# Patient Record
Sex: FEMALE | Race: WHITE | Employment: OTHER | URBAN - METROPOLITAN AREA
[De-identification: names, ages, dates, MRNs, and addresses within clinical notes are randomized per-mention and may not be internally consistent; named-entity substitution may affect disease eponyms.]

---

## 2017-02-03 ENCOUNTER — HOSPITAL ENCOUNTER (OUTPATIENT)
Dept: GENERAL RADIOLOGY | Age: 37
Discharge: HOME | End: 2017-02-03
Payer: MEDICAID

## 2017-02-03 ENCOUNTER — HOSPITAL ENCOUNTER (OUTPATIENT)
Dept: WOMENS IMAGING | Age: 37
Discharge: HOME | End: 2017-02-03
Payer: MEDICAID

## 2017-02-03 DIAGNOSIS — G89.29 CHRONIC RIGHT-SIDED LOW BACK PAIN, WITH SCIATICA PRESENCE UNSPECIFIED: ICD-10-CM

## 2017-02-03 DIAGNOSIS — Z13.9 SCREENING: ICD-10-CM

## 2017-02-03 DIAGNOSIS — M54.5 CHRONIC RIGHT-SIDED LOW BACK PAIN, WITH SCIATICA PRESENCE UNSPECIFIED: ICD-10-CM

## 2017-02-03 PROCEDURE — G0202 SCR MAMMO BI INCL CAD: HCPCS

## 2017-02-03 PROCEDURE — 72110 X-RAY EXAM L-2 SPINE 4/>VWS: CPT

## 2017-02-17 ENCOUNTER — OFFICE VISIT (OUTPATIENT)
Dept: SURGERY | Age: 37
End: 2017-02-17

## 2017-02-17 VITALS
TEMPERATURE: 96.1 F | HEIGHT: 65 IN | WEIGHT: 281 LBS | BODY MASS INDEX: 46.82 KG/M2 | DIASTOLIC BLOOD PRESSURE: 76 MMHG | SYSTOLIC BLOOD PRESSURE: 118 MMHG

## 2017-02-17 DIAGNOSIS — K80.10 CHRONIC CHOLECYSTITIS WITH CALCULUS: ICD-10-CM

## 2017-02-17 DIAGNOSIS — K80.10 CHRONIC CHOLECYSTITIS WITH CALCULUS: Primary | ICD-10-CM

## 2017-02-17 LAB
ALBUMIN SERPL-MCNC: 4.6 G/DL (ref 3.9–4.9)
ALP BLD-CCNC: 105 U/L (ref 40–130)
ALT SERPL-CCNC: 46 U/L (ref 0–33)
AMYLASE: 53 U/L (ref 28–100)
ANION GAP SERPL CALCULATED.3IONS-SCNC: 10 MEQ/L (ref 7–13)
AST SERPL-CCNC: 44 U/L (ref 0–35)
BILIRUB SERPL-MCNC: 0.2 MG/DL (ref 0–1.2)
BUN BLDV-MCNC: 6 MG/DL (ref 6–20)
CALCIUM SERPL-MCNC: 9.6 MG/DL (ref 8.6–10.2)
CHLORIDE BLD-SCNC: 101 MEQ/L (ref 98–107)
CO2: 27 MEQ/L (ref 22–29)
CREAT SERPL-MCNC: 0.61 MG/DL (ref 0.5–0.9)
GFR AFRICAN AMERICAN: >60
GFR NON-AFRICAN AMERICAN: >60
GLOBULIN: 2.9 G/DL (ref 2.3–3.5)
GLUCOSE BLD-MCNC: 127 MG/DL (ref 74–109)
POTASSIUM SERPL-SCNC: 4 MEQ/L (ref 3.5–5.1)
SODIUM BLD-SCNC: 138 MEQ/L (ref 132–144)
TOTAL PROTEIN: 7.5 G/DL (ref 6.4–8.1)

## 2017-02-17 PROCEDURE — 99203 OFFICE O/P NEW LOW 30 MIN: CPT | Performed by: SURGERY

## 2017-02-17 RX ORDER — LITHIUM CARBONATE 300 MG/1
300 CAPSULE ORAL
COMMUNITY
End: 2018-09-28 | Stop reason: SDUPTHER

## 2017-02-17 RX ORDER — SIMVASTATIN 40 MG
TABLET ORAL
COMMUNITY
Start: 2017-02-10 | End: 2017-07-19 | Stop reason: SDUPTHER

## 2017-02-17 RX ORDER — ORPHENADRINE CITRATE 100 MG/1
TABLET, EXTENDED RELEASE ORAL
Refills: 0 | COMMUNITY
Start: 2017-02-10 | End: 2017-07-19

## 2017-02-17 RX ORDER — CEPHALEXIN 250 MG/1
250 CAPSULE ORAL 3 TIMES DAILY
Qty: 42 CAPSULE | Refills: 1 | Status: SHIPPED | OUTPATIENT
Start: 2017-02-17 | End: 2017-03-03

## 2017-02-17 RX ORDER — SERTRALINE HYDROCHLORIDE 100 MG/1
100 TABLET, FILM COATED ORAL DAILY
COMMUNITY
End: 2018-09-28 | Stop reason: SDUPTHER

## 2017-02-17 RX ORDER — HYDROCODONE BITARTRATE AND ACETAMINOPHEN 5; 325 MG/1; MG/1
TABLET ORAL
Qty: 40 TABLET | Refills: 0 | Status: SHIPPED | OUTPATIENT
Start: 2017-02-17 | End: 2017-03-02

## 2017-02-17 ASSESSMENT — ENCOUNTER SYMPTOMS
EYES NEGATIVE: 1
RESPIRATORY NEGATIVE: 1
ALLERGIC/IMMUNOLOGIC NEGATIVE: 1
BLOOD IN STOOL: 0
RECTAL PAIN: 0
COLOR CHANGE: 0
RHINORRHEA: 0
NAUSEA: 1
ABDOMINAL DISTENTION: 0
SHORTNESS OF BREATH: 0
ABDOMINAL PAIN: 1
CHEST TIGHTNESS: 0
BACK PAIN: 1
VOMITING: 1

## 2017-02-18 ENCOUNTER — HOSPITAL ENCOUNTER (OUTPATIENT)
Dept: ULTRASOUND IMAGING | Age: 37
Discharge: HOME | End: 2017-02-18
Payer: MEDICAID

## 2017-02-18 DIAGNOSIS — K80.10 CHRONIC CHOLECYSTITIS WITH CALCULUS: ICD-10-CM

## 2017-02-18 PROCEDURE — 76705 ECHO EXAM OF ABDOMEN: CPT

## 2017-02-23 ENCOUNTER — PREP FOR PROCEDURE (OUTPATIENT)
Dept: SURGERY | Age: 37
End: 2017-02-23

## 2017-02-23 ENCOUNTER — OFFICE VISIT (OUTPATIENT)
Dept: SURGERY | Age: 37
End: 2017-02-23

## 2017-02-23 VITALS
DIASTOLIC BLOOD PRESSURE: 84 MMHG | HEIGHT: 65 IN | SYSTOLIC BLOOD PRESSURE: 122 MMHG | WEIGHT: 274 LBS | BODY MASS INDEX: 45.65 KG/M2 | TEMPERATURE: 95.6 F

## 2017-02-23 DIAGNOSIS — K80.10 CHRONIC CHOLECYSTITIS WITH CALCULUS: Primary | ICD-10-CM

## 2017-02-23 PROCEDURE — 99213 OFFICE O/P EST LOW 20 MIN: CPT | Performed by: SURGERY

## 2017-02-23 RX ORDER — OXYCODONE HYDROCHLORIDE AND ACETAMINOPHEN 5; 325 MG/1; MG/1
TABLET ORAL
Qty: 40 TABLET | Refills: 0 | Status: SHIPPED | OUTPATIENT
Start: 2017-02-23 | End: 2017-07-19

## 2017-02-23 RX ORDER — FLUCONAZOLE 150 MG/1
150 TABLET ORAL DAILY
Qty: 5 TABLET | Refills: 0 | Status: SHIPPED | OUTPATIENT
Start: 2017-02-23 | End: 2017-02-24

## 2017-02-27 ENCOUNTER — ANESTHESIA EVENT (OUTPATIENT)
Dept: OPERATING ROOM | Age: 37
End: 2017-02-27
Payer: MEDICAID

## 2017-02-28 ENCOUNTER — HOSPITAL ENCOUNTER (OUTPATIENT)
Dept: GENERAL RADIOLOGY | Age: 37
Setting detail: OUTPATIENT SURGERY
Discharge: HOME | End: 2017-02-28
Attending: SURGERY
Payer: MEDICAID

## 2017-02-28 ENCOUNTER — HOSPITAL ENCOUNTER (OUTPATIENT)
Age: 37
Setting detail: OUTPATIENT SURGERY
Discharge: HOME | End: 2017-02-28
Attending: SURGERY | Admitting: SURGERY
Payer: MEDICAID

## 2017-02-28 ENCOUNTER — ANESTHESIA (OUTPATIENT)
Dept: OPERATING ROOM | Age: 37
End: 2017-02-28
Payer: MEDICAID

## 2017-02-28 ENCOUNTER — SURGERY (OUTPATIENT)
Age: 37
End: 2017-02-28

## 2017-02-28 VITALS — TEMPERATURE: 96.8 F | SYSTOLIC BLOOD PRESSURE: 116 MMHG | OXYGEN SATURATION: 93 % | DIASTOLIC BLOOD PRESSURE: 63 MMHG

## 2017-02-28 VITALS
DIASTOLIC BLOOD PRESSURE: 83 MMHG | TEMPERATURE: 98 F | RESPIRATION RATE: 20 BRPM | SYSTOLIC BLOOD PRESSURE: 122 MMHG | HEART RATE: 83 BPM | HEIGHT: 65 IN | OXYGEN SATURATION: 98 % | WEIGHT: 278 LBS | BODY MASS INDEX: 46.32 KG/M2

## 2017-02-28 DIAGNOSIS — K80.50 STONES COMMON DUCT: ICD-10-CM

## 2017-02-28 LAB
GLUCOSE BLD-MCNC: 120 MG/DL (ref 60–115)
GLUCOSE BLD-MCNC: 137 MG/DL (ref 60–115)
HCT VFR BLD CALC: 44.7 % (ref 37–47)
HEMOGLOBIN: 14.5 G/DL (ref 12–16)
MCH RBC QN AUTO: 27.2 PG (ref 27–31.3)
MCHC RBC AUTO-ENTMCNC: 32.4 % (ref 33–37)
MCV RBC AUTO: 84 FL (ref 82–100)
PDW BLD-RTO: 15.9 % (ref 11.5–14.5)
PERFORMED ON: ABNORMAL
PERFORMED ON: ABNORMAL
PLATELET # BLD: 204 K/UL (ref 130–400)
RBC # BLD: 5.32 M/UL (ref 4.2–5.4)
WBC # BLD: 11.4 K/UL (ref 4.8–10.8)

## 2017-02-28 PROCEDURE — 88304 TISSUE EXAM BY PATHOLOGIST: CPT

## 2017-02-28 PROCEDURE — 6360000002 HC RX W HCPCS

## 2017-02-28 PROCEDURE — 6360000002 HC RX W HCPCS: Performed by: SURGERY

## 2017-02-28 PROCEDURE — 6360000002 HC RX W HCPCS: Performed by: NURSE ANESTHETIST, CERTIFIED REGISTERED

## 2017-02-28 PROCEDURE — 85027 COMPLETE CBC AUTOMATED: CPT

## 2017-02-28 PROCEDURE — 87070 CULTURE OTHR SPECIMN AEROBIC: CPT

## 2017-02-28 PROCEDURE — 2580000003 HC RX 258: Performed by: STUDENT IN AN ORGANIZED HEALTH CARE EDUCATION/TRAINING PROGRAM

## 2017-02-28 PROCEDURE — 7100000001 HC PACU RECOVERY - ADDTL 15 MIN: Performed by: SURGERY

## 2017-02-28 PROCEDURE — 3700000001 HC ADD 15 MINUTES (ANESTHESIA): Performed by: SURGERY

## 2017-02-28 PROCEDURE — 7100000011 HC PHASE II RECOVERY - ADDTL 15 MIN: Performed by: SURGERY

## 2017-02-28 PROCEDURE — 3600000004 HC SURGERY LEVEL 4 BASE: Performed by: SURGERY

## 2017-02-28 PROCEDURE — 2500000003 HC RX 250 WO HCPCS: Performed by: STUDENT IN AN ORGANIZED HEALTH CARE EDUCATION/TRAINING PROGRAM

## 2017-02-28 PROCEDURE — 87075 CULTR BACTERIA EXCEPT BLOOD: CPT

## 2017-02-28 PROCEDURE — 76000 FLUOROSCOPY <1 HR PHYS/QHP: CPT

## 2017-02-28 PROCEDURE — 2580000003 HC RX 258: Performed by: SURGERY

## 2017-02-28 PROCEDURE — 3700000000 HC ANESTHESIA ATTENDED CARE: Performed by: SURGERY

## 2017-02-28 PROCEDURE — 47563 LAPARO CHOLECYSTECTOMY/GRAPH: CPT | Performed by: SURGERY

## 2017-02-28 PROCEDURE — 6370000000 HC RX 637 (ALT 250 FOR IP): Performed by: SURGERY

## 2017-02-28 PROCEDURE — 3600000014 HC SURGERY LEVEL 4 ADDTL 15MIN: Performed by: SURGERY

## 2017-02-28 PROCEDURE — 2500000003 HC RX 250 WO HCPCS: Performed by: NURSE ANESTHETIST, CERTIFIED REGISTERED

## 2017-02-28 PROCEDURE — 6360000002 HC RX W HCPCS: Performed by: ANESTHESIOLOGY

## 2017-02-28 PROCEDURE — 87205 SMEAR GRAM STAIN: CPT

## 2017-02-28 PROCEDURE — 7100000010 HC PHASE II RECOVERY - FIRST 15 MIN: Performed by: SURGERY

## 2017-02-28 PROCEDURE — 7100000000 HC PACU RECOVERY - FIRST 15 MIN: Performed by: SURGERY

## 2017-02-28 PROCEDURE — 6360000004 HC RX CONTRAST MEDICATION: Performed by: SURGERY

## 2017-02-28 RX ORDER — OXYCODONE HYDROCHLORIDE AND ACETAMINOPHEN 5; 325 MG/1; MG/1
1 TABLET ORAL EVERY 4 HOURS PRN
Status: DISCONTINUED | OUTPATIENT
Start: 2017-02-28 | End: 2017-02-28 | Stop reason: HOSPADM

## 2017-02-28 RX ORDER — PROPOFOL 10 MG/ML
INJECTION, EMULSION INTRAVENOUS PRN
Status: DISCONTINUED | OUTPATIENT
Start: 2017-02-28 | End: 2017-02-28 | Stop reason: SDUPTHER

## 2017-02-28 RX ORDER — ROCURONIUM BROMIDE 10 MG/ML
INJECTION, SOLUTION INTRAVENOUS PRN
Status: DISCONTINUED | OUTPATIENT
Start: 2017-02-28 | End: 2017-02-28 | Stop reason: SDUPTHER

## 2017-02-28 RX ORDER — SODIUM CHLORIDE, SODIUM LACTATE, POTASSIUM CHLORIDE, CALCIUM CHLORIDE 600; 310; 30; 20 MG/100ML; MG/100ML; MG/100ML; MG/100ML
INJECTION, SOLUTION INTRAVENOUS CONTINUOUS
Status: DISCONTINUED | OUTPATIENT
Start: 2017-02-28 | End: 2017-02-28 | Stop reason: SDUPTHER

## 2017-02-28 RX ORDER — HYDROMORPHONE HCL 110MG/55ML
PATIENT CONTROLLED ANALGESIA SYRINGE INTRAVENOUS PRN
Status: DISCONTINUED | OUTPATIENT
Start: 2017-02-28 | End: 2017-02-28 | Stop reason: SDUPTHER

## 2017-02-28 RX ORDER — SODIUM CHLORIDE 0.9 % (FLUSH) 0.9 %
10 SYRINGE (ML) INJECTION PRN
Status: DISCONTINUED | OUTPATIENT
Start: 2017-02-28 | End: 2017-02-28 | Stop reason: HOSPADM

## 2017-02-28 RX ORDER — FENTANYL CITRATE 50 UG/ML
INJECTION, SOLUTION INTRAMUSCULAR; INTRAVENOUS PRN
Status: DISCONTINUED | OUTPATIENT
Start: 2017-02-28 | End: 2017-02-28 | Stop reason: SDUPTHER

## 2017-02-28 RX ORDER — PROMETHAZINE HYDROCHLORIDE 25 MG/ML
25 INJECTION, SOLUTION INTRAMUSCULAR; INTRAVENOUS
Status: DISCONTINUED | OUTPATIENT
Start: 2017-02-28 | End: 2017-02-28 | Stop reason: HOSPADM

## 2017-02-28 RX ORDER — SODIUM CHLORIDE 0.9 % (FLUSH) 0.9 %
10 SYRINGE (ML) INJECTION EVERY 12 HOURS SCHEDULED
Status: DISCONTINUED | OUTPATIENT
Start: 2017-02-28 | End: 2017-02-28 | Stop reason: SDUPTHER

## 2017-02-28 RX ORDER — ACETAMINOPHEN 325 MG/1
650 TABLET ORAL EVERY 4 HOURS PRN
Status: DISCONTINUED | OUTPATIENT
Start: 2017-02-28 | End: 2017-02-28 | Stop reason: HOSPADM

## 2017-02-28 RX ORDER — ONDANSETRON 2 MG/ML
4 INJECTION INTRAMUSCULAR; INTRAVENOUS EVERY 6 HOURS PRN
Status: DISCONTINUED | OUTPATIENT
Start: 2017-02-28 | End: 2017-02-28 | Stop reason: HOSPADM

## 2017-02-28 RX ORDER — OXYCODONE HYDROCHLORIDE AND ACETAMINOPHEN 5; 325 MG/1; MG/1
2 TABLET ORAL EVERY 4 HOURS PRN
Status: DISCONTINUED | OUTPATIENT
Start: 2017-02-28 | End: 2017-02-28 | Stop reason: HOSPADM

## 2017-02-28 RX ORDER — MORPHINE SULFATE 2 MG/ML
1 INJECTION, SOLUTION INTRAMUSCULAR; INTRAVENOUS
Status: DISCONTINUED | OUTPATIENT
Start: 2017-02-28 | End: 2017-02-28 | Stop reason: HOSPADM

## 2017-02-28 RX ORDER — SODIUM CHLORIDE 0.9 % (FLUSH) 0.9 %
10 SYRINGE (ML) INJECTION EVERY 12 HOURS SCHEDULED
Status: DISCONTINUED | OUTPATIENT
Start: 2017-02-28 | End: 2017-02-28 | Stop reason: HOSPADM

## 2017-02-28 RX ORDER — LIDOCAINE HYDROCHLORIDE 10 MG/ML
1 INJECTION, SOLUTION EPIDURAL; INFILTRATION; INTRACAUDAL; PERINEURAL
Status: COMPLETED | OUTPATIENT
Start: 2017-02-28 | End: 2017-02-28

## 2017-02-28 RX ORDER — FENTANYL CITRATE 50 UG/ML
50 INJECTION, SOLUTION INTRAMUSCULAR; INTRAVENOUS EVERY 5 MIN PRN
Status: COMPLETED | OUTPATIENT
Start: 2017-02-28 | End: 2017-02-28

## 2017-02-28 RX ORDER — SUCCINYLCHOLINE CHLORIDE 20 MG/ML
INJECTION INTRAMUSCULAR; INTRAVENOUS PRN
Status: DISCONTINUED | OUTPATIENT
Start: 2017-02-28 | End: 2017-02-28 | Stop reason: SDUPTHER

## 2017-02-28 RX ORDER — SODIUM CHLORIDE, SODIUM LACTATE, POTASSIUM CHLORIDE, CALCIUM CHLORIDE 600; 310; 30; 20 MG/100ML; MG/100ML; MG/100ML; MG/100ML
INJECTION, SOLUTION INTRAVENOUS CONTINUOUS
Status: DISCONTINUED | OUTPATIENT
Start: 2017-02-28 | End: 2017-02-28 | Stop reason: HOSPADM

## 2017-02-28 RX ORDER — LIDOCAINE HYDROCHLORIDE 20 MG/ML
INJECTION, SOLUTION INFILTRATION; PERINEURAL PRN
Status: DISCONTINUED | OUTPATIENT
Start: 2017-02-28 | End: 2017-02-28 | Stop reason: SDUPTHER

## 2017-02-28 RX ORDER — MAGNESIUM HYDROXIDE 1200 MG/15ML
LIQUID ORAL CONTINUOUS PRN
Status: DISCONTINUED | OUTPATIENT
Start: 2017-02-28 | End: 2017-02-28 | Stop reason: HOSPADM

## 2017-02-28 RX ORDER — KETOROLAC TROMETHAMINE 30 MG/ML
30 INJECTION, SOLUTION INTRAMUSCULAR; INTRAVENOUS
Status: COMPLETED | OUTPATIENT
Start: 2017-02-28 | End: 2017-02-28

## 2017-02-28 RX ORDER — ONDANSETRON 2 MG/ML
4 INJECTION INTRAMUSCULAR; INTRAVENOUS
Status: DISCONTINUED | OUTPATIENT
Start: 2017-02-28 | End: 2017-02-28 | Stop reason: HOSPADM

## 2017-02-28 RX ORDER — SODIUM CHLORIDE 0.9 % (FLUSH) 0.9 %
10 SYRINGE (ML) INJECTION PRN
Status: DISCONTINUED | OUTPATIENT
Start: 2017-02-28 | End: 2017-02-28 | Stop reason: SDUPTHER

## 2017-02-28 RX ORDER — ONDANSETRON 2 MG/ML
INJECTION INTRAMUSCULAR; INTRAVENOUS PRN
Status: DISCONTINUED | OUTPATIENT
Start: 2017-02-28 | End: 2017-02-28 | Stop reason: SDUPTHER

## 2017-02-28 RX ORDER — MIDAZOLAM HYDROCHLORIDE 1 MG/ML
INJECTION INTRAMUSCULAR; INTRAVENOUS
Status: COMPLETED
Start: 2017-02-28 | End: 2017-02-28

## 2017-02-28 RX ADMIN — HYDROMORPHONE HYDROCHLORIDE 0.5 MG: 1 INJECTION, SOLUTION INTRAMUSCULAR; INTRAVENOUS; SUBCUTANEOUS at 14:00

## 2017-02-28 RX ADMIN — SODIUM CHLORIDE 1000 ML: 900 IRRIGANT IRRIGATION at 12:40

## 2017-02-28 RX ADMIN — LIDOCAINE HYDROCHLORIDE 0.1 ML: 10 INJECTION, SOLUTION EPIDURAL; INFILTRATION; INTRACAUDAL; PERINEURAL at 10:45

## 2017-02-28 RX ADMIN — HYDROMORPHONE HYDROCHLORIDE 0.4 MG: 2 INJECTION, SOLUTION INTRAMUSCULAR; INTRAVENOUS; SUBCUTANEOUS at 13:15

## 2017-02-28 RX ADMIN — HYDROMORPHONE HYDROCHLORIDE 0.5 MG: 1 INJECTION, SOLUTION INTRAMUSCULAR; INTRAVENOUS; SUBCUTANEOUS at 14:10

## 2017-02-28 RX ADMIN — ONDANSETRON HYDROCHLORIDE 4 MG: 2 INJECTION, SOLUTION INTRAMUSCULAR; INTRAVENOUS at 13:05

## 2017-02-28 RX ADMIN — DEXTROSE MONOHYDRATE 3 G: 50 INJECTION, SOLUTION INTRAVENOUS at 12:12

## 2017-02-28 RX ADMIN — FENTANYL CITRATE 50 MCG: 50 INJECTION, SOLUTION INTRAMUSCULAR; INTRAVENOUS at 12:35

## 2017-02-28 RX ADMIN — IOTHALAMATE MEGLUMINE 9 ML: 430 INJECTION INTRAVASCULAR at 13:19

## 2017-02-28 RX ADMIN — FENTANYL CITRATE 50 MCG: 50 INJECTION, SOLUTION INTRAMUSCULAR; INTRAVENOUS at 13:51

## 2017-02-28 RX ADMIN — LIDOCAINE HYDROCHLORIDE 100 MG: 20 INJECTION, SOLUTION INFILTRATION; PERINEURAL at 12:20

## 2017-02-28 RX ADMIN — FENTANYL CITRATE 50 MCG: 50 INJECTION, SOLUTION INTRAMUSCULAR; INTRAVENOUS at 13:44

## 2017-02-28 RX ADMIN — SODIUM CHLORIDE, POTASSIUM CHLORIDE, SODIUM LACTATE AND CALCIUM CHLORIDE: 600; 310; 30; 20 INJECTION, SOLUTION INTRAVENOUS at 13:10

## 2017-02-28 RX ADMIN — HYDROMORPHONE HYDROCHLORIDE 0.4 MG: 2 INJECTION, SOLUTION INTRAMUSCULAR; INTRAVENOUS; SUBCUTANEOUS at 13:00

## 2017-02-28 RX ADMIN — Medication 1 STICK: at 13:00

## 2017-02-28 RX ADMIN — HYDROMORPHONE HYDROCHLORIDE 0.4 MG: 2 INJECTION, SOLUTION INTRAMUSCULAR; INTRAVENOUS; SUBCUTANEOUS at 13:27

## 2017-02-28 RX ADMIN — HYDROMORPHONE HYDROCHLORIDE 0.4 MG: 2 INJECTION, SOLUTION INTRAMUSCULAR; INTRAVENOUS; SUBCUTANEOUS at 13:28

## 2017-02-28 RX ADMIN — MIDAZOLAM HYDROCHLORIDE 2 MG: 1 INJECTION, SOLUTION INTRAMUSCULAR; INTRAVENOUS at 12:12

## 2017-02-28 RX ADMIN — FENTANYL CITRATE 50 MCG: 50 INJECTION, SOLUTION INTRAMUSCULAR; INTRAVENOUS at 12:45

## 2017-02-28 RX ADMIN — OXYCODONE HYDROCHLORIDE AND ACETAMINOPHEN 2 TABLET: 5; 325 TABLET ORAL at 15:05

## 2017-02-28 RX ADMIN — SODIUM CHLORIDE, POTASSIUM CHLORIDE, SODIUM LACTATE AND CALCIUM CHLORIDE: 600; 310; 30; 20 INJECTION, SOLUTION INTRAVENOUS at 10:45

## 2017-02-28 RX ADMIN — KETOROLAC TROMETHAMINE 30 MG: 30 INJECTION, SOLUTION INTRAMUSCULAR at 10:45

## 2017-02-28 RX ADMIN — ROCURONIUM BROMIDE 30 MG: 10 SOLUTION INTRAVENOUS at 12:28

## 2017-02-28 RX ADMIN — FENTANYL CITRATE 100 MCG: 50 INJECTION, SOLUTION INTRAMUSCULAR; INTRAVENOUS at 12:20

## 2017-02-28 RX ADMIN — HYDROMORPHONE HYDROCHLORIDE 0.5 MG: 1 INJECTION, SOLUTION INTRAMUSCULAR; INTRAVENOUS; SUBCUTANEOUS at 14:20

## 2017-02-28 RX ADMIN — SUGAMMADEX 252 MG: 100 INJECTION, SOLUTION INTRAVENOUS at 13:15

## 2017-02-28 RX ADMIN — ROCURONIUM BROMIDE 10 MG: 10 SOLUTION INTRAVENOUS at 12:20

## 2017-02-28 RX ADMIN — PROPOFOL 200 MG: 10 INJECTION, EMULSION INTRAVENOUS at 12:20

## 2017-02-28 RX ADMIN — SODIUM CHLORIDE 1000 ML: 900 IRRIGANT IRRIGATION at 12:41

## 2017-02-28 RX ADMIN — HYDROMORPHONE HYDROCHLORIDE 0.4 MG: 2 INJECTION, SOLUTION INTRAMUSCULAR; INTRAVENOUS; SUBCUTANEOUS at 13:20

## 2017-02-28 RX ADMIN — HYDROMORPHONE HYDROCHLORIDE 0.5 MG: 1 INJECTION, SOLUTION INTRAMUSCULAR; INTRAVENOUS; SUBCUTANEOUS at 14:29

## 2017-02-28 RX ADMIN — SUCCINYLCHOLINE CHLORIDE 140 MG: 20 INJECTION, SOLUTION INTRAMUSCULAR; INTRAVENOUS at 12:20

## 2017-02-28 ASSESSMENT — PAIN SCALES - GENERAL
PAINLEVEL_OUTOF10: 5
PAINLEVEL_OUTOF10: 9
PAINLEVEL_OUTOF10: 8
PAINLEVEL_OUTOF10: 0
PAINLEVEL_OUTOF10: 4
PAINLEVEL_OUTOF10: 7
PAINLEVEL_OUTOF10: 3
PAINLEVEL_OUTOF10: 6
PAINLEVEL_OUTOF10: 5
PAINLEVEL_OUTOF10: 9
PAINLEVEL_OUTOF10: 10
PAINLEVEL_OUTOF10: 6

## 2017-02-28 ASSESSMENT — ENCOUNTER SYMPTOMS: STRIDOR: 0

## 2017-03-01 ENCOUNTER — TELEPHONE (OUTPATIENT)
Dept: SURGERY | Age: 37
End: 2017-03-01

## 2017-03-02 ENCOUNTER — OFFICE VISIT (OUTPATIENT)
Dept: SURGERY | Age: 37
End: 2017-03-02

## 2017-03-02 VITALS
HEIGHT: 65 IN | SYSTOLIC BLOOD PRESSURE: 116 MMHG | TEMPERATURE: 96.9 F | WEIGHT: 280 LBS | BODY MASS INDEX: 46.65 KG/M2 | DIASTOLIC BLOOD PRESSURE: 78 MMHG

## 2017-03-02 DIAGNOSIS — K80.10 CHRONIC CHOLECYSTITIS WITH CALCULUS: Primary | ICD-10-CM

## 2017-03-02 LAB
ANAEROBIC CULTURE: NORMAL
CULTURE SURGICAL: NORMAL
GRAM STAIN RESULT: NORMAL

## 2017-03-02 PROCEDURE — 99024 POSTOP FOLLOW-UP VISIT: CPT | Performed by: SURGERY

## 2017-03-02 RX ORDER — OXYCODONE HYDROCHLORIDE AND ACETAMINOPHEN 5; 325 MG/1; MG/1
TABLET ORAL
Qty: 40 TABLET | Refills: 0 | Status: SHIPPED | OUTPATIENT
Start: 2017-03-02 | End: 2017-07-19

## 2017-03-03 PROBLEM — F19.11 HISTORY OF DRUG ABUSE (HCC): Status: ACTIVE | Noted: 2017-03-03

## 2017-06-15 PROBLEM — M47.816 SPONDYLOSIS OF LUMBAR REGION WITHOUT MYELOPATHY OR RADICULOPATHY: Status: ACTIVE | Noted: 2017-06-15

## 2017-07-19 ENCOUNTER — OFFICE VISIT (OUTPATIENT)
Dept: PRIMARY CARE CLINIC | Age: 37
End: 2017-07-19

## 2017-07-19 VITALS
HEIGHT: 65 IN | WEIGHT: 265 LBS | BODY MASS INDEX: 44.15 KG/M2 | RESPIRATION RATE: 16 BRPM | SYSTOLIC BLOOD PRESSURE: 120 MMHG | DIASTOLIC BLOOD PRESSURE: 76 MMHG | HEART RATE: 95 BPM | OXYGEN SATURATION: 93 % | TEMPERATURE: 98 F

## 2017-07-19 DIAGNOSIS — Z20.5 PERINATAL HEPATITIS C EXPOSURE: ICD-10-CM

## 2017-07-19 DIAGNOSIS — E78.5 DYSLIPIDEMIA: ICD-10-CM

## 2017-07-19 DIAGNOSIS — R53.81 MALAISE AND FATIGUE: ICD-10-CM

## 2017-07-19 DIAGNOSIS — R53.83 MALAISE AND FATIGUE: ICD-10-CM

## 2017-07-19 DIAGNOSIS — E11.51 DM (DIABETES MELLITUS) TYPE II CONTROLLED PERIPHERAL VASCULAR DISORDER: Primary | ICD-10-CM

## 2017-07-19 DIAGNOSIS — E11.51 DM (DIABETES MELLITUS) TYPE II CONTROLLED PERIPHERAL VASCULAR DISORDER: ICD-10-CM

## 2017-07-19 LAB
CHOLESTEROL, TOTAL: 240 MG/DL (ref 0–199)
CREATININE URINE: 109.9 MG/DL
HBA1C MFR BLD: 6.3 % (ref 4.8–5.9)
HDLC SERPL-MCNC: 30 MG/DL (ref 40–59)
LDL CHOLESTEROL CALCULATED: ABNORMAL MG/DL (ref 0–129)
MICROALBUMIN UR-MCNC: <1.2 MG/DL
MICROALBUMIN/CREAT UR-RTO: NORMAL MG/G (ref 0–30)
T4 FREE: 1.11 NG/DL (ref 0.93–1.7)
TRIGL SERPL-MCNC: 542 MG/DL (ref 0–200)
TSH SERPL DL<=0.05 MIU/L-ACNC: 3.74 UIU/ML (ref 0.27–4.2)

## 2017-07-19 PROCEDURE — 99203 OFFICE O/P NEW LOW 30 MIN: CPT | Performed by: FAMILY MEDICINE

## 2017-07-19 RX ORDER — TRAMADOL HYDROCHLORIDE 50 MG/1
50 TABLET ORAL EVERY 6 HOURS PRN
Qty: 60 TABLET | Refills: 1 | Status: SHIPPED | OUTPATIENT
Start: 2017-07-19 | End: 2017-08-16 | Stop reason: SDUPTHER

## 2017-07-19 RX ORDER — SIMVASTATIN 40 MG
40 TABLET ORAL NIGHTLY
Qty: 90 TABLET | Refills: 1 | Status: SHIPPED | OUTPATIENT
Start: 2017-07-19 | End: 2018-11-23 | Stop reason: DRUGHIGH

## 2017-07-19 ASSESSMENT — ENCOUNTER SYMPTOMS
SINUS PRESSURE: 0
DIARRHEA: 1
NAUSEA: 0
RESPIRATORY NEGATIVE: 1
COUGH: 0
VOMITING: 0
ABDOMINAL PAIN: 0
SORE THROAT: 0
BLURRED VISION: 0
CONSTIPATION: 0
BACK PAIN: 0
WHEEZING: 0
SHORTNESS OF BREATH: 0

## 2017-07-19 ASSESSMENT — PATIENT HEALTH QUESTIONNAIRE - PHQ9
SUM OF ALL RESPONSES TO PHQ QUESTIONS 1-9: 0
2. FEELING DOWN, DEPRESSED OR HOPELESS: 0
1. LITTLE INTEREST OR PLEASURE IN DOING THINGS: 0
SUM OF ALL RESPONSES TO PHQ9 QUESTIONS 1 & 2: 0

## 2017-07-22 LAB — HIV-1 WESTERN BLOT: NEGATIVE

## 2017-08-16 ENCOUNTER — TELEPHONE (OUTPATIENT)
Dept: PRIMARY CARE CLINIC | Age: 37
End: 2017-08-16

## 2017-08-16 ENCOUNTER — OFFICE VISIT (OUTPATIENT)
Dept: PRIMARY CARE CLINIC | Age: 37
End: 2017-08-16

## 2017-08-16 VITALS
HEIGHT: 65 IN | SYSTOLIC BLOOD PRESSURE: 108 MMHG | WEIGHT: 263 LBS | DIASTOLIC BLOOD PRESSURE: 70 MMHG | TEMPERATURE: 97.2 F | RESPIRATION RATE: 16 BRPM | HEART RATE: 95 BPM | BODY MASS INDEX: 43.82 KG/M2 | OXYGEN SATURATION: 97 %

## 2017-08-16 DIAGNOSIS — M47.816 SPONDYLOSIS OF LUMBAR REGION WITHOUT MYELOPATHY OR RADICULOPATHY: Primary | ICD-10-CM

## 2017-08-16 PROCEDURE — 99213 OFFICE O/P EST LOW 20 MIN: CPT | Performed by: FAMILY MEDICINE

## 2017-08-16 RX ORDER — TRAMADOL HYDROCHLORIDE 50 MG/1
50 TABLET ORAL EVERY 6 HOURS PRN
Qty: 60 TABLET | Refills: 1 | Status: SHIPPED | OUTPATIENT
Start: 2017-08-16 | End: 2018-10-12

## 2017-08-16 RX ORDER — FENOFIBRATE 160 MG/1
160 TABLET ORAL DAILY
Qty: 30 TABLET | Refills: 5 | Status: SHIPPED | OUTPATIENT
Start: 2017-08-16 | End: 2018-07-10

## 2017-08-16 ASSESSMENT — ENCOUNTER SYMPTOMS
SORE THROAT: 0
DIARRHEA: 0
WHEEZING: 0
COUGH: 0
CONSTIPATION: 0
RESPIRATORY NEGATIVE: 1
ABDOMINAL PAIN: 0
BACK PAIN: 1
SINUS PRESSURE: 0
VOMITING: 0
NAUSEA: 0
SHORTNESS OF BREATH: 0

## 2017-08-24 ENCOUNTER — HOSPITAL ENCOUNTER (OUTPATIENT)
Dept: MRI IMAGING | Age: 37
Discharge: HOME | End: 2017-08-24
Payer: MEDICAID

## 2017-08-24 VITALS — WEIGHT: 265 LBS | BODY MASS INDEX: 44.15 KG/M2 | HEIGHT: 65 IN

## 2017-08-24 DIAGNOSIS — M47.816 SPONDYLOSIS OF LUMBAR REGION WITHOUT MYELOPATHY OR RADICULOPATHY: ICD-10-CM

## 2017-08-24 PROCEDURE — 72148 MRI LUMBAR SPINE W/O DYE: CPT

## 2018-07-10 ENCOUNTER — OFFICE VISIT (OUTPATIENT)
Dept: PRIMARY CARE CLINIC | Age: 38
End: 2018-07-10
Payer: MEDICAID

## 2018-07-10 VITALS
WEIGHT: 259 LBS | BODY MASS INDEX: 43.15 KG/M2 | SYSTOLIC BLOOD PRESSURE: 126 MMHG | HEART RATE: 97 BPM | DIASTOLIC BLOOD PRESSURE: 80 MMHG | OXYGEN SATURATION: 98 % | HEIGHT: 65 IN | RESPIRATION RATE: 16 BRPM | TEMPERATURE: 99.4 F

## 2018-07-10 DIAGNOSIS — R73.9 HYPERGLYCEMIA: ICD-10-CM

## 2018-07-10 DIAGNOSIS — R53.81 MALAISE AND FATIGUE: ICD-10-CM

## 2018-07-10 DIAGNOSIS — R53.83 MALAISE AND FATIGUE: ICD-10-CM

## 2018-07-10 DIAGNOSIS — Z20.6 HIV EXPOSURE: ICD-10-CM

## 2018-07-10 DIAGNOSIS — E78.5 DYSLIPIDEMIA: ICD-10-CM

## 2018-07-10 DIAGNOSIS — N63.20 BREAST MASS, LEFT: Primary | ICD-10-CM

## 2018-07-10 LAB
ALBUMIN SERPL-MCNC: 4.4 G/DL (ref 3.9–4.9)
ALP BLD-CCNC: 91 U/L (ref 40–130)
ALT SERPL-CCNC: 32 U/L (ref 0–33)
ANION GAP SERPL CALCULATED.3IONS-SCNC: 15 MEQ/L (ref 7–13)
AST SERPL-CCNC: 22 U/L (ref 0–35)
BASOPHILS ABSOLUTE: 0.2 K/UL (ref 0–0.2)
BASOPHILS RELATIVE PERCENT: 1.4 %
BILIRUB SERPL-MCNC: <0.2 MG/DL (ref 0–1.2)
BUN BLDV-MCNC: 7 MG/DL (ref 6–20)
CALCIUM SERPL-MCNC: 9.7 MG/DL (ref 8.6–10.2)
CHLORIDE BLD-SCNC: 102 MEQ/L (ref 98–107)
CHOLESTEROL, TOTAL: 221 MG/DL (ref 0–199)
CO2: 24 MEQ/L (ref 22–29)
CREAT SERPL-MCNC: 0.53 MG/DL (ref 0.5–0.9)
EOSINOPHILS ABSOLUTE: 0.4 K/UL (ref 0–0.7)
EOSINOPHILS RELATIVE PERCENT: 2.6 %
GFR AFRICAN AMERICAN: >60
GFR NON-AFRICAN AMERICAN: >60
GLOBULIN: 3.2 G/DL (ref 2.3–3.5)
GLUCOSE BLD-MCNC: 65 MG/DL (ref 74–109)
HBA1C MFR BLD: 6.1 % (ref 4.8–5.9)
HCT VFR BLD CALC: 46.3 % (ref 37–47)
HDLC SERPL-MCNC: 29 MG/DL (ref 40–59)
HEMOGLOBIN: 15.1 G/DL (ref 12–16)
LDL CHOLESTEROL CALCULATED: 133 MG/DL (ref 0–129)
LYMPHOCYTES ABSOLUTE: 3.7 K/UL (ref 1–4.8)
LYMPHOCYTES RELATIVE PERCENT: 24.9 %
MCH RBC QN AUTO: 27.6 PG (ref 27–31.3)
MCHC RBC AUTO-ENTMCNC: 32.7 % (ref 33–37)
MCV RBC AUTO: 84.4 FL (ref 82–100)
MONOCYTES ABSOLUTE: 0.7 K/UL (ref 0.2–0.8)
MONOCYTES RELATIVE PERCENT: 4.4 %
NEUTROPHILS ABSOLUTE: 10 K/UL (ref 1.4–6.5)
NEUTROPHILS RELATIVE PERCENT: 66.7 %
PDW BLD-RTO: 15.3 % (ref 11.5–14.5)
PLATELET # BLD: 263 K/UL (ref 130–400)
POTASSIUM SERPL-SCNC: 3.8 MEQ/L (ref 3.5–5.1)
RBC # BLD: 5.48 M/UL (ref 4.2–5.4)
SODIUM BLD-SCNC: 141 MEQ/L (ref 132–144)
TOTAL PROTEIN: 7.6 G/DL (ref 6.4–8.1)
TRIGL SERPL-MCNC: 294 MG/DL (ref 0–200)
TSH SERPL DL<=0.05 MIU/L-ACNC: 2.73 UIU/ML (ref 0.27–4.2)
WBC # BLD: 15 K/UL (ref 4.8–10.8)

## 2018-07-10 PROCEDURE — 99214 OFFICE O/P EST MOD 30 MIN: CPT | Performed by: FAMILY MEDICINE

## 2018-07-10 PROCEDURE — G8427 DOCREV CUR MEDS BY ELIG CLIN: HCPCS | Performed by: FAMILY MEDICINE

## 2018-07-10 PROCEDURE — G8417 CALC BMI ABV UP PARAM F/U: HCPCS | Performed by: FAMILY MEDICINE

## 2018-07-10 PROCEDURE — 4004F PT TOBACCO SCREEN RCVD TLK: CPT | Performed by: FAMILY MEDICINE

## 2018-07-10 ASSESSMENT — ENCOUNTER SYMPTOMS
CONSTIPATION: 0
WHEEZING: 0
RESPIRATORY NEGATIVE: 1
SHORTNESS OF BREATH: 0
EYES NEGATIVE: 1
ABDOMINAL PAIN: 0
EYE ITCHING: 0
PHOTOPHOBIA: 0
EYE REDNESS: 0
DIARRHEA: 0
BACK PAIN: 0
GASTROINTESTINAL NEGATIVE: 1

## 2018-07-10 NOTE — PROGRESS NOTES
Subjective:      Patient ID: Lurlene Phalen is a 45 y.o. female who presents today for:  Chief Complaint   Patient presents with    Fatigue     ptis here today with concerns with fatigue. pt states that she has beem picking up extra slack at home and is unsure if that is the issue. pt sates that she sleeps enough at night and sometimes takes naps during the day. HPI  Fatigue  Pt presents today for fatigue. She admits she has been picking up extra slack at home and she is unsure if this is causing fatigue. Pt states she sleeps 8-10 hours a night and takes naps during the day. Patient comes in also complaining of right breast mass. She admits she has had tenderness to the medial inner aspect of the right breast with lump formation. She is interested in having a mammogram at this time. She denies any recent trauma or injury to the breast.    Patient does admit that she has difficulty and she had to quit her job so that she can take care of her  at home. She does request a B stating such so that she may receive food stamps. Past Medical History:   Diagnosis Date    Bipolar disorder ([address]     Depression     DM (diabetes mellitus) ([facility] [facility]     History of drug abuse     positive drug screens 9/22/14 and 5/18/14    Hyperlipidemia     Osteoarthritis      Past Surgical History:   Procedure Laterality Date    CHOLECYSTECTOMY, LAPAROSCOPIC N/A 2/28/2017    CHOLECYSTECTOMY LAPAROSCOPIC WITH GRAMS POSS OPEN , RECENT LABS  performed by Nimisha Graves MD at [facility]       Family History   Problem Relation Age of Onset    Other Mother         CHF    Cirrhosis Brother     Other Maternal Grandmother         CHF     Social History     Social History    Marital status: Single     Spouse name: N/A    Number of children: N/A    Years of education: N/A     Occupational History    Not on file.      Social History Main Topics    Smoking status: Current Every Day Smoker Packs/day: 0.50     Years: 24.00     Types: Cigarettes    Smokeless tobacco: Not on file    Alcohol use No    Drug use: No    Sexual activity: Not on file     Other Topics Concern    Not on file     Social History Narrative    No narrative on file     Allergies:  Nsaids and Adhesive tape    Review of Systems   Constitutional: Positive for fatigue. Negative for activity change and appetite change. HENT: Negative. Eyes: Negative. Negative for photophobia, redness and itching. Respiratory: Negative. Negative for shortness of breath and wheezing. Cardiovascular: Negative. Gastrointestinal: Negative. Negative for abdominal pain, constipation and diarrhea. Genitourinary: Negative. Negative for hematuria, pelvic pain and urgency. Musculoskeletal: Negative. Negative for back pain. Skin: Negative. Neurological: Negative. Psychiatric/Behavioral: Negative. Negative for agitation and behavioral problems. The patient is not nervous/anxious. Objective:   /80   Pulse 97   Temp 99.4 °F (37.4 °C) (Tympanic)   Resp 16   Ht 5' 5\" (1.651 m)   Wt 259 lb (117.5 kg)   SpO2 98%   BMI 43.10 kg/m²     Physical Exam   Constitutional: She is oriented to person, place, and time. She appears well-developed and well-nourished. HENT:   Head: Normocephalic and atraumatic. Eyes: Conjunctivae and EOM are normal. Pupils are equal, round, and reactive to light. Neck: Normal range of motion. Neck supple. No thyromegaly present. Cardiovascular: Normal rate, regular rhythm and normal heart sounds. No murmur heard. Pulmonary/Chest: Effort normal and breath sounds normal. She has no wheezes. She exhibits no tenderness. Abdominal: Soft. Bowel sounds are normal. There is no tenderness. Musculoskeletal: Normal range of motion. She exhibits no edema or tenderness. Lymphadenopathy:     She has no cervical adenopathy. Neurological: She is alert and oriented to person, place, and time. She has normal reflexes. Coordination normal.   Skin: Skin is warm and dry. No rash noted. Psychiatric: Thought content normal. Her mood appears anxious. Cognition and memory are not impaired. She exhibits a depressed mood. She exhibits normal recent memory and normal remote memory. Nursing note and vitals reviewed. Assessment:      Diagnosis Orders   1. Breast mass, left  HO DIGITAL DIAGNOSTIC W OR WO CAD BILATERAL   2. HIV exposure  HIV-1 western blot   3. Malaise and fatigue  TSH without Reflex    CBC Auto Differential   4. Dyslipidemia  Comprehensive Metabolic Panel    Lipid Panel   5. Hyperglycemia  POCT glycosylated hemoglobin (Hb A1C)    Hemoglobin A1C       Plan:      Orders Placed This Encounter   Procedures    HO DIGITAL DIAGNOSTIC W OR WO CAD BILATERAL     Standing Status:   Future     Standing Expiration Date:   9/10/2019    HIV-1 western blot     Standing Status:   Future     Number of Occurrences:   1     Standing Expiration Date:   7/10/2019    Comprehensive Metabolic Panel     Standing Status:   Future     Number of Occurrences:   1     Standing Expiration Date:   8/10/2018    Lipid Panel     Standing Status:   Future     Number of Occurrences:   1     Standing Expiration Date:   8/10/2018     Order Specific Question:   Is Patient Fasting?/# of Hours     Answer:   yes    TSH without Reflex     Standing Status:   Future     Number of Occurrences:   1     Standing Expiration Date:   8/10/2018    CBC Auto Differential     Standing Status:   Future     Number of Occurrences:   1     Standing Expiration Date:   7/10/2019    Hemoglobin A1C     Standing Status:   Future     Number of Occurrences:   1     Standing Expiration Date:   7/10/2019    POCT glycosylated hemoglobin (Hb A1C)         Controlled Substances Monitoring:      Return in about 4 weeks (around 8/7/2018) for Review of Labs & Diagnostic Testing, Routine follow up.     JENNIFER Alfred, JOCELYN  , am scribing for and in the presence of Devika Isaac DO. Electronically signed by :  Patricia Moss, JENNIFER, [facility], Devika Isaac DO, personally performed the services described in this documentation, as scribed by Patricia Moss in my presence, and it is both accurate and complete.  Electronically signed by: Devika Isaac DO    7/10/18 11:18 PM    Devika Isaac DO

## 2018-07-13 LAB — HIV-1 WESTERN BLOT: NEGATIVE

## 2018-09-28 ENCOUNTER — OFFICE VISIT (OUTPATIENT)
Dept: PRIMARY CARE CLINIC | Age: 38
End: 2018-09-28
Payer: MEDICAID

## 2018-09-28 VITALS
OXYGEN SATURATION: 94 % | HEIGHT: 65 IN | RESPIRATION RATE: 16 BRPM | HEART RATE: 94 BPM | WEIGHT: 257 LBS | SYSTOLIC BLOOD PRESSURE: 124 MMHG | BODY MASS INDEX: 42.82 KG/M2 | DIASTOLIC BLOOD PRESSURE: 80 MMHG | TEMPERATURE: 98.4 F

## 2018-09-28 DIAGNOSIS — G47.33 OSA (OBSTRUCTIVE SLEEP APNEA): ICD-10-CM

## 2018-09-28 DIAGNOSIS — R10.11 RIGHT UPPER QUADRANT ABDOMINAL PAIN: ICD-10-CM

## 2018-09-28 DIAGNOSIS — F32.1 CURRENT MODERATE EPISODE OF MAJOR DEPRESSIVE DISORDER WITHOUT PRIOR EPISODE (HCC): ICD-10-CM

## 2018-09-28 DIAGNOSIS — R53.83 FATIGUE, UNSPECIFIED TYPE: ICD-10-CM

## 2018-09-28 DIAGNOSIS — R05.9 COUGH: ICD-10-CM

## 2018-09-28 DIAGNOSIS — Z79.899 LONG-TERM CURRENT USE OF LITHIUM: ICD-10-CM

## 2018-09-28 DIAGNOSIS — R10.31 RIGHT LOWER QUADRANT ABDOMINAL PAIN: ICD-10-CM

## 2018-09-28 DIAGNOSIS — J40 BRONCHITIS: Primary | ICD-10-CM

## 2018-09-28 DIAGNOSIS — R19.7 DIARRHEA, UNSPECIFIED TYPE: ICD-10-CM

## 2018-09-28 LAB
ALBUMIN SERPL-MCNC: 4.7 G/DL (ref 3.9–4.9)
ALP BLD-CCNC: 96 U/L (ref 40–130)
ALT SERPL-CCNC: 25 U/L (ref 0–33)
AMYLASE: 76 U/L (ref 28–100)
ANION GAP SERPL CALCULATED.3IONS-SCNC: 15 MEQ/L (ref 7–13)
AST SERPL-CCNC: 20 U/L (ref 0–35)
BASOPHILS ABSOLUTE: 0.2 K/UL (ref 0–0.2)
BASOPHILS RELATIVE PERCENT: 0.9 %
BILIRUB SERPL-MCNC: <0.2 MG/DL (ref 0–1.2)
BUN BLDV-MCNC: 10 MG/DL (ref 6–20)
CALCIUM SERPL-MCNC: 9.7 MG/DL (ref 8.6–10.2)
CHLORIDE BLD-SCNC: 106 MEQ/L (ref 98–107)
CO2: 20 MEQ/L (ref 22–29)
CREAT SERPL-MCNC: 0.67 MG/DL (ref 0.5–0.9)
EOSINOPHILS ABSOLUTE: 0.3 K/UL (ref 0–0.7)
EOSINOPHILS RELATIVE PERCENT: 1.5 %
GFR AFRICAN AMERICAN: >60
GFR NON-AFRICAN AMERICAN: >60
GLOBULIN: 3.5 G/DL (ref 2.3–3.5)
GLUCOSE BLD-MCNC: 106 MG/DL (ref 74–109)
HCT VFR BLD CALC: 48.9 % (ref 37–47)
HEMOGLOBIN: 16.2 G/DL (ref 12–16)
LIPASE: 52 U/L (ref 13–60)
LITHIUM LEVEL: 0.2 MEQ/L (ref 0.6–1.2)
LYMPHOCYTES ABSOLUTE: 3.9 K/UL (ref 1–4.8)
LYMPHOCYTES RELATIVE PERCENT: 22.3 %
MCH RBC QN AUTO: 28 PG (ref 27–31.3)
MCHC RBC AUTO-ENTMCNC: 33 % (ref 33–37)
MCV RBC AUTO: 84.8 FL (ref 82–100)
MONOCYTES ABSOLUTE: 0.8 K/UL (ref 0.2–0.8)
MONOCYTES RELATIVE PERCENT: 4.6 %
NEUTROPHILS ABSOLUTE: 12.5 K/UL (ref 1.4–6.5)
NEUTROPHILS RELATIVE PERCENT: 70.7 %
PDW BLD-RTO: 15.2 % (ref 11.5–14.5)
PLATELET # BLD: 307 K/UL (ref 130–400)
POTASSIUM SERPL-SCNC: 4.4 MEQ/L (ref 3.5–5.1)
RBC # BLD: 5.77 M/UL (ref 4.2–5.4)
S PYO AG THROAT QL: NORMAL
SODIUM BLD-SCNC: 141 MEQ/L (ref 132–144)
TOTAL PROTEIN: 8.2 G/DL (ref 6.4–8.1)
TSH SERPL DL<=0.05 MIU/L-ACNC: 2.3 UIU/ML (ref 0.27–4.2)
WBC # BLD: 17.7 K/UL (ref 4.8–10.8)

## 2018-09-28 PROCEDURE — 87880 STREP A ASSAY W/OPTIC: CPT | Performed by: FAMILY MEDICINE

## 2018-09-28 PROCEDURE — 96372 THER/PROPH/DIAG INJ SC/IM: CPT | Performed by: FAMILY MEDICINE

## 2018-09-28 PROCEDURE — 4004F PT TOBACCO SCREEN RCVD TLK: CPT | Performed by: FAMILY MEDICINE

## 2018-09-28 PROCEDURE — 99214 OFFICE O/P EST MOD 30 MIN: CPT | Performed by: FAMILY MEDICINE

## 2018-09-28 PROCEDURE — G8417 CALC BMI ABV UP PARAM F/U: HCPCS | Performed by: FAMILY MEDICINE

## 2018-09-28 PROCEDURE — G8427 DOCREV CUR MEDS BY ELIG CLIN: HCPCS | Performed by: FAMILY MEDICINE

## 2018-09-28 RX ORDER — PROMETHAZINE HYDROCHLORIDE AND CODEINE PHOSPHATE 6.25; 1 MG/5ML; MG/5ML
5 SYRUP ORAL EVERY 6 HOURS PRN
Qty: 118 ML | Refills: 0 | Status: SHIPPED | OUTPATIENT
Start: 2018-09-28 | End: 2018-10-05

## 2018-09-28 RX ORDER — CEFTRIAXONE 1 G/1
1 INJECTION, POWDER, FOR SOLUTION INTRAMUSCULAR; INTRAVENOUS ONCE
Status: COMPLETED | OUTPATIENT
Start: 2018-09-28 | End: 2018-09-28

## 2018-09-28 RX ORDER — PROMETHAZINE HYDROCHLORIDE AND CODEINE PHOSPHATE 6.25; 1 MG/5ML; MG/5ML
5 SYRUP ORAL EVERY 6 HOURS PRN
Qty: 120 ML | Refills: 0 | Status: CANCELLED | OUTPATIENT
Start: 2018-09-28 | End: 2018-10-05

## 2018-09-28 RX ORDER — LITHIUM CARBONATE 300 MG/1
300 CAPSULE ORAL
Qty: 90 CAPSULE | Refills: 3 | Status: ON HOLD | OUTPATIENT
Start: 2018-09-28 | End: 2020-06-23 | Stop reason: HOSPADM

## 2018-09-28 RX ORDER — AZITHROMYCIN 250 MG/1
TABLET, FILM COATED ORAL
Qty: 1 PACKET | Refills: 0 | Status: SHIPPED | OUTPATIENT
Start: 2018-09-28 | End: 2018-10-02

## 2018-09-28 RX ORDER — SERTRALINE HYDROCHLORIDE 100 MG/1
100 TABLET, FILM COATED ORAL DAILY
Qty: 30 TABLET | Refills: 3 | Status: SHIPPED | OUTPATIENT
Start: 2018-09-28 | End: 2018-11-12 | Stop reason: SDUPTHER

## 2018-09-28 RX ADMIN — CEFTRIAXONE 1 G: 1 INJECTION, POWDER, FOR SOLUTION INTRAMUSCULAR; INTRAVENOUS at 16:30

## 2018-09-28 ASSESSMENT — ENCOUNTER SYMPTOMS
SHORTNESS OF BREATH: 1
EYES NEGATIVE: 1
HEMOPTYSIS: 0
ABDOMINAL PAIN: 0
EYE ITCHING: 0
EYE REDNESS: 0
RHINORRHEA: 1
SORE THROAT: 1
PHOTOPHOBIA: 0
HEARTBURN: 0
DIARRHEA: 1
BACK PAIN: 0
WHEEZING: 0
CONSTIPATION: 0
COUGH: 1

## 2018-09-28 ASSESSMENT — PATIENT HEALTH QUESTIONNAIRE - PHQ9
SUM OF ALL RESPONSES TO PHQ QUESTIONS 1-9: 0
1. LITTLE INTEREST OR PLEASURE IN DOING THINGS: 0
SUM OF ALL RESPONSES TO PHQ QUESTIONS 1-9: 0
2. FEELING DOWN, DEPRESSED OR HOPELESS: 0
SUM OF ALL RESPONSES TO PHQ9 QUESTIONS 1 & 2: 0

## 2018-09-29 ENCOUNTER — TELEPHONE (OUTPATIENT)
Dept: PRIMARY CARE CLINIC | Age: 38
End: 2018-09-29

## 2018-10-01 RX ORDER — SIMVASTATIN 20 MG
20 TABLET ORAL EVERY EVENING
Qty: 30 TABLET | Refills: 3 | Status: SHIPPED | OUTPATIENT
Start: 2018-10-01 | End: 2018-11-12 | Stop reason: SDUPTHER

## 2018-10-01 RX ORDER — BENZONATATE 100 MG/1
100-200 CAPSULE ORAL 3 TIMES DAILY PRN
Qty: 60 CAPSULE | Refills: 1 | Status: SHIPPED | OUTPATIENT
Start: 2018-10-01 | End: 2018-10-12

## 2018-10-09 ENCOUNTER — HOSPITAL ENCOUNTER (OUTPATIENT)
Dept: SLEEP CENTER | Age: 38
Discharge: HOME | End: 2018-10-11
Payer: MEDICAID

## 2018-10-09 PROCEDURE — 95810 POLYSOM 6/> YRS 4/> PARAM: CPT | Performed by: INTERNAL MEDICINE

## 2018-10-09 PROCEDURE — 95810 POLYSOM 6/> YRS 4/> PARAM: CPT

## 2018-10-10 PROBLEM — G47.30 SLEEP APNEA: Status: ACTIVE | Noted: 2018-09-28

## 2018-10-11 DIAGNOSIS — G47.33 OSA (OBSTRUCTIVE SLEEP APNEA): ICD-10-CM

## 2018-10-12 ENCOUNTER — OFFICE VISIT (OUTPATIENT)
Dept: PRIMARY CARE CLINIC | Age: 38
End: 2018-10-12
Payer: MEDICAID

## 2018-10-12 VITALS
HEART RATE: 76 BPM | TEMPERATURE: 98 F | OXYGEN SATURATION: 96 % | DIASTOLIC BLOOD PRESSURE: 88 MMHG | SYSTOLIC BLOOD PRESSURE: 130 MMHG | BODY MASS INDEX: 43.65 KG/M2 | WEIGHT: 262 LBS | RESPIRATION RATE: 16 BRPM | HEIGHT: 65 IN

## 2018-10-12 DIAGNOSIS — E66.01 MORBID OBESITY WITH BODY MASS INDEX (BMI) OF 40.0 TO 44.9 IN ADULT (HCC): ICD-10-CM

## 2018-10-12 DIAGNOSIS — G47.33 OBSTRUCTIVE SLEEP APNEA SYNDROME: Primary | ICD-10-CM

## 2018-10-12 DIAGNOSIS — K90.9 DIARRHEA DUE TO MALABSORPTION: ICD-10-CM

## 2018-10-12 DIAGNOSIS — K14.6 TONGUE SORE: ICD-10-CM

## 2018-10-12 DIAGNOSIS — R19.7 DIARRHEA DUE TO MALABSORPTION: ICD-10-CM

## 2018-10-12 PROCEDURE — G8417 CALC BMI ABV UP PARAM F/U: HCPCS | Performed by: FAMILY MEDICINE

## 2018-10-12 PROCEDURE — G8427 DOCREV CUR MEDS BY ELIG CLIN: HCPCS | Performed by: FAMILY MEDICINE

## 2018-10-12 PROCEDURE — 4004F PT TOBACCO SCREEN RCVD TLK: CPT | Performed by: FAMILY MEDICINE

## 2018-10-12 PROCEDURE — 99214 OFFICE O/P EST MOD 30 MIN: CPT | Performed by: FAMILY MEDICINE

## 2018-10-12 PROCEDURE — G8484 FLU IMMUNIZE NO ADMIN: HCPCS | Performed by: FAMILY MEDICINE

## 2018-10-12 RX ORDER — DIPHENOXYLATE HYDROCHLORIDE AND ATROPINE SULFATE 2.5; .025 MG/1; MG/1
1 TABLET ORAL 4 TIMES DAILY PRN
Qty: 40 TABLET | Refills: 0 | Status: SHIPPED | OUTPATIENT
Start: 2018-10-12 | End: 2018-11-11

## 2018-10-12 RX ORDER — ACYCLOVIR 400 MG/1
400 TABLET ORAL DAILY
Qty: 30 TABLET | Refills: 0 | Status: SHIPPED | OUTPATIENT
Start: 2018-10-12 | End: 2018-11-23 | Stop reason: ALTCHOICE

## 2018-10-12 RX ORDER — TRIAMCINOLONE ACETONIDE 0.1 %
PASTE (GRAM) DENTAL
Qty: 60 G | Refills: 1 | Status: CANCELLED | OUTPATIENT
Start: 2018-10-12 | End: 2018-10-19

## 2018-10-12 ASSESSMENT — ENCOUNTER SYMPTOMS
EYE DISCHARGE: 0
EYE PAIN: 0
CONSTIPATION: 0
DOUBLE VISION: 0
SORE THROAT: 1
WHEEZING: 0
RHINORRHEA: 0
HEMOPTYSIS: 0
EYE REDNESS: 0
ORTHOPNEA: 0
SHORTNESS OF BREATH: 0
NAUSEA: 0
PHOTOPHOBIA: 0
GASTROINTESTINAL NEGATIVE: 1
ABDOMINAL PAIN: 0
SWOLLEN GLANDS: 0
VOMITING: 0
STRIDOR: 0
COUGH: 1
DIARRHEA: 0
HEARTBURN: 0
EYE ITCHING: 0
EYES NEGATIVE: 1
BACK PAIN: 0

## 2018-10-12 NOTE — PROGRESS NOTES
neck pain. Skin: Negative. Negative for rash. Allergic/Immunologic: Negative for environmental allergies. Neurological: Negative. Negative for headaches. Psychiatric/Behavioral: Negative. Negative for agitation and behavioral problems. The patient is not nervous/anxious. Objective:   /88 (Site: Left Upper Arm, Position: Sitting, Cuff Size: Medium Adult)   Pulse 76   Temp 98 °F (36.7 °C) (Oral)   Resp 16   Ht 5' 5\" (1.651 m)   Wt 262 lb (118.8 kg)   SpO2 96%   BMI 43.60 kg/m²     Physical Exam   Constitutional: She is oriented to person, place, and time. She appears well-developed and well-nourished. HENT:   Head: Normocephalic and atraumatic. Eyes: Pupils are equal, round, and reactive to light. Conjunctivae and EOM are normal.   Neck: Normal range of motion. Neck supple. No thyromegaly present. Cardiovascular: Normal rate, regular rhythm and normal heart sounds. No murmur heard. Pulmonary/Chest: Effort normal and breath sounds normal. She has no wheezes. She exhibits no tenderness. Abdominal: Soft. Bowel sounds are normal. There is no tenderness. Musculoskeletal: Normal range of motion. She exhibits no edema or tenderness. Lymphadenopathy:     She has no cervical adenopathy. Neurological: She is alert and oriented to person, place, and time. She has normal reflexes. Coordination normal.   Skin: Skin is warm and dry. No rash noted. Psychiatric: She has a normal mood and affect. Thought content normal.   Nursing note and vitals reviewed. Assessment:      Diagnosis Orders   1. Obstructive sleep apnea syndrome  Sleep Study with PAP Titration   2. Tongue sore  acyclovir (ZOVIRAX) 400 MG tablet   3. Morbid obesity with body mass index (BMI) of 40.0 to 44.9 in adult ([facility] [address]     4.  Diarrhea due to malabsorption  diphenoxylate-atropine (DIPHENATOL) 2.5-0.025 MG per tablet       Plan:      Orders Placed This Encounter   Procedures    Sleep Study with PAP Titration Standing Status:   Future     Standing Expiration Date:   10/12/2019     Order Specific Question:   Sleep Study Titration Type     Answer:   Split Night Study (Baseline followed by PAP Titration)     Order Specific Question:   Location For Sleep Study     Answer:   [facility]     Order Specific Question:   Select Sleep Lab Location     Answer:   [facility]     Orders Placed This Encounter   Medications    acyclovir (ZOVIRAX) 400 MG tablet     Sig: Take 1 tablet by mouth daily     Dispense:  30 tablet     Refill:  0    diphenoxylate-atropine (DIPHENATOL) 2.5-0.025 MG per tablet     Sig: Take 1 tablet by mouth 4 times daily as needed for Diarrhea for up to 30 days. Do not exceed 5/day. Dispense:  40 tablet     Refill:  0       Controlled Substances Monitoring:      Return in about 4 weeks (around 11/9/2018) for Routine follow up, Review of [facility]. JENNIFER Salas, CMA  , am scribing for and in the presence of Najma Samayoa DO. Electronically signed by :  JENNIFER Miner, [address], Najma Samayoa DO, personally performed the services described in this documentation, as scribed by Vane Diaz in my presence, and it is both accurate and complete.  Electronically signed by: Najma Samayoa DO    10/18/18 10:33 PM    Najma Samayoa DO

## 2018-10-26 ENCOUNTER — HOSPITAL ENCOUNTER (OUTPATIENT)
Dept: SLEEP CENTER | Age: 38
Discharge: HOME | End: 2018-10-28
Payer: MEDICAID

## 2018-10-26 PROCEDURE — 95811 POLYSOM 6/>YRS CPAP 4/> PARM: CPT

## 2018-10-26 PROCEDURE — 95811 POLYSOM 6/>YRS CPAP 4/> PARM: CPT | Performed by: INTERNAL MEDICINE

## 2018-10-30 ENCOUNTER — TELEPHONE (OUTPATIENT)
Dept: PRIMARY CARE CLINIC | Age: 38
End: 2018-10-30

## 2018-10-30 DIAGNOSIS — G47.33 OBSTRUCTIVE SLEEP APNEA SYNDROME: ICD-10-CM

## 2018-10-30 DIAGNOSIS — G47.33 OSA (OBSTRUCTIVE SLEEP APNEA): Primary | ICD-10-CM

## 2018-11-10 ENCOUNTER — HOSPITAL ENCOUNTER (OUTPATIENT)
Dept: ULTRASOUND IMAGING | Age: 38
Discharge: HOME | End: 2018-11-12
Payer: MEDICAID

## 2018-11-10 DIAGNOSIS — R10.11 RIGHT UPPER QUADRANT ABDOMINAL PAIN: ICD-10-CM

## 2018-11-10 DIAGNOSIS — R10.31 RIGHT LOWER QUADRANT ABDOMINAL PAIN: ICD-10-CM

## 2018-11-10 DIAGNOSIS — R19.7 DIARRHEA, UNSPECIFIED TYPE: ICD-10-CM

## 2018-11-10 PROCEDURE — 76705 ECHO EXAM OF ABDOMEN: CPT

## 2018-11-12 DIAGNOSIS — F32.1 CURRENT MODERATE EPISODE OF MAJOR DEPRESSIVE DISORDER WITHOUT PRIOR EPISODE (HCC): ICD-10-CM

## 2018-11-12 RX ORDER — SIMVASTATIN 20 MG
20 TABLET ORAL EVERY EVENING
Qty: 30 TABLET | Refills: 3 | Status: ON HOLD | OUTPATIENT
Start: 2018-11-12 | End: 2022-02-16 | Stop reason: HOSPADM

## 2018-11-12 RX ORDER — SERTRALINE HYDROCHLORIDE 100 MG/1
100 TABLET, FILM COATED ORAL DAILY
Qty: 30 TABLET | Refills: 3 | Status: ON HOLD | OUTPATIENT
Start: 2018-11-12 | End: 2018-11-28 | Stop reason: HOSPADM

## 2018-11-23 ENCOUNTER — HOSPITAL ENCOUNTER (INPATIENT)
Age: 38
LOS: 5 days | Discharge: HOME | DRG: 753 | End: 2018-11-28
Attending: EMERGENCY MEDICINE | Admitting: PSYCHIATRY & NEUROLOGY
Payer: MEDICAID

## 2018-11-23 DIAGNOSIS — F31.9 BIPOLAR 1 DISORDER (HCC): Primary | ICD-10-CM

## 2018-11-23 DIAGNOSIS — F32.2 CURRENT SEVERE EPISODE OF MAJOR DEPRESSIVE DISORDER WITHOUT PSYCHOTIC FEATURES WITHOUT PRIOR EPISODE (HCC): ICD-10-CM

## 2018-11-23 PROBLEM — F31.32 BIPOLAR DISORDER, CURRENT EPISODE DEPRESSED, MODERATE (HCC): Status: ACTIVE | Noted: 2018-11-23

## 2018-11-23 LAB
ACETAMINOPHEN LEVEL: <5 UG/ML (ref 10–30)
ALBUMIN SERPL-MCNC: 4 G/DL (ref 3.9–4.9)
ALP BLD-CCNC: 90 U/L (ref 40–130)
ALT SERPL-CCNC: 20 U/L (ref 0–33)
AMPHETAMINE SCREEN, URINE: ABNORMAL
ANION GAP SERPL CALCULATED.3IONS-SCNC: 11 MEQ/L (ref 7–13)
AST SERPL-CCNC: 22 U/L (ref 0–35)
BACTERIA: NORMAL /HPF
BARBITURATE SCREEN URINE: ABNORMAL
BASOPHILS ABSOLUTE: 0.2 K/UL (ref 0–0.2)
BASOPHILS RELATIVE PERCENT: 1.3 %
BENZODIAZEPINE SCREEN, URINE: ABNORMAL
BILIRUB SERPL-MCNC: 0.4 MG/DL (ref 0–1.2)
BILIRUBIN URINE: ABNORMAL
BLOOD, URINE: NEGATIVE
BUN BLDV-MCNC: 11 MG/DL (ref 6–20)
CALCIUM SERPL-MCNC: 9.7 MG/DL (ref 8.6–10.2)
CANNABINOID SCREEN URINE: POSITIVE
CHLORIDE BLD-SCNC: 103 MEQ/L (ref 98–107)
CK MB: 2.9 NG/ML (ref 0–3.8)
CLARITY: CLEAR
CO2: 23 MEQ/L (ref 22–29)
COCAINE METABOLITE SCREEN URINE: ABNORMAL
COLOR: ABNORMAL
CREAT SERPL-MCNC: 0.67 MG/DL (ref 0.5–0.9)
CREATINE KINASE-MB INDEX: 2.1 % (ref 0–3.5)
EOSINOPHILS ABSOLUTE: 0.1 K/UL (ref 0–0.7)
EOSINOPHILS RELATIVE PERCENT: 0.7 %
EPITHELIAL CELLS, UA: NORMAL /HPF
ETHANOL PERCENT: NORMAL G/DL
ETHANOL: <10 MG/DL (ref 0–0.08)
GFR AFRICAN AMERICAN: >60
GFR NON-AFRICAN AMERICAN: >60
GLOBULIN: 3.5 G/DL (ref 2.3–3.5)
GLUCOSE BLD-MCNC: 106 MG/DL (ref 74–109)
GLUCOSE URINE: NEGATIVE MG/DL
HCG(URINE) PREGNANCY TEST: NEGATIVE
HCT VFR BLD CALC: 45.3 % (ref 37–47)
HEMOGLOBIN: 15.1 G/DL (ref 12–16)
KETONES, URINE: ABNORMAL MG/DL
LEUKOCYTE ESTERASE, URINE: ABNORMAL
LITHIUM LEVEL: 0.5 MEQ/L (ref 0.6–1.2)
LYMPHOCYTES ABSOLUTE: 2.9 K/UL (ref 1–4.8)
LYMPHOCYTES RELATIVE PERCENT: 15.4 %
Lab: ABNORMAL
MCH RBC QN AUTO: 28.2 PG (ref 27–31.3)
MCHC RBC AUTO-ENTMCNC: 33.4 % (ref 33–37)
MCV RBC AUTO: 84.3 FL (ref 82–100)
MONOCYTES ABSOLUTE: 0.9 K/UL (ref 0.2–0.8)
MONOCYTES RELATIVE PERCENT: 4.7 %
MUCUS: PRESENT
NEUTROPHILS ABSOLUTE: 14.7 K/UL (ref 1.4–6.5)
NEUTROPHILS RELATIVE PERCENT: 77.9 %
NITRITE, URINE: NEGATIVE
OPIATE SCREEN URINE: ABNORMAL
PDW BLD-RTO: 15.1 % (ref 11.5–14.5)
PH UA: 5 (ref 5–9)
PHENCYCLIDINE SCREEN URINE: ABNORMAL
PLATELET # BLD: 240 K/UL (ref 130–400)
POTASSIUM SERPL-SCNC: 3.7 MEQ/L (ref 3.5–5.1)
PROTEIN UA: ABNORMAL MG/DL
RBC # BLD: 5.38 M/UL (ref 4.2–5.4)
RBC UA: NORMAL /HPF (ref 0–2)
RENAL EPITHELIAL, UA: NORMAL /HPF
SALICYLATE, SERUM: <0.3 MG/DL (ref 15–30)
SODIUM BLD-SCNC: 137 MEQ/L (ref 132–144)
SPECIFIC GRAVITY UA: 1.03 (ref 1–1.03)
TOTAL CK: 135 U/L (ref 0–170)
TOTAL PROTEIN: 7.5 G/DL (ref 6.4–8.1)
TSH SERPL DL<=0.05 MIU/L-ACNC: 1.19 UIU/ML (ref 0.27–4.2)
URINE REFLEX TO CULTURE: YES
UROBILINOGEN, URINE: 1 E.U./DL
WBC # BLD: 18.8 K/UL (ref 4.8–10.8)
WBC UA: NORMAL /HPF (ref 0–5)

## 2018-11-23 PROCEDURE — 1240000000 HC EMOTIONAL WELLNESS R&B

## 2018-11-23 PROCEDURE — 80307 DRUG TEST PRSMV CHEM ANLYZR: CPT

## 2018-11-23 PROCEDURE — 82553 CREATINE MB FRACTION: CPT

## 2018-11-23 PROCEDURE — 6370000000 HC RX 637 (ALT 250 FOR IP): Performed by: PSYCHIATRY & NEUROLOGY

## 2018-11-23 PROCEDURE — 85025 COMPLETE CBC W/AUTO DIFF WBC: CPT

## 2018-11-23 PROCEDURE — 84443 ASSAY THYROID STIM HORMONE: CPT

## 2018-11-23 PROCEDURE — G0480 DRUG TEST DEF 1-7 CLASSES: HCPCS

## 2018-11-23 PROCEDURE — 84703 CHORIONIC GONADOTROPIN ASSAY: CPT

## 2018-11-23 PROCEDURE — 99285 EMERGENCY DEPT VISIT HI MDM: CPT

## 2018-11-23 PROCEDURE — 80053 COMPREHEN METABOLIC PANEL: CPT

## 2018-11-23 PROCEDURE — 87086 URINE CULTURE/COLONY COUNT: CPT

## 2018-11-23 PROCEDURE — 82550 ASSAY OF CK (CPK): CPT

## 2018-11-23 PROCEDURE — 80178 ASSAY OF LITHIUM: CPT

## 2018-11-23 PROCEDURE — 6370000000 HC RX 637 (ALT 250 FOR IP): Performed by: EMERGENCY MEDICINE

## 2018-11-23 PROCEDURE — 81001 URINALYSIS AUTO W/SCOPE: CPT

## 2018-11-23 PROCEDURE — 36415 COLL VENOUS BLD VENIPUNCTURE: CPT

## 2018-11-23 RX ORDER — HALOPERIDOL 5 MG/ML
5 INJECTION INTRAMUSCULAR EVERY 6 HOURS PRN
Status: DISCONTINUED | OUTPATIENT
Start: 2018-11-23 | End: 2018-11-28 | Stop reason: HOSPADM

## 2018-11-23 RX ORDER — SERTRALINE HYDROCHLORIDE 100 MG/1
100 TABLET, FILM COATED ORAL DAILY
Status: DISCONTINUED | OUTPATIENT
Start: 2018-11-24 | End: 2018-11-24

## 2018-11-23 RX ORDER — SIMVASTATIN 20 MG
20 TABLET ORAL EVERY EVENING
Status: DISCONTINUED | OUTPATIENT
Start: 2018-11-23 | End: 2018-11-28 | Stop reason: HOSPADM

## 2018-11-23 RX ORDER — BENZTROPINE MESYLATE 1 MG/ML
2 INJECTION INTRAMUSCULAR; INTRAVENOUS 2 TIMES DAILY PRN
Status: DISCONTINUED | OUTPATIENT
Start: 2018-11-23 | End: 2018-11-28 | Stop reason: HOSPADM

## 2018-11-23 RX ORDER — TRAZODONE HYDROCHLORIDE 50 MG/1
50 TABLET ORAL NIGHTLY
Status: DISCONTINUED | OUTPATIENT
Start: 2018-11-23 | End: 2018-11-28 | Stop reason: HOSPADM

## 2018-11-23 RX ORDER — HYDROXYZINE HYDROCHLORIDE 50 MG/ML
50 INJECTION, SOLUTION INTRAMUSCULAR EVERY 6 HOURS PRN
Status: DISCONTINUED | OUTPATIENT
Start: 2018-11-23 | End: 2018-11-28 | Stop reason: HOSPADM

## 2018-11-23 RX ORDER — ACETAMINOPHEN 325 MG/1
650 TABLET ORAL EVERY 4 HOURS PRN
Status: DISCONTINUED | OUTPATIENT
Start: 2018-11-23 | End: 2018-11-28 | Stop reason: HOSPADM

## 2018-11-23 RX ORDER — LITHIUM CARBONATE 300 MG/1
300 CAPSULE ORAL
Status: DISCONTINUED | OUTPATIENT
Start: 2018-11-24 | End: 2018-11-28 | Stop reason: HOSPADM

## 2018-11-23 RX ORDER — TRAZODONE HYDROCHLORIDE 50 MG/1
50 TABLET ORAL NIGHTLY PRN
Status: DISCONTINUED | OUTPATIENT
Start: 2018-11-24 | End: 2018-11-23 | Stop reason: CLARIF

## 2018-11-23 RX ORDER — HYDROXYZINE PAMOATE 50 MG/1
50 CAPSULE ORAL EVERY 6 HOURS PRN
Status: DISCONTINUED | OUTPATIENT
Start: 2018-11-23 | End: 2018-11-28 | Stop reason: HOSPADM

## 2018-11-23 RX ORDER — MAGNESIUM HYDROXIDE/ALUMINUM HYDROXICE/SIMETHICONE 120; 1200; 1200 MG/30ML; MG/30ML; MG/30ML
30 SUSPENSION ORAL EVERY 6 HOURS PRN
Status: DISCONTINUED | OUTPATIENT
Start: 2018-11-23 | End: 2018-11-28 | Stop reason: HOSPADM

## 2018-11-23 RX ORDER — HALOPERIDOL 5 MG
5 TABLET ORAL EVERY 6 HOURS PRN
Status: DISCONTINUED | OUTPATIENT
Start: 2018-11-23 | End: 2018-11-28 | Stop reason: HOSPADM

## 2018-11-23 RX ADMIN — HYDROXYZINE PAMOATE 50 MG: 50 CAPSULE ORAL at 22:44

## 2018-11-23 RX ADMIN — ACETAMINOPHEN 650 MG: 325 TABLET ORAL at 22:43

## 2018-11-23 RX ADMIN — TRAZODONE HYDROCHLORIDE 50 MG: 50 TABLET ORAL at 22:45

## 2018-11-23 RX ADMIN — SIMVASTATIN 20 MG: 20 TABLET, FILM COATED ORAL at 22:44

## 2018-11-23 RX ADMIN — NICOTINE POLACRILEX 2 MG: 2 GUM, CHEWING BUCCAL at 19:24

## 2018-11-23 ASSESSMENT — ENCOUNTER SYMPTOMS
VOMITING: 0
COUGH: 0
DIARRHEA: 0
SORE THROAT: 0
BACK PAIN: 0
NAUSEA: 0
SHORTNESS OF BREATH: 0
ABDOMINAL PAIN: 0

## 2018-11-23 ASSESSMENT — SLEEP AND FATIGUE QUESTIONNAIRES
DIFFICULTY FALLING ASLEEP: NO
AVERAGE NUMBER OF SLEEP HOURS: 6
DIFFICULTY ARISING: NO
DIFFICULTY STAYING ASLEEP: YES
DO YOU HAVE DIFFICULTY SLEEPING: YES
SLEEP PATTERN: DISTURBED/INTERRUPTED SLEEP
DO YOU USE A SLEEP AID: NO
RESTFUL SLEEP: NO

## 2018-11-23 ASSESSMENT — PATIENT HEALTH QUESTIONNAIRE - PHQ9: SUM OF ALL RESPONSES TO PHQ QUESTIONS 1-9: 15

## 2018-11-23 ASSESSMENT — PAIN SCALES - GENERAL: PAINLEVEL_OUTOF10: 6

## 2018-11-24 LAB
EKG ATRIAL RATE: 69 BPM
EKG P AXIS: 27 DEGREES
EKG P-R INTERVAL: 174 MS
EKG Q-T INTERVAL: 432 MS
EKG QRS DURATION: 90 MS
EKG QTC CALCULATION (BAZETT): 462 MS
EKG R AXIS: 18 DEGREES
EKG T AXIS: 40 DEGREES
EKG VENTRICULAR RATE: 69 BPM

## 2018-11-24 PROCEDURE — 6370000000 HC RX 637 (ALT 250 FOR IP): Performed by: PSYCHIATRY & NEUROLOGY

## 2018-11-24 PROCEDURE — 1240000000 HC EMOTIONAL WELLNESS R&B

## 2018-11-24 PROCEDURE — 93005 ELECTROCARDIOGRAM TRACING: CPT

## 2018-11-24 PROCEDURE — 99253 IP/OBS CNSLTJ NEW/EST LOW 45: CPT | Performed by: INTERNAL MEDICINE

## 2018-11-24 RX ADMIN — LITHIUM CARBONATE 300 MG: 300 CAPSULE, GELATIN COATED ORAL at 08:50

## 2018-11-24 RX ADMIN — SERTRALINE 100 MG: 100 TABLET, FILM COATED ORAL at 08:50

## 2018-11-24 RX ADMIN — TRAZODONE HYDROCHLORIDE 50 MG: 50 TABLET ORAL at 20:16

## 2018-11-24 RX ADMIN — NICOTINE POLACRILEX 2 MG: 2 GUM, CHEWING BUCCAL at 10:39

## 2018-11-24 RX ADMIN — SIMVASTATIN 20 MG: 20 TABLET, FILM COATED ORAL at 18:19

## 2018-11-24 RX ADMIN — LITHIUM CARBONATE 300 MG: 300 CAPSULE, GELATIN COATED ORAL at 12:59

## 2018-11-24 RX ADMIN — NICOTINE POLACRILEX 2 MG: 2 GUM, CHEWING BUCCAL at 14:53

## 2018-11-24 RX ADMIN — HYDROXYZINE PAMOATE 50 MG: 50 CAPSULE ORAL at 20:16

## 2018-11-24 RX ADMIN — NICOTINE POLACRILEX 2 MG: 2 GUM, CHEWING BUCCAL at 19:43

## 2018-11-24 RX ADMIN — LITHIUM CARBONATE 300 MG: 300 CAPSULE, GELATIN COATED ORAL at 18:19

## 2018-11-24 NOTE — PLAN OF CARE
Problem: Altered Mood, Depressive Behavior:  Goal: Able to verbalize acceptance of life and situations over which he or she has no control  Able to verbalize acceptance of life and situations over which he or she has no control   Outcome: Ongoing    Goal: Ability to disclose and discuss suicidal ideas will improve  Ability to disclose and discuss suicidal ideas will improve   Outcome: Met This Shift

## 2018-11-24 NOTE — CONSULTS
file     Other Topics Concern    Not on file     Social History Narrative    No narrative on file      [] Unable to obtain due to ventilated and/ or neurologic status      Home Medications:      Prescriptions Prior to Admission: sertraline (ZOLOFT) 100 MG tablet, Take 1 tablet by mouth daily  simvastatin (ZOCOR) 20 MG tablet, Take 1 tablet by mouth every evening  lithium 300 MG capsule, Take 1 capsule by mouth 3 times daily (with meals) (Patient taking differently: Take 900 mg by mouth nightly )    Current Hospital Medications:     Scheduled Meds:   [START ON 11/25/2018] sertraline  150 mg Oral Daily    lithium  300 mg Oral TID WC    simvastatin  20 mg Oral QPM    traZODone  50 mg Oral Nightly     Continuous Infusions:  PRN Meds:.acetaminophen, magnesium hydroxide, benztropine mesylate, nicotine polacrilex, aluminum & magnesium hydroxide-simethicone, hydrOXYzine **OR** hydrOXYzine, haloperidol **OR** haloperidol lactate  . Allergies: Allergies   Allergen Reactions    Nsaids Other (See Comments)     NSAIDS interact with Lithium, causing elevated levels.  Adhesive Tape         Review of Systems:       [x] CV, Resp, Neuro, , and all other systems reviewed and negative other than listed in HPI.     [] Unable to obtain due to ventilated and/ or neurologic status      Objective Findings:     Vitals:   Vitals:    11/23/18 1635 11/23/18 2130 11/24/18 0820   BP: 114/67 103/66 119/72   Pulse: 114 93 71   Resp: 18 18 16   Temp: 98.6 °F (37 °C) 98.3 °F (36.8 °C) 97.8 °F (36.6 °C)   TempSrc: Oral Oral Oral   SpO2: 97%  97%   Weight: 257 lb (116.6 kg)     Height: 5' 5\" (1.651 m)          Physical Examination:  General: comfortable  HEENT: Normocephalic, no scleral icterus. Neck: No JVD. Heart: Regular, no murmur, no rub/gallop. No RV heave. Lungs: Clear to ascultation, no rales/wheezing/rhonchi. Good chest wall excursion. Abdomen: Distension no, minimal tenderness in lower abdomen.    Extremities: No clubbing/cyanosis, no edema. Skin: Warm, dry, normal turgor, no rash, no bruise, no petichiae. Neuro: No myoclonus or tremor. Psych: Normal affect    Results/ Medications reviewed 11/24/2018, 2:28 PM     Laboratory, Microbiology, Pathology, Radiology, Cardiology, Medications and Transcriptions reviewed  Scheduled Meds:   [START ON 11/25/2018] sertraline  150 mg Oral Daily    lithium  300 mg Oral TID WC    simvastatin  20 mg Oral QPM    traZODone  50 mg Oral Nightly     Continuous Infusions:    Recent Labs      11/23/18   1802   WBC  18.8*   HGB  15.1   HCT  45.3   MCV  84.3   PLT  240     Recent Labs      11/23/18   1802   NA  137   K  3.7   CL  103   CO2  23   BUN  11   CREATININE  0.67     Recent Labs      11/23/18 1802   AST  22   ALT  20   BILITOT  0.4   ALKPHOS  90     No results for input(s): LIPASE, AMYLASE in the last 72 hours. Recent Labs      11/23/18   1802   PROT  7.5     Us Abdomen Limited    Result Date: 11/10/2018  EXAMINATION:  US ABDOMEN LIMITED CLINICAL HISTORY: R10.31 Right lower quadrant abdominal pain ICD10. . COMPARISONS:  NONE AVAILABLE Clinical note: Patient reports cholecystectomy in 2017 TECHNIQUE:  Transabdominal ultrasound of the right upper quadrant FINDINGS:  There is diffuse increased echogenicity. This a nonspecific finding that can be seen with fatty infiltration. No focal parenchymal adenitis or intrahepatic biliary dilatation. The gallbladder surgically absent. The common bile duct measures 5 mm. There is a small fluid collection in the region of the daquan hepatis, gallbladder fossa. It measures approximate 1.4 x 1.7 cm. SMALL FLUID COLLECTION THE REGION OF THE DAQUAN HEPATIS /GALLBLADDER FOSSA. MAY BE POSTSURGICAL. RECOMMEND REPEAT ULTRASOUND IN 6 MONTHS. OTHER FINDINGS DETAILED ABOVE       Impression:   72-year-old female currently in the hospital with worsening depression.   GI be consulted for evaluation of chronic non bloody diarrhea, along with chronic

## 2018-11-24 NOTE — H&P
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 She used to use drugs, mostly opiates and cocaine, in various forms. She said she had been smoking THC lately. Tox screen was positive for THC     VITALS: /72   Pulse 71   Temp 97.8 °F (36.6 °C) (Oral)   Resp 16   Ht 5' 5\" (1.651 m)   Wt 257 lb (116.6 kg)   SpO2 97%   BMI 42.77 kg/m²     MENTAL STATUS EXAM: Appearance: alert, fair grooming Behavior: cooperative Mood: depressed, anxious Affect: mood-congruent Speech: mildly pressured Thought Process: relatively organized Thought Content: with suicidal, and ?homicidal ideations, as above; some ? paranoia Perception: denies hallucinations Orientation: oriented to time, place, self Concentration: fair Memory: fair Abstraction: able to demonstrate some abstract thinking Fund of knowledge: fair Insight: fair Judgement: fair    LABS:   Admission on 11/23/2018   Component Date Value Ref Range Status    Acetaminophen Level 11/23/2018 <5* 10 - 30 ug/mL Final    WBC 11/23/2018 18.8* 4.8 - 10.8 K/uL Final    RBC 11/23/2018 5.38  4.20 - 5.40 M/uL Final    Hemoglobin 11/23/2018 15.1  12.0 - 16.0 g/dL Final    Hematocrit 11/23/2018 45.3  37.0 - 47.0 % Final    MCV 11/23/2018 84.3  82.0 - 100.0 fL Final    MCH 11/23/2018 28.2  27.0 - 31.3 pg Final    MCHC 11/23/2018 33.4  33.0 - 37.0 % Final    RDW 11/23/2018 15.1* 11.5 - 14.5 % Final    Platelets 61/75/6487 240  130 - 400 K/uL Final    Neutrophils % 11/23/2018 77.9  % Final    Lymphocytes % 11/23/2018 15.4  % Final    Monocytes % 11/23/2018 4.7  % Final    Eosinophils % 11/23/2018 0.7  % Final    Basophils % 11/23/2018 1.3  % Final    Neutrophils # 11/23/2018 14.7* 1.4 - 6.5 K/uL Final    Lymphocytes # 11/23/2018 2.9  1.0 - 4.8 K/uL Final    Monocytes # 11/23/2018 0.9* 0.2 - 0.8 K/uL Final    Eosinophils # 11/23/2018 0.1  0.0 - 0.7 K/uL Final    Basophils # 11/23/2018 0.2  0.0 - 0.2 K/uL Final    Total CK 11/23/2018 135  0 - 170 U/L Final

## 2018-11-25 ENCOUNTER — APPOINTMENT (OUTPATIENT)
Dept: GENERAL RADIOLOGY | Age: 38
DRG: 753 | End: 2018-11-25
Payer: MEDICAID

## 2018-11-25 LAB
CRYPTOSPORIDIUM ANTIGEN STOOL: NORMAL
GI BACTERIAL PATHOGENS BY PCR: NORMAL
GIARDIA ANTIGEN STOOL: NORMAL
URINE CULTURE, ROUTINE: NORMAL

## 2018-11-25 PROCEDURE — 87329 GIARDIA AG IA: CPT

## 2018-11-25 PROCEDURE — 87449 NOS EACH ORGANISM AG IA: CPT

## 2018-11-25 PROCEDURE — 74019 RADEX ABDOMEN 2 VIEWS: CPT

## 2018-11-25 PROCEDURE — 1240000000 HC EMOTIONAL WELLNESS R&B

## 2018-11-25 PROCEDURE — 87324 CLOSTRIDIUM AG IA: CPT

## 2018-11-25 PROCEDURE — 6370000000 HC RX 637 (ALT 250 FOR IP): Performed by: PSYCHIATRY & NEUROLOGY

## 2018-11-25 PROCEDURE — 87328 CRYPTOSPORIDIUM AG IA: CPT

## 2018-11-25 PROCEDURE — 87506 IADNA-DNA/RNA PROBE TQ 6-11: CPT

## 2018-11-25 RX ADMIN — TRAZODONE HYDROCHLORIDE 50 MG: 50 TABLET ORAL at 20:55

## 2018-11-25 RX ADMIN — NICOTINE POLACRILEX 2 MG: 2 GUM, CHEWING BUCCAL at 11:10

## 2018-11-25 RX ADMIN — SIMVASTATIN 20 MG: 20 TABLET, FILM COATED ORAL at 17:49

## 2018-11-25 RX ADMIN — HYDROXYZINE PAMOATE 50 MG: 50 CAPSULE ORAL at 20:54

## 2018-11-25 RX ADMIN — SERTRALINE 150 MG: 100 TABLET, FILM COATED ORAL at 08:34

## 2018-11-25 RX ADMIN — ACETAMINOPHEN 650 MG: 325 TABLET ORAL at 11:10

## 2018-11-25 RX ADMIN — LITHIUM CARBONATE 300 MG: 300 CAPSULE, GELATIN COATED ORAL at 17:49

## 2018-11-25 RX ADMIN — LITHIUM CARBONATE 300 MG: 300 CAPSULE, GELATIN COATED ORAL at 08:34

## 2018-11-25 RX ADMIN — LITHIUM CARBONATE 300 MG: 300 CAPSULE, GELATIN COATED ORAL at 12:53

## 2018-11-25 RX ADMIN — NICOTINE POLACRILEX 2 MG: 2 GUM, CHEWING BUCCAL at 18:46

## 2018-11-25 ASSESSMENT — PAIN SCALES - GENERAL
PAINLEVEL_OUTOF10: 0
PAINLEVEL_OUTOF10: 7

## 2018-11-25 ASSESSMENT — LIFESTYLE VARIABLES: HISTORY_ALCOHOL_USE: NO

## 2018-11-25 NOTE — CARE COORDINATION
BHI Biopsychosocial Assessment    Current Level of Psychosocial Functioning     Independent   Dependent    Minimal Assist x    Comments:  Patient lives with her  who receives social security benefits. Patient is unemployed and receives [employer] [facility]. Psychosocial High Risk Factors (check all that apply)    Unable to obtain meds   Chronic illness/pain x  Substance abuse x  Lack of Family Support x  Financial stress x  Isolation   Inadequate Community Resources x  Suicide attempt(s)  Not taking medications   Victim of crime   Developmental Delay  Unable to manage personal needs    Age 72 or older   Homeless  No transportation   Readmission within 30 days  Unemployment x  Traumatic Event    Comments: Patient has at least 6 high risk factors associated with this admission. Psychiatric Advanced Directives: None Reported. Family to Involve in Treatment: Patient provided her 's contact information to complete collateral.    Sexual Orientation:  Patient is currently in a heterosexual relationship. Patient Strengths: Patient is very positive and upbeat. Patient Barriers: Patient has medical concern she believes is impeding the efficacy of her mental health medication. Opiate Education Provided:  N/A    CMHC/mental health history: Patient stated she    Plan of Care   medication management, group/individual therapies, family meetings, psycho -education, treatment team meetings to assist with stabilization    Initial Discharge Plan:  Patient will return home and follow the recommendations of the treatment team.       Clinical Summary:    Patient is a 45year old female who was admitted to the [facility] due to depression and suicidal ideation. Reportedly, patient has been feeling suicidal for the past two weeks. Patient had a plan to overdose on her prescription medication. Patient presented as upbeat with a positive outlook on life.  There is a possibility that patient was masking her

## 2018-11-25 NOTE — CARE COORDINATION
FAMILY COLLATERAL NOTE    Family/Support Name: Abimbola Mahajan #: [phone]  Relationship to Pt[de-identified]          Family/Support contact aware of hospitalization: Yes    Presenting Symptoms/Current Concerns:  Patient was experiencing no change in her emotional state even with taking her medication. She became very symptomatic requiring hospitalization. Top 3 Life Stressors:   Problems with neighbor who is mentally ill. Family stressors with her oldest child. Patient lost her brother in April of 2018. Background History Relevant to Current Hospitalization:   was able to encourage patient to go to the ER for evaluation. Patient was initially resistant to the idea of seeking help. Family Mental Health/Substance Use History:   Patient's mother and brother had difficulties with drugs/alcohol. Support Network's Goal for Hospitalization:   Treatment Team needs to look at the relationship between her medical and mental health issues.  believes there is a connection between the two. Discharge Plan:   Patient will return home and seek help at [facility] or  [facility]. Support Network Supportive of Discharge Plan: Yes    Support can confirm Safety of Location and Security of Weapons:    agrees to remove weapon from the home so patient does no have access. Support agreeable to Safeguard and Monitor Medications (including Prescription and OTC): Yes    Identified Barriers to Compliance with Discharge Plan:   Patient may have trouble with transportation. Recommendations for Support Network:   Please be available for follow-up questions closer to discharge.       CESARIO Dale

## 2018-11-26 LAB — C DIFFICILE TOXIN, EIA: NORMAL

## 2018-11-26 PROCEDURE — 6370000000 HC RX 637 (ALT 250 FOR IP): Performed by: PSYCHIATRY & NEUROLOGY

## 2018-11-26 PROCEDURE — 6370000000 HC RX 637 (ALT 250 FOR IP): Performed by: INTERNAL MEDICINE

## 2018-11-26 PROCEDURE — 99232 SBSQ HOSP IP/OBS MODERATE 35: CPT | Performed by: PSYCHIATRY & NEUROLOGY

## 2018-11-26 PROCEDURE — 6370000000 HC RX 637 (ALT 250 FOR IP): Performed by: NURSE PRACTITIONER

## 2018-11-26 PROCEDURE — 93010 ELECTROCARDIOGRAM REPORT: CPT | Performed by: INTERNAL MEDICINE

## 2018-11-26 PROCEDURE — 1240000000 HC EMOTIONAL WELLNESS R&B

## 2018-11-26 RX ORDER — LOPERAMIDE HYDROCHLORIDE 2 MG/1
2 CAPSULE ORAL 3 TIMES DAILY PRN
Status: DISCONTINUED | OUTPATIENT
Start: 2018-11-26 | End: 2018-11-28 | Stop reason: HOSPADM

## 2018-11-26 RX ADMIN — LITHIUM CARBONATE 300 MG: 300 CAPSULE, GELATIN COATED ORAL at 08:46

## 2018-11-26 RX ADMIN — ACETAMINOPHEN 650 MG: 325 TABLET ORAL at 14:56

## 2018-11-26 RX ADMIN — SERTRALINE 150 MG: 100 TABLET, FILM COATED ORAL at 08:45

## 2018-11-26 RX ADMIN — LOPERAMIDE HYDROCHLORIDE 2 MG: 2 CAPSULE ORAL at 12:59

## 2018-11-26 RX ADMIN — LITHIUM CARBONATE 300 MG: 300 CAPSULE, GELATIN COATED ORAL at 17:45

## 2018-11-26 RX ADMIN — POLYETHYLENE GLYCOL-3350 AND ELECTROLYTES 2000 ML: 236; 6.74; 5.86; 2.97; 22.74 POWDER, FOR SOLUTION ORAL at 19:45

## 2018-11-26 RX ADMIN — SIMVASTATIN 20 MG: 20 TABLET, FILM COATED ORAL at 17:46

## 2018-11-26 RX ADMIN — NICOTINE POLACRILEX 2 MG: 2 GUM, CHEWING BUCCAL at 10:16

## 2018-11-26 RX ADMIN — NICOTINE POLACRILEX 2 MG: 2 GUM, CHEWING BUCCAL at 14:56

## 2018-11-26 RX ADMIN — LITHIUM CARBONATE 300 MG: 300 CAPSULE, GELATIN COATED ORAL at 12:21

## 2018-11-26 ASSESSMENT — PAIN SCALES - GENERAL: PAINLEVEL_OUTOF10: 7

## 2018-11-27 ENCOUNTER — ANESTHESIA (OUTPATIENT)
Dept: OPERATING ROOM | Age: 38
DRG: 753 | End: 2018-11-27
Payer: MEDICAID

## 2018-11-27 ENCOUNTER — ANESTHESIA EVENT (OUTPATIENT)
Dept: OPERATING ROOM | Age: 38
DRG: 753 | End: 2018-11-27
Payer: MEDICAID

## 2018-11-27 VITALS
RESPIRATION RATE: 28 BRPM | SYSTOLIC BLOOD PRESSURE: 143 MMHG | DIASTOLIC BLOOD PRESSURE: 83 MMHG | OXYGEN SATURATION: 95 %

## 2018-11-27 LAB
GLUCOSE BLD-MCNC: 99 MG/DL (ref 60–115)
LITHIUM LEVEL: 0.5 MEQ/L (ref 0.6–1.2)
PERFORMED ON: NORMAL

## 2018-11-27 PROCEDURE — 88305 TISSUE EXAM BY PATHOLOGIST: CPT

## 2018-11-27 PROCEDURE — 36415 COLL VENOUS BLD VENIPUNCTURE: CPT

## 2018-11-27 PROCEDURE — 2580000003 HC RX 258: Performed by: ANESTHESIOLOGY

## 2018-11-27 PROCEDURE — 3700000000 HC ANESTHESIA ATTENDED CARE: Performed by: INTERNAL MEDICINE

## 2018-11-27 PROCEDURE — 99231 SBSQ HOSP IP/OBS SF/LOW 25: CPT | Performed by: PSYCHIATRY & NEUROLOGY

## 2018-11-27 PROCEDURE — 7100000000 HC PACU RECOVERY - FIRST 15 MIN: Performed by: INTERNAL MEDICINE

## 2018-11-27 PROCEDURE — 90833 PSYTX W PT W E/M 30 MIN: CPT | Performed by: PSYCHIATRY & NEUROLOGY

## 2018-11-27 PROCEDURE — 6370000000 HC RX 637 (ALT 250 FOR IP): Performed by: PSYCHIATRY & NEUROLOGY

## 2018-11-27 PROCEDURE — 45380 COLONOSCOPY AND BIOPSY: CPT | Performed by: INTERNAL MEDICINE

## 2018-11-27 PROCEDURE — 3700000001 HC ADD 15 MINUTES (ANESTHESIA): Performed by: INTERNAL MEDICINE

## 2018-11-27 PROCEDURE — 2580000003 HC RX 258: Performed by: INTERNAL MEDICINE

## 2018-11-27 PROCEDURE — 6360000002 HC RX W HCPCS: Performed by: NURSE ANESTHETIST, CERTIFIED REGISTERED

## 2018-11-27 PROCEDURE — 3609027000 HC COLONOSCOPY: Performed by: INTERNAL MEDICINE

## 2018-11-27 PROCEDURE — 7100000001 HC PACU RECOVERY - ADDTL 15 MIN: Performed by: INTERNAL MEDICINE

## 2018-11-27 PROCEDURE — 1240000000 HC EMOTIONAL WELLNESS R&B

## 2018-11-27 PROCEDURE — 80178 ASSAY OF LITHIUM: CPT

## 2018-11-27 PROCEDURE — 2709999900 HC NON-CHARGEABLE SUPPLY: Performed by: INTERNAL MEDICINE

## 2018-11-27 RX ORDER — SODIUM CHLORIDE 0.9 % (FLUSH) 0.9 %
10 SYRINGE (ML) INJECTION EVERY 12 HOURS SCHEDULED
Status: CANCELLED | OUTPATIENT
Start: 2018-11-27

## 2018-11-27 RX ORDER — ONDANSETRON 2 MG/ML
4 INJECTION INTRAMUSCULAR; INTRAVENOUS
Status: DISCONTINUED | OUTPATIENT
Start: 2018-11-27 | End: 2018-11-27 | Stop reason: HOSPADM

## 2018-11-27 RX ORDER — MEPERIDINE HYDROCHLORIDE 25 MG/ML
12.5 INJECTION INTRAMUSCULAR; INTRAVENOUS; SUBCUTANEOUS EVERY 5 MIN PRN
Status: DISCONTINUED | OUTPATIENT
Start: 2018-11-27 | End: 2018-11-27 | Stop reason: HOSPADM

## 2018-11-27 RX ORDER — MAGNESIUM HYDROXIDE 1200 MG/15ML
LIQUID ORAL PRN
Status: DISCONTINUED | OUTPATIENT
Start: 2018-11-27 | End: 2018-11-27 | Stop reason: HOSPADM

## 2018-11-27 RX ORDER — SODIUM CHLORIDE 9 MG/ML
INJECTION, SOLUTION INTRAVENOUS CONTINUOUS
Status: CANCELLED | OUTPATIENT
Start: 2018-11-27

## 2018-11-27 RX ORDER — HYDROCODONE BITARTRATE AND ACETAMINOPHEN 5; 325 MG/1; MG/1
1 TABLET ORAL PRN
Status: DISCONTINUED | OUTPATIENT
Start: 2018-11-27 | End: 2018-11-27 | Stop reason: HOSPADM

## 2018-11-27 RX ORDER — SODIUM CHLORIDE, SODIUM LACTATE, POTASSIUM CHLORIDE, CALCIUM CHLORIDE 600; 310; 30; 20 MG/100ML; MG/100ML; MG/100ML; MG/100ML
INJECTION, SOLUTION INTRAVENOUS CONTINUOUS
Status: DISCONTINUED | OUTPATIENT
Start: 2018-11-27 | End: 2018-11-28 | Stop reason: HOSPADM

## 2018-11-27 RX ORDER — FENTANYL CITRATE 50 UG/ML
50 INJECTION, SOLUTION INTRAMUSCULAR; INTRAVENOUS EVERY 10 MIN PRN
Status: DISCONTINUED | OUTPATIENT
Start: 2018-11-27 | End: 2018-11-27 | Stop reason: HOSPADM

## 2018-11-27 RX ORDER — DIPHENHYDRAMINE HYDROCHLORIDE 50 MG/ML
12.5 INJECTION INTRAMUSCULAR; INTRAVENOUS
Status: DISCONTINUED | OUTPATIENT
Start: 2018-11-27 | End: 2018-11-27 | Stop reason: HOSPADM

## 2018-11-27 RX ORDER — METOCLOPRAMIDE HYDROCHLORIDE 5 MG/ML
10 INJECTION INTRAMUSCULAR; INTRAVENOUS
Status: DISCONTINUED | OUTPATIENT
Start: 2018-11-27 | End: 2018-11-27 | Stop reason: HOSPADM

## 2018-11-27 RX ORDER — HYDROCODONE BITARTRATE AND ACETAMINOPHEN 5; 325 MG/1; MG/1
2 TABLET ORAL PRN
Status: DISCONTINUED | OUTPATIENT
Start: 2018-11-27 | End: 2018-11-27 | Stop reason: HOSPADM

## 2018-11-27 RX ORDER — SODIUM CHLORIDE 0.9 % (FLUSH) 0.9 %
10 SYRINGE (ML) INJECTION PRN
Status: CANCELLED | OUTPATIENT
Start: 2018-11-27

## 2018-11-27 RX ORDER — PROPOFOL 10 MG/ML
INJECTION, EMULSION INTRAVENOUS PRN
Status: DISCONTINUED | OUTPATIENT
Start: 2018-11-27 | End: 2018-11-27 | Stop reason: SDUPTHER

## 2018-11-27 RX ORDER — LIDOCAINE HYDROCHLORIDE 10 MG/ML
1 INJECTION, SOLUTION EPIDURAL; INFILTRATION; INTRACAUDAL; PERINEURAL
Status: CANCELLED | OUTPATIENT
Start: 2018-11-27 | End: 2018-11-27

## 2018-11-27 RX ADMIN — PROPOFOL 50 MG: 10 INJECTION, EMULSION INTRAVENOUS at 13:28

## 2018-11-27 RX ADMIN — PROPOFOL 100 MG: 10 INJECTION, EMULSION INTRAVENOUS at 13:11

## 2018-11-27 RX ADMIN — LITHIUM CARBONATE 300 MG: 300 CAPSULE, GELATIN COATED ORAL at 15:11

## 2018-11-27 RX ADMIN — PROPOFOL 50 MG: 10 INJECTION, EMULSION INTRAVENOUS at 13:19

## 2018-11-27 RX ADMIN — NICOTINE POLACRILEX 2 MG: 2 GUM, CHEWING BUCCAL at 15:12

## 2018-11-27 RX ADMIN — SIMVASTATIN 20 MG: 20 TABLET, FILM COATED ORAL at 18:30

## 2018-11-27 RX ADMIN — PROPOFOL 50 MG: 10 INJECTION, EMULSION INTRAVENOUS at 13:25

## 2018-11-27 RX ADMIN — PROPOFOL 100 MG: 10 INJECTION, EMULSION INTRAVENOUS at 13:12

## 2018-11-27 RX ADMIN — PROPOFOL 50 MG: 10 INJECTION, EMULSION INTRAVENOUS at 13:16

## 2018-11-27 RX ADMIN — SERTRALINE 150 MG: 100 TABLET, FILM COATED ORAL at 15:11

## 2018-11-27 RX ADMIN — PROPOFOL 50 MG: 10 INJECTION, EMULSION INTRAVENOUS at 13:31

## 2018-11-27 RX ADMIN — PROPOFOL 50 MG: 10 INJECTION, EMULSION INTRAVENOUS at 13:14

## 2018-11-27 RX ADMIN — HYDROXYZINE PAMOATE 50 MG: 50 CAPSULE ORAL at 20:08

## 2018-11-27 RX ADMIN — NICOTINE POLACRILEX 2 MG: 2 GUM, CHEWING BUCCAL at 18:30

## 2018-11-27 RX ADMIN — PROPOFOL 100 MG: 10 INJECTION, EMULSION INTRAVENOUS at 13:10

## 2018-11-27 RX ADMIN — PROPOFOL 50 MG: 10 INJECTION, EMULSION INTRAVENOUS at 13:22

## 2018-11-27 RX ADMIN — NICOTINE POLACRILEX 2 MG: 2 GUM, CHEWING BUCCAL at 09:04

## 2018-11-27 RX ADMIN — TRAZODONE HYDROCHLORIDE 50 MG: 50 TABLET ORAL at 20:09

## 2018-11-27 RX ADMIN — SODIUM CHLORIDE, POTASSIUM CHLORIDE, SODIUM LACTATE AND CALCIUM CHLORIDE: 600; 310; 30; 20 INJECTION, SOLUTION INTRAVENOUS at 13:00

## 2018-11-27 ASSESSMENT — PULMONARY FUNCTION TESTS
PIF_VALUE: 0
PIF_VALUE: 1
PIF_VALUE: 0

## 2018-11-27 NOTE — PROGRESS NOTES
Isolates to room throughout evening shift, resting in bed with eyes closed. Mood stable. Accepted PRN vistaril 50 mg PO and PRN trazodone 50 mg PO @ 2054 for restlessness and for sleep. Denies needs/ concerns.
Pt very anxious
Pt. declined to attend the 0900 community meeting, despite staff encouragement.  Electronically signed by Alie Clay on 11/24/2018 at 9:37 AM
History    Marital status: Single     Spouse name: N/A    Number of children: N/A    Years of education: N/A     Occupational History    Not on file. Social History Main Topics    Smoking status: Current Every Day Smoker     Packs/day: 0.50     Years: 24.00     Types: Cigarettes    Smokeless tobacco: Never Used    Alcohol use No    Drug use: No    Sexual activity: Not on file     Other Topics Concern    Not on file     Social History Narrative    No narrative on file       LABS:  Lab Results   Component Value Date    LITHIUM 0.5 (L) 11/23/2018     11/23/2018    BUN 11 11/23/2018    CREATININE 0.67 11/23/2018    TSH 1.190 11/23/2018    WBC 18.8 (H) 11/23/2018     No results found for: PHENYTOIN, PHENOBARB, VALPROATE, CBMZ  Lab Results   Component Value Date    INR 1.0 05/18/2014    PROTIME 10.0 05/18/2014     Lab Results   Component Value Date    APTT 25.2 05/18/2014     Recent Labs      11/23/18   1802   ETOH  <10           ROS:  [x] All negative/unchanged except if checked.  Explain positive(checked items) below:  [] Constitutional  [] Eyes  [] Ear/Nose/Mouth/Throat  [] Respiratory  [] CV  [] GI  []   [] Musculoskeletal  [] Skin/Breast  [] Neurological  [] Endocrine  [] Heme/Lymph  [] Allergic/Immunologic      MEDICATIONS:    Current Facility-Administered Medications:     sertraline (ZOLOFT) tablet 150 mg, 150 mg, Oral, Daily, Alexander Sidhu MD, 150 mg at 11/25/18 9106    lithium capsule 300 mg, 300 mg, Oral, TID WC, Alexander Sidhu MD, 300 mg at 11/25/18 1253    simvastatin (ZOCOR) tablet 20 mg, 20 mg, Oral, QPM, Alexander Sidhu MD, 20 mg at 11/24/18 1819    acetaminophen (TYLENOL) tablet 650 mg, 650 mg, Oral, Q4H PRN, Alexander Sidhu MD, 650 mg at 11/25/18 1110    magnesium hydroxide (MILK OF MAGNESIA) 400 MG/5ML suspension 30 mL, 30 mL, Oral, Daily PRN, Alexander Sidhu MD    benztropine mesylate (COGENTIN) injection 2 mg, 2 mg, Intramuscular, BID PRN, Alexander Sidhu MD    nicotine
effects(SE): no    Mental Status Examination:    Level of consciousness:  within normal limits   Appearance:  fair grooming and fair hygiene  Behavior/Motor:  psychomotor retardation  Attitude toward examiner:  cooperative  Speech:  slow   Mood: anxious and depressed  Affect:  mood congruent  Thought processes:  linear and goal directed   Thought content:  Suicidal Ideation:  denies suicidal ideation  Delusions:  no evidence of delusions  Perceptual Disturbance:  denies any perceptual disturbance  Cognition:  oriented to person, place, and time   Concentration distractible  Insight fair   Judgement fair     ASSESSMENT:   Patient symptoms are:  [] Well controlled  [x] Improving  [] Worsening  [] No change      Diagnosis:   Principal Problem:    Bipolar 1 disorder, depressed ([facility] [address]  Resolved Problems:    * No resolved hospital problems. *      LABS:    No results for input(s): WBC, HGB, PLT in the last 72 hours. No results for input(s): NA, K, CL, CO2, BUN, CREATININE, GLUCOSE in the last 72 hours. No results for input(s): BILITOT, ALKPHOS, AST, ALT in the last 72 hours. Lab Results   Component Value Date    LABAMPH Neg 11/23/2018    BARBSCNU Neg 11/23/2018    LABBENZ Neg 11/23/2018    OPIATESCREENURINE Neg 11/23/2018    PHENCYCLIDINESCREENURINE Neg 11/23/2018    ETOH <10 11/23/2018     Lab Results   Component Value Date    TSH 1.190 11/23/2018     Lab Results   Component Value Date    LITHIUM 0.5 (L) 11/27/2018     No results found for: VALPROATE, CBMZ      Treatment Plan:  Reviewed current Medications with the patient. ZOloft 150 mg to continued  Lithium dose to be continued. Level normal  Risks, benefits, side effects, drug-to-drug interactions and alternatives to treatment were discussed. Collateral information: reviewed  CD evaluation  Encourage patient to attend group and other milieu activities.   Discharge planning discussed with the patient and treatment team.    PSYCHOTHERAPY/COUNSELING:  [x] Therapeutic

## 2018-11-27 NOTE — CARE COORDINATION
Patient did not attend group despite staff encouragement.   Electronically signed by Barrington Davalos [facility] on 11/27/2018 at 3:53 PM

## 2018-11-27 NOTE — ANESTHESIA PRE PROCEDURE
Department of Anesthesiology  Preprocedure Note       Name:  Bryanna Arceo   Age:  45 y.o.  :  1980                                          MRN:  [id number]         Date:  2018      Surgeon: Suha Humphreys): Magalis Ruiz MD    Procedure: COLONOSCOPY ROOM 384 (N/A )    Medications prior to admission:   Prior to Admission medications    Medication Sig Start Date End Date Taking?  Authorizing Provider   sertraline (ZOLOFT) 100 MG tablet Take 1 tablet by mouth daily 18  Yes Fariba Whitt DO   simvastatin (ZOCOR) 20 MG tablet Take 1 tablet by mouth every evening 18  Yes Fariba Whitt DO   lithium 300 MG capsule Take 1 capsule by mouth 3 times daily (with meals)  Patient taking differently: Take 900 mg by mouth nightly  18  Yes Fariba Whitt DO       Current medications:    Current Facility-Administered Medications   Medication Dose Route Frequency Provider Last Rate Last Dose    fentaNYL (SUBLIMAZE) injection 50 mcg  50 mcg Intravenous Q10 Min PRN Matthew Jacobo MD        HYDROmorphone (DILAUDID) injection 0.5 mg  0.5 mg Intravenous Q10 Min PRN Matthew Jacobo MD        HYDROcodone-acetaminophen [facility]) 5-325 MG per tablet 1 tablet  1 tablet Oral KIRSTIE Jacobo MD        Or    HYDROcodone-acetaminophen [facility]) 5-325 MG per tablet 2 tablet  2 tablet Oral KIRSTIE Jacobo MD        diphenhydrAMINE (BENADRYL) injection 12.5 mg  12.5 mg Intravenous Once PRJONATHAN Jacobo MD        ondansetron [facility]) injection 4 mg  4 mg Intravenous Once PRJONATHAN Jacobo MD        metoclopramide [facility]) injection 10 mg  10 mg Intravenous Once PRN Matthew Jacobo MD        meperidine (DEMEROL) injection 12.5 mg  12.5 mg Intravenous Q5 Min PRJONATHAN Jacobo MD        loperamide (IMODIUM) capsule 2 mg  2 mg Oral TID PRN NAOMI Hemphill - CNP   2 mg at 18 1259    polyethylene glycol (GoLYTELY) solution 2,000 mL

## 2018-11-28 VITALS
RESPIRATION RATE: 20 BRPM | HEART RATE: 86 BPM | HEIGHT: 65 IN | DIASTOLIC BLOOD PRESSURE: 107 MMHG | WEIGHT: 257 LBS | SYSTOLIC BLOOD PRESSURE: 156 MMHG | OXYGEN SATURATION: 96 % | BODY MASS INDEX: 42.82 KG/M2 | TEMPERATURE: 98 F

## 2018-11-28 PROCEDURE — 99239 HOSP IP/OBS DSCHRG MGMT >30: CPT | Performed by: PSYCHIATRY & NEUROLOGY

## 2018-11-28 PROCEDURE — 6370000000 HC RX 637 (ALT 250 FOR IP): Performed by: PSYCHIATRY & NEUROLOGY

## 2018-11-28 RX ORDER — TRAZODONE HYDROCHLORIDE 50 MG/1
50 TABLET ORAL NIGHTLY
Qty: 15 TABLET | Refills: 2 | Status: ON HOLD | OUTPATIENT
Start: 2018-11-28 | End: 2020-12-21 | Stop reason: HOSPADM

## 2018-11-28 RX ORDER — HYDROXYZINE PAMOATE 50 MG/1
50 CAPSULE ORAL EVERY 6 HOURS PRN
Qty: 30 CAPSULE | Refills: 2 | Status: SHIPPED | OUTPATIENT
Start: 2018-11-28 | End: 2019-01-12 | Stop reason: SDUPTHER

## 2018-11-28 RX ADMIN — LITHIUM CARBONATE 300 MG: 300 CAPSULE, GELATIN COATED ORAL at 08:46

## 2018-11-28 RX ADMIN — NICOTINE POLACRILEX 2 MG: 2 GUM, CHEWING BUCCAL at 07:08

## 2018-11-28 RX ADMIN — NICOTINE POLACRILEX 2 MG: 2 GUM, CHEWING BUCCAL at 08:46

## 2018-11-28 RX ADMIN — SERTRALINE 150 MG: 100 TABLET, FILM COATED ORAL at 08:46

## 2018-11-28 NOTE — DISCHARGE SUMMARY
symptoms are:  [x] Well controlled  [x] Improving  [] Worsening  [] No change      Diagnosis:  Principal Problem:    Bipolar 1 disorder, depressed ([facility]  Active Problems:    Diarrhea  Resolved Problems:    * No resolved hospital problems. *      LABS:    No results for input(s): WBC, HGB, PLT in the last 72 hours. No results for input(s): NA, K, CL, CO2, BUN, CREATININE, GLUCOSE in the last 72 hours. No results for input(s): BILITOT, ALKPHOS, AST, ALT in the last 72 hours. Lab Results   Component Value Date    LABAMPH Neg 11/23/2018    BARBSCNU Neg 11/23/2018    LABBENZ Neg 11/23/2018    OPIATESCREENURINE Neg 11/23/2018    PHENCYCLIDINESCREENURINE Neg 11/23/2018    ETOH <10 11/23/2018     Lab Results   Component Value Date    TSH 1.190 11/23/2018     Lab Results   Component Value Date    LITHIUM 0.5 (L) 11/27/2018     No results found for: VALPROATE, CBMZ    RISK ASSESSMENT AT DISCHARGE: Low risk for suicide and homicide. Treatment Plan:  Reviewed current Medications with the patient. Education provided on the complaince with treatment. Risks, benefits, side effects, drug-to-drug interactions and alternatives to treatment were discussed. Encourage patient to attend outpatient follow up appointment and therapy. Patient was advised to call the outpatient provider, visit the nearest ED or call 911 if symptoms are not manageable.      Patient's family member was contacted prior to the discharge.         Medication List      START taking these medications    hydrOXYzine 50 MG capsule  Commonly known as:  VISTARIL  Take 1 capsule by mouth every 6 hours as needed for Anxiety     traZODone 50 MG tablet  Commonly known as:  DESYREL  Take 1 tablet by mouth nightly        CHANGE how you take these medications    lithium 300 MG capsule  Take 1 capsule by mouth 3 times daily (with meals)  What changed:  · how much to take  · when to take this     sertraline 50 MG tablet  Commonly known as:  ZOLOFT  Take 3 tablets

## 2018-12-04 ENCOUNTER — TELEPHONE (OUTPATIENT)
Dept: GASTROENTEROLOGY | Age: 38
End: 2018-12-04

## 2018-12-05 ENCOUNTER — TELEPHONE (OUTPATIENT)
Dept: GASTROENTEROLOGY | Age: 38
End: 2018-12-05

## 2018-12-06 ENCOUNTER — OFFICE VISIT (OUTPATIENT)
Dept: PRIMARY CARE CLINIC | Age: 38
End: 2018-12-06
Payer: MEDICAID

## 2018-12-06 VITALS
HEART RATE: 84 BPM | BODY MASS INDEX: 41.82 KG/M2 | OXYGEN SATURATION: 97 % | RESPIRATION RATE: 14 BRPM | HEIGHT: 65 IN | DIASTOLIC BLOOD PRESSURE: 86 MMHG | TEMPERATURE: 98.2 F | SYSTOLIC BLOOD PRESSURE: 120 MMHG | WEIGHT: 251 LBS

## 2018-12-06 DIAGNOSIS — F17.209 NICOTINE DEPENDENCE WITH NICOTINE-INDUCED DISORDER, UNSPECIFIED NICOTINE PRODUCT TYPE: ICD-10-CM

## 2018-12-06 DIAGNOSIS — K58.2 IRRITABLE BOWEL SYNDROME WITH BOTH CONSTIPATION AND DIARRHEA: ICD-10-CM

## 2018-12-06 DIAGNOSIS — G47.33 OSA (OBSTRUCTIVE SLEEP APNEA): ICD-10-CM

## 2018-12-06 DIAGNOSIS — F31.32 BIPOLAR DISORDER, CURRENT EPISODE DEPRESSED, MODERATE (HCC): Primary | ICD-10-CM

## 2018-12-06 PROCEDURE — G8484 FLU IMMUNIZE NO ADMIN: HCPCS | Performed by: FAMILY MEDICINE

## 2018-12-06 PROCEDURE — G8417 CALC BMI ABV UP PARAM F/U: HCPCS | Performed by: FAMILY MEDICINE

## 2018-12-06 PROCEDURE — 99213 OFFICE O/P EST LOW 20 MIN: CPT | Performed by: FAMILY MEDICINE

## 2018-12-06 PROCEDURE — 1111F DSCHRG MED/CURRENT MED MERGE: CPT | Performed by: FAMILY MEDICINE

## 2018-12-06 PROCEDURE — 4004F PT TOBACCO SCREEN RCVD TLK: CPT | Performed by: FAMILY MEDICINE

## 2018-12-06 PROCEDURE — G8427 DOCREV CUR MEDS BY ELIG CLIN: HCPCS | Performed by: FAMILY MEDICINE

## 2018-12-06 RX ORDER — CHOLESTYRAMINE 4 G/9G
1 POWDER, FOR SUSPENSION ORAL 2 TIMES DAILY
Qty: 60 PACKET | Refills: 1 | Status: SHIPPED | OUTPATIENT
Start: 2018-12-06 | End: 2019-02-08

## 2018-12-06 RX ORDER — DICYCLOMINE HYDROCHLORIDE 10 MG/1
10 CAPSULE ORAL 4 TIMES DAILY
Qty: 120 CAPSULE | Refills: 3 | Status: SHIPPED | OUTPATIENT
Start: 2018-12-06 | End: 2019-02-08

## 2018-12-06 ASSESSMENT — ENCOUNTER SYMPTOMS
CONSTIPATION: 0
EYES NEGATIVE: 1
EYE ITCHING: 0
GASTROINTESTINAL NEGATIVE: 1
BACK PAIN: 0
SHORTNESS OF BREATH: 0
EYE REDNESS: 0
DIARRHEA: 0
RESPIRATORY NEGATIVE: 1
ABDOMINAL PAIN: 0
PHOTOPHOBIA: 0
WHEEZING: 0

## 2018-12-06 NOTE — LETTER
[facility]  [address] [facility] [address]  Phone: [phone]  Fax: [address], [place]        December 6, 2018     Patient: Kyree Luna   YOB: 1980   Date of Visit: 12/6/2018       To Whom it May Concern:    Kyree Luna was seen in my clinic on 12/6/2018. She is temporarily disabled due to bipolar disorder, lumbar DDD and chronic diarrhea. If you have any questions or concerns, please don't hesitate to call.     Sincerely,         Delores Cifuentes, DO

## 2018-12-06 NOTE — PROGRESS NOTES
Not on file     Social History Narrative    No narrative on file     Allergies:  Nsaids; Latuda [lurasidone hcl]; and Adhesive tape    Review of Systems   Constitutional: Negative. Negative for activity change, appetite change and fatigue. HENT: Negative. Eyes: Negative. Negative for photophobia, redness and itching. Respiratory: Negative. Negative for shortness of breath and wheezing. Cardiovascular: Negative. Gastrointestinal: Negative. Negative for abdominal pain, constipation and diarrhea. Genitourinary: Negative. Negative for hematuria, pelvic pain and urgency. Musculoskeletal: Negative. Negative for back pain. Skin: Negative. Neurological: Negative. Psychiatric/Behavioral: Negative. Negative for agitation and behavioral problems. The patient is not nervous/anxious. Objective:   /86 (Site: Right Upper Arm, Position: Sitting, Cuff Size: Large Adult)   Pulse 84   Temp 98.2 °F (36.8 °C) (Tympanic)   Resp 14   Ht 5' 5\" (1.651 m)   Wt 251 lb (113.9 kg)   SpO2 97%   BMI 41.77 kg/m²     Physical Exam   Constitutional: She is oriented to person, place, and time. She appears well-developed and well-nourished. HENT:   Head: Normocephalic and atraumatic. Eyes: Pupils are equal, round, and reactive to light. Conjunctivae and EOM are normal.   Neck: Normal range of motion. Neck supple. No thyromegaly present. Cardiovascular: Normal rate, regular rhythm and normal heart sounds. No murmur heard. Pulmonary/Chest: Effort normal and breath sounds normal. She has no wheezes. She exhibits no tenderness. Abdominal: Soft. Bowel sounds are normal. There is no tenderness. Musculoskeletal: Normal range of motion. She exhibits no edema or tenderness. Lymphadenopathy:     She has no cervical adenopathy. Neurological: She is alert and oriented to person, place, and time. She has normal reflexes. Coordination normal.   Skin: Skin is warm and dry. No rash noted.

## 2018-12-11 ENCOUNTER — TELEPHONE (OUTPATIENT)
Dept: PRIMARY CARE CLINIC | Age: 38
End: 2018-12-11

## 2018-12-12 RX ORDER — CHOLESTYRAMINE LIGHT 4 G/5.7G
POWDER, FOR SUSPENSION ORAL
Refills: 3 | Status: CANCELLED | OUTPATIENT
Start: 2018-12-12

## 2018-12-14 RX ORDER — CHOLESTYRAMINE LIGHT 4 G/5.7G
4 POWDER, FOR SUSPENSION ORAL DAILY
Qty: 30 PACKET | Refills: 3 | Status: SHIPPED | OUTPATIENT
Start: 2018-12-14 | End: 2019-02-08

## 2019-01-12 RX ORDER — HYDROXYZINE PAMOATE 50 MG/1
50 CAPSULE ORAL EVERY 6 HOURS PRN
Qty: 30 CAPSULE | Refills: 2 | Status: SHIPPED | OUTPATIENT
Start: 2019-01-12 | End: 2019-02-01 | Stop reason: SDUPTHER

## 2019-01-14 ENCOUNTER — TELEPHONE (OUTPATIENT)
Dept: PRIMARY CARE CLINIC | Age: 39
End: 2019-01-14

## 2019-01-14 RX ORDER — FLUCONAZOLE 150 MG/1
150 TABLET ORAL ONCE
Qty: 1 TABLET | Refills: 0 | Status: SHIPPED | OUTPATIENT
Start: 2019-01-14 | End: 2019-01-14

## 2019-02-01 RX ORDER — HYDROXYZINE PAMOATE 50 MG/1
50 CAPSULE ORAL EVERY 6 HOURS PRN
Qty: 30 CAPSULE | Refills: 2 | Status: SHIPPED | OUTPATIENT
Start: 2019-02-01 | End: 2019-02-15

## 2019-02-08 ENCOUNTER — HOSPITAL ENCOUNTER (INPATIENT)
Age: 39
LOS: 4 days | Discharge: HOME | DRG: 753 | End: 2019-02-12
Attending: STUDENT IN AN ORGANIZED HEALTH CARE EDUCATION/TRAINING PROGRAM | Admitting: PSYCHIATRY & NEUROLOGY
Payer: MEDICAID

## 2019-02-08 DIAGNOSIS — F14.11 HISTORY OF COCAINE ABUSE (HCC): ICD-10-CM

## 2019-02-08 DIAGNOSIS — R11.0 CHRONIC NAUSEA: ICD-10-CM

## 2019-02-08 DIAGNOSIS — E66.01 MORBID OBESITY DUE TO EXCESS CALORIES (HCC): ICD-10-CM

## 2019-02-08 DIAGNOSIS — F12.20 MARIJUANA DEPENDENCE (HCC): ICD-10-CM

## 2019-02-08 DIAGNOSIS — K52.9 CHRONIC DIARRHEA: ICD-10-CM

## 2019-02-08 DIAGNOSIS — F31.9 BIPOLAR 1 DISORDER (HCC): Primary | ICD-10-CM

## 2019-02-08 DIAGNOSIS — B37.0 THRUSH, ORAL: ICD-10-CM

## 2019-02-08 DIAGNOSIS — R73.9 HYPERGLYCEMIA: ICD-10-CM

## 2019-02-08 DIAGNOSIS — F17.200 TOBACCO DEPENDENCE: ICD-10-CM

## 2019-02-08 LAB
ACETAMINOPHEN LEVEL: <5 UG/ML (ref 10–30)
ALBUMIN SERPL-MCNC: 4.2 G/DL (ref 3.5–4.6)
ALP BLD-CCNC: 93 U/L (ref 40–130)
ALT SERPL-CCNC: 22 U/L (ref 0–33)
AMPHETAMINE SCREEN, URINE: ABNORMAL
ANION GAP SERPL CALCULATED.3IONS-SCNC: 16 MEQ/L (ref 9–15)
AST SERPL-CCNC: 19 U/L (ref 0–35)
BARBITURATE SCREEN URINE: ABNORMAL
BASOPHILS ABSOLUTE: 0.1 K/UL (ref 0–0.2)
BASOPHILS RELATIVE PERCENT: 0.6 %
BENZODIAZEPINE SCREEN, URINE: ABNORMAL
BILIRUB SERPL-MCNC: 0.4 MG/DL (ref 0.2–0.7)
BILIRUBIN URINE: NEGATIVE
BLOOD, URINE: NEGATIVE
BUN BLDV-MCNC: 7 MG/DL (ref 6–20)
CALCIUM SERPL-MCNC: 9.4 MG/DL (ref 8.5–9.9)
CANNABINOID SCREEN URINE: POSITIVE
CHLORIDE BLD-SCNC: 100 MEQ/L (ref 95–107)
CK MB: <1 NG/ML (ref 0–3.8)
CLARITY: CLEAR
CO2: 22 MEQ/L (ref 20–31)
COCAINE METABOLITE SCREEN URINE: ABNORMAL
COLOR: YELLOW
CREAT SERPL-MCNC: 0.78 MG/DL (ref 0.5–0.9)
CREATINE KINASE-MB INDEX: 2.6 % (ref 0–3.5)
EOSINOPHILS ABSOLUTE: 0.4 K/UL (ref 0–0.7)
EOSINOPHILS RELATIVE PERCENT: 2.5 %
ETHANOL PERCENT: NORMAL G/DL
ETHANOL: <10 MG/DL (ref 0–0.08)
GFR AFRICAN AMERICAN: >60
GFR NON-AFRICAN AMERICAN: >60
GLOBULIN: 3.4 G/DL (ref 2.3–3.5)
GLUCOSE BLD-MCNC: 127 MG/DL (ref 70–99)
GLUCOSE URINE: NEGATIVE MG/DL
HCG(URINE) PREGNANCY TEST: NEGATIVE
HCT VFR BLD CALC: 45.7 % (ref 37–47)
HEMOGLOBIN: 15.1 G/DL (ref 12–16)
KETONES, URINE: NEGATIVE MG/DL
LEUKOCYTE ESTERASE, URINE: NEGATIVE
LITHIUM LEVEL: 0.5 MEQ/L (ref 0.6–1.2)
LYMPHOCYTES ABSOLUTE: 2.2 K/UL (ref 1–4.8)
LYMPHOCYTES RELATIVE PERCENT: 14.8 %
Lab: ABNORMAL
MCH RBC QN AUTO: 28.1 PG (ref 27–31.3)
MCHC RBC AUTO-ENTMCNC: 33 % (ref 33–37)
MCV RBC AUTO: 84.9 FL (ref 82–100)
MONOCYTES ABSOLUTE: 0.8 K/UL (ref 0.2–0.8)
MONOCYTES RELATIVE PERCENT: 5.2 %
NEUTROPHILS ABSOLUTE: 11.4 K/UL (ref 1.4–6.5)
NEUTROPHILS RELATIVE PERCENT: 76.9 %
NITRITE, URINE: NEGATIVE
OPIATE SCREEN URINE: ABNORMAL
PARATHYROID HORMONE INTACT: 57.8 PG/ML (ref 15–65)
PDW BLD-RTO: 14.5 % (ref 11.5–14.5)
PH UA: 6 (ref 5–9)
PHENCYCLIDINE SCREEN URINE: ABNORMAL
PLATELET # BLD: 247 K/UL (ref 130–400)
PLATELET SLIDE REVIEW: NORMAL
POTASSIUM SERPL-SCNC: 3.4 MEQ/L (ref 3.4–4.9)
PROTEIN UA: NEGATIVE MG/DL
RBC # BLD: 5.38 M/UL (ref 4.2–5.4)
SALICYLATE, SERUM: <0.3 MG/DL (ref 15–30)
SODIUM BLD-SCNC: 138 MEQ/L (ref 135–144)
SPECIFIC GRAVITY UA: 1.02 (ref 1–1.03)
TOTAL CK: 39 U/L (ref 0–170)
TOTAL PROTEIN: 7.6 G/DL (ref 6.3–8)
TSH SERPL DL<=0.05 MIU/L-ACNC: 4.13 UIU/ML (ref 0.44–3.86)
URINE REFLEX TO CULTURE: NORMAL
UROBILINOGEN, URINE: 0.2 E.U./DL
WBC # BLD: 14.9 K/UL (ref 4.8–10.8)

## 2019-02-08 PROCEDURE — 83970 ASSAY OF PARATHORMONE: CPT

## 2019-02-08 PROCEDURE — 36415 COLL VENOUS BLD VENIPUNCTURE: CPT

## 2019-02-08 PROCEDURE — G0480 DRUG TEST DEF 1-7 CLASSES: HCPCS

## 2019-02-08 PROCEDURE — 81003 URINALYSIS AUTO W/O SCOPE: CPT

## 2019-02-08 PROCEDURE — 80178 ASSAY OF LITHIUM: CPT

## 2019-02-08 PROCEDURE — 82553 CREATINE MB FRACTION: CPT

## 2019-02-08 PROCEDURE — 82550 ASSAY OF CK (CPK): CPT

## 2019-02-08 PROCEDURE — 84443 ASSAY THYROID STIM HORMONE: CPT

## 2019-02-08 PROCEDURE — 1240000000 HC EMOTIONAL WELLNESS R&B

## 2019-02-08 PROCEDURE — 6370000000 HC RX 637 (ALT 250 FOR IP): Performed by: PSYCHIATRY & NEUROLOGY

## 2019-02-08 PROCEDURE — 84703 CHORIONIC GONADOTROPIN ASSAY: CPT

## 2019-02-08 PROCEDURE — 80053 COMPREHEN METABOLIC PANEL: CPT

## 2019-02-08 PROCEDURE — 85025 COMPLETE CBC W/AUTO DIFF WBC: CPT

## 2019-02-08 PROCEDURE — 84481 FREE ASSAY (FT-3): CPT

## 2019-02-08 PROCEDURE — 6370000000 HC RX 637 (ALT 250 FOR IP): Performed by: STUDENT IN AN ORGANIZED HEALTH CARE EDUCATION/TRAINING PROGRAM

## 2019-02-08 PROCEDURE — 80307 DRUG TEST PRSMV CHEM ANLYZR: CPT

## 2019-02-08 PROCEDURE — 84439 ASSAY OF FREE THYROXINE: CPT

## 2019-02-08 PROCEDURE — 99285 EMERGENCY DEPT VISIT HI MDM: CPT

## 2019-02-08 RX ORDER — TRAZODONE HYDROCHLORIDE 50 MG/1
50 TABLET ORAL NIGHTLY PRN
Status: DISCONTINUED | OUTPATIENT
Start: 2019-02-08 | End: 2019-02-12 | Stop reason: HOSPADM

## 2019-02-08 RX ORDER — NICOTINE 21 MG/24HR
1 PATCH, TRANSDERMAL 24 HOURS TRANSDERMAL DAILY
Status: DISCONTINUED | OUTPATIENT
Start: 2019-02-08 | End: 2019-02-11

## 2019-02-08 RX ORDER — SIMVASTATIN 20 MG
20 TABLET ORAL EVERY EVENING
Status: DISCONTINUED | OUTPATIENT
Start: 2019-02-08 | End: 2019-02-12 | Stop reason: HOSPADM

## 2019-02-08 RX ORDER — HYDROXYZINE PAMOATE 50 MG/1
50 CAPSULE ORAL EVERY 6 HOURS PRN
Status: DISCONTINUED | OUTPATIENT
Start: 2019-02-08 | End: 2019-02-09

## 2019-02-08 RX ORDER — HALOPERIDOL 5 MG/ML
5 INJECTION INTRAMUSCULAR EVERY 6 HOURS PRN
Status: DISCONTINUED | OUTPATIENT
Start: 2019-02-08 | End: 2019-02-12 | Stop reason: HOSPADM

## 2019-02-08 RX ORDER — HALOPERIDOL 5 MG
5 TABLET ORAL EVERY 6 HOURS PRN
Status: DISCONTINUED | OUTPATIENT
Start: 2019-02-08 | End: 2019-02-12 | Stop reason: HOSPADM

## 2019-02-08 RX ORDER — FLUCONAZOLE 100 MG/1
150 TABLET ORAL
Status: DISCONTINUED | OUTPATIENT
Start: 2019-02-08 | End: 2019-02-09

## 2019-02-08 RX ORDER — HYDROXYZINE PAMOATE 50 MG/1
50 CAPSULE ORAL ONCE
Status: COMPLETED | OUTPATIENT
Start: 2019-02-08 | End: 2019-02-08

## 2019-02-08 RX ORDER — LITHIUM CARBONATE 300 MG/1
600 CAPSULE ORAL DAILY
Status: DISCONTINUED | OUTPATIENT
Start: 2019-02-08 | End: 2019-02-08

## 2019-02-08 RX ORDER — HYDROXYZINE HYDROCHLORIDE 50 MG/ML
50 INJECTION, SOLUTION INTRAMUSCULAR EVERY 6 HOURS PRN
Status: DISCONTINUED | OUTPATIENT
Start: 2019-02-08 | End: 2019-02-09

## 2019-02-08 RX ORDER — CLONIDINE HYDROCHLORIDE 0.1 MG/1
0.1 TABLET ORAL 3 TIMES DAILY PRN
Status: ON HOLD | COMMUNITY
End: 2019-02-12 | Stop reason: HOSPADM

## 2019-02-08 RX ORDER — BENZTROPINE MESYLATE 1 MG/ML
2 INJECTION INTRAMUSCULAR; INTRAVENOUS 2 TIMES DAILY PRN
Status: DISCONTINUED | OUTPATIENT
Start: 2019-02-08 | End: 2019-02-12 | Stop reason: HOSPADM

## 2019-02-08 RX ORDER — MAGNESIUM HYDROXIDE/ALUMINUM HYDROXICE/SIMETHICONE 120; 1200; 1200 MG/30ML; MG/30ML; MG/30ML
30 SUSPENSION ORAL EVERY 6 HOURS PRN
Status: DISCONTINUED | OUTPATIENT
Start: 2019-02-08 | End: 2019-02-12 | Stop reason: HOSPADM

## 2019-02-08 RX ORDER — ACETAMINOPHEN 325 MG/1
650 TABLET ORAL EVERY 4 HOURS PRN
Status: DISCONTINUED | OUTPATIENT
Start: 2019-02-08 | End: 2019-02-12 | Stop reason: HOSPADM

## 2019-02-08 RX ORDER — LITHIUM CARBONATE 300 MG/1
600 CAPSULE ORAL EVERY EVENING
Status: DISCONTINUED | OUTPATIENT
Start: 2019-02-09 | End: 2019-02-12 | Stop reason: HOSPADM

## 2019-02-08 RX ADMIN — TRAZODONE HYDROCHLORIDE 50 MG: 50 TABLET ORAL at 20:01

## 2019-02-08 RX ADMIN — SERTRALINE 150 MG: 100 TABLET, FILM COATED ORAL at 20:01

## 2019-02-08 RX ADMIN — HYDROXYZINE PAMOATE 50 MG: 50 CAPSULE ORAL at 17:28

## 2019-02-08 RX ADMIN — SIMVASTATIN 20 MG: 20 TABLET, FILM COATED ORAL at 20:01

## 2019-02-08 ASSESSMENT — PAIN DESCRIPTION - LOCATION: LOCATION: BACK;HIP

## 2019-02-08 ASSESSMENT — ENCOUNTER SYMPTOMS
NAUSEA: 1
ABDOMINAL PAIN: 0
DIARRHEA: 1
SHORTNESS OF BREATH: 0
VOMITING: 0
CHEST TIGHTNESS: 0
BACK PAIN: 0
COUGH: 0
SINUS PRESSURE: 0
TROUBLE SWALLOWING: 0

## 2019-02-08 ASSESSMENT — PAIN DESCRIPTION - ORIENTATION: ORIENTATION: LOWER;RIGHT

## 2019-02-08 ASSESSMENT — PAIN DESCRIPTION - PAIN TYPE: TYPE: CHRONIC PAIN

## 2019-02-08 ASSESSMENT — PAIN DESCRIPTION - DESCRIPTORS: DESCRIPTORS: CONSTANT

## 2019-02-09 LAB
EKG ATRIAL RATE: 77 BPM
EKG P AXIS: 37 DEGREES
EKG P-R INTERVAL: 160 MS
EKG Q-T INTERVAL: 414 MS
EKG QRS DURATION: 86 MS
EKG QTC CALCULATION (BAZETT): 468 MS
EKG R AXIS: 12 DEGREES
EKG T AXIS: 30 DEGREES
EKG VENTRICULAR RATE: 77 BPM
T3 FREE: 3.6 PG/ML (ref 2–4.4)
T4 FREE: 0.98 NG/DL (ref 0.84–1.68)

## 2019-02-09 PROCEDURE — 93005 ELECTROCARDIOGRAM TRACING: CPT

## 2019-02-09 PROCEDURE — 6370000000 HC RX 637 (ALT 250 FOR IP): Performed by: NURSE PRACTITIONER

## 2019-02-09 PROCEDURE — 6370000000 HC RX 637 (ALT 250 FOR IP): Performed by: PSYCHIATRY & NEUROLOGY

## 2019-02-09 PROCEDURE — 6370000000 HC RX 637 (ALT 250 FOR IP): Performed by: PHYSICIAN ASSISTANT

## 2019-02-09 PROCEDURE — 99254 IP/OBS CNSLTJ NEW/EST MOD 60: CPT | Performed by: INTERNAL MEDICINE

## 2019-02-09 PROCEDURE — 94640 AIRWAY INHALATION TREATMENT: CPT

## 2019-02-09 PROCEDURE — 94664 DEMO&/EVAL PT USE INHALER: CPT

## 2019-02-09 PROCEDURE — 1240000000 HC EMOTIONAL WELLNESS R&B

## 2019-02-09 RX ORDER — LITHIUM CARBONATE 300 MG/1
300 CAPSULE ORAL
Status: DISCONTINUED | OUTPATIENT
Start: 2019-02-10 | End: 2019-02-12 | Stop reason: HOSPADM

## 2019-02-09 RX ORDER — FLUCONAZOLE 100 MG/1
100 TABLET ORAL DAILY
Status: DISCONTINUED | OUTPATIENT
Start: 2019-02-10 | End: 2019-02-12 | Stop reason: HOSPADM

## 2019-02-09 RX ORDER — SERTRALINE HYDROCHLORIDE 100 MG/1
200 TABLET, FILM COATED ORAL NIGHTLY
Status: DISCONTINUED | OUTPATIENT
Start: 2019-02-09 | End: 2019-02-12 | Stop reason: HOSPADM

## 2019-02-09 RX ORDER — HYDROXYZINE PAMOATE 50 MG/1
50 CAPSULE ORAL 4 TIMES DAILY PRN
Status: DISCONTINUED | OUTPATIENT
Start: 2019-02-09 | End: 2019-02-10

## 2019-02-09 RX ORDER — ALBUTEROL SULFATE 90 UG/1
2 AEROSOL, METERED RESPIRATORY (INHALATION) EVERY 4 HOURS PRN
Status: DISCONTINUED | OUTPATIENT
Start: 2019-02-09 | End: 2019-02-12 | Stop reason: HOSPADM

## 2019-02-09 RX ORDER — HYDROXYZINE HYDROCHLORIDE 50 MG/ML
50 INJECTION, SOLUTION INTRAMUSCULAR EVERY 6 HOURS PRN
Status: DISCONTINUED | OUTPATIENT
Start: 2019-02-09 | End: 2019-02-10

## 2019-02-09 RX ORDER — FLUCONAZOLE 100 MG/1
100 TABLET ORAL DAILY
Status: DISCONTINUED | OUTPATIENT
Start: 2019-02-09 | End: 2019-02-09

## 2019-02-09 RX ORDER — BUSPIRONE HYDROCHLORIDE 7.5 MG/1
7.5 TABLET ORAL 2 TIMES DAILY
Status: DISCONTINUED | OUTPATIENT
Start: 2019-02-09 | End: 2019-02-10

## 2019-02-09 RX ORDER — BENZONATATE 100 MG/1
100 CAPSULE ORAL 3 TIMES DAILY PRN
Status: DISCONTINUED | OUTPATIENT
Start: 2019-02-09 | End: 2019-02-12 | Stop reason: HOSPADM

## 2019-02-09 RX ORDER — ALBUTEROL SULFATE 90 UG/1
2 AEROSOL, METERED RESPIRATORY (INHALATION) EVERY 6 HOURS PRN
Status: DISCONTINUED | OUTPATIENT
Start: 2019-02-09 | End: 2019-02-09

## 2019-02-09 RX ADMIN — HYDROXYZINE PAMOATE 50 MG: 50 CAPSULE ORAL at 18:05

## 2019-02-09 RX ADMIN — HYDROXYZINE PAMOATE 50 MG: 50 CAPSULE ORAL at 06:23

## 2019-02-09 RX ADMIN — SIMVASTATIN 20 MG: 20 TABLET, FILM COATED ORAL at 18:04

## 2019-02-09 RX ADMIN — BENZONATATE 100 MG: 100 CAPSULE ORAL at 10:48

## 2019-02-09 RX ADMIN — SERTRALINE 200 MG: 100 TABLET, FILM COATED ORAL at 20:53

## 2019-02-09 RX ADMIN — TRAZODONE HYDROCHLORIDE 50 MG: 50 TABLET ORAL at 20:57

## 2019-02-09 RX ADMIN — FLUCONAZOLE 150 MG: 100 TABLET ORAL at 09:17

## 2019-02-09 RX ADMIN — Medication 2 PUFF: at 19:32

## 2019-02-09 RX ADMIN — LITHIUM CARBONATE 600 MG: 300 CAPSULE, GELATIN COATED ORAL at 18:04

## 2019-02-09 RX ADMIN — Medication 2 PUFF: at 11:05

## 2019-02-09 RX ADMIN — BENZONATATE 100 MG: 100 CAPSULE ORAL at 19:15

## 2019-02-09 RX ADMIN — HYDROXYZINE PAMOATE 50 MG: 50 CAPSULE ORAL at 12:22

## 2019-02-09 ASSESSMENT — SLEEP AND FATIGUE QUESTIONNAIRES
SLEEP PATTERN: DISTURBED/INTERRUPTED SLEEP;RESTLESSNESS
DO YOU USE A SLEEP AID: YES
DIFFICULTY FALLING ASLEEP: NO
DO YOU HAVE DIFFICULTY SLEEPING: YES
AVERAGE NUMBER OF SLEEP HOURS: 6
DIFFICULTY ARISING: NO
DIFFICULTY STAYING ASLEEP: YES
RESTFUL SLEEP: NO

## 2019-02-09 ASSESSMENT — PATIENT HEALTH QUESTIONNAIRE - PHQ9: SUM OF ALL RESPONSES TO PHQ QUESTIONS 1-9: 18

## 2019-02-09 ASSESSMENT — LIFESTYLE VARIABLES: HISTORY_ALCOHOL_USE: YES

## 2019-02-10 ENCOUNTER — APPOINTMENT (OUTPATIENT)
Dept: GENERAL RADIOLOGY | Age: 39
DRG: 753 | End: 2019-02-10
Payer: MEDICAID

## 2019-02-10 LAB
RAPID INFLUENZA  B AGN: NEGATIVE
RAPID INFLUENZA A AGN: NEGATIVE

## 2019-02-10 PROCEDURE — 80074 ACUTE HEPATITIS PANEL: CPT

## 2019-02-10 PROCEDURE — 87536 HIV-1 QUANT&REVRSE TRNSCRPJ: CPT

## 2019-02-10 PROCEDURE — 94640 AIRWAY INHALATION TREATMENT: CPT

## 2019-02-10 PROCEDURE — 71046 X-RAY EXAM CHEST 2 VIEWS: CPT

## 2019-02-10 PROCEDURE — 87633 RESP VIRUS 12-25 TARGETS: CPT

## 2019-02-10 PROCEDURE — 1240000000 HC EMOTIONAL WELLNESS R&B

## 2019-02-10 PROCEDURE — 6370000000 HC RX 637 (ALT 250 FOR IP): Performed by: PSYCHIATRY & NEUROLOGY

## 2019-02-10 PROCEDURE — 87804 INFLUENZA ASSAY W/OPTIC: CPT

## 2019-02-10 PROCEDURE — 87491 CHLMYD TRACH DNA AMP PROBE: CPT

## 2019-02-10 PROCEDURE — 36415 COLL VENOUS BLD VENIPUNCTURE: CPT

## 2019-02-10 PROCEDURE — 87040 BLOOD CULTURE FOR BACTERIA: CPT

## 2019-02-10 PROCEDURE — 6370000000 HC RX 637 (ALT 250 FOR IP): Performed by: NURSE PRACTITIONER

## 2019-02-10 PROCEDURE — 87591 N.GONORRHOEAE DNA AMP PROB: CPT

## 2019-02-10 RX ORDER — HYDROXYZINE PAMOATE 50 MG/1
50 CAPSULE ORAL 4 TIMES DAILY
Status: DISCONTINUED | OUTPATIENT
Start: 2019-02-10 | End: 2019-02-12 | Stop reason: HOSPADM

## 2019-02-10 RX ADMIN — SERTRALINE 200 MG: 100 TABLET, FILM COATED ORAL at 20:58

## 2019-02-10 RX ADMIN — HYDROXYZINE PAMOATE 50 MG: 50 CAPSULE ORAL at 20:58

## 2019-02-10 RX ADMIN — Medication 2 PUFF: at 07:38

## 2019-02-10 RX ADMIN — HYDROXYZINE PAMOATE 50 MG: 50 CAPSULE ORAL at 15:58

## 2019-02-10 RX ADMIN — BENZONATATE 100 MG: 100 CAPSULE ORAL at 20:58

## 2019-02-10 RX ADMIN — Medication 2 PUFF: at 20:48

## 2019-02-10 RX ADMIN — LITHIUM CARBONATE 600 MG: 300 CAPSULE, GELATIN COATED ORAL at 17:40

## 2019-02-10 RX ADMIN — SIMVASTATIN 20 MG: 20 TABLET, FILM COATED ORAL at 17:40

## 2019-02-10 RX ADMIN — TRAZODONE HYDROCHLORIDE 50 MG: 50 TABLET ORAL at 20:58

## 2019-02-10 RX ADMIN — FLUCONAZOLE 100 MG: 100 TABLET ORAL at 08:46

## 2019-02-10 RX ADMIN — HYDROXYZINE PAMOATE 50 MG: 50 CAPSULE ORAL at 06:39

## 2019-02-10 RX ADMIN — LITHIUM CARBONATE 300 MG: 300 CAPSULE, GELATIN COATED ORAL at 08:46

## 2019-02-10 RX ADMIN — BENZONATATE 100 MG: 100 CAPSULE ORAL at 06:39

## 2019-02-10 RX ADMIN — HYDROXYZINE PAMOATE 50 MG: 50 CAPSULE ORAL at 12:07

## 2019-02-11 LAB
ANION GAP SERPL CALCULATED.3IONS-SCNC: 15 MEQ/L (ref 9–15)
BUN BLDV-MCNC: 10 MG/DL (ref 6–20)
CALCIUM SERPL-MCNC: 9.9 MG/DL (ref 8.5–9.9)
CHLORIDE BLD-SCNC: 100 MEQ/L (ref 95–107)
CO2: 24 MEQ/L (ref 20–31)
CREAT SERPL-MCNC: 0.88 MG/DL (ref 0.5–0.9)
GFR AFRICAN AMERICAN: >60
GFR NON-AFRICAN AMERICAN: >60
GLUCOSE BLD-MCNC: 119 MG/DL (ref 70–99)
HCT VFR BLD CALC: 49.6 % (ref 37–47)
HEMOGLOBIN: 16.2 G/DL (ref 12–16)
MCH RBC QN AUTO: 27.8 PG (ref 27–31.3)
MCHC RBC AUTO-ENTMCNC: 32.7 % (ref 33–37)
MCV RBC AUTO: 85.1 FL (ref 82–100)
PDW BLD-RTO: 14.3 % (ref 11.5–14.5)
PLATELET # BLD: 323 K/UL (ref 130–400)
POTASSIUM SERPL-SCNC: 4.1 MEQ/L (ref 3.4–4.9)
RBC # BLD: 5.83 M/UL (ref 4.2–5.4)
SODIUM BLD-SCNC: 139 MEQ/L (ref 135–144)
WBC # BLD: 14.5 K/UL (ref 4.8–10.8)

## 2019-02-11 PROCEDURE — 93010 ELECTROCARDIOGRAM REPORT: CPT | Performed by: INTERNAL MEDICINE

## 2019-02-11 PROCEDURE — 99232 SBSQ HOSP IP/OBS MODERATE 35: CPT | Performed by: PSYCHIATRY & NEUROLOGY

## 2019-02-11 PROCEDURE — 36415 COLL VENOUS BLD VENIPUNCTURE: CPT

## 2019-02-11 PROCEDURE — 1240000000 HC EMOTIONAL WELLNESS R&B

## 2019-02-11 PROCEDURE — 85027 COMPLETE CBC AUTOMATED: CPT

## 2019-02-11 PROCEDURE — 6370000000 HC RX 637 (ALT 250 FOR IP): Performed by: PHYSICIAN ASSISTANT

## 2019-02-11 PROCEDURE — 6370000000 HC RX 637 (ALT 250 FOR IP): Performed by: PSYCHIATRY & NEUROLOGY

## 2019-02-11 PROCEDURE — 80048 BASIC METABOLIC PNL TOTAL CA: CPT

## 2019-02-11 PROCEDURE — 6370000000 HC RX 637 (ALT 250 FOR IP): Performed by: NURSE PRACTITIONER

## 2019-02-11 RX ORDER — NICOTINE 21 MG/24HR
1 PATCH, TRANSDERMAL 24 HOURS TRANSDERMAL DAILY
Status: DISCONTINUED | OUTPATIENT
Start: 2019-02-11 | End: 2019-02-12 | Stop reason: HOSPADM

## 2019-02-11 RX ORDER — GUAIFENESIN 600 MG/1
600 TABLET, EXTENDED RELEASE ORAL 2 TIMES DAILY
Status: DISCONTINUED | OUTPATIENT
Start: 2019-02-11 | End: 2019-02-12 | Stop reason: HOSPADM

## 2019-02-11 RX ADMIN — LITHIUM CARBONATE 300 MG: 300 CAPSULE, GELATIN COATED ORAL at 09:07

## 2019-02-11 RX ADMIN — BENZONATATE 100 MG: 100 CAPSULE ORAL at 18:26

## 2019-02-11 RX ADMIN — HYDROXYZINE PAMOATE 50 MG: 50 CAPSULE ORAL at 12:32

## 2019-02-11 RX ADMIN — SERTRALINE 200 MG: 100 TABLET, FILM COATED ORAL at 20:12

## 2019-02-11 RX ADMIN — FLUCONAZOLE 100 MG: 100 TABLET ORAL at 09:06

## 2019-02-11 RX ADMIN — BENZONATATE 100 MG: 100 CAPSULE ORAL at 09:06

## 2019-02-11 RX ADMIN — HYDROXYZINE PAMOATE 50 MG: 50 CAPSULE ORAL at 09:10

## 2019-02-11 RX ADMIN — GUAIFENESIN 600 MG: 600 TABLET, EXTENDED RELEASE ORAL at 18:11

## 2019-02-11 RX ADMIN — HYDROXYZINE PAMOATE 50 MG: 50 CAPSULE ORAL at 20:12

## 2019-02-11 RX ADMIN — HYDROXYZINE PAMOATE 50 MG: 50 CAPSULE ORAL at 16:10

## 2019-02-11 RX ADMIN — SALINE NASAL SPRAY 1 SPRAY: 1.5 SOLUTION NASAL at 13:02

## 2019-02-11 RX ADMIN — LITHIUM CARBONATE 600 MG: 300 CAPSULE, GELATIN COATED ORAL at 18:10

## 2019-02-11 RX ADMIN — SIMVASTATIN 20 MG: 20 TABLET, FILM COATED ORAL at 18:11

## 2019-02-11 RX ADMIN — TRAZODONE HYDROCHLORIDE 50 MG: 50 TABLET ORAL at 20:15

## 2019-02-12 VITALS
TEMPERATURE: 98 F | WEIGHT: 250 LBS | HEIGHT: 65 IN | SYSTOLIC BLOOD PRESSURE: 149 MMHG | DIASTOLIC BLOOD PRESSURE: 105 MMHG | BODY MASS INDEX: 41.65 KG/M2 | RESPIRATION RATE: 18 BRPM | HEART RATE: 91 BPM | OXYGEN SATURATION: 96 %

## 2019-02-12 LAB
HIV-1 QNT LOG, IU/ML: NOT DETECTED LOG CPY/ML
HIV-1 QNT, IU/ML: NOT DETECTED CPY/ML
INTERPRETATION: NOT DETECTED
TSH SERPL DL<=0.05 MIU/L-ACNC: 2.46 UIU/ML (ref 0.44–3.86)

## 2019-02-12 PROCEDURE — 99239 HOSP IP/OBS DSCHRG MGMT >30: CPT | Performed by: PSYCHIATRY & NEUROLOGY

## 2019-02-12 PROCEDURE — 6370000000 HC RX 637 (ALT 250 FOR IP): Performed by: NURSE PRACTITIONER

## 2019-02-12 PROCEDURE — 6370000000 HC RX 637 (ALT 250 FOR IP): Performed by: PSYCHIATRY & NEUROLOGY

## 2019-02-12 PROCEDURE — 36415 COLL VENOUS BLD VENIPUNCTURE: CPT

## 2019-02-12 PROCEDURE — 84443 ASSAY THYROID STIM HORMONE: CPT

## 2019-02-12 PROCEDURE — 6370000000 HC RX 637 (ALT 250 FOR IP): Performed by: PHYSICIAN ASSISTANT

## 2019-02-12 RX ORDER — GUAIFENESIN 600 MG/1
600 TABLET, EXTENDED RELEASE ORAL 2 TIMES DAILY
Qty: 60 TABLET | Refills: 0 | Status: SHIPPED | OUTPATIENT
Start: 2019-02-12 | End: 2019-03-26 | Stop reason: ALTCHOICE

## 2019-02-12 RX ORDER — SERTRALINE HYDROCHLORIDE 100 MG/1
200 TABLET, FILM COATED ORAL NIGHTLY
Qty: 30 TABLET | Refills: 2 | Status: ON HOLD | OUTPATIENT
Start: 2019-02-12 | End: 2020-06-23 | Stop reason: HOSPADM

## 2019-02-12 RX ADMIN — BENZONATATE 100 MG: 100 CAPSULE ORAL at 08:10

## 2019-02-12 RX ADMIN — FLUCONAZOLE 100 MG: 100 TABLET ORAL at 08:11

## 2019-02-12 RX ADMIN — HYDROXYZINE PAMOATE 50 MG: 50 CAPSULE ORAL at 08:11

## 2019-02-12 RX ADMIN — LITHIUM CARBONATE 300 MG: 300 CAPSULE, GELATIN COATED ORAL at 08:10

## 2019-02-12 RX ADMIN — GUAIFENESIN 600 MG: 600 TABLET, EXTENDED RELEASE ORAL at 08:11

## 2019-02-13 LAB
ADENOVIRUS SPECIES BE: NOT DETECTED
ADENOVIRUS SPECIES C: NOT DETECTED
HUMAN METAPNEUMOVIRUS PCR: NOT DETECTED
INFLUENZA A BY PCR: NOT DETECTED
INFLUENZA A H1 (2009) PCR: NOT DETECTED
INFLUENZA A H1 (2009) PCR: NOT DETECTED
INFLUENZA A H3 PCR: NOT DETECTED
INFLUENZA B BY PCR: NOT DETECTED
PARAINFLUENZA 2: NOT DETECTED
PARAINFLUENZA 3: NOT DETECTED
PARAINFLUENZA1: NOT DETECTED
RHINOVIRUS RNA XXX PCR: NOT DETECTED
RSV A AB BY PCR: NOT DETECTED
RSV B AB BY PCR: NOT DETECTED
RVP SOURCE: NORMAL

## 2019-02-14 LAB
C. TRACHOMATIS DNA ,URINE: NEGATIVE
N. GONORRHOEAE DNA, URINE: NEGATIVE

## 2019-02-15 LAB — BLOOD CULTURE, ROUTINE: NORMAL

## 2019-02-19 ENCOUNTER — OFFICE VISIT (OUTPATIENT)
Dept: PRIMARY CARE CLINIC | Age: 39
End: 2019-02-19
Payer: MEDICAID

## 2019-02-19 VITALS
HEART RATE: 77 BPM | HEIGHT: 65 IN | BODY MASS INDEX: 41.15 KG/M2 | OXYGEN SATURATION: 98 % | SYSTOLIC BLOOD PRESSURE: 118 MMHG | WEIGHT: 247 LBS | DIASTOLIC BLOOD PRESSURE: 80 MMHG | RESPIRATION RATE: 16 BRPM | TEMPERATURE: 98 F

## 2019-02-19 DIAGNOSIS — R10.9 ABDOMINAL PAIN, UNSPECIFIED ABDOMINAL LOCATION: Primary | ICD-10-CM

## 2019-02-19 DIAGNOSIS — F31.32 BIPOLAR DISORDER, CURRENT EPISODE DEPRESSED, MODERATE (HCC): ICD-10-CM

## 2019-02-19 DIAGNOSIS — R52 PAIN AGGRAVATED BY WALKING: ICD-10-CM

## 2019-02-19 DIAGNOSIS — Z00.00 PREVENTATIVE HEALTH CARE: ICD-10-CM

## 2019-02-19 DIAGNOSIS — F31.9 BIPOLAR 1 DISORDER, DEPRESSED (HCC): ICD-10-CM

## 2019-02-19 DIAGNOSIS — S39.012A LUMBAR STRAIN, INITIAL ENCOUNTER: ICD-10-CM

## 2019-02-19 DIAGNOSIS — R10.9 FLANK PAIN, ACUTE: ICD-10-CM

## 2019-02-19 DIAGNOSIS — R10.9 ABDOMINAL PAIN, UNSPECIFIED ABDOMINAL LOCATION: ICD-10-CM

## 2019-02-19 DIAGNOSIS — F31.9 BIPOLAR DISORDER WITH DEPRESSION (HCC): ICD-10-CM

## 2019-02-19 LAB
BILIRUBIN, POC: NORMAL
BLOOD URINE, POC: NORMAL
CLARITY, POC: CLEAR
COLOR, POC: YELLOW
FOLLICLE STIMULATING HORMONE: 4.1 MIU/ML
GLUCOSE URINE, POC: NORMAL
KETONES, POC: NORMAL
LEUKOCYTE EST, POC: NORMAL
LUTEINIZING HORMONE: 6 MIU/ML
NITRITE, POC: NORMAL
PH, POC: 6
PROTEIN, POC: NORMAL
SPECIFIC GRAVITY, POC: 1.02
UROBILINOGEN, POC: NORMAL

## 2019-02-19 PROCEDURE — 99214 OFFICE O/P EST MOD 30 MIN: CPT | Performed by: FAMILY MEDICINE

## 2019-02-19 PROCEDURE — G8417 CALC BMI ABV UP PARAM F/U: HCPCS | Performed by: FAMILY MEDICINE

## 2019-02-19 PROCEDURE — G8427 DOCREV CUR MEDS BY ELIG CLIN: HCPCS | Performed by: FAMILY MEDICINE

## 2019-02-19 PROCEDURE — G8484 FLU IMMUNIZE NO ADMIN: HCPCS | Performed by: FAMILY MEDICINE

## 2019-02-19 PROCEDURE — 4004F PT TOBACCO SCREEN RCVD TLK: CPT | Performed by: FAMILY MEDICINE

## 2019-02-19 PROCEDURE — 81003 URINALYSIS AUTO W/O SCOPE: CPT | Performed by: FAMILY MEDICINE

## 2019-02-19 RX ORDER — HYDROXYZINE 50 MG/1
TABLET, FILM COATED ORAL
Refills: 2 | Status: ON HOLD | COMMUNITY
Start: 2019-01-31 | End: 2020-06-23 | Stop reason: HOSPADM

## 2019-02-19 RX ORDER — CHOLESTYRAMINE 4 G/9G
POWDER, FOR SUSPENSION ORAL
Refills: 1 | COMMUNITY
Start: 2019-02-11 | End: 2019-03-26 | Stop reason: ALTCHOICE

## 2019-02-19 RX ORDER — TRAMADOL HYDROCHLORIDE 50 MG/1
50 TABLET ORAL EVERY 6 HOURS PRN
Qty: 60 TABLET | Refills: 1 | Status: SHIPPED | OUTPATIENT
Start: 2019-02-19 | End: 2019-03-05

## 2019-02-19 ASSESSMENT — ENCOUNTER SYMPTOMS
CONSTIPATION: 0
SHORTNESS OF BREATH: 0
BACK PAIN: 1
GASTROINTESTINAL NEGATIVE: 1
EYE ITCHING: 0
EYE REDNESS: 0
ABDOMINAL PAIN: 0
DIARRHEA: 0
WHEEZING: 0
EYES NEGATIVE: 1
PHOTOPHOBIA: 0
RESPIRATORY NEGATIVE: 1

## 2019-02-20 LAB
HAV IGM SER IA-ACNC: NORMAL
HEPATITIS B CORE IGM ANTIBODY: NORMAL
HEPATITIS B SURFACE ANTIGEN INTERPRETATION: NORMAL
HEPATITIS C ANTIBODY INTERPRETATION: NORMAL
HEPATITIS INTERPRETATION:: NORMAL

## 2019-02-23 LAB
ESTRADIOL LEVEL: 71.9 PG/ML
ESTROGEN TOTAL: 117.2 PG/ML
ESTRONE: 45.3 PG/ML

## 2019-03-25 ENCOUNTER — TELEPHONE (OUTPATIENT)
Dept: ENDOCRINOLOGY | Age: 39
End: 2019-03-25

## 2019-03-26 ENCOUNTER — OFFICE VISIT (OUTPATIENT)
Dept: FAMILY MEDICINE CLINIC | Age: 39
End: 2019-03-26
Payer: MEDICAID

## 2019-03-26 VITALS
BODY MASS INDEX: 38.99 KG/M2 | RESPIRATION RATE: 14 BRPM | SYSTOLIC BLOOD PRESSURE: 116 MMHG | TEMPERATURE: 97 F | HEIGHT: 65 IN | WEIGHT: 234 LBS | DIASTOLIC BLOOD PRESSURE: 68 MMHG | OXYGEN SATURATION: 98 % | HEART RATE: 69 BPM

## 2019-03-26 DIAGNOSIS — R35.0 FREQUENCY OF URINATION: ICD-10-CM

## 2019-03-26 DIAGNOSIS — B37.0 ORAL CANDIDIASIS: ICD-10-CM

## 2019-03-26 DIAGNOSIS — B37.31 VAGINAL CANDIDIASIS: Primary | ICD-10-CM

## 2019-03-26 PROCEDURE — G8484 FLU IMMUNIZE NO ADMIN: HCPCS | Performed by: NURSE PRACTITIONER

## 2019-03-26 PROCEDURE — G8427 DOCREV CUR MEDS BY ELIG CLIN: HCPCS | Performed by: NURSE PRACTITIONER

## 2019-03-26 PROCEDURE — 4004F PT TOBACCO SCREEN RCVD TLK: CPT | Performed by: NURSE PRACTITIONER

## 2019-03-26 PROCEDURE — G8417 CALC BMI ABV UP PARAM F/U: HCPCS | Performed by: NURSE PRACTITIONER

## 2019-03-26 PROCEDURE — 99213 OFFICE O/P EST LOW 20 MIN: CPT | Performed by: NURSE PRACTITIONER

## 2019-03-26 RX ORDER — FLUCONAZOLE 150 MG/1
150 TABLET ORAL DAILY
Qty: 3 TABLET | Refills: 0 | Status: SHIPPED | OUTPATIENT
Start: 2019-03-26 | End: 2019-03-29

## 2019-03-26 ASSESSMENT — ENCOUNTER SYMPTOMS
VOMITING: 0
NAUSEA: 0
CONSTIPATION: 0
DIARRHEA: 1

## 2019-03-28 LAB — URINE CULTURE, ROUTINE: NORMAL

## 2019-05-13 ENCOUNTER — TELEPHONE (OUTPATIENT)
Dept: PRIMARY CARE CLINIC | Age: 39
End: 2019-05-13

## 2019-10-28 ENCOUNTER — HOSPITAL ENCOUNTER (EMERGENCY)
Age: 39
Discharge: OTHER INSTITUTION | End: 2019-10-29
Attending: EMERGENCY MEDICINE
Payer: MEDICAID

## 2019-10-28 VITALS
SYSTOLIC BLOOD PRESSURE: 123 MMHG | HEIGHT: 65 IN | BODY MASS INDEX: 28.32 KG/M2 | WEIGHT: 170 LBS | OXYGEN SATURATION: 98 % | DIASTOLIC BLOOD PRESSURE: 77 MMHG | RESPIRATION RATE: 18 BRPM | TEMPERATURE: 97.2 F | HEART RATE: 78 BPM

## 2019-10-28 DIAGNOSIS — T50.902A SUICIDE ATTEMPT BY DRUG INGESTION, INITIAL ENCOUNTER (HCC): Primary | ICD-10-CM

## 2019-10-28 DIAGNOSIS — F32.A DEPRESSION, UNSPECIFIED DEPRESSION TYPE: ICD-10-CM

## 2019-10-28 LAB
ACETAMINOPHEN LEVEL: <5 UG/ML (ref 10–30)
ALBUMIN SERPL-MCNC: 4.7 G/DL (ref 3.5–4.6)
ALP BLD-CCNC: 98 U/L (ref 40–130)
ALT SERPL-CCNC: 34 U/L (ref 0–33)
ANION GAP SERPL CALCULATED.3IONS-SCNC: 21 MEQ/L (ref 9–15)
AST SERPL-CCNC: 29 U/L (ref 0–35)
BASOPHILS ABSOLUTE: 0.1 K/UL (ref 0–0.2)
BASOPHILS RELATIVE PERCENT: 0.8 %
BILIRUB SERPL-MCNC: 0.3 MG/DL (ref 0.2–0.7)
BILIRUBIN DIRECT: <0.2 MG/DL (ref 0–0.4)
BILIRUBIN URINE: NEGATIVE
BILIRUBIN, INDIRECT: ABNORMAL MG/DL (ref 0–0.6)
BLOOD, URINE: NEGATIVE
BUN BLDV-MCNC: 13 MG/DL (ref 6–20)
CALCIUM SERPL-MCNC: 9.6 MG/DL (ref 8.5–9.9)
CHLORIDE BLD-SCNC: 98 MEQ/L (ref 95–107)
CHOLESTEROL, TOTAL: 166 MG/DL (ref 0–199)
CLARITY: CLEAR
CO2: 20 MEQ/L (ref 20–31)
COLOR: YELLOW
CREAT SERPL-MCNC: 0.66 MG/DL (ref 0.5–0.9)
EKG ATRIAL RATE: 70 BPM
EKG P AXIS: 32 DEGREES
EKG P-R INTERVAL: 138 MS
EKG Q-T INTERVAL: 418 MS
EKG QRS DURATION: 88 MS
EKG QTC CALCULATION (BAZETT): 451 MS
EKG R AXIS: 9 DEGREES
EKG T AXIS: 31 DEGREES
EKG VENTRICULAR RATE: 70 BPM
EOSINOPHILS ABSOLUTE: 0.3 K/UL (ref 0–0.7)
EOSINOPHILS RELATIVE PERCENT: 1.5 %
ETHANOL PERCENT: NORMAL G/DL
ETHANOL: <10 MG/DL (ref 0–0.08)
GFR AFRICAN AMERICAN: >60
GFR NON-AFRICAN AMERICAN: >60
GLUCOSE BLD-MCNC: 109 MG/DL (ref 70–99)
GLUCOSE URINE: NEGATIVE MG/DL
HCT VFR BLD CALC: 47.1 % (ref 37–47)
HDLC SERPL-MCNC: 32 MG/DL (ref 40–59)
HEMOGLOBIN: 15.4 G/DL (ref 12–16)
KETONES, URINE: ABNORMAL MG/DL
LDL CHOLESTEROL CALCULATED: 75 MG/DL (ref 0–129)
LEUKOCYTE ESTERASE, URINE: NEGATIVE
LYMPHOCYTES ABSOLUTE: 4.5 K/UL (ref 1–4.8)
LYMPHOCYTES RELATIVE PERCENT: 25.6 %
MCH RBC QN AUTO: 27.1 PG (ref 27–31.3)
MCHC RBC AUTO-ENTMCNC: 32.7 % (ref 33–37)
MCV RBC AUTO: 83.1 FL (ref 82–100)
MONOCYTES ABSOLUTE: 0.9 K/UL (ref 0.2–0.8)
MONOCYTES RELATIVE PERCENT: 5.2 %
NEUTROPHILS ABSOLUTE: 11.9 K/UL (ref 1.4–6.5)
NEUTROPHILS RELATIVE PERCENT: 66.9 %
NITRITE, URINE: NEGATIVE
PDW BLD-RTO: 14.7 % (ref 11.5–14.5)
PH UA: 5 (ref 5–9)
PLATELET # BLD: 297 K/UL (ref 130–400)
POTASSIUM SERPL-SCNC: 3.5 MEQ/L (ref 3.4–4.9)
PROTEIN UA: NEGATIVE MG/DL
RBC # BLD: 5.67 M/UL (ref 4.2–5.4)
SALICYLATE, SERUM: <0.3 MG/DL (ref 15–30)
SODIUM BLD-SCNC: 139 MEQ/L (ref 135–144)
SPECIFIC GRAVITY UA: 1.02 (ref 1–1.03)
TOTAL CK: 66 U/L (ref 0–170)
TOTAL PROTEIN: 8.3 G/DL (ref 6.3–8)
TRIGL SERPL-MCNC: 297 MG/DL (ref 0–150)
TROPONIN: <0.01 NG/ML (ref 0–0.01)
URINE REFLEX TO CULTURE: ABNORMAL
UROBILINOGEN, URINE: 0.2 E.U./DL
WBC # BLD: 17.7 K/UL (ref 4.8–10.8)

## 2019-10-28 PROCEDURE — 80048 BASIC METABOLIC PNL TOTAL CA: CPT

## 2019-10-28 PROCEDURE — 82550 ASSAY OF CK (CPK): CPT

## 2019-10-28 PROCEDURE — 85025 COMPLETE CBC W/AUTO DIFF WBC: CPT

## 2019-10-28 PROCEDURE — G0480 DRUG TEST DEF 1-7 CLASSES: HCPCS

## 2019-10-28 PROCEDURE — 36415 COLL VENOUS BLD VENIPUNCTURE: CPT

## 2019-10-28 PROCEDURE — 81003 URINALYSIS AUTO W/O SCOPE: CPT

## 2019-10-28 PROCEDURE — 80061 LIPID PANEL: CPT

## 2019-10-28 PROCEDURE — 84484 ASSAY OF TROPONIN QUANT: CPT

## 2019-10-28 PROCEDURE — 80306 DRUG TEST PRSMV INSTRMNT: CPT

## 2019-10-28 PROCEDURE — 99285 EMERGENCY DEPT VISIT HI MDM: CPT

## 2019-10-28 PROCEDURE — 96374 THER/PROPH/DIAG INJ IV PUSH: CPT

## 2019-10-28 PROCEDURE — 80076 HEPATIC FUNCTION PANEL: CPT

## 2019-10-28 PROCEDURE — 6370000000 HC RX 637 (ALT 250 FOR IP): Performed by: EMERGENCY MEDICINE

## 2019-10-28 PROCEDURE — 6360000002 HC RX W HCPCS: Performed by: EMERGENCY MEDICINE

## 2019-10-28 PROCEDURE — 93005 ELECTROCARDIOGRAM TRACING: CPT | Performed by: EMERGENCY MEDICINE

## 2019-10-28 RX ORDER — ESCITALOPRAM OXALATE 20 MG/1
20 TABLET ORAL DAILY
Status: ON HOLD | COMMUNITY
End: 2020-06-23 | Stop reason: HOSPADM

## 2019-10-28 RX ORDER — SODIUM CHLORIDE 0.9 % (FLUSH) 0.9 %
3 SYRINGE (ML) INJECTION EVERY 8 HOURS
Status: DISCONTINUED | OUTPATIENT
Start: 2019-10-28 | End: 2019-10-29 | Stop reason: HOSPADM

## 2019-10-28 RX ORDER — ONDANSETRON 2 MG/ML
4 INJECTION INTRAMUSCULAR; INTRAVENOUS ONCE
Status: COMPLETED | OUTPATIENT
Start: 2019-10-28 | End: 2019-10-28

## 2019-10-28 RX ORDER — CHLORPROMAZINE HYDROCHLORIDE 10 MG/1
10 TABLET, FILM COATED ORAL 3 TIMES DAILY
Status: ON HOLD | COMMUNITY
End: 2020-06-23 | Stop reason: HOSPADM

## 2019-10-28 RX ORDER — ACTIVATED CHARCOAL 208 MG/ML
50 SUSPENSION ORAL ONCE
Status: DISCONTINUED | OUTPATIENT
Start: 2019-10-28 | End: 2019-10-29 | Stop reason: HOSPADM

## 2019-10-28 RX ADMIN — ONDANSETRON 4 MG: 2 INJECTION INTRAMUSCULAR; INTRAVENOUS at 20:46

## 2019-10-28 RX ADMIN — MAGNESIUM CITRATE 296 ML: 1.75 LIQUID ORAL at 21:53

## 2019-10-28 RX ADMIN — ACTIVATED CHARCOAL 50 G: 208 SUSPENSION ORAL at 20:50

## 2019-10-28 ASSESSMENT — ENCOUNTER SYMPTOMS
EYE PAIN: 0
CHEST TIGHTNESS: 0
RESPIRATORY NEGATIVE: 1
SHORTNESS OF BREATH: 0
SINUS PAIN: 0
GASTROINTESTINAL NEGATIVE: 1
COUGH: 0
ABDOMINAL PAIN: 0
EYE DISCHARGE: 0
DIARRHEA: 0
BACK PAIN: 0
WHEEZING: 0
BLOOD IN STOOL: 0
EYES NEGATIVE: 1
VOMITING: 0
SORE THROAT: 0
NAUSEA: 0
ABDOMINAL DISTENTION: 0

## 2019-10-28 ASSESSMENT — PATIENT HEALTH QUESTIONNAIRE - PHQ9: SUM OF ALL RESPONSES TO PHQ QUESTIONS 1-9: 23

## 2019-10-29 LAB
AMPHETAMINE SCREEN, URINE: ABNORMAL
BARBITURATE SCREEN URINE: ABNORMAL
BENZODIAZEPINE SCREEN, URINE: ABNORMAL
CANNABINOID SCREEN URINE: POSITIVE
COCAINE METABOLITE SCREEN URINE: ABNORMAL
Lab: ABNORMAL
METHADONE SCREEN, URINE: ABNORMAL
OPIATE SCREEN URINE: ABNORMAL
PHENCYCLIDINE SCREEN URINE: ABNORMAL
PROPOXYPHENE SCREEN, URINE: ABNORMAL
UR OXYCODONE RAPID SCREEN: ABNORMAL

## 2019-10-29 PROCEDURE — 93010 ELECTROCARDIOGRAM REPORT: CPT | Performed by: INTERNAL MEDICINE

## 2020-04-14 ENCOUNTER — HOSPITAL ENCOUNTER (EMERGENCY)
Age: 40
Discharge: HOME | End: 2020-04-14
Payer: MEDICAID

## 2020-04-14 ENCOUNTER — APPOINTMENT (OUTPATIENT)
Dept: GENERAL RADIOLOGY | Age: 40
End: 2020-04-14
Payer: MEDICAID

## 2020-04-14 VITALS
RESPIRATION RATE: 18 BRPM | HEIGHT: 65 IN | DIASTOLIC BLOOD PRESSURE: 65 MMHG | OXYGEN SATURATION: 98 % | HEART RATE: 98 BPM | TEMPERATURE: 98.3 F | WEIGHT: 280 LBS | SYSTOLIC BLOOD PRESSURE: 105 MMHG | BODY MASS INDEX: 46.65 KG/M2

## 2020-04-14 PROCEDURE — 99284 EMERGENCY DEPT VISIT MOD MDM: CPT

## 2020-04-14 PROCEDURE — 73610 X-RAY EXAM OF ANKLE: CPT

## 2020-04-14 PROCEDURE — 6370000000 HC RX 637 (ALT 250 FOR IP): Performed by: NURSE PRACTITIONER

## 2020-04-14 RX ORDER — NAPROXEN 500 MG/1
500 TABLET ORAL 2 TIMES DAILY
Qty: 20 TABLET | Refills: 0 | Status: ON HOLD | OUTPATIENT
Start: 2020-04-14 | End: 2020-06-23 | Stop reason: HOSPADM

## 2020-04-14 RX ORDER — CLONIDINE HYDROCHLORIDE 0.2 MG/1
0.2 TABLET ORAL 4 TIMES DAILY
Status: ON HOLD | COMMUNITY
End: 2020-06-23 | Stop reason: HOSPADM

## 2020-04-14 RX ORDER — HYDROCODONE BITARTRATE AND ACETAMINOPHEN 5; 325 MG/1; MG/1
1 TABLET ORAL ONCE
Status: COMPLETED | OUTPATIENT
Start: 2020-04-14 | End: 2020-04-14

## 2020-04-14 RX ADMIN — HYDROCODONE BITARTRATE AND ACETAMINOPHEN 1 TABLET: 5; 325 TABLET ORAL at 13:28

## 2020-04-14 ASSESSMENT — ENCOUNTER SYMPTOMS
ABDOMINAL PAIN: 0
COUGH: 0
SHORTNESS OF BREATH: 0
BACK PAIN: 0

## 2020-04-14 ASSESSMENT — PAIN SCALES - GENERAL
PAINLEVEL_OUTOF10: 9
PAINLEVEL_OUTOF10: 9

## 2020-04-14 ASSESSMENT — PAIN DESCRIPTION - ORIENTATION: ORIENTATION: RIGHT

## 2020-04-14 ASSESSMENT — PAIN DESCRIPTION - LOCATION: LOCATION: ANKLE

## 2020-04-14 NOTE — ED PROVIDER NOTES
MG TABLET    Take 20 mg by mouth daily    HYDROXYZINE (ATARAX) 50 MG TABLET    TK 1 T PO QID PRA    LITHIUM 300 MG CAPSULE    Take 1 capsule by mouth 3 times daily (with meals)    LURASIDONE (LATUDA) 20 MG TABS TABLET    Take 20 mg by mouth daily    SERTRALINE (ZOLOFT) 100 MG TABLET    Take 2 tablets by mouth nightly    SIMVASTATIN (ZOCOR) 20 MG TABLET    Take 1 tablet by mouth every evening    TRAZODONE (DESYREL) 50 MG TABLET    Take 1 tablet by mouth nightly       ALLERGIES     Latuda [lurasidone hcl] and Adhesive tape    FAMILY HISTORY       Family History   Problem Relation Age of Onset    Other Mother         CHF    Cirrhosis Brother     Other Maternal Grandmother         CHF          SOCIAL HISTORY       Social History     Socioeconomic History    Marital status: Single     Spouse name: None    Number of children: None    Years of education: None    Highest education level: None   Occupational History    None   Social Needs    Financial resource strain: None    Food insecurity     Worry: None     Inability: None    Transportation needs     Medical: None     Non-medical: None   Tobacco Use    Smoking status: Current Every Day Smoker     Packs/day: 0.50     Years: 24.00     Pack years: 12.00     Types: Cigarettes    Smokeless tobacco: Never Used   Substance and Sexual Activity    Alcohol use: No    Drug use: Yes     Types: Marijuana     Comment: + THC    Sexual activity: None   Lifestyle    Physical activity     Days per week: None     Minutes per session: None    Stress: None   Relationships    Social connections     Talks on phone: None     Gets together: None     Attends Congregational service: None     Active member of club or organization: None     Attends meetings of clubs or organizations: None     Relationship status: None    Intimate partner violence     Fear of current or ex partner: None     Emotionally abused: None     Physically abused: None     Forced sexual activity: None   Other Topics Concern    None   Social History Narrative    None       SCREENINGS      @FLOW([id number])@      PHYSICAL EXAM    (up to 7 for level 4, 8 or more for level 5)     ED Triage Vitals [04/14/20 1317]   BP Temp Temp Source Pulse Resp SpO2 Height Weight   105/65 98.3 °F (36.8 °C) Oral 98 18 98 % 5' 5\" (1.651 m) 280 lb (127 kg)       Physical Exam  Vitals signs and nursing note reviewed. Constitutional:       Appearance: She is well-developed. HENT:      Head: Normocephalic and atraumatic. Right Ear: External ear normal.      Left Ear: External ear normal.   Eyes:      Conjunctiva/sclera: Conjunctivae normal.      Pupils: Pupils are equal, round, and reactive to light. Neck:      Musculoskeletal: Normal range of motion and neck supple. Cardiovascular:      Rate and Rhythm: Normal rate and regular rhythm. Pulmonary:      Effort: Pulmonary effort is normal.      Breath sounds: Normal breath sounds. Abdominal:      General: Bowel sounds are normal. There is no distension. Palpations: Abdomen is soft. Tenderness: There is no abdominal tenderness. Musculoskeletal: Normal range of motion. Left knee: She exhibits swelling. She exhibits no LCL laxity, normal patellar mobility and no MCL laxity. Right ankle: She exhibits swelling. She exhibits no deformity and normal pulse. Tenderness. Lateral malleolus and medial malleolus tenderness found. Achilles tendon normal.        Legs:         Feet:    Skin:     General: Skin is warm and dry. Neurological:      Mental Status: She is alert and oriented to person, place, and time. Deep Tendon Reflexes: Reflexes are normal and symmetric. Psychiatric:         Judgment: Judgment normal.           All other labs were within normal range or not returned as of this dictation.     EMERGENCY DEPARTMENT COURSE and DIFFERENTIALDIAGNOSIS/MDM:   Vitals:    Vitals:    04/14/20 1317   BP: 105/65   Pulse: 98   Resp: 18   Temp: 98.3 °F (36.8 °C) TempSrc: Oral   SpO2: 98%   Weight: 280 lb (127 kg)   Height: 5' 5\" (1.651 m)            36 yr old female with R ankle sprain and L knee contusion. Prescription for Naprosyn was given to the patient. Crutches and ACE wrap were given to assist with ambulation. F/U With ortho if symptoms persist.  Patient verbalizes understanding. PROCEDURES:  Unless otherwise noted below, none     Procedures      FINAL IMPRESSION      1. Sprain of right ankle, unspecified ligament, initial encounter    2.  Contusion of left knee, initial encounter          DISPOSITION/PLAN   DISPOSITION Decision To Discharge 04/14/2020 02:16:48 PM          NAOMI Argueta CNP (electronically signed)  Attending Emergency Physician     NAOMI Argueta CNP  04/14/20 3857

## 2020-04-16 ENCOUNTER — OFFICE VISIT (OUTPATIENT)
Dept: ORTHOPEDIC SURGERY | Age: 40
End: 2020-04-16
Payer: MEDICAID

## 2020-04-16 VITALS
TEMPERATURE: 96.7 F | OXYGEN SATURATION: 96 % | HEART RATE: 76 BPM | WEIGHT: 280 LBS | HEIGHT: 65 IN | BODY MASS INDEX: 46.65 KG/M2

## 2020-04-16 PROCEDURE — 27786 TREATMENT OF ANKLE FRACTURE: CPT | Performed by: ORTHOPAEDIC SURGERY

## 2020-04-16 PROCEDURE — 4004F PT TOBACCO SCREEN RCVD TLK: CPT | Performed by: ORTHOPAEDIC SURGERY

## 2020-04-16 PROCEDURE — G8427 DOCREV CUR MEDS BY ELIG CLIN: HCPCS | Performed by: ORTHOPAEDIC SURGERY

## 2020-04-16 PROCEDURE — G8417 CALC BMI ABV UP PARAM F/U: HCPCS | Performed by: ORTHOPAEDIC SURGERY

## 2020-04-16 PROCEDURE — L4360 PNEUMAT WALKING BOOT PRE CST: HCPCS | Performed by: ORTHOPAEDIC SURGERY

## 2020-04-16 PROCEDURE — 99203 OFFICE O/P NEW LOW 30 MIN: CPT | Performed by: ORTHOPAEDIC SURGERY

## 2020-04-16 RX ORDER — HYDROCODONE BITARTRATE AND ACETAMINOPHEN 5; 325 MG/1; MG/1
1 TABLET ORAL EVERY 8 HOURS PRN
Qty: 15 TABLET | Refills: 0 | Status: SHIPPED | OUTPATIENT
Start: 2020-04-16 | End: 2020-04-19

## 2020-04-16 NOTE — PROGRESS NOTES
lowest dose possible to manage pain     Dispense:  15 tablet     Refill:  0     Reduce doses taken as pain becomes manageable       Return in about 2 weeks (around 4/30/2020) for fracture followup.       Renu Pemberton DO

## 2020-04-30 ENCOUNTER — OFFICE VISIT (OUTPATIENT)
Dept: ORTHOPEDIC SURGERY | Age: 40
End: 2020-04-30

## 2020-04-30 VITALS
BODY MASS INDEX: 46.65 KG/M2 | TEMPERATURE: 98.6 F | OXYGEN SATURATION: 99 % | HEART RATE: 100 BPM | HEIGHT: 65 IN | WEIGHT: 280 LBS

## 2020-04-30 PROCEDURE — 99024 POSTOP FOLLOW-UP VISIT: CPT | Performed by: ORTHOPAEDIC SURGERY

## 2020-04-30 RX ORDER — PRAZOSIN HYDROCHLORIDE 2 MG/1
CAPSULE ORAL
Status: ON HOLD | COMMUNITY
Start: 2019-04-25 | End: 2020-06-23 | Stop reason: HOSPADM

## 2020-04-30 RX ORDER — FLUCONAZOLE 150 MG/1
TABLET ORAL
Status: ON HOLD | COMMUNITY
Start: 2020-04-19 | End: 2020-06-23 | Stop reason: HOSPADM

## 2020-04-30 NOTE — PROGRESS NOTES
Subjective:      Patient ID: Paxton Knapp is a 36 y.o. female who presents today for:  Chief Complaint   Patient presents with    Follow-up     2 wk f/u right ankle fracture; pt says ankle still hurts but ankle feels better without the boot       HPI  Maria D Kinds starting to feel better for the most part she does not wear the boot around the house. When she does leave the house which she is only been a couple of times since the injury she does wear the boot.     Past Medical History:   Diagnosis Date    [facility] ([facility]     Depression     [facility]     History of drug abuse ([facility]     positive drug screens 9/22/14 and 5/18/14    Hyperlipidemia     Osteoarthritis      Past Surgical History:   Procedure Laterality Date    CHOLECYSTECTOMY, LAPAROSCOPIC N/A 2/28/2017    CHOLECYSTECTOMY LAPAROSCOPIC WITH GRAMS POSS OPEN , RECENT LABS  performed by Kyle Ulloa MD at [address] [facility] NOT  [address] [place] [address] N/A 11/27/2018    COLONOSCOPY ROOM 384 performed by Florencio Salinas MD at [address] [place] [address] History     Socioeconomic History    Marital status: Single     Spouse name: Not on file    Number of children: Not on file    Years of education: Not on file    Highest education level: Not on file   Occupational History    Not on file   Social Needs    Financial resource strain: Not on file    Food insecurity     Worry: Not on file     Inability: Not on file    Transportation needs     Medical: Not on file     Non-medical: Not on file   Tobacco Use    Smoking status: Current Every Day Smoker     Packs/day: 0.50     Years: 24.00     Pack years: 12.00     Types: Cigarettes    Smokeless tobacco: Never Used   Substance and Sexual Activity    Alcohol use: No    Drug use: Yes     Types: Marijuana     Comment: + THC    Sexual activity: Not on file   Lifestyle    Physical activity     Days per week: Not on file     Minutes per session: Not on capsule 3    naproxen (NAPROSYN) 500 MG tablet Take 1 tablet by mouth 2 times daily for 20 doses 20 tablet 0     No current facility-administered medications on file prior to visit. Review of Systems      Objective:   Pulse 100   Temp 98.6 °F (37 °C) (Temporal)   Ht 5' 5\" (1.651 m)   Wt 280 lb (127 kg)   SpO2 99%   BMI 46.59 kg/m²     Ortho Exam  Exam shows resolving bruising laterally minimal swelling remains. She still little tender laterally at the tip of the malleolus. Drawer test remains negative    Radiographs and Laboratory Studies:     Diagnostic Imaging Studies:        Assessment:       Diagnosis Orders   1. Closed avulsion fracture of lateral malleolus of right fibula with routine healing, subsequent encounter           Plan:   Continue with the boot as needed outside of the house. Plan follow-up again in another 4 weeks for reassessment and probable referral to therapy at that time to wean out of the boot completely if she is not out already    No orders of the defined types were placed in this encounter. No orders of the defined types were placed in this encounter. Return in about 4 weeks (around 5/28/2020).       Princess Johnson DO

## 2020-05-29 ENCOUNTER — HOSPITAL ENCOUNTER (EMERGENCY)
Age: 40
Discharge: HOME | End: 2020-05-29
Attending: NEUROMUSCULOSKELETAL MEDICINE, SPORTS MEDICINE
Payer: MEDICAID

## 2020-05-29 VITALS
DIASTOLIC BLOOD PRESSURE: 82 MMHG | HEART RATE: 107 BPM | BODY MASS INDEX: 46.65 KG/M2 | WEIGHT: 280 LBS | HEIGHT: 65 IN | TEMPERATURE: 98.7 F | SYSTOLIC BLOOD PRESSURE: 118 MMHG | RESPIRATION RATE: 18 BRPM | OXYGEN SATURATION: 96 %

## 2020-05-29 PROCEDURE — 99283 EMERGENCY DEPT VISIT LOW MDM: CPT

## 2020-05-29 PROCEDURE — 6370000000 HC RX 637 (ALT 250 FOR IP): Performed by: NEUROMUSCULOSKELETAL MEDICINE, SPORTS MEDICINE

## 2020-05-29 PROCEDURE — 96372 THER/PROPH/DIAG INJ SC/IM: CPT

## 2020-05-29 PROCEDURE — 6360000002 HC RX W HCPCS: Performed by: NEUROMUSCULOSKELETAL MEDICINE, SPORTS MEDICINE

## 2020-05-29 PROCEDURE — 2580000003 HC RX 258

## 2020-05-29 RX ORDER — AMOXICILLIN AND CLAVULANATE POTASSIUM 875; 125 MG/1; MG/1
1 TABLET, FILM COATED ORAL 2 TIMES DAILY
Qty: 20 TABLET | Refills: 0 | Status: SHIPPED | OUTPATIENT
Start: 2020-05-29 | End: 2020-06-08

## 2020-05-29 RX ORDER — CEFTRIAXONE 1 G/1
1 INJECTION, POWDER, FOR SOLUTION INTRAMUSCULAR; INTRAVENOUS ONCE
Status: COMPLETED | OUTPATIENT
Start: 2020-05-29 | End: 2020-05-29

## 2020-05-29 RX ORDER — HYDROCODONE BITARTRATE AND ACETAMINOPHEN 5; 325 MG/1; MG/1
1 TABLET ORAL ONCE
Status: COMPLETED | OUTPATIENT
Start: 2020-05-29 | End: 2020-05-29

## 2020-05-29 RX ORDER — AMOXICILLIN AND CLAVULANATE POTASSIUM 875; 125 MG/1; MG/1
1 TABLET, FILM COATED ORAL ONCE
Status: COMPLETED | OUTPATIENT
Start: 2020-05-29 | End: 2020-05-29

## 2020-05-29 RX ADMIN — HYDROCODONE BITARTRATE AND ACETAMINOPHEN 1 TABLET: 5; 325 TABLET ORAL at 22:20

## 2020-05-29 RX ADMIN — WATER 10 ML: 1 INJECTION INTRAMUSCULAR; INTRAVENOUS; SUBCUTANEOUS at 22:20

## 2020-05-29 RX ADMIN — CEFTRIAXONE SODIUM 1 G: 1 INJECTION, POWDER, FOR SOLUTION INTRAMUSCULAR; INTRAVENOUS at 22:20

## 2020-05-29 RX ADMIN — AMOXICILLIN AND CLAVULANATE POTASSIUM 1 TABLET: 875; 125 TABLET, FILM COATED ORAL at 22:20

## 2020-05-29 ASSESSMENT — PAIN DESCRIPTION - DIRECTION: RADIATING_TOWARDS: INDEX FINGER

## 2020-05-29 ASSESSMENT — PAIN DESCRIPTION - ORIENTATION: ORIENTATION: RIGHT

## 2020-05-29 ASSESSMENT — PAIN DESCRIPTION - PAIN TYPE: TYPE: ACUTE PAIN

## 2020-05-29 ASSESSMENT — PAIN SCALES - GENERAL
PAINLEVEL_OUTOF10: 5
PAINLEVEL_OUTOF10: 4

## 2020-05-29 ASSESSMENT — PAIN DESCRIPTION - FREQUENCY: FREQUENCY: CONTINUOUS

## 2020-05-29 ASSESSMENT — PAIN DESCRIPTION - LOCATION: LOCATION: FINGER (COMMENT WHICH ONE)

## 2020-05-29 ASSESSMENT — PAIN DESCRIPTION - DESCRIPTORS: DESCRIPTORS: ACHING

## 2020-05-30 NOTE — ED PROVIDER NOTES
[address] [facility] ED  eMERGENCYdEPARTMENT eNCOUnter      Pt Name: Cristiano Jay  MRN: [id number]  Diegogfjyoti 1980  Date of evaluation: 5/29/2020  Provider:RONALDO FANG MD    CHIEF COMPLAINT           HPI  Cristiano Jay is a 36 y.o. female per chart review has a h/o being bitten by a stray cat. States the cat is acting otherwise normal, and was apparently doing well when she attempted to give the cat a Bath, got bitten on the finger. Patient presents with finger injury. Location: Finger  Hand location: finger  Injury: no   Pain details:     Quality:  Sharp, burning, shooting, throbbing and tearing    Radiates to:  Base of hand    Severity:  Moderate    Onset quality:  Gradual    Timing:  Intermittent    Progression:  Worsening  Handedness:  Right-handed  Dislocation: no    Foreign body present:  No foreign bodies  Prior injury to area:  No  Relieved by:  None tried  Ineffective treatments:  None tried  Associated symptoms: swelling    Risk factors: concern for possible rabies    ROS  Review of Systems  REVIEW OF SYSTEMS:     General: No fevers, sweats, chills, night sweats, change in appetite, change in weight or change in energy level. HEENT: no changes in hearing or vision, no nose bleeds, discharge or congestion. Cardiovascular: No limb swelling, palpitations, PND, orthopnea, claudication, chest pain, SMITH, or history of HTN, CHF, CAD. Pulmonary: negative for cough, sputum production, wheezing and shortness of breath. GI: No abdominal pain, nausea, or vomiting, bloating, dyspepsia, heartburn, diarrhea, constipation, change in stool caliber, bloody stool, or black stool. Musculoskeletal: negative for back pain, neck pain, muscle pain, joint pain or swelling. : no dysuria, hematuria, frequency, nocturia or discharge. Neuro: No headache, weakness, numbness, tingling, neck stiffness, tremor, vertigo, dizziness, memory loss, syncope.   Skin: Puncture injuries noted to the finger of the hand.  Psychiatric: negative for sleep disturbance, mood disorder and recent psychosocial stressors  Hematology/Lymphology: Negative for prolonged bleeding, bruising easily or swollen nodes. Endocrine: No polyuria, polydipsia, cold or heat intolerance. All other 14-point systems reviewed and negative except for HPI. Except as noted above the remainder of the review of systems was reviewed and negative.        PAST MEDICAL HISTORY     Past Medical History:   Diagnosis Date    [facility] ([facility]     Depression     [facility]     History of drug abuse ([facility]     positive drug screens 9/22/14 and 5/18/14    Hyperlipidemia     Osteoarthritis          SURGICAL HISTORY       Past Surgical History:   Procedure Laterality Date    CHOLECYSTECTOMY, LAPAROSCOPIC N/A 2/28/2017    CHOLECYSTECTOMY LAPAROSCOPIC WITH GRAMS POSS OPEN , RECENT LABS  performed by Da Randall MD at [address] IND N/A 11/27/2018    COLONOSCOPY ROOM 384 performed by Rajan Raygoza MD at [address] [facility]       Previous Medications    BREXPIPRAZOLE (REXULTI PO)    Take 3 mg by mouth daily    CHLORPROMAZINE (THORAZINE) 10 MG TABLET    Take 10 mg by mouth 3 times daily    CLONIDINE (CATAPRES) 0.2 MG TABLET    Take 0.2 mg by mouth 4 times daily    ESCITALOPRAM (LEXAPRO) 20 MG TABLET    Take 20 mg by mouth daily    FLUCONAZOLE (DIFLUCAN) 150 MG TABLET    Take by mouth    HYDROXYZINE (ATARAX) 50 MG TABLET    TK 1 T PO QID PRA    LITHIUM 300 MG CAPSULE    Take 1 capsule by mouth 3 times daily (with meals)    LURASIDONE (LATUDA) 20 MG TABS TABLET    Take 20 mg by mouth daily    NAPROXEN (NAPROSYN) 500 MG TABLET    Take 1 tablet by mouth 2 times daily for 20 doses    PRAZOSIN (MINIPRESS) 2 MG CAPSULE    Take by mouth    SERTRALINE (ZOLOFT) 100 MG TABLET    Take 2 tablets by mouth nightly    SIMVASTATIN (ZOCOR) 20 MG TABLET    Take 1 tablet by mouth every evening    TRAZODONE (DESYREL) 50 MG TABLET    Take 1 tablet by mouth nightly       ALLERGIES     Latuda [lurasidone hcl]; Lurasidone; and Adhesive tape    FAMILY HISTORY       Family History   Problem Relation Age of Onset    Other Mother         CHF    Cirrhosis Brother     Other Maternal Grandmother         CHF          SOCIAL HISTORY       Social History     Socioeconomic History    Marital status: Single     Spouse name: None    Number of children: None    Years of education: None    Highest education level: None   Occupational History    None   Social Needs    Financial resource strain: None    Food insecurity     Worry: None     Inability: None    Transportation needs     Medical: None     Non-medical: None   Tobacco Use    Smoking status: Current Every Day Smoker     Packs/day: 0.50     Years: 24.00     Pack years: 12.00     Types: Cigarettes    Smokeless tobacco: Never Used   Substance and Sexual Activity    Alcohol use: No    Drug use: Yes     Types: Marijuana     Comment: + THC    Sexual activity: None   Lifestyle    Physical activity     Days per week: None     Minutes per session: None    Stress: None   Relationships    Social connections     Talks on phone: None     Gets together: None     Attends Gnosticist service: None     Active member of club or organization: None     Attends meetings of clubs or organizations: None     Relationship status: None    Intimate partner violence     Fear of current or ex partner: None     Emotionally abused: None     Physically abused: None     Forced sexual activity: None   Other Topics Concern    None   Social History Narrative    None         PHYSICAL EXAM       ED Triage Vitals [05/29/20 2204]   BP Temp Temp Source Pulse Resp SpO2 Height Weight   118/82 98.7 °F (37.1 °C) Oral 107 18 96 % 5' 5\" (1.651 m) 280 lb (127 kg)       Physical Exam    Vitals signs and nursing note reviewed.    Constitutional:       Appearance: Normal swelling in the area, and may require her to be admitted to the hospital, require surgical intervention. FINAL IMPRESSION      1.  Cat bite of finger, initial encounter          DISPOSITION/PLAN   DISPOSITION Decision To Discharge 05/29/2020 10:40:53 PM        DISCHARGE MEDICATIONS:  [unfilled]     amoxicillin-clavulanate (AUGMENTIN) 875-125 MG per tablet  Take 1 tablet by mouth 2 times daily for 10 days, Disp-20 tablet, R-0  MD Loc Callahan MD(electronically signed)  Attending Emergency Physician            Jeffrey Gaytan MD  05/29/20 2938

## 2020-05-30 NOTE — ED NOTES
Discharge  instructions given and reviewed. Patient verbalized understanding. Patient ambulated out of ED with a steady gait to POV.        Caty Madrid RN  05/29/20 6269

## 2020-06-19 ENCOUNTER — HOSPITAL ENCOUNTER (INPATIENT)
Age: 40
LOS: 4 days | Discharge: HOME | DRG: 753 | End: 2020-06-23
Attending: EMERGENCY MEDICINE | Admitting: PSYCHIATRY & NEUROLOGY
Payer: MEDICAID

## 2020-06-19 PROBLEM — F32.9 MAJOR DEPRESSION, SINGLE EPISODE: Status: ACTIVE | Noted: 2020-06-19

## 2020-06-19 LAB
ACETAMINOPHEN LEVEL: <5 UG/ML (ref 10–30)
ALBUMIN SERPL-MCNC: 4.3 G/DL (ref 3.5–4.6)
ALP BLD-CCNC: 103 U/L (ref 40–130)
ALT SERPL-CCNC: 57 U/L (ref 0–33)
AMPHETAMINE SCREEN, URINE: ABNORMAL
ANION GAP SERPL CALCULATED.3IONS-SCNC: 13 MEQ/L (ref 9–15)
AST SERPL-CCNC: 60 U/L (ref 0–35)
BARBITURATE SCREEN URINE: ABNORMAL
BASOPHILS ABSOLUTE: 0.1 K/UL (ref 0–0.2)
BASOPHILS RELATIVE PERCENT: 1.1 %
BENZODIAZEPINE SCREEN, URINE: ABNORMAL
BILIRUB SERPL-MCNC: <0.2 MG/DL (ref 0.2–0.7)
BILIRUBIN URINE: NEGATIVE
BLOOD, URINE: NEGATIVE
BUN BLDV-MCNC: 10 MG/DL (ref 6–20)
CALCIUM SERPL-MCNC: 9.5 MG/DL (ref 8.5–9.9)
CANNABINOID SCREEN URINE: POSITIVE
CHLORIDE BLD-SCNC: 98 MEQ/L (ref 95–107)
CHOLESTEROL, TOTAL: 220 MG/DL (ref 0–199)
CK MB: 1.2 NG/ML (ref 0–3.8)
CLARITY: CLEAR
CO2: 19 MEQ/L (ref 20–31)
COCAINE METABOLITE SCREEN URINE: ABNORMAL
COLOR: YELLOW
CREAT SERPL-MCNC: 0.53 MG/DL (ref 0.5–0.9)
CREATINE KINASE-MB INDEX: 2.3 % (ref 0–3.5)
EKG ATRIAL RATE: 81 BPM
EKG P AXIS: 21 DEGREES
EKG P-R INTERVAL: 154 MS
EKG Q-T INTERVAL: 380 MS
EKG QRS DURATION: 82 MS
EKG QTC CALCULATION (BAZETT): 441 MS
EKG R AXIS: 16 DEGREES
EKG T AXIS: 36 DEGREES
EKG VENTRICULAR RATE: 81 BPM
EOSINOPHILS ABSOLUTE: 0.2 K/UL (ref 0–0.7)
EOSINOPHILS RELATIVE PERCENT: 1.7 %
ETHANOL PERCENT: NORMAL G/DL
ETHANOL: <10 MG/DL (ref 0–0.08)
GFR AFRICAN AMERICAN: >60
GFR NON-AFRICAN AMERICAN: >60
GLOBULIN: 3.6 G/DL (ref 2.3–3.5)
GLUCOSE BLD-MCNC: 268 MG/DL (ref 70–99)
GLUCOSE URINE: >=1000 MG/DL
HCG(URINE) PREGNANCY TEST: NEGATIVE
HCT VFR BLD CALC: 48.9 % (ref 37–47)
HDLC SERPL-MCNC: 33 MG/DL (ref 40–59)
HEMOGLOBIN: 16.1 G/DL (ref 12–16)
KETONES, URINE: NEGATIVE MG/DL
LDL CHOLESTEROL CALCULATED: 126 MG/DL (ref 0–129)
LEUKOCYTE ESTERASE, URINE: NEGATIVE
LYMPHOCYTES ABSOLUTE: 3.3 K/UL (ref 1–4.8)
LYMPHOCYTES RELATIVE PERCENT: 27.4 %
Lab: ABNORMAL
MCH RBC QN AUTO: 27.7 PG (ref 27–31.3)
MCHC RBC AUTO-ENTMCNC: 32.9 % (ref 33–37)
MCV RBC AUTO: 84 FL (ref 82–100)
METHADONE SCREEN, URINE: ABNORMAL
MONOCYTES ABSOLUTE: 0.7 K/UL (ref 0.2–0.8)
MONOCYTES RELATIVE PERCENT: 5.5 %
NEUTROPHILS ABSOLUTE: 7.8 K/UL (ref 1.4–6.5)
NEUTROPHILS RELATIVE PERCENT: 64.3 %
NITRITE, URINE: NEGATIVE
OPIATE SCREEN URINE: ABNORMAL
OXYCODONE URINE: ABNORMAL
PDW BLD-RTO: 14.5 % (ref 11.5–14.5)
PH UA: 5 (ref 5–9)
PHENCYCLIDINE SCREEN URINE: ABNORMAL
PLATELET # BLD: 186 K/UL (ref 130–400)
POTASSIUM SERPL-SCNC: 4 MEQ/L (ref 3.4–4.9)
PROPOXYPHENE SCREEN: ABNORMAL
PROTEIN UA: NEGATIVE MG/DL
RBC # BLD: 5.81 M/UL (ref 4.2–5.4)
SALICYLATE, SERUM: 0.4 MG/DL (ref 15–30)
SARS-COV-2, NAAT: NOT DETECTED
SODIUM BLD-SCNC: 130 MEQ/L (ref 135–144)
SPECIFIC GRAVITY UA: 1.03 (ref 1–1.03)
TOTAL CK: 53 U/L (ref 0–170)
TOTAL PROTEIN: 7.9 G/DL (ref 6.3–8)
TRIGL SERPL-MCNC: 305 MG/DL (ref 0–150)
TSH SERPL DL<=0.05 MIU/L-ACNC: 1.96 UIU/ML (ref 0.44–3.86)
URINE REFLEX TO CULTURE: ABNORMAL
UROBILINOGEN, URINE: 0.2 E.U./DL
WBC # BLD: 12.2 K/UL (ref 4.8–10.8)

## 2020-06-19 PROCEDURE — 6370000000 HC RX 637 (ALT 250 FOR IP): Performed by: EMERGENCY MEDICINE

## 2020-06-19 PROCEDURE — 99285 EMERGENCY DEPT VISIT HI MDM: CPT

## 2020-06-19 PROCEDURE — 84443 ASSAY THYROID STIM HORMONE: CPT

## 2020-06-19 PROCEDURE — 1240000000 HC EMOTIONAL WELLNESS R&B

## 2020-06-19 PROCEDURE — 84703 CHORIONIC GONADOTROPIN ASSAY: CPT

## 2020-06-19 PROCEDURE — 80053 COMPREHEN METABOLIC PANEL: CPT

## 2020-06-19 PROCEDURE — G0480 DRUG TEST DEF 1-7 CLASSES: HCPCS

## 2020-06-19 PROCEDURE — 82550 ASSAY OF CK (CPK): CPT

## 2020-06-19 PROCEDURE — 36415 COLL VENOUS BLD VENIPUNCTURE: CPT

## 2020-06-19 PROCEDURE — 81003 URINALYSIS AUTO W/O SCOPE: CPT

## 2020-06-19 PROCEDURE — 80307 DRUG TEST PRSMV CHEM ANLYZR: CPT

## 2020-06-19 PROCEDURE — 93005 ELECTROCARDIOGRAM TRACING: CPT

## 2020-06-19 PROCEDURE — U0002 COVID-19 LAB TEST NON-CDC: HCPCS

## 2020-06-19 PROCEDURE — 85025 COMPLETE CBC W/AUTO DIFF WBC: CPT

## 2020-06-19 PROCEDURE — 80061 LIPID PANEL: CPT

## 2020-06-19 PROCEDURE — 6370000000 HC RX 637 (ALT 250 FOR IP): Performed by: PSYCHIATRY & NEUROLOGY

## 2020-06-19 PROCEDURE — 82553 CREATINE MB FRACTION: CPT

## 2020-06-19 RX ORDER — HALOPERIDOL 5 MG/ML
5 INJECTION INTRAMUSCULAR EVERY 6 HOURS PRN
Status: DISCONTINUED | OUTPATIENT
Start: 2020-06-19 | End: 2020-06-23 | Stop reason: HOSPADM

## 2020-06-19 RX ORDER — NICOTINE 21 MG/24HR
1 PATCH, TRANSDERMAL 24 HOURS TRANSDERMAL ONCE
Status: COMPLETED | OUTPATIENT
Start: 2020-06-19 | End: 2020-06-20

## 2020-06-19 RX ORDER — ESCITALOPRAM OXALATE 20 MG/1
20 TABLET ORAL DAILY
Status: DISCONTINUED | OUTPATIENT
Start: 2020-06-19 | End: 2020-06-20

## 2020-06-19 RX ORDER — GLIPIZIDE 10 MG/1
10 TABLET ORAL
Status: DISCONTINUED | OUTPATIENT
Start: 2020-06-19 | End: 2020-06-23 | Stop reason: HOSPADM

## 2020-06-19 RX ORDER — HYDROXYZINE PAMOATE 50 MG/1
50 CAPSULE ORAL EVERY 6 HOURS PRN
Status: DISCONTINUED | OUTPATIENT
Start: 2020-06-19 | End: 2020-06-23 | Stop reason: HOSPADM

## 2020-06-19 RX ORDER — GLIPIZIDE 10 MG/1
10 TABLET ORAL
Status: ON HOLD | COMMUNITY
End: 2020-11-10 | Stop reason: HOSPADM

## 2020-06-19 RX ORDER — HALOPERIDOL 5 MG
5 TABLET ORAL EVERY 6 HOURS PRN
Status: DISCONTINUED | OUTPATIENT
Start: 2020-06-19 | End: 2020-06-23 | Stop reason: HOSPADM

## 2020-06-19 RX ORDER — NICOTINE 21 MG/24HR
1 PATCH, TRANSDERMAL 24 HOURS TRANSDERMAL DAILY
Status: DISCONTINUED | OUTPATIENT
Start: 2020-06-20 | End: 2020-06-23 | Stop reason: HOSPADM

## 2020-06-19 RX ORDER — POLYETHYLENE GLYCOL 3350 17 G/17G
17 POWDER, FOR SOLUTION ORAL DAILY PRN
Status: DISCONTINUED | OUTPATIENT
Start: 2020-06-19 | End: 2020-06-23 | Stop reason: HOSPADM

## 2020-06-19 RX ORDER — TRAZODONE HYDROCHLORIDE 50 MG/1
50 TABLET ORAL NIGHTLY
Status: DISCONTINUED | OUTPATIENT
Start: 2020-06-19 | End: 2020-06-23 | Stop reason: HOSPADM

## 2020-06-19 RX ORDER — HYDROXYZINE HYDROCHLORIDE 50 MG/ML
50 INJECTION, SOLUTION INTRAMUSCULAR EVERY 6 HOURS PRN
Status: DISCONTINUED | OUTPATIENT
Start: 2020-06-19 | End: 2020-06-23 | Stop reason: HOSPADM

## 2020-06-19 RX ORDER — ACETAMINOPHEN 325 MG/1
650 TABLET ORAL EVERY 4 HOURS PRN
Status: DISCONTINUED | OUTPATIENT
Start: 2020-06-19 | End: 2020-06-23 | Stop reason: HOSPADM

## 2020-06-19 RX ORDER — CLONIDINE HYDROCHLORIDE 0.1 MG/1
0.2 TABLET ORAL 4 TIMES DAILY
Status: DISCONTINUED | OUTPATIENT
Start: 2020-06-19 | End: 2020-06-21

## 2020-06-19 RX ORDER — ATORVASTATIN CALCIUM 20 MG/1
20 TABLET, FILM COATED ORAL DAILY
Status: DISCONTINUED | OUTPATIENT
Start: 2020-06-19 | End: 2020-06-23 | Stop reason: HOSPADM

## 2020-06-19 RX ADMIN — CLONIDINE HYDROCHLORIDE 0.2 MG: 0.1 TABLET ORAL at 21:14

## 2020-06-19 RX ADMIN — ATORVASTATIN CALCIUM 20 MG: 20 TABLET, FILM COATED ORAL at 18:33

## 2020-06-19 RX ADMIN — TRAZODONE HYDROCHLORIDE 50 MG: 50 TABLET ORAL at 21:15

## 2020-06-19 RX ADMIN — BREXPIPRAZOLE 3 MG: 3 TABLET ORAL at 18:33

## 2020-06-19 RX ADMIN — CLONIDINE HYDROCHLORIDE 0.2 MG: 0.1 TABLET ORAL at 18:33

## 2020-06-19 RX ADMIN — ESCITALOPRAM OXALATE 20 MG: 20 TABLET ORAL at 18:33

## 2020-06-19 RX ADMIN — GLIPIZIDE 10 MG: 10 TABLET ORAL at 18:45

## 2020-06-19 ASSESSMENT — PATIENT HEALTH QUESTIONNAIRE - PHQ9: SUM OF ALL RESPONSES TO PHQ QUESTIONS 1-9: 23

## 2020-06-19 ASSESSMENT — SLEEP AND FATIGUE QUESTIONNAIRES
DO YOU USE A SLEEP AID: YES
DIFFICULTY STAYING ASLEEP: NO
DIFFICULTY FALLING ASLEEP: YES
DO YOU HAVE DIFFICULTY SLEEPING: YES
RESTFUL SLEEP: NO
DIFFICULTY ARISING: NO
AVERAGE NUMBER OF SLEEP HOURS: 5

## 2020-06-19 ASSESSMENT — ENCOUNTER SYMPTOMS
NAUSEA: 0
VOMITING: 0
BACK PAIN: 0
SHORTNESS OF BREATH: 0
DIARRHEA: 0
COUGH: 0
ABDOMINAL PAIN: 0
SORE THROAT: 0
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
## 2020-06-19 NOTE — ED PROVIDER NOTES
Medications    BREXPIPRAZOLE (REXULTI PO)    Take 3 mg by mouth daily    CHLORPROMAZINE (THORAZINE) 10 MG TABLET    Take 10 mg by mouth 3 times daily    CLONIDINE (CATAPRES) 0.2 MG TABLET    Take 0.2 mg by mouth 4 times daily    ESCITALOPRAM (LEXAPRO) 20 MG TABLET    Take 20 mg by mouth daily    FLUCONAZOLE (DIFLUCAN) 150 MG TABLET    Take by mouth    GLIPIZIDE (GLUCOTROL) 10 MG TABLET    Take 10 mg by mouth 2 times daily (before meals)    HYDROXYZINE (ATARAX) 50 MG TABLET    TK 1 T PO QID PRA    LITHIUM 300 MG CAPSULE    Take 1 capsule by mouth 3 times daily (with meals)    LURASIDONE (LATUDA) 20 MG TABS TABLET    Take 20 mg by mouth daily    NAPROXEN (NAPROSYN) 500 MG TABLET    Take 1 tablet by mouth 2 times daily for 20 doses    PRAZOSIN (MINIPRESS) 2 MG CAPSULE    Take by mouth    SERTRALINE (ZOLOFT) 100 MG TABLET    Take 2 tablets by mouth nightly    SIMVASTATIN (ZOCOR) 20 MG TABLET    Take 1 tablet by mouth every evening    TRAZODONE (DESYREL) 50 MG TABLET    Take 1 tablet by mouth nightly       ALLERGIES     Latuda [lurasidone hcl];  Lurasidone; and Adhesive tape    FAMILY HISTORY       Family History   Problem Relation Age of Onset    Other Mother         CHF    Cirrhosis Brother     Other Maternal Grandmother         CHF          SOCIAL HISTORY       Social History     Socioeconomic History    Marital status: Single     Spouse name: None    Number of children: None    Years of education: None    Highest education level: None   Occupational History    None   Social Needs    Financial resource strain: None    Food insecurity     Worry: None     Inability: None    Transportation needs     Medical: None     Non-medical: None   Tobacco Use    Smoking status: Current Every Day Smoker     Packs/day: 1.00     Years: 24.00     Pack years: 24.00     Types: Cigarettes    Smokeless tobacco: Never Used   Substance and Sexual Activity    Alcohol use: No    Drug use: Yes     Types: Marijuana     Comment: 3-5

## 2020-06-20 LAB
ANION GAP SERPL CALCULATED.3IONS-SCNC: 8 MEQ/L (ref 9–15)
BUN BLDV-MCNC: 12 MG/DL (ref 6–20)
CALCIUM SERPL-MCNC: 9.9 MG/DL (ref 8.5–9.9)
CHLORIDE BLD-SCNC: 102 MEQ/L (ref 95–107)
CO2: 27 MEQ/L (ref 20–31)
CREAT SERPL-MCNC: 0.56 MG/DL (ref 0.5–0.9)
GFR AFRICAN AMERICAN: >60
GFR NON-AFRICAN AMERICAN: >60
GLUCOSE BLD-MCNC: 181 MG/DL (ref 60–115)
GLUCOSE BLD-MCNC: 185 MG/DL (ref 70–99)
GLUCOSE BLD-MCNC: 245 MG/DL (ref 60–115)
PERFORMED ON: ABNORMAL
PERFORMED ON: ABNORMAL
POTASSIUM REFLEX MAGNESIUM: 4.4 MEQ/L (ref 3.4–4.9)
SODIUM BLD-SCNC: 137 MEQ/L (ref 135–144)

## 2020-06-20 PROCEDURE — 36415 COLL VENOUS BLD VENIPUNCTURE: CPT

## 2020-06-20 PROCEDURE — 6370000000 HC RX 637 (ALT 250 FOR IP): Performed by: PSYCHIATRY & NEUROLOGY

## 2020-06-20 PROCEDURE — 80048 BASIC METABOLIC PNL TOTAL CA: CPT

## 2020-06-20 PROCEDURE — 1240000000 HC EMOTIONAL WELLNESS R&B

## 2020-06-20 RX ORDER — NICOTINE POLACRILEX 4 MG
15 LOZENGE BUCCAL PRN
Status: DISCONTINUED | OUTPATIENT
Start: 2020-06-20 | End: 2020-06-23 | Stop reason: HOSPADM

## 2020-06-20 RX ORDER — VENLAFAXINE HYDROCHLORIDE 37.5 MG/1
37.5 CAPSULE, EXTENDED RELEASE ORAL
Status: DISCONTINUED | OUTPATIENT
Start: 2020-06-21 | End: 2020-06-23 | Stop reason: HOSPADM

## 2020-06-20 RX ORDER — ESCITALOPRAM OXALATE 10 MG/1
10 TABLET ORAL DAILY
Status: DISCONTINUED | OUTPATIENT
Start: 2020-06-21 | End: 2020-06-23

## 2020-06-20 RX ORDER — DEXTROSE MONOHYDRATE 50 MG/ML
100 INJECTION, SOLUTION INTRAVENOUS PRN
Status: DISCONTINUED | OUTPATIENT
Start: 2020-06-20 | End: 2020-06-23 | Stop reason: HOSPADM

## 2020-06-20 RX ORDER — DEXTROSE MONOHYDRATE 25 G/50ML
12.5 INJECTION, SOLUTION INTRAVENOUS PRN
Status: DISCONTINUED | OUTPATIENT
Start: 2020-06-20 | End: 2020-06-23 | Stop reason: HOSPADM

## 2020-06-20 RX ADMIN — CLONIDINE HYDROCHLORIDE 0.2 MG: 0.1 TABLET ORAL at 09:32

## 2020-06-20 RX ADMIN — CLONIDINE HYDROCHLORIDE 0.2 MG: 0.1 TABLET ORAL at 14:01

## 2020-06-20 RX ADMIN — CLONIDINE HYDROCHLORIDE 0.2 MG: 0.1 TABLET ORAL at 21:08

## 2020-06-20 RX ADMIN — GLIPIZIDE 10 MG: 10 TABLET ORAL at 17:06

## 2020-06-20 RX ADMIN — GLIPIZIDE 10 MG: 10 TABLET ORAL at 06:09

## 2020-06-20 RX ADMIN — ATORVASTATIN CALCIUM 20 MG: 20 TABLET, FILM COATED ORAL at 09:32

## 2020-06-20 RX ADMIN — ESCITALOPRAM OXALATE 20 MG: 20 TABLET ORAL at 09:32

## 2020-06-20 RX ADMIN — BREXPIPRAZOLE 3 MG: 3 TABLET ORAL at 09:32

## 2020-06-20 RX ADMIN — TRAZODONE HYDROCHLORIDE 50 MG: 50 TABLET ORAL at 21:08

## 2020-06-20 RX ADMIN — CLONIDINE HYDROCHLORIDE 0.2 MG: 0.1 TABLET ORAL at 17:07

## 2020-06-20 ASSESSMENT — LIFESTYLE VARIABLES: HISTORY_ALCOHOL_USE: NO

## 2020-06-20 NOTE — H&P
Daily PRN Hetal Amato MD        nicotine (NICODERM CQ) 21 MG/24HR 1 patch  1 patch Transdermal Daily Hetal Amato MD   1 patch at 06/20/20 0930    hydrOXYzine (VISTARIL) capsule 50 mg  50 mg Oral Q6H PRN Hetal Amato MD        Or    hydrOXYzine (VISTARIL) injection 50 mg  50 mg Intramuscular Q6H PRN Hetal Amato MD        haloperidol (HALDOL) tablet 5 mg  5 mg Oral Q6H PRN Hetal Amato MD        Or    haloperidol lactate (HALDOL) injection 5 mg  5 mg Intramuscular Q6H PRN Hetal Amato MD           ALLERGIES: [place] hcl]; Lurasidone; and Adhesive tape    REVIEW OF SYSTEM:   ROS as noted in HPI, 12 point ROS reviewed and otherwise negative. OBJECTIVE  PHYSICAL EXAM: /77   Pulse 93   Temp 98 °F (36.7 °C) (Oral)   Resp 18   Ht 5' 6\" (1.676 m)   Wt 274 lb (124.3 kg)   SpO2 95%   BMI 44.22 kg/m²   CONSTITUTIONAL:  awake, alert, cooperative, no apparent distress, and appears stated age  EYES:  Lids and lashes normal, pupils equal, round and reactive to light, extra ocular muscles intact, sclera clear, conjunctiva normal  ENT:  Normocephalic, without obvious abnormality, atraumatic, sinuses nontender on palpation, external ears without lesions, oral pharynx with moist mucus membranes, tonsils without erythema or exudates, gums normal and good dentition. NECK:  Supple, symmetrical, trachea midline, no adenopathy, thyroid symmetric, not enlarged and no tenderness, skin normal  LUNGS:  No increased work of breathing, good air exchange, clear to auscultation bilaterally, no crackles or wheezing  CARDIOVASCULAR:  Normal apical impulse, regular rate and rhythm, normal S1 and S2, no S3 or S4, and no murmur noted  ABDOMEN:  No scars, normal bowel sounds, soft, non-distended, non-tender, no masses palpated, no hepatosplenomegally  MUSCULOSKELETAL:  There is no redness, warmth, or swelling of the joints. Full range of motion noted.   Motor strength is 5 out of 5 all

## 2020-06-20 NOTE — CARE COORDINATION
Patient did not attend group despite staff encouragement.   Electronically signed by Jessica Solorio on 6/20/2020 at 11:48 AM

## 2020-06-20 NOTE — CARE COORDINATION
Pt. Calm and cooperative. Suicidal thoughts off and on, but safe here. Reports poor energy and concentration. Reports no motivation, oversleeping, and overeating. Flat, worried affect.

## 2020-06-20 NOTE — PROGRESS NOTES
Patient did not attend Activity Group despite staff encouragement.  Electronically signed by Tony Alvarez on 6/19/2020 at 10:36 PM

## 2020-06-20 NOTE — H&P
and poor motivation. PAST PSYCHIATRIC HISTORY: She has a history of Bipolar disorder, diagnosed 10 years ago. She was previously admitted to , and her last admission was in 2019. She had suicide attempt in the past, twice by overdose. She is seeing a provider at [facility]. She was on Lithium in the past, but she said it had been discontinued because of ?side effects vs. Toxicity. PAST MEDICAL HISTORY:       Diagnosis Date    [facility]     Depression     DM [facility]diabetes mellitus) ([facility]     History of drug abuse ([facility]     positive drug screens 9/22/14 and 5/18/14    Hyperlipidemia     Osteoarthritis          ALLERGIES: Latuda [lurasidone hcl]; Lurasidone; and Adhesive tape    FAMILY PSYCHIATRIC HISTORY: she said her mother suffered from depression, and her brother also had depression  SOCIAL HISTORY: She was born in [facility], and raised in the [facility]. She has a 10th grade education. She said she had a \"hard time focusing\" when she was in school. She is . She has 4 children. She lives with her ; two children are now living with her (the third one, the 22 yo, she said she \"kicked out\" now). She is unemployed, has no income. 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     SUBSTANCE ABUSE HISTORY: she smokes cigarettes, 1pack/day. She denied drinking alcohol. 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 She used to use drugs, mostly opiates and cocaine, in various forms. She said she had been smoking THC.  Tox screen was positive for THC     VITALS: BP (!) 126/90   Pulse 95   Temp 98 °F (36.7 °C) (Oral)   Resp 18   Ht 5' 6\" (1.676 m)   Wt 274 lb (124.3 kg)   SpO2 92%   BMI 44.22 kg/m²     MENTAL STATUS EXAM: Appearance: alert, fair grooming Behavior: cooperative Mood: depressed, anxious Affect: mood-congruent Speech: with normal rate, rhythm, prosody Thought Process: relatively organized Thought Content: with suicidal function.  Calcium 06/20/2020 9.9  8.5 - 9.9 mg/dL Final    POC Glucose 06/20/2020 181* 60 - 115 mg/dl Final    Performed on 06/20/2020 ACCU-CHEK   Final           MEDICATIONS: Current Facility-Administered Medications: [START ON 6/21/2020] venlafaxine (EFFEXOR XR) extended release capsule 37.5 mg, 37.5 mg, Oral, Daily with breakfast  brexpiprazole (REXULTI) tablet 3 mg, 3 mg, Oral, Daily  cloNIDine (CATAPRES) tablet 0.2 mg, 0.2 mg, Oral, 4x Daily  escitalopram (LEXAPRO) tablet 20 mg, 20 mg, Oral, Daily  glipiZIDE (GLUCOTROL) tablet 10 mg, 10 mg, Oral, BID AC  atorvastatin (LIPITOR) tablet 20 mg, 20 mg, Oral, Daily  traZODone (DESYREL) tablet 50 mg, 50 mg, Oral, Nightly  acetaminophen (TYLENOL) tablet 650 mg, 650 mg, Oral, Q4H PRN  polyethylene glycol (GLYCOLAX) packet 17 g, 17 g, Oral, Daily PRN  nicotine (NICODERM CQ) 21 MG/24HR 1 patch, 1 patch, Transdermal, Daily  hydrOXYzine (VISTARIL) capsule 50 mg, 50 mg, Oral, Q6H PRN **OR** hydrOXYzine (VISTARIL) injection 50 mg, 50 mg, Intramuscular, Q6H PRN  haloperidol (HALDOL) tablet 5 mg, 5 mg, Oral, Q6H PRN **OR** haloperidol lactate (HALDOL) injection 5 mg, 5 mg, Intramuscular, Q6H PRN     ASSESSMENT:     Bipolar disorder, depressed. Cannabis use disorder  Opioid use disorder, in remission  Stimulant (cocaine) use disorder, in remission. PLAN: Patient admitted to 3W general program for further evaluation, treatment and safety. Daily vitals, regular diet provided. Patient will be switched to Effexor (from Lexapro, which she says she does not feel is working anymore), and will be continued on Rexulti; will cross-taper the Effexor and Lexapro. PRN medications for agitation, anxiety and sleep are in place. Patient will be encouraged to participate in individual, group and milieu therapy. Patient will be discharged when clinically stable. Please note that case has been discussed with treatment team and notes have been reviewed.        Signed:  Nilson Patel Alexandra  6/20/2020  1:42 PM

## 2020-06-20 NOTE — PROGRESS NOTES
Pt isolating to room, voiced quiet time, being left to self, medication adjustment, helpful. Pt reports showering today. Pt presents with clean and well kept appearance. Pt reports good appetite. Pt reports good sleep. Pt rates anxiety, 0 /10. Pt rates depression, 0 / 10, voiced [address] helpful, contributes to decrease depression, anxiety. Pt denies attending groups. Pt states, maybe tomorrow, voiced dislike groups. Pt denies SI, HI and A/V hallucinations. No voiced delusions at this time. Pt alert and oriented x 4. Will continue to monitor. FAMILY HISTORY:  FH: hypertension, Mother- , sister x2 alive with hx of HTN    Father  Still living? Unknown  Family history of melanoma, Age at diagnosis: Age Unknown    Mother  Still living? No  Family history of melanoma, Age at diagnosis: Age Unknown

## 2020-06-20 NOTE — PROGRESS NOTES
Pt. refused to attend the 1100 skills group, despite staff encouragement.  Electronically signed by Nyasia Verdin on 6/20/2020 at 1:44 PM

## 2020-06-20 NOTE — PROGRESS NOTES
Pt. presents pleasant. Familiar with this writer form past admissions. Reports increased depression and anxiety. 21year old daughter is causing friction in the  home. \"I wanted to beat the hell out of her. That's how I knew something was wrong and I needed to come to ER. \"  Reports increased appetite and always wanting to sleep. Low motivation. Anhedonia. Denies suicidal thoughts and no longer feels homicidal. Poor memory and concentration. Denies drinking ETOH and does not use other drugs. Does smoke marijuana daily.  Stressors include  1.money  2.kids  *read,  Take walks  Electronically signed by Doretha Ramos on 6/20/2020 at 1:03 PM

## 2020-06-21 LAB
ANION GAP SERPL CALCULATED.3IONS-SCNC: 11 MEQ/L (ref 9–15)
BUN BLDV-MCNC: 12 MG/DL (ref 6–20)
CALCIUM SERPL-MCNC: 9.9 MG/DL (ref 8.5–9.9)
CHLORIDE BLD-SCNC: 103 MEQ/L (ref 95–107)
CO2: 24 MEQ/L (ref 20–31)
CREAT SERPL-MCNC: 0.55 MG/DL (ref 0.5–0.9)
GFR AFRICAN AMERICAN: >60
GFR NON-AFRICAN AMERICAN: >60
GLUCOSE BLD-MCNC: 170 MG/DL (ref 60–115)
GLUCOSE BLD-MCNC: 201 MG/DL (ref 60–115)
GLUCOSE BLD-MCNC: 206 MG/DL (ref 60–115)
GLUCOSE BLD-MCNC: 215 MG/DL (ref 70–99)
GLUCOSE BLD-MCNC: 235 MG/DL (ref 60–115)
PERFORMED ON: ABNORMAL
POTASSIUM REFLEX MAGNESIUM: 4.4 MEQ/L (ref 3.4–4.9)
SODIUM BLD-SCNC: 138 MEQ/L (ref 135–144)

## 2020-06-21 PROCEDURE — 1240000000 HC EMOTIONAL WELLNESS R&B

## 2020-06-21 PROCEDURE — 6370000000 HC RX 637 (ALT 250 FOR IP): Performed by: PSYCHIATRY & NEUROLOGY

## 2020-06-21 PROCEDURE — 80048 BASIC METABOLIC PNL TOTAL CA: CPT

## 2020-06-21 PROCEDURE — 36415 COLL VENOUS BLD VENIPUNCTURE: CPT

## 2020-06-21 RX ORDER — CLONIDINE HYDROCHLORIDE 0.1 MG/1
0.2 TABLET ORAL 2 TIMES DAILY PRN
Status: DISCONTINUED | OUTPATIENT
Start: 2020-06-21 | End: 2020-06-23 | Stop reason: HOSPADM

## 2020-06-21 RX ADMIN — ESCITALOPRAM OXALATE 10 MG: 10 TABLET ORAL at 08:58

## 2020-06-21 RX ADMIN — GLIPIZIDE 10 MG: 10 TABLET ORAL at 16:35

## 2020-06-21 RX ADMIN — ATORVASTATIN CALCIUM 20 MG: 20 TABLET, FILM COATED ORAL at 08:58

## 2020-06-21 RX ADMIN — CLONIDINE HYDROCHLORIDE 0.2 MG: 0.1 TABLET ORAL at 08:58

## 2020-06-21 RX ADMIN — BREXPIPRAZOLE 3 MG: 3 TABLET ORAL at 08:58

## 2020-06-21 RX ADMIN — GLIPIZIDE 10 MG: 10 TABLET ORAL at 06:23

## 2020-06-21 RX ADMIN — TRAZODONE HYDROCHLORIDE 50 MG: 50 TABLET ORAL at 21:05

## 2020-06-21 RX ADMIN — VENLAFAXINE HYDROCHLORIDE 37.5 MG: 37.5 CAPSULE, EXTENDED RELEASE ORAL at 08:58

## 2020-06-21 RX ADMIN — CLONIDINE HYDROCHLORIDE 0.2 MG: 0.1 TABLET ORAL at 21:05

## 2020-06-21 NOTE — PROGRESS NOTES
Patient did not attend the 1900 Activity Group despite staff encouragement.  Electronically signed by Curly Ludwig on 6/20/2020 at 11:00 PM

## 2020-06-21 NOTE — GROUP NOTE
Group Therapy Note    Date: 6/21/2020    Group Start Time: 7255  Group End Time: 2109  Group Topic: Healthy Living/Wellness    [facility]    Maryse Cormier        Group Therapy Note    Attendees: 14/20       Patient's Goal:  To learn about practicing distraction skills when in high distress and to participate and contribute to the 1600 Healthy Living Group's discussion. Notes:  Patient actively participated in the [address] [place] [address],[address]. Status After Intervention:  Unchanged    Participation Level:  Active Listener and Minimal    Participation Quality: Appropriate and Attentive      Speech:  normal      Thought Process/Content: Logical      Affective Functioning: Flat      Mood: euthymic      Level of consciousness:  Alert and Attentive      Response to Learning: Able to verbalize current knowledge/experience and Progressing to goal      Endings: None Reported    Modes of Intervention: Education      Discipline Responsible: [address] [facility] [employer]      Signature:  Maryse Cormier

## 2020-06-21 NOTE — PROGRESS NOTES
Patient did not attend the 1900 Activity Group despite staff encouragement.  Electronically signed by Kathryn Rivera on 6/21/2020 at 7:34 PM

## 2020-06-21 NOTE — PROGRESS NOTES
Tobacco Use    Smoking status: Current Every Day Smoker     Packs/day: 1.00     Years: 24.00     Pack years: 24.00     Types: Cigarettes    Smokeless tobacco: Never Used   Substance and Sexual Activity    Alcohol use: No    Drug use: Yes     Types: Marijuana     Comment: 3-5 times a week    Sexual activity: Yes   Lifestyle    Physical activity     Days per week: Not on file     Minutes per session: Not on file    Stress: Not on file   Relationships    Social connections     Talks on phone: Not on file     Gets together: Not on file     Attends Tenriism service: Not on file     Active member of club or organization: Not on file     Attends meetings of clubs or organizations: Not on file     Relationship status: Not on file    Intimate partner violence     Fear of current or ex partner: Not on file     Emotionally abused: Not on file     Physically abused: Not on file     Forced sexual activity: Not on file   Other Topics Concern    Not on file   Social History Narrative    Not on file           ROS:  [x] All negative/unchanged except if checked.  Explain positive(checked items) below:  [] Constitutional  [] Eyes  [] Ear/Nose/Mouth/Throat  [] Respiratory  [] CV  [] GI  []   [] Musculoskeletal  [] Skin/Breast  [] Neurological  [] Endocrine  [] Heme/Lymph  [] Allergic/Immunologic    Explanation:     MEDICATIONS:    Current Facility-Administered Medications:     cloNIDine (CATAPRES) tablet 0.2 mg, 0.2 mg, Oral, BID PRN, Yonatan Pemberton MD, 0.2 mg at 06/21/20 0858    venlafaxine (EFFEXOR XR) extended release capsule 37.5 mg, 37.5 mg, Oral, Daily with breakfast, Yonatan Pemberton MD, 37.5 mg at 06/21/20 0858    glucose (GLUTOSE) 40 % oral gel 15 g, 15 g, Oral, PRN, FRANCO Caceres    dextrose 50 % IV solution, 12.5 g, Intravenous, PRN, FRANCO Espinosa    glucagon (rDNA) injection 1 mg, 1 mg, Intramuscular, PRN, FRANCO Caceres    dextrose 5 % solution, 100 mL/hr, perceptual disturbance  Cognition:  oriented to person, place, and time   Concentration distractible  Insight poor   Judgement poor     ASSESSMENT:   Patient symptoms are:  [x] Well controlled  [x] Improving  [] Worsening  [] No change      Diagnosis:   Bipolar disorder, depressed. Cannabis use disorder  Opioid use disorder, in remission  Stimulant (cocaine) use disorder, in remission    LABS:    Recent Labs     06/19/20  1312   WBC 12.2*   HGB 16.1*        Recent Labs     06/19/20  1312 06/20/20  0602 06/21/20  0619   * 137 138   K 4.0 4.4 4.4   CL 98 102 103   CO2 19* 27 24   BUN 10 12 12   CREATININE 0.53 0.56 0.55   GLUCOSE 268* 185* 215*     Recent Labs     06/19/20  1312   BILITOT <0.2   ALKPHOS 103   AST 60*   ALT 57*     Lab Results   Component Value Date    LABAMPH Neg 06/19/2020    BARBSCNU Neg 06/19/2020    LABBENZ Neg 06/19/2020    LABMETH Neg 06/19/2020    OPIATESCREENURINE Neg 06/19/2020    PHENCYCLIDINESCREENURINE Neg 06/19/2020    ETOH <10 06/19/2020     Lab Results   Component Value Date    TSH 1.960 06/19/2020     Lab Results   Component Value Date    LITHIUM 0.5 (L) 02/08/2019     No results found for: VALPROATE, CBMZ    RISK ASSESSMENT:   Suicide risk: moderate  Homicide risk: low  Violence risk: low  Elopement risk: low    Treatment Plan:  Reviewed current Medications with the patient. Will reduce clonidine daily dose by reducing frequency  Risks, benefits, side effects, drug-to-drug interactions and alternatives to treatment were discussed. Collateral information: pending  CD evaluation   Encourage patient to attend group and other milieu activities.   Discharge planning discussed with the patient and treatment team.    PSYCHOTHERAPY/COUNSELING:  [x] Therapeutic interview  [x] Supportive  [] CBT  [] Ongoing  [] Other    [x] Patient continues to need, on a daily basis, active treatment furnished directly by or requiring the supervision of inpatient psychiatric personnel

## 2020-06-21 NOTE — PROGRESS NOTES
Pt. refused to attend the 1100 skills group, despite staff encouragement.  Electronically signed by Mckenzie Pope on 6/21/2020 at 1:53 PM

## 2020-06-21 NOTE — CARE COORDINATION
FAMILY COLLATERAL NOTE    Family/Support Name: Agnes Berger #: [phone]  Relationship to Pt::         Family/Support contact aware of hospitalization: Yes    Presenting Symptoms/Current Concerns:   stated that patient is experiencing emotional distress due to her daughters being \"mean and hateful\" to patient. Over her past week or so, the daughters have been rying to cause relationship strains in patient's marriage. Posting negative comments on Facebook.  believes patient may not be taking her medication. Top 3 Life Stressors:   Targeted by her adult daughters      Background History Relevant to Current Hospitalization:  Per , patient's children were in foster care for awhile when they were younger. In and out of several homes. Patient is trying to make up for those years when her children were in out of home placements. Patient's efforts to do the right things now are being thwarted by her children. This is wearing on patient emotionally. Family Mental Health/Substance Use History:   Patient's mother suffered with depression. Support Network's Goal for Hospitalization:   Patient needs medication and stress relief. Time away from her stressful life. Patient needs time to reflect and re-energize. Discharge Plan:   Patient will return home and continue community care at [facility]. Patient has a  and will be getting a therapist as well. Support Network Supportive of Discharge Plan: Yes      Support can confirm Safety of Location and Security of Weapons:   Per , there are no weapons at the discharge location. Support agreeable to Safeguard and Monitor Medications (including Prescription and OTC):   Yes.  is willing to make the discharge location as safe as possible. Will monitor and safeguard medication.     Identified Barriers to Compliance

## 2020-06-22 LAB
ANION GAP SERPL CALCULATED.3IONS-SCNC: 11 MEQ/L (ref 9–15)
BUN BLDV-MCNC: 11 MG/DL (ref 6–20)
CALCIUM SERPL-MCNC: 9.8 MG/DL (ref 8.5–9.9)
CHLORIDE BLD-SCNC: 102 MEQ/L (ref 95–107)
CO2: 25 MEQ/L (ref 20–31)
CREAT SERPL-MCNC: 0.56 MG/DL (ref 0.5–0.9)
GFR AFRICAN AMERICAN: >60
GFR NON-AFRICAN AMERICAN: >60
GLUCOSE BLD-MCNC: 126 MG/DL (ref 60–115)
GLUCOSE BLD-MCNC: 164 MG/DL (ref 60–115)
GLUCOSE BLD-MCNC: 176 MG/DL (ref 70–99)
GLUCOSE BLD-MCNC: 212 MG/DL (ref 60–115)
GLUCOSE BLD-MCNC: 251 MG/DL (ref 60–115)
HBA1C MFR BLD: 11.2 % (ref 4.8–5.9)
PERFORMED ON: ABNORMAL
POTASSIUM REFLEX MAGNESIUM: 4.4 MEQ/L (ref 3.4–4.9)
SODIUM BLD-SCNC: 138 MEQ/L (ref 135–144)

## 2020-06-22 PROCEDURE — 36415 COLL VENOUS BLD VENIPUNCTURE: CPT

## 2020-06-22 PROCEDURE — 99222 1ST HOSP IP/OBS MODERATE 55: CPT | Performed by: INTERNAL MEDICINE

## 2020-06-22 PROCEDURE — 83036 HEMOGLOBIN GLYCOSYLATED A1C: CPT

## 2020-06-22 PROCEDURE — 6370000000 HC RX 637 (ALT 250 FOR IP): Performed by: PSYCHIATRY & NEUROLOGY

## 2020-06-22 PROCEDURE — 1240000000 HC EMOTIONAL WELLNESS R&B

## 2020-06-22 PROCEDURE — 99233 SBSQ HOSP IP/OBS HIGH 50: CPT | Performed by: PSYCHIATRY & NEUROLOGY

## 2020-06-22 PROCEDURE — 80048 BASIC METABOLIC PNL TOTAL CA: CPT

## 2020-06-22 RX ADMIN — ESCITALOPRAM OXALATE 10 MG: 10 TABLET ORAL at 08:41

## 2020-06-22 RX ADMIN — HYDROXYZINE PAMOATE 50 MG: 50 CAPSULE ORAL at 17:20

## 2020-06-22 RX ADMIN — ATORVASTATIN CALCIUM 20 MG: 20 TABLET, FILM COATED ORAL at 08:41

## 2020-06-22 RX ADMIN — TRAZODONE HYDROCHLORIDE 50 MG: 50 TABLET ORAL at 20:48

## 2020-06-22 RX ADMIN — GLIPIZIDE 10 MG: 10 TABLET ORAL at 05:53

## 2020-06-22 RX ADMIN — VENLAFAXINE HYDROCHLORIDE 37.5 MG: 37.5 CAPSULE, EXTENDED RELEASE ORAL at 08:41

## 2020-06-22 RX ADMIN — CLONIDINE HYDROCHLORIDE 0.2 MG: 0.1 TABLET ORAL at 08:45

## 2020-06-22 RX ADMIN — GLIPIZIDE 10 MG: 10 TABLET ORAL at 16:57

## 2020-06-22 RX ADMIN — CLONIDINE HYDROCHLORIDE 0.2 MG: 0.1 TABLET ORAL at 20:48

## 2020-06-22 RX ADMIN — BREXPIPRAZOLE 3 MG: 3 TABLET ORAL at 08:41

## 2020-06-22 NOTE — GROUP NOTE
Group Therapy Note    Date: 6/22/2020    Group Start Time: 1600  Group End Time: 1640  Group Topic: Healthy Living/Wellness    [facility]    Nat Lanza        Group Therapy Note    Attendees: 8/15       Patient's Goal:  To practice re-framing negative self-talk into positive, saying positive affirmations, and discussing the effects of good and bad self-esteem. Notes:  Patient briefly participated in the [address] [place] [address],[address] but departed early. Patient stated that she was \"nervous\" and \"anxious\" to be in group and left, stating that she felt \"very hot. \"    Status After Intervention:  Unchanged    Participation Level: Minimal    Participation Quality: Attentive and Resistant      Speech:  pressured      Thought Process/Content: Logical      Affective Functioning: Flat      Mood: anxious      Level of consciousness:  Alert      Response to Learning: Progressing to goal      Endings: None Reported    Modes of Intervention: Education      Discipline Responsible: [address], [employer]      Signature:  Nat Lanza

## 2020-06-22 NOTE — PROGRESS NOTES
Patient did not attend the 1900 Activity Group despite staff encouragement.  Electronically signed by Antoine Pate on 6/22/2020 at 7:06 PM

## 2020-06-22 NOTE — PROGRESS NOTES
Daily Note: [id number]    Pt reports broken sleep r/t napping during the day yesterday. Pt reports good appetite and improved mood. Pt denies SI, HI, and A/V hallucinations. Pt reports depression and anxiety have both decreased. Pt states she is \" feeling much better, I feel ready to go home\". Pt denies any other issues at this time. Pt out for meals, showered, attended selective groups. Pt voiced that \" being around people, especially if it's loud, makes me nervous\".

## 2020-06-22 NOTE — PROGRESS NOTES
Pt isolating to room, voiced less anxiety, depression today,  Catapres helpful. Continue to report, no group activity, voiced groups, increase anxiety. Patient education, low carb diet, related to elevated blood glucose readings, patient verbalized understanding. Pt reports showering today. Pt presents with clean and well kept appearance. Pt reports good appetite. Pt reports good sleep. Pt rates anxiety, 2 /10. Pt rates depression, 3 / 10. Pt denies SI, HI and A/V hallucinations. No voiced delusions at this time. Pt alert and oriented x 4. Pt calm and cooperative, voiced rest/relaxation helpful. Will continue to monitor.

## 2020-06-22 NOTE — PROGRESS NOTES
Pt. attended the 0900 community meeting. Electronically signed by Bassem Rosas [facility] on 6/22/2020 at 9:46 AM

## 2020-06-22 NOTE — PROGRESS NOTES
cooperative  Speech:  slow   Mood: decreased range and depressed  Affect:  mood congruent  Thought processes:  slow   Thought content:  Suicidal Ideation:  passive  Delusions:  no evidence of delusions  Perceptual Disturbance:  denies any perceptual disturbance  Cognition:  oriented to person, place, and time   Concentration distractible  Insight poor   Judgement poor     ASSESSMENT:   Patient symptoms are:  [] Well controlled  [] Improving  [] Worsening  [x] No change      Diagnosis:   Principal Problem:    Bipolar 1 disorder, depressed ([facility]  Resolved Problems:    * No resolved hospital problems. *      LABS:    Recent Labs     06/19/20  1312   WBC 12.2*   HGB 16.1*        Recent Labs     06/20/20  0602 06/21/20  0619 06/22/20  0547    138 138   K 4.4 4.4 4.4    103 102   CO2 27 24 25   BUN 12 12 11   CREATININE 0.56 0.55 0.56   GLUCOSE 185* 215* 176*     Recent Labs     06/19/20  1312   BILITOT <0.2   ALKPHOS 103   AST 60*   ALT 57*     Lab Results   Component Value Date    LABAMPH Neg 06/19/2020    BARBSCNU Neg 06/19/2020    LABBENZ Neg 06/19/2020    LABMETH Neg 06/19/2020    OPIATESCREENURINE Neg 06/19/2020    PHENCYCLIDINESCREENURINE Neg 06/19/2020    ETOH <10 06/19/2020     Lab Results   Component Value Date    TSH 1.960 06/19/2020     Lab Results   Component Value Date    LITHIUM 0.5 (L) 02/08/2019     No results found for: VALPROATE, CBMZ      Treatment Plan:  Reviewed current Medications with the patient. Cross tapering lexapro to effexor  Continue rexulti  Risks, benefits, side effects, drug-to-drug interactions and alternatives to treatment were discussed. Collateral information: reviewed  CD evaluation  Encourage patient to attend group and other milieu activities.   Discharge planning discussed with the patient and treatment team.    PSYCHOTHERAPY/COUNSELING:  [x] Therapeutic interview  [x] Supportive  [] CBT  [] Ongoing  [] Other    [x] Patient continues to need, on a daily basis, active treatment furnished directly by or requiring the supervision of inpatient psychiatric personnel      Anticipated Length of stay:            Electronically signed by Chirag Omer MD on 6/22/2020 at 10:37 AM

## 2020-06-23 VITALS
DIASTOLIC BLOOD PRESSURE: 89 MMHG | SYSTOLIC BLOOD PRESSURE: 130 MMHG | OXYGEN SATURATION: 94 % | HEIGHT: 66 IN | BODY MASS INDEX: 44.03 KG/M2 | HEART RATE: 94 BPM | RESPIRATION RATE: 16 BRPM | WEIGHT: 274 LBS | TEMPERATURE: 98 F

## 2020-06-23 LAB
GLUCOSE BLD-MCNC: 164 MG/DL (ref 60–115)
GLUCOSE BLD-MCNC: 239 MG/DL (ref 60–115)
PERFORMED ON: ABNORMAL
PERFORMED ON: ABNORMAL

## 2020-06-23 PROCEDURE — 6370000000 HC RX 637 (ALT 250 FOR IP): Performed by: INTERNAL MEDICINE

## 2020-06-23 PROCEDURE — 6370000000 HC RX 637 (ALT 250 FOR IP): Performed by: PSYCHIATRY & NEUROLOGY

## 2020-06-23 PROCEDURE — 99239 HOSP IP/OBS DSCHRG MGMT >30: CPT | Performed by: PSYCHIATRY & NEUROLOGY

## 2020-06-23 PROCEDURE — 99232 SBSQ HOSP IP/OBS MODERATE 35: CPT | Performed by: INTERNAL MEDICINE

## 2020-06-23 RX ORDER — INSULIN GLARGINE 100 [IU]/ML
15 INJECTION, SOLUTION SUBCUTANEOUS NIGHTLY
Status: DISCONTINUED | OUTPATIENT
Start: 2020-06-23 | End: 2020-06-23 | Stop reason: HOSPADM

## 2020-06-23 RX ORDER — ALOGLIPTIN 25 MG/1
25 TABLET, FILM COATED ORAL DAILY
Status: DISCONTINUED | OUTPATIENT
Start: 2020-06-23 | End: 2020-06-23 | Stop reason: HOSPADM

## 2020-06-23 RX ORDER — DULAGLUTIDE 0.75 MG/.5ML
0.75 INJECTION, SOLUTION SUBCUTANEOUS WEEKLY
Qty: 4 PEN | Refills: 3 | Status: ON HOLD | OUTPATIENT
Start: 2020-06-23 | End: 2020-11-10 | Stop reason: HOSPADM

## 2020-06-23 RX ORDER — VENLAFAXINE HYDROCHLORIDE 37.5 MG/1
37.5 CAPSULE, EXTENDED RELEASE ORAL
Qty: 15 CAPSULE | Refills: 3 | Status: ON HOLD | OUTPATIENT
Start: 2020-06-24 | End: 2020-12-21 | Stop reason: HOSPADM

## 2020-06-23 RX ORDER — HYDROXYZINE PAMOATE 50 MG/1
50 CAPSULE ORAL 2 TIMES DAILY PRN
Qty: 45 CAPSULE | Refills: 1 | Status: SHIPPED | OUTPATIENT
Start: 2020-06-23 | End: 2020-07-07

## 2020-06-23 RX ADMIN — INSULIN LISPRO 4 UNITS: 100 INJECTION, SOLUTION INTRAVENOUS; SUBCUTANEOUS at 12:06

## 2020-06-23 RX ADMIN — GLIPIZIDE 10 MG: 10 TABLET ORAL at 06:42

## 2020-06-23 RX ADMIN — BREXPIPRAZOLE 3 MG: 3 TABLET ORAL at 08:23

## 2020-06-23 RX ADMIN — ATORVASTATIN CALCIUM 20 MG: 20 TABLET, FILM COATED ORAL at 08:23

## 2020-06-23 RX ADMIN — ESCITALOPRAM OXALATE 10 MG: 10 TABLET ORAL at 08:23

## 2020-06-23 RX ADMIN — VENLAFAXINE HYDROCHLORIDE 37.5 MG: 37.5 CAPSULE, EXTENDED RELEASE ORAL at 08:23

## 2020-06-23 RX ADMIN — INSULIN LISPRO 1 UNITS: 100 INJECTION, SOLUTION INTRAVENOUS; SUBCUTANEOUS at 08:26

## 2020-06-23 RX ADMIN — CLONIDINE HYDROCHLORIDE 0.2 MG: 0.1 TABLET ORAL at 09:00

## 2020-06-23 NOTE — CONSULTS
history of  substance abuse, depression, bipolar disorder, osteoarthritis,  hypercholesterolemia. PAST SURGICAL HISTORY:  Tubal ligation, cholecystectomy, colon cancer  screening. FAMILY HISTORY:  Cirrhosis. PERSONAL AND SOCIAL HISTORY:  Currently does smoke cigarettes. Denies  any alcohol. Does use marijuana. MEDICATIONS:  Meds here include glipizide 10 mg daily, Rexulti, Lipitor,  Lexapro, Desyrel, Effexor. ALLERGIES:  LATUDA (LURASIDONE), ADHESIVE TAPE. REVIEW OF SYSTEMS:  Other than depression, suicidal ideation, increasing  stress, increasing weight, 14-point review of systems were negative. PHYSICAL EXAMINATION:  GENERAL:  The patient was alert, awake, oriented x3 in no obvious  distress. VITAL SIGNS:  Blood pressure was 143/89, pulse rate was 82, respiratory  rate was 18, temperature 97. 6. HEENT:  Normocephalic, atraumatic. Pupils equal and reactive to light. No jaundice. Oral mucosa was moist.  NECK:  Supple. Acanthosis nigricans was noted. No goiter or  thyromegaly was felt. CHEST:  Lungs were showing clear auscultation bilaterally. No wheezing  or crackles were heard. CARDIOVASCULAR:  Heart sounds were normal.  No murmurs or thrills were  present. ABDOMEN:  Soft, significantly obese. Bowel sounds were present. No  organomegaly or tenderness. EXTREMITIES:  Lower extremities reveal no edema. SKIN:  Intact. MUSCULOSKELETAL:  No joint swelling. NEUROLOGIC:  Cranial nerves I through XII were intact. PSYCHIATRIC:  Depressed affect. LABORATORY DATA:  As above. ASSESSMENT:  Uncontrolled diabetes due to issues with compliance, weight  gain, increasing stressors, side effects from metformin. PLAN:  Continue glipizide 10 mg twice a day. We will get Humalog  coverage medium dose. The patient also to consider starting Trulicity  as outpatient in addition to glipizide. We will hold off any other  SGLT2, put the patient on medium dose sliding scale.   After

## 2020-06-23 NOTE — PROGRESS NOTES
Jyl Nyhan is a 36 y.o. female patient.   Chief complaints uncontrolled diabetes    Current Facility-Administered Medications   Medication Dose Route Frequency Provider Last Rate Last Dose    insulin glargine (LANTUS) injection vial 15 Units  15 Units Subcutaneous Nightly Fidel Pearce MD        alogliptin (NESINA) tablet 25 mg  25 mg Oral Daily Fidel Pearce MD        insulin lispro (HUMALOG) injection vial 0-12 Units  0-12 Units Subcutaneous TID WC Fidel Pearce MD   4 Units at 06/23/20 1206    insulin lispro (HUMALOG) injection vial 0-6 Units  0-6 Units Subcutaneous Nightly Fidel Pearce MD        cloNIDine (CATAPRES) tablet 0.2 mg  0.2 mg Oral BID PRN Sahra Corey MD   0.2 mg at 06/23/20 0900    venlafaxine (EFFEXOR XR) extended release capsule 37.5 mg  37.5 mg Oral Daily with breakfast Sahra Corey MD   37.5 mg at 06/23/20 0823    glucose (GLUTOSE) 40 % oral gel 15 g  15 g Oral PRN Jesus FRANCO Wells        dextrose 50 % IV solution  12.5 g Intravenous PRN Jesus FRANCO Wells        glucagon (rDNA) injection 1 mg  1 mg Intramuscular PRN Jesus FRANCO Wells        dextrose 5 % solution  100 mL/hr Intravenous PRN FRANCO Espitia        brexpiprazole (REXULTI) tablet 3 mg  3 mg Oral Daily Joce Miller MD   3 mg at 06/23/20 0823    glipiZIDE (GLUCOTROL) tablet 10 mg  10 mg Oral BID AC Joce Miller MD   10 mg at 06/23/20 7621    atorvastatin (LIPITOR) tablet 20 mg  20 mg Oral Daily Joce Miller MD   20 mg at 06/23/20 0823    traZODone (DESYREL) tablet 50 mg  50 mg Oral Nightly Joce Miller MD   50 mg at 06/22/20 2048    acetaminophen (TYLENOL) tablet 650 mg  650 mg Oral Q4H PRN Joce Miller MD        polyethylene glycol (GLYCOLAX) packet 17 g  17 g Oral Daily PRN Joce Miller MD        nicotine (NICODERM CQ) 21 MG/24HR 1 patch  1 patch Transdermal Daily Joce Miller MD   1 patch at 06/23/20 1209    hydrOXYzine (VISTARIL) capsule 50 mg 50 mg Oral Q6H PRN Juan R Martínez MD   50 mg at 06/22/20 1720    Or    hydrOXYzine (VISTARIL) injection 50 mg  50 mg Intramuscular Q6H PRN Juan R Martínez MD        haloperidol (HALDOL) tablet 5 mg  5 mg Oral Q6H PRN Juan R Martínez MD        Or    haloperidol lactate (HALDOL) injection 5 mg  5 mg Intramuscular Q6H PRN Juan R Martínez MD         Allergies   Allergen Reactions    Latuda [Lurasidone Hcl] Hives    Lurasidone Hives    Adhesive Tape      Principal Problem:    Bipolar 1 disorder, depressed ([facility]  Active Problems:    Uncontrolled type 2 diabetes mellitus with hyperglycemia ([facility]  Resolved Problems:    * No resolved hospital problems. *    Blood pressure 130/89, pulse 94, temperature 98 °F (36.7 °C), resp. rate 16, height 5' 6\" (1.676 m), weight 274 lb (124.3 kg), SpO2 94 %, not currently breastfeeding. Subjective:  Symptoms:  Stable. Diet:  Adequate intake. Activity level: Normal.      Objective:  General Appearance:  Comfortable. Vital signs: (most recent): Blood pressure 130/89, pulse 94, temperature 98 °F (36.7 °C), resp. rate 16, height 5' 6\" (1.676 m), weight 274 lb (124.3 kg), SpO2 94 %, not currently breastfeeding. Vital signs are normal.    HEENT: Normal HEENT exam.    Lungs:  Normal effort and normal respiratory rate. Heart: Normal rate. Extremities: Normal range of motion. Neurological: Patient is alert. Results for Hussain Ca (MRN [id number]) as of 6/23/2020 12:55   Ref. Range 6/22/2020 16:52 6/22/2020 20:07 6/23/2020 06:41 6/23/2020 11:14   POC Glucose Latest Ref Range: 60 - 115 mg/dl 251 (H) 126 (H) 164 (H) 239 (H)       Assessment:    Condition: In stable condition. Unchanged. (Uncontrolled diabetes  Depression ). Plan:   Discharge home. (D/c pt home on glipizide 10 mg bid  Plus   trulicity 7.09/ZE/ZGBO   F/u in 2 weeks with himanshu parada ).        Ashley Charles MD  6/23/2020
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
## 2020-06-23 NOTE — PROGRESS NOTES
Pt. attended the 0900 community meeting. Electronically signed by Gualberto Nieves Edelstein [facility] on 6/23/2020 at 9:52 AM

## 2020-06-26 ENCOUNTER — TELEPHONE (OUTPATIENT)
Dept: ENDOCRINOLOGY | Age: 40
End: 2020-06-26

## 2020-07-09 ENCOUNTER — TELEPHONE (OUTPATIENT)
Dept: ENDOCRINOLOGY | Age: 40
End: 2020-07-09

## 2020-11-07 ENCOUNTER — HOSPITAL ENCOUNTER (INPATIENT)
Age: 40
LOS: 3 days | Discharge: HOME | DRG: 420 | End: 2020-11-10
Attending: FAMILY MEDICINE | Admitting: INTERNAL MEDICINE
Payer: MEDICAID

## 2020-11-07 DIAGNOSIS — E11.65 TYPE 2 DIABETES MELLITUS WITH HYPERGLYCEMIA, WITHOUT LONG-TERM CURRENT USE OF INSULIN (HCC): ICD-10-CM

## 2020-11-07 DIAGNOSIS — L03.311 CELLULITIS OF ABDOMINAL WALL: Primary | ICD-10-CM

## 2020-11-07 DIAGNOSIS — E11.65 UNCONTROLLED TYPE 2 DIABETES MELLITUS WITH HYPERGLYCEMIA (HCC): ICD-10-CM

## 2020-11-07 LAB
ALBUMIN SERPL-MCNC: 3.9 G/DL (ref 3.5–4.6)
ALP BLD-CCNC: 124 U/L (ref 40–130)
ALT SERPL-CCNC: 52 U/L (ref 0–33)
ANION GAP SERPL CALCULATED.3IONS-SCNC: 11 MEQ/L (ref 9–15)
AST SERPL-CCNC: 73 U/L (ref 0–35)
BASOPHILS ABSOLUTE: 0.1 K/UL (ref 0–0.2)
BASOPHILS RELATIVE PERCENT: 0.7 %
BILIRUB SERPL-MCNC: <0.2 MG/DL (ref 0.2–0.7)
BUN BLDV-MCNC: 10 MG/DL (ref 6–20)
CALCIUM SERPL-MCNC: 9.4 MG/DL (ref 8.5–9.9)
CHLORIDE BLD-SCNC: 92 MEQ/L (ref 95–107)
CO2: 27 MEQ/L (ref 20–31)
CREAT SERPL-MCNC: 0.83 MG/DL (ref 0.5–0.9)
EOSINOPHILS ABSOLUTE: 0.3 K/UL (ref 0–0.7)
EOSINOPHILS RELATIVE PERCENT: 1.9 %
GFR AFRICAN AMERICAN: >60
GFR NON-AFRICAN AMERICAN: >60
GLOBULIN: 3 G/DL (ref 2.3–3.5)
GLUCOSE BLD-MCNC: 294 MG/DL (ref 60–115)
GLUCOSE BLD-MCNC: 450 MG/DL (ref 60–115)
GLUCOSE BLD-MCNC: 519 MG/DL (ref 70–99)
HBA1C MFR BLD: 11.1 % (ref 4.8–5.9)
HCT VFR BLD CALC: 43.4 % (ref 37–47)
HEMOGLOBIN: 14 G/DL (ref 12–16)
LACTIC ACID: 2.7 MMOL/L (ref 0.5–2.2)
LYMPHOCYTES ABSOLUTE: 3 K/UL (ref 1–4.8)
LYMPHOCYTES RELATIVE PERCENT: 22.9 %
MCH RBC QN AUTO: 26.9 PG (ref 27–31.3)
MCHC RBC AUTO-ENTMCNC: 32.3 % (ref 33–37)
MCV RBC AUTO: 83.4 FL (ref 82–100)
MONOCYTES ABSOLUTE: 0.8 K/UL (ref 0.2–0.8)
MONOCYTES RELATIVE PERCENT: 5.7 %
NEUTROPHILS ABSOLUTE: 9.1 K/UL (ref 1.4–6.5)
NEUTROPHILS RELATIVE PERCENT: 68.8 %
PDW BLD-RTO: 15.5 % (ref 11.5–14.5)
PERFORMED ON: ABNORMAL
PERFORMED ON: ABNORMAL
PLATELET # BLD: 219 K/UL (ref 130–400)
POTASSIUM SERPL-SCNC: 3.5 MEQ/L (ref 3.4–4.9)
RBC # BLD: 5.2 M/UL (ref 4.2–5.4)
SODIUM BLD-SCNC: 130 MEQ/L (ref 135–144)
TOTAL PROTEIN: 6.9 G/DL (ref 6.3–8)
WBC # BLD: 13.2 K/UL (ref 4.8–10.8)

## 2020-11-07 PROCEDURE — 36415 COLL VENOUS BLD VENIPUNCTURE: CPT

## 2020-11-07 PROCEDURE — 6370000000 HC RX 637 (ALT 250 FOR IP): Performed by: NURSE PRACTITIONER

## 2020-11-07 PROCEDURE — 83036 HEMOGLOBIN GLYCOSYLATED A1C: CPT

## 2020-11-07 PROCEDURE — 96366 THER/PROPH/DIAG IV INF ADDON: CPT

## 2020-11-07 PROCEDURE — 2580000003 HC RX 258: Performed by: NURSE PRACTITIONER

## 2020-11-07 PROCEDURE — 87205 SMEAR GRAM STAIN: CPT

## 2020-11-07 PROCEDURE — 6360000002 HC RX W HCPCS: Performed by: FAMILY MEDICINE

## 2020-11-07 PROCEDURE — 96365 THER/PROPH/DIAG IV INF INIT: CPT

## 2020-11-07 PROCEDURE — 96372 THER/PROPH/DIAG INJ SC/IM: CPT

## 2020-11-07 PROCEDURE — 6360000002 HC RX W HCPCS: Performed by: NURSE PRACTITIONER

## 2020-11-07 PROCEDURE — 85025 COMPLETE CBC W/AUTO DIFF WBC: CPT

## 2020-11-07 PROCEDURE — 87075 CULTR BACTERIA EXCEPT BLOOD: CPT

## 2020-11-07 PROCEDURE — 99284 EMERGENCY DEPT VISIT MOD MDM: CPT

## 2020-11-07 PROCEDURE — 1210000000 HC MED SURG R&B

## 2020-11-07 PROCEDURE — 6370000000 HC RX 637 (ALT 250 FOR IP): Performed by: FAMILY MEDICINE

## 2020-11-07 PROCEDURE — 87070 CULTURE OTHR SPECIMN AEROBIC: CPT

## 2020-11-07 PROCEDURE — 80053 COMPREHEN METABOLIC PANEL: CPT

## 2020-11-07 PROCEDURE — 83605 ASSAY OF LACTIC ACID: CPT

## 2020-11-07 PROCEDURE — 96375 TX/PRO/DX INJ NEW DRUG ADDON: CPT

## 2020-11-07 PROCEDURE — 2580000003 HC RX 258: Performed by: FAMILY MEDICINE

## 2020-11-07 PROCEDURE — 87040 BLOOD CULTURE FOR BACTERIA: CPT

## 2020-11-07 RX ORDER — DEXTROSE MONOHYDRATE 50 MG/ML
100 INJECTION, SOLUTION INTRAVENOUS PRN
Status: DISCONTINUED | OUTPATIENT
Start: 2020-11-07 | End: 2020-11-10 | Stop reason: HOSPADM

## 2020-11-07 RX ORDER — ACETAMINOPHEN 650 MG/1
650 SUPPOSITORY RECTAL EVERY 6 HOURS PRN
Status: DISCONTINUED | OUTPATIENT
Start: 2020-11-07 | End: 2020-11-10 | Stop reason: HOSPADM

## 2020-11-07 RX ORDER — TRAZODONE HYDROCHLORIDE 50 MG/1
50 TABLET ORAL NIGHTLY PRN
Status: DISCONTINUED | OUTPATIENT
Start: 2020-11-07 | End: 2020-11-10 | Stop reason: HOSPADM

## 2020-11-07 RX ORDER — SODIUM CHLORIDE 0.9 % (FLUSH) 0.9 %
10 SYRINGE (ML) INJECTION PRN
Status: DISCONTINUED | OUTPATIENT
Start: 2020-11-07 | End: 2020-11-10 | Stop reason: HOSPADM

## 2020-11-07 RX ORDER — ATORVASTATIN CALCIUM 20 MG/1
20 TABLET, FILM COATED ORAL DAILY
Status: DISCONTINUED | OUTPATIENT
Start: 2020-11-08 | End: 2020-11-10 | Stop reason: HOSPADM

## 2020-11-07 RX ORDER — NICOTINE POLACRILEX 4 MG
15 LOZENGE BUCCAL PRN
Status: DISCONTINUED | OUTPATIENT
Start: 2020-11-07 | End: 2020-11-10 | Stop reason: HOSPADM

## 2020-11-07 RX ORDER — VENLAFAXINE HYDROCHLORIDE 37.5 MG/1
37.5 CAPSULE, EXTENDED RELEASE ORAL
Status: DISCONTINUED | OUTPATIENT
Start: 2020-11-08 | End: 2020-11-10 | Stop reason: HOSPADM

## 2020-11-07 RX ORDER — POLYETHYLENE GLYCOL 3350 17 G/17G
17 POWDER, FOR SOLUTION ORAL DAILY PRN
Status: DISCONTINUED | OUTPATIENT
Start: 2020-11-07 | End: 2020-11-10 | Stop reason: HOSPADM

## 2020-11-07 RX ORDER — KETOROLAC TROMETHAMINE 15 MG/ML
15 INJECTION, SOLUTION INTRAMUSCULAR; INTRAVENOUS EVERY 6 HOURS PRN
Status: DISCONTINUED | OUTPATIENT
Start: 2020-11-07 | End: 2020-11-10 | Stop reason: HOSPADM

## 2020-11-07 RX ORDER — MORPHINE SULFATE 2 MG/ML
4 INJECTION, SOLUTION INTRAMUSCULAR; INTRAVENOUS
Status: COMPLETED | OUTPATIENT
Start: 2020-11-07 | End: 2020-11-07

## 2020-11-07 RX ORDER — SODIUM CHLORIDE 0.9 % (FLUSH) 0.9 %
10 SYRINGE (ML) INJECTION EVERY 12 HOURS SCHEDULED
Status: DISCONTINUED | OUTPATIENT
Start: 2020-11-07 | End: 2020-11-10 | Stop reason: HOSPADM

## 2020-11-07 RX ORDER — PROMETHAZINE HYDROCHLORIDE 25 MG/1
12.5 TABLET ORAL EVERY 6 HOURS PRN
Status: DISCONTINUED | OUTPATIENT
Start: 2020-11-07 | End: 2020-11-10 | Stop reason: HOSPADM

## 2020-11-07 RX ORDER — DEXTROSE MONOHYDRATE 25 G/50ML
12.5 INJECTION, SOLUTION INTRAVENOUS PRN
Status: DISCONTINUED | OUTPATIENT
Start: 2020-11-07 | End: 2020-11-10 | Stop reason: HOSPADM

## 2020-11-07 RX ORDER — ONDANSETRON 2 MG/ML
4 INJECTION INTRAMUSCULAR; INTRAVENOUS EVERY 6 HOURS PRN
Status: DISCONTINUED | OUTPATIENT
Start: 2020-11-07 | End: 2020-11-10 | Stop reason: HOSPADM

## 2020-11-07 RX ORDER — ACETAMINOPHEN 325 MG/1
650 TABLET ORAL EVERY 6 HOURS PRN
Status: DISCONTINUED | OUTPATIENT
Start: 2020-11-07 | End: 2020-11-10 | Stop reason: HOSPADM

## 2020-11-07 RX ORDER — FLUCONAZOLE 2 MG/ML
200 INJECTION, SOLUTION INTRAVENOUS ONCE
Status: COMPLETED | OUTPATIENT
Start: 2020-11-07 | End: 2020-11-07

## 2020-11-07 RX ORDER — MORPHINE SULFATE 2 MG/ML
4 INJECTION, SOLUTION INTRAMUSCULAR; INTRAVENOUS ONCE
Status: COMPLETED | OUTPATIENT
Start: 2020-11-07 | End: 2020-11-07

## 2020-11-07 RX ADMIN — VANCOMYCIN HYDROCHLORIDE 1000 MG: 1 INJECTION, POWDER, LYOPHILIZED, FOR SOLUTION INTRAVENOUS at 16:40

## 2020-11-07 RX ADMIN — INSULIN LISPRO 3 UNITS: 100 INJECTION, SOLUTION INTRAVENOUS; SUBCUTANEOUS at 23:21

## 2020-11-07 RX ADMIN — MORPHINE SULFATE 4 MG: 2 INJECTION, SOLUTION INTRAMUSCULAR; INTRAVENOUS at 16:39

## 2020-11-07 RX ADMIN — FLUCONAZOLE, SODIUM CHLORIDE 200 MG: 2 INJECTION INTRAVENOUS at 20:07

## 2020-11-07 RX ADMIN — Medication 10 ML: at 23:23

## 2020-11-07 RX ADMIN — ENOXAPARIN SODIUM 40 MG: 40 INJECTION SUBCUTANEOUS at 23:18

## 2020-11-07 RX ADMIN — INSULIN HUMAN 12 UNITS: 100 INJECTION, SOLUTION PARENTERAL at 18:25

## 2020-11-07 RX ADMIN — MORPHINE SULFATE 4 MG: 2 INJECTION, SOLUTION INTRAMUSCULAR; INTRAVENOUS at 20:18

## 2020-11-07 RX ADMIN — MORPHINE SULFATE 4 MG: 2 INJECTION, SOLUTION INTRAMUSCULAR; INTRAVENOUS at 23:17

## 2020-11-07 ASSESSMENT — ENCOUNTER SYMPTOMS
ALLERGIC/IMMUNOLOGIC NEGATIVE: 1
RESPIRATORY NEGATIVE: 1
VOMITING: 0
ABDOMINAL PAIN: 0
SHORTNESS OF BREATH: 0
EYES NEGATIVE: 1
SORE THROAT: 0
RHINORRHEA: 0
BACK PAIN: 0
NAUSEA: 1
WHEEZING: 0

## 2020-11-07 ASSESSMENT — PAIN SCALES - GENERAL
PAINLEVEL_OUTOF10: 7
PAINLEVEL_OUTOF10: 7
PAINLEVEL_OUTOF10: 8

## 2020-11-07 ASSESSMENT — PAIN DESCRIPTION - DESCRIPTORS: DESCRIPTORS: TENDER

## 2020-11-07 ASSESSMENT — PAIN DESCRIPTION - ORIENTATION: ORIENTATION: LOWER

## 2020-11-07 ASSESSMENT — PAIN DESCRIPTION - PAIN TYPE: TYPE: ACUTE PAIN

## 2020-11-07 ASSESSMENT — PAIN DESCRIPTION - LOCATION
LOCATION: ABDOMEN
LOCATION: ABDOMEN

## 2020-11-07 NOTE — ED PROVIDER NOTES
[facility] [facility] ED  eMERGENCY dEPARTMENT eNCOUnter      Pt Name: Shazia Valerio  MRN: [id number]  [place] 1980  Date of evaluation: 11/7/2020  Provider: Lachelle Holm MD    CHIEF COMPLAINT       Chief Complaint   Patient presents with    Abscess     abscess on lower abd that has been there for about a week         HISTORY OF PRESENT ILLNESS   (Location/Symptom, Timing/Onset,Context/Setting, Quality, Duration, Modifying Factors, Severity)  Note limiting factors. Shazia Valerio is a 36 y.o. female who presents to the emergency department redness lower abdomen     The history is provided by the patient. Abscess   Location:  Torso  Torso abscess location:  Abd RLQ  Abscess quality: draining, induration, painful, warmth and weeping    Red streaking: yes    Duration:  3 days  Progression:  Worsening  Pain details:     Quality:  Throbbing    Severity:  Severe    Duration:  3 days    Timing:  Constant    Progression:  Worsening  Chronicity:  New  Context: diabetes    Context: not immunosuppression, not injected drug use, not insect bite/sting and not skin injury    Relieved by:  Nothing  Worsened by:  Nothing  Ineffective treatments:  None tried  Associated symptoms: fatigue, fever and nausea        NursingNotes were reviewed. REVIEW OF SYSTEMS    (2-9 systems for level 4, 10 or more for level 5)     Review of Systems   Constitutional: Positive for fatigue and fever. HENT: Negative. Eyes: Negative. Respiratory: Negative. Cardiovascular: Negative. Gastrointestinal: Positive for nausea. Endocrine: Negative. Genitourinary: Positive for vaginal discharge. Genital itching  white vaginal discharge   Musculoskeletal: Negative. Skin: Negative. Allergic/Immunologic: Negative. Neurological: Negative. Hematological: Negative. Psychiatric/Behavioral: Negative. All other systems reviewed and are negative.       Except as noted above the remainder of the review of systems was reviewed and negative. PAST MEDICAL HISTORY     Past Medical History:   Diagnosis Date    Bipolar disorder ([facility]     Depression     DM (diabetes mellitus) ([facility]     History of drug abuse ([facility]     positive drug screens 9/22/14 and 5/18/14    Hyperlipidemia     Osteoarthritis          SURGICALHISTORY       Past Surgical History:   Procedure Laterality Date    CHOLECYSTECTOMY, LAPAROSCOPIC N/A 2/28/2017    CHOLECYSTECTOMY LAPAROSCOPIC WITH GRAMS POSS OPEN , RECENT LABS  performed by Renate Irving MD at [address] [facility] [address]  IND N/A 11/27/2018    COLONOSCOPY ROOM 384 performed by Christine Cardenas MD at [address] [address]       Previous Medications    BREXPIPRAZOLE (REXULTI PO)    Take 3 mg by mouth daily    DULAGLUTIDE (TRULICITY) 5.76 IE/5.9YJ SOPN    Inject 0.75 mg into the skin once a week    GLIPIZIDE (GLUCOTROL) 10 MG TABLET    Take 10 mg by mouth 2 times daily (before meals)    SIMVASTATIN (ZOCOR) 20 MG TABLET    Take 1 tablet by mouth every evening    TRAZODONE (DESYREL) 50 MG TABLET    Take 1 tablet by mouth nightly    VENLAFAXINE (EFFEXOR XR) 37.5 MG EXTENDED RELEASE CAPSULE    Take 1 capsule by mouth daily (with breakfast)       ALLERGIES     Latuda [lurasidone hcl];  Lurasidone; and Adhesive tape    FAMILY HISTORY       Family History   Problem Relation Age of Onset    Other Mother         CHF    Cirrhosis Brother     Other Maternal Grandmother         CHF          SOCIAL HISTORY       Social History     Socioeconomic History    Marital status: Single     Spouse name: None    Number of children: None    Years of education: None    Highest education level: None   Occupational History    None   Social Needs    Financial resource strain: None    Food insecurity     Worry: None     Inability: None    Transportation needs     Medical: None     Non-medical: None   Tobacco Use    Smoking status: Current Every Day Smoker     Packs/day: 1.00     Years: 24.00     Pack years: 24.00     Types: Cigarettes    Smokeless tobacco: Never Used   Substance and Sexual Activity    Alcohol use: No    Drug use: Not Currently     Types: Marijuana    Sexual activity: Yes   Lifestyle    Physical activity     Days per week: None     Minutes per session: None    Stress: None   Relationships    Social connections     Talks on phone: None     Gets together: None     Attends Worship service: None     Active member of club or organization: None     Attends meetings of clubs or organizations: None     Relationship status: None    Intimate partner violence     Fear of current or ex partner: None     Emotionally abused: None     Physically abused: None     Forced sexual activity: None   Other Topics Concern    None   Social History Narrative    None       SCREENINGS    [place] Coma Scale  Eye Opening: Spontaneous  Best Verbal Response: Oriented  Best Motor Response: Obeys commands  Rodolfo Coma Scale Score: 15 @FLOW([id number])@      PHYSICAL EXAM    (up to 7 for level 4, 8 or more for level 5)     ED Triage Vitals [11/07/20 1549]   BP Temp Temp Source Pulse Resp SpO2 Height Weight   (!) 145/87 97.9 °F (36.6 °C) Temporal 112 17 96 % 5' 5\" (1.651 m) 280 lb (127 kg)       Physical Exam  Vitals signs and nursing note reviewed. Constitutional:       Appearance: Normal appearance. She is well-developed. HENT:      Head: Normocephalic and atraumatic. Right Ear: External ear normal.      Left Ear: External ear normal.      Nose: Nose normal.   Eyes:      Pupils: Pupils are equal, round, and reactive to light. Neck:      Musculoskeletal: Normal range of motion and neck supple. Cardiovascular:      Rate and Rhythm: Normal rate and regular rhythm. Heart sounds: Normal heart sounds. Pulmonary:      Effort: Pulmonary effort is normal. No respiratory distress. Breath sounds: Normal breath sounds. No wheezing or rales.    Chest: Chest wall: No tenderness. Abdominal:      General: Bowel sounds are normal.      Palpations: Abdomen is soft. Comments: Large abscess in the right lower quadrant with surrounding induration open and draining also diffuse cellulitis of the lower abdominal wall extending above the umbilicus   Musculoskeletal: Normal range of motion. Skin:     General: Skin is warm and dry. Neurological:      Mental Status: She is alert and oriented to person, place, and time. Cranial Nerves: No cranial nerve deficit. Sensory: No sensory deficit. Motor: No abnormal muscle tone. Coordination: Coordination normal.      Deep Tendon Reflexes: Reflexes normal.   Psychiatric:         Behavior: Behavior normal.         Thought Content:  Thought content normal.         Judgment: Judgment normal.         DIAGNOSTIC RESULTS     EKG: All EKG's are interpreted by the Emergency Department Physician who either signs or Co-signsthis chart in the absence of a cardiologist.        RADIOLOGY:   Andrew Loffler such as CT, Ultrasound and MRI are read by the radiologist. Plain radiographic images are visualized and preliminarily interpreted by the emergency physician with the below findings:        Interpretation per the Radiologist below, if available at the time ofthis note:    No orders to display         ED BEDSIDE ULTRASOUND:   Performed by ED Physician - none    LABS:  Labs Reviewed   COMPREHENSIVE METABOLIC PANEL - Abnormal; Notable for the following components:       Result Value    Sodium 130 (*)     Chloride 92 (*)     Glucose 519 (*)     ALT 52 (*)     AST 73 (*)     All other components within normal limits    Narrative:     John Martines  [facility] tel. [id number],  Glu results called to and read back by Camilo Bahena, 11/07/2020 17:41, by  [email or web address]   LACTIC ACID, PLASMA - Abnormal; Notable for the following components:    Lactic Acid 2.7 (*)     All other components within normal limits   HEMOGLOBIN A1C - Abnormal; Notable for the following components:    Hemoglobin A1C 11.1 (*)     All other components within normal limits   POCT GLUCOSE - Abnormal; Notable for the following components:    POC Glucose 450 (*)     All other components within normal limits   CULTURE, BLOOD 2   CULTURE, BLOOD 1   CULTURE, ANAEROBIC AND AEROBIC   CBC WITH AUTO DIFFERENTIAL       All other labs were within normal range or not returned as of this dictation. EMERGENCY DEPARTMENT COURSE and DIFFERENTIAL DIAGNOSIS/MDM:   Vitals:    Vitals:    11/07/20 1549 11/07/20 1724 11/07/20 1836   BP: (!) 145/87 103/63 117/72   Pulse: 112 92 92   Resp: 17 16 18   Temp: 97.9 °F (36.6 °C)     TempSrc: Temporal     SpO2: 96% 97% 94%   Weight: 280 lb (127 kg)     Height: 5' 5\" (1.651 m)                  MDM  Number of Diagnoses or Management Options  Cellulitis of abdominal wall:   Type 2 diabetes mellitus with hyperglycemia, without long-term current use of insulin ([facility]):   Diagnosis management comments: 36years old with known diabetes presented to the ER with abdominal wall cellulitis and a draining abscess elevated blood sugar patient will be admitted for blood sugar control IV antibiotic area was cultured empirically started Vanco and Zosyn patient tolerated well also patient had concern about vaginal yeast infection was given a dose of Diflucan in the ER       Amount and/or Complexity of Data Reviewed  Clinical lab tests: ordered and reviewed  Tests in the radiology section of CPT®: ordered and reviewed        CONSULTS:  None    PROCEDURES:  Unless otherwise noted below, none     Procedures    FINAL IMPRESSION      1. Cellulitis of abdominal wall    2. Type 2 diabetes mellitus with hyperglycemia, without long-term current use of insulin ([address]          DISPOSITION/PLAN   DISPOSITION Admitted 11/07/2020 07:06:16 PM      PATIENT REFERRED TO:  No follow-up provider specified.     DISCHARGE MEDICATIONS:  New Prescriptions    No medications on file (Please note thatportions of this note were completed with a voice recognition program.  Efforts were made to edit the dictations but occasionally words are mis-transcribed.)    Erika Echevarria MD (electronically signed)  Attending Emergency Physician          Refugio Diez MD  11/07/20 5318

## 2020-11-08 LAB
GLUCOSE BLD-MCNC: 201 MG/DL (ref 60–115)
GLUCOSE BLD-MCNC: 228 MG/DL (ref 60–115)
GLUCOSE BLD-MCNC: 283 MG/DL (ref 60–115)
GLUCOSE BLD-MCNC: 290 MG/DL (ref 60–115)
PERFORMED ON: ABNORMAL

## 2020-11-08 PROCEDURE — 6360000002 HC RX W HCPCS: Performed by: NURSE PRACTITIONER

## 2020-11-08 PROCEDURE — 99222 1ST HOSP IP/OBS MODERATE 55: CPT | Performed by: INTERNAL MEDICINE

## 2020-11-08 PROCEDURE — 6360000002 HC RX W HCPCS: Performed by: INTERNAL MEDICINE

## 2020-11-08 PROCEDURE — 1210000000 HC MED SURG R&B

## 2020-11-08 PROCEDURE — 2580000003 HC RX 258: Performed by: NURSE PRACTITIONER

## 2020-11-08 PROCEDURE — 6370000000 HC RX 637 (ALT 250 FOR IP): Performed by: NURSE PRACTITIONER

## 2020-11-08 RX ORDER — MORPHINE SULFATE 2 MG/ML
2 INJECTION, SOLUTION INTRAMUSCULAR; INTRAVENOUS EVERY 4 HOURS PRN
Status: DISCONTINUED | OUTPATIENT
Start: 2020-11-08 | End: 2020-11-10 | Stop reason: HOSPADM

## 2020-11-08 RX ORDER — NICOTINE 21 MG/24HR
1 PATCH, TRANSDERMAL 24 HOURS TRANSDERMAL DAILY
Status: DISCONTINUED | OUTPATIENT
Start: 2020-11-08 | End: 2020-11-10 | Stop reason: HOSPADM

## 2020-11-08 RX ADMIN — MORPHINE SULFATE 2 MG: 2 INJECTION, SOLUTION INTRAMUSCULAR; INTRAVENOUS at 08:21

## 2020-11-08 RX ADMIN — KETOROLAC TROMETHAMINE 15 MG: 15 INJECTION, SOLUTION INTRAMUSCULAR; INTRAVENOUS at 16:34

## 2020-11-08 RX ADMIN — PIPERACILLIN AND TAZOBACTAM 3.38 G: 3; .375 INJECTION, POWDER, LYOPHILIZED, FOR SOLUTION INTRAVENOUS at 17:48

## 2020-11-08 RX ADMIN — Medication 10 ML: at 08:21

## 2020-11-08 RX ADMIN — INSULIN LISPRO 2 UNITS: 100 INJECTION, SOLUTION INTRAVENOUS; SUBCUTANEOUS at 20:36

## 2020-11-08 RX ADMIN — PIPERACILLIN AND TAZOBACTAM 3.38 G: 3; .375 INJECTION, POWDER, LYOPHILIZED, FOR SOLUTION INTRAVENOUS at 02:59

## 2020-11-08 RX ADMIN — MORPHINE SULFATE 2 MG: 2 INJECTION, SOLUTION INTRAMUSCULAR; INTRAVENOUS at 17:42

## 2020-11-08 RX ADMIN — Medication 10 ML: at 20:38

## 2020-11-08 RX ADMIN — ENOXAPARIN SODIUM 40 MG: 40 INJECTION SUBCUTANEOUS at 08:20

## 2020-11-08 RX ADMIN — KETOROLAC TROMETHAMINE 15 MG: 15 INJECTION, SOLUTION INTRAMUSCULAR; INTRAVENOUS at 02:59

## 2020-11-08 RX ADMIN — VENLAFAXINE HYDROCHLORIDE 37.5 MG: 37.5 CAPSULE, EXTENDED RELEASE ORAL at 08:20

## 2020-11-08 RX ADMIN — BREXPIPRAZOLE 3 MG: 3 TABLET ORAL at 08:21

## 2020-11-08 RX ADMIN — TRAZODONE HYDROCHLORIDE 50 MG: 50 TABLET ORAL at 22:42

## 2020-11-08 RX ADMIN — ATORVASTATIN CALCIUM 20 MG: 20 TABLET, FILM COATED ORAL at 08:20

## 2020-11-08 RX ADMIN — VANCOMYCIN HYDROCHLORIDE 1250 MG: 5 INJECTION, POWDER, LYOPHILIZED, FOR SOLUTION INTRAVENOUS at 22:29

## 2020-11-08 RX ADMIN — PIPERACILLIN AND TAZOBACTAM 3.38 G: 3; .375 INJECTION, POWDER, LYOPHILIZED, FOR SOLUTION INTRAVENOUS at 11:27

## 2020-11-08 RX ADMIN — VANCOMYCIN HYDROCHLORIDE 1250 MG: 5 INJECTION, POWDER, LYOPHILIZED, FOR SOLUTION INTRAVENOUS at 08:46

## 2020-11-08 ASSESSMENT — ENCOUNTER SYMPTOMS
NAUSEA: 0
COUGH: 0
SHORTNESS OF BREATH: 0
VOMITING: 0
DIARRHEA: 0

## 2020-11-08 NOTE — PROGRESS NOTES
Pharmacy Note  Vancomycin Consult    Chong Jacob is a 36 y.o. female started on Vancomycin for cellulitis; consult received from Pino Alaniz NP to manage therapy. Also receiving the following antibiotics: Zosyn, fluconazole x 1 in ED. Patient Active Problem List   Diagnosis    Chronic cholecystitis with calculus    History of drug abuse ([address]    Spondylosis of lumbar region without myelopathy or radiculopathy    QI (obstructive sleep apnea)    Morbid obesity with body mass index (BMI) of 40.0 to 44.9 in adult [facility])    Bipolar disorder, current episode depressed, moderate ([address]    Bipolar 1 disorder, depressed ([address]    Diarrhea    Bipolar disorder with depression ([address]    Closed fracture of middle or proximal phalanx or phalanges of hand    Major depression, single episode    Uncontrolled type 2 diabetes mellitus with hyperglycemia ([address]    Cellulitis of abdominal wall       Allergies:  Latuda [lurasidone hcl]; Lurasidone; and Adhesive tape     Temp max: 98.2F    Recent Labs     11/07/20  1615   BUN 10       Recent Labs     11/07/20  1615   CREATININE 0.83       Recent Labs     11/07/20  1615   WBC 13.2*         Intake/Output Summary (Last 24 hours) at 11/8/2020 0007  Last data filed at 11/7/2020 1910  Gross per 24 hour   Intake 350 ml   Output --   Net 350 ml       Culture Date      Source                       Results  11/7/20                blood                           in process  11/7/20                abd wound                  in process    Ht Readings from Last 1 Encounters:   11/07/20 5' 5\" (1.651 m)        Wt Readings from Last 1 Encounters:   11/07/20 268 lb (121.6 kg)         Body mass index is 44.6 kg/m². Estimated Creatinine Clearance: 118 mL/min (based on SCr of 0.83 mg/dL). Goal Trough Level: 10-20 mcg/mL    Assessment/Plan:  Will initiate Vancomycin with a one time dose of 1000 mg given in ER 11/7 1640, followed by 1250 mg IV every 12 hours.  Trough prior to 4th 1250mg dose 11/9/20 1700. Timing of trough level will be determined based on culture results, renal function, and clinical response. Thank you for the consult. Will continue to follow. JAYLYN Hawkins. Amilcar.  11/8/2020  12:09 AM

## 2020-11-08 NOTE — H&P
[facility]  MEDICINE    HISTORY AND PHYSICAL EXAM    PATIENT NAME:  Mary Olvera    MRN:  [id number]  SERVICE DATE:  11/7/2020   SERVICE TIME:  9:57 PM    Primary Care Physician: Georges Newsome DO         SUBJECTIVE  CHIEF COMPLAINT: Draining abdominal wound    HPI: Patient presents with erythema and her abdominal pannus starting Monday with, what she called a black sore, worsening throughout the week. Today she states that the sore on her right abdomen started draining and purulent drainage came out. She states that it is painful rating it an 8 of 10 and describes it as throbbing. Patient has attempted no interventions to relieve her symptoms and has never had this before. Wound culture sent in ED. Area of erythema is bilateral and marked in the ED with surgical pen. Patient reports chills but no measured temperature. Patient reports that she has been mildly nauseated but no vomiting. Patient denies chest pain or shortness of breath. Patient does report recent genital itching with white vaginal discharge which was treated with Diflucan in the ED. patient is a 1 pack/day smoker    Patient's past medical history includes diabetes without insulin, bipolar with depression, hyperlipidemia, and history of drug abuse. Patient denies any recent drug abuse or alcohol use.     PAST MEDICAL HISTORY:    Past Medical History:   Diagnosis Date    [facility]     Depression     DM (diabetes mellitus) ([facility]     History of drug abuse ([facility]     positive drug screens 9/22/14 and 5/18/14    Hyperlipidemia     Osteoarthritis      PAST SURGICAL HISTORY:    Past Surgical History:   Procedure Laterality Date    CHOLECYSTECTOMY, LAPAROSCOPIC N/A 2/28/2017    CHOLECYSTECTOMY LAPAROSCOPIC WITH GRAMS POSS OPEN , RECENT LABS  performed by Brian Villa MD at [facility] [address] NOT  [address] [place] [address] N/A 11/27/2018    COLONOSCOPY ROOM 384 performed [facility]um [facility] at [facility]  TUBAL LIGATION       FAMILY HISTORY:    Family History   Problem Relation Age of Onset    Other Mother         CHF    Cirrhosis Brother     Other Maternal Grandmother         CHF     SOCIAL HISTORY:    Social History     Socioeconomic History    Marital status: Single     Spouse name: Not on file    Number of children: Not on file    Years of education: Not on file    Highest education level: Not on file   Occupational History    Not on file   Social Needs    Financial resource strain: Not on file    Food insecurity     Worry: Not on file     Inability: Not on file    Transportation needs     Medical: Not on file     Non-medical: Not on file   Tobacco Use    Smoking status: Current Every Day Smoker     Packs/day: 1.00     Years: 24.00     Pack years: 24.00     Types: Cigarettes    Smokeless tobacco: Never Used   Substance and Sexual Activity    Alcohol use: No    Drug use: Not Currently     Types: Marijuana    Sexual activity: Yes   Lifestyle    Physical activity     Days per week: Not on file     Minutes per session: Not on file    Stress: Not on file   Relationships    Social connections     Talks on phone: Not on file     Gets together: Not on file     Attends Jehovah's witness service: Not on file     Active member of club or organization: Not on file     Attends meetings of clubs or organizations: Not on file     Relationship status: Not on file    Intimate partner violence     Fear of current or ex partner: Not on file     Emotionally abused: Not on file     Physically abused: Not on file     Forced sexual activity: Not on file   Other Topics Concern    Not on file   Social History Narrative    Not on file     MEDICATIONS:   Prior to Admission medications    Medication Sig Start Date End Date Taking?  Authorizing Provider   venlafaxine (EFFEXOR XR) 37.5 MG extended release capsule Take 1 capsule by mouth daily (with breakfast) 6/24/20   Edy Colon MD   Dulaglutide (TRULICITY) 1.83 MG/0.5ML SOPN Inject 0.75 mg into the skin once a week 6/23/20   Karla Roy MD   glipiZIDE (GLUCOTROL) 10 MG tablet Take 10 mg by mouth 2 times daily (before meals)    Historical Provider, MD   Brexpiprazole (REXULTI PO) Take 3 mg by mouth daily    Historical Provider, MD   traZODone (DESYREL) 50 MG tablet Take 1 tablet by mouth nightly  Patient taking differently: Take 50 mg by mouth nightly as needed  11/28/18   Alycia Nelson MD   simvastatin (ZOCOR) 20 MG tablet Take 1 tablet by mouth every evening 11/12/18   Glenn Page DO       ALLERGIES: Samayoa Deutscher hcl]; Lurasidone; and Adhesive tape    REVIEW OF SYSTEM:   Review of Systems   Constitutional: Positive for chills. Negative for appetite change, fatigue, fever and unexpected weight change. HENT: Negative for congestion, rhinorrhea and sore throat. Eyes: Negative. Respiratory: Negative for shortness of breath and wheezing. Cardiovascular: Negative for chest pain. Gastrointestinal: Positive for nausea. Negative for abdominal pain and vomiting. Endocrine: Negative. Genitourinary: Positive for vaginal discharge (itching). Negative for difficulty urinating, dysuria, menstrual problem, pelvic pain, vaginal bleeding and vaginal pain. Musculoskeletal: Negative for back pain. Skin: Positive for wound. Allergic/Immunologic: Negative. Neurological: Negative. Hematological: Negative. Psychiatric/Behavioral: Negative. OBJECTIVE  PHYSICAL EXAM: /72   Pulse 92   Temp 97.9 °F (36.6 °C) (Temporal)   Resp 18   Ht 5' 5\" (1.651 m)   Wt 280 lb (127 kg)   SpO2 94%   BMI 46.59 kg/m²     Physical Exam  Vitals signs and nursing note reviewed. Constitutional:       General: She is not in acute distress. Appearance: She is well-developed. HENT:      Nose: Nose normal.   Eyes:      Pupils: Pupils are equal, round, and reactive to light. Neck:      Musculoskeletal: Normal range of motion.    Cardiovascular: Rate and Rhythm: Normal rate and regular rhythm. Pulmonary:      Effort: Pulmonary effort is normal. No respiratory distress. Breath sounds: Normal breath sounds. No wheezing or rales. Abdominal:      General: Bowel sounds are normal. There is no distension. Palpations: Abdomen is soft. There is no mass. Tenderness: There is no abdominal tenderness. There is no guarding or rebound. Hernia: No hernia is present. Musculoskeletal: Normal range of motion. Skin:     General: Skin is warm and dry. Capillary Refill: Capillary refill takes less than 2 seconds. Neurological:      Mental Status: She is alert and oriented to person, place, and time. DATA:     Diagnostic tests reviewed for today's visit:    Most recent labs and imaging results reviewed. LABS:    Recent Results (from the past 24 hour(s))   Comprehensive Metabolic Panel    Collection Time: 11/07/20  4:15 PM   Result Value Ref Range    Sodium 130 (L) 135 - 144 mEq/L    Potassium 3.5 3.4 - 4.9 mEq/L    Chloride 92 (L) 95 - 107 mEq/L    CO2 27 20 - 31 mEq/L    Anion Gap 11 9 - 15 mEq/L    Glucose 519 (HH) 70 - 99 mg/dL    BUN 10 6 - 20 mg/dL    CREATININE 0.83 0.50 - 0.90 mg/dL    GFR Non-African American >60.0 >60    GFR  >60.0 >60    Calcium 9.4 8.5 - 9.9 mg/dL    Total Protein 6.9 6.3 - 8.0 g/dL    Alb 3.9 3.5 - 4.6 g/dL    Total Bilirubin <0.2 0.2 - 0.7 mg/dL    Alkaline Phosphatase 124 40 - 130 U/L    ALT 52 (H) 0 - 33 U/L    AST 73 (H) 0 - 35 U/L    Globulin 3.0 2.3 - 3.5 g/dL   CBC Auto Differential    Collection Time: 11/07/20  4:15 PM   Result Value Ref Range    WBC 13.2 (H) 4.8 - 10.8 K/uL    RBC 5.20 4. 20 - 5.40 M/uL    Hemoglobin 14.0 12.0 - 16.0 g/dL    Hematocrit 43.4 37.0 - 47.0 %    MCV 83.4 82.0 - 100.0 fL    MCH 26.9 (L) 27.0 - 31.3 pg    MCHC 32.3 (L) 33.0 - 37.0 %    RDW 15.5 (H) 11.5 - 14.5 %    Platelets 620 285 - 271 K/uL    Neutrophils % 68.8 %    Lymphocytes % 22.9 % Monocytes % 5.7 %    Eosinophils % 1.9 %    Basophils % 0.7 %    Neutrophils Absolute 9.1 (H) 1.4 - 6.5 K/uL    Lymphocytes Absolute 3.0 1.0 - 4.8 K/uL    Monocytes Absolute 0.8 0.2 - 0.8 K/uL    Eosinophils Absolute 0.3 0.0 - 0.7 K/uL    Basophils Absolute 0.1 0.0 - 0.2 K/uL   Lactic Acid, Plasma    Collection Time: 11/07/20  4:15 PM   Result Value Ref Range    Lactic Acid 2.7 (H) 0.5 - 2.2 mmol/L   Hemoglobin A1C    Collection Time: 11/07/20  4:15 PM   Result Value Ref Range    Hemoglobin A1C 11.1 (H) 4.8 - 5.9 %   POCT Glucose    Collection Time: 11/07/20  7:08 PM   Result Value Ref Range    POC Glucose 450 (HH) 60 - 115 mg/dl    Performed on ACCU-CHEK    POCT Glucose    Collection Time: 11/07/20  9:35 PM   Result Value Ref Range    POC Glucose 294 (H) 60 - 115 mg/dl    Performed on ACCU-CHEK        IMAGING:  No results found. VTE Prophylaxis: low molecular weight heparin -  start    ASSESSMENT AND PLAN    Principal Problem:  1) Cellulitis of abdominal wall: Abdominal cellulitis with draining abscess. Patient given IV vancomycin in ED and wound culture sent. We will continue IV antibiotics and treat empirically pending culture results. We will consult wound care    2) Uncontrolled type 2 diabetes mellitus with hyperglycemia ([facility]): Glucose 519 in ED. A1c 11.1 and similar to last A1c in July. Patient given 12 units insulin in ED. Glucose now 294. Patient requesting consult for endocrine as her last experience with Dr. Pamela Crowley was positive  We will consult endocrine Elroy Grief). We will monitor and cover with medium sliding scale insulin AC at bedtime. 3) Bipolar 1 disorder, depressed ([address]): Patient takes oral medications to control. We will resume home meds        Plan of care discussed with: patient    SIGNATURE: Jefry Ponce RN, NP  DATE: November 7, 2020  TIME: 9:57 PM     KWAME Mariee, DO - supervising physician

## 2020-11-08 NOTE — PROGRESS NOTES
Progress Note  Date:2020       TYMW:[id number]  Patient Name:Kanchan Cam     Date of Birth:[birth year]     Age:40 y.o. Subjective    Subjective:  Symptoms:  She reports malaise and weakness. No shortness of breath, cough, chest pain, headache, chest pressure, anorexia, diarrhea or anxiety. Diet:  No nausea or vomiting. Review of Systems   Respiratory: Negative for cough and shortness of breath. Cardiovascular: Negative for chest pain. Gastrointestinal: Negative for anorexia, diarrhea, nausea and vomiting. Neurological: Positive for weakness. Objective         Vitals Last 24 Hours:  TEMPERATURE:  Temp  Av.9 °F (36.6 °C)  Min: 97.3 °F (36.3 °C)  Max: 98.4 °F (36.9 °C)  RESPIRATIONS RANGE: Resp  Av.4  Min: 15  Max: 18  PULSE OXIMETRY RANGE: SpO2  Av %  Min: 94 %  Max: 97 %  PULSE RANGE: Pulse  Av  Min: 82  Max: 112  BLOOD PRESSURE RANGE: Systolic (84SSL), RWT:333 , Min:103 , GOZ:288   ; Diastolic (29KRJ), FNL:98, Min:63, Max:88    I/O (24Hr): Intake/Output Summary (Last 24 hours) at 2020 1244  Last data filed at 2020 1910  Gross per 24 hour   Intake 350 ml   Output --   Net 350 ml     Objective:  General Appearance:  Comfortable, well-appearing and in no acute distress. Vital signs: (most recent): Blood pressure (!) 143/88, pulse 82, temperature 97.7 °F (36.5 °C), resp. rate 15, height 5' 5\" (1.651 m), weight 268 lb (121.6 kg), SpO2 97 %, not currently breastfeeding. Lungs:  Normal effort. Heart: Normal rate. Regular rhythm. S1 normal.    Abdomen: Abdomen is soft. Bowel sounds are normal.   There is no epigastric area or suprapubic area tenderness. Extremities: Normal range of motion. Pulses: Distal pulses are intact. Neurological: Patient is alert and oriented to person, place and time. Pupils:  Pupils are equal, round, and reactive to light. Skin:  There is a rash.       Labs/Imaging/Diagnostics    Labs:  CBC:  Recent Labs 11/07/20  1615   WBC 13.2*   RBC 5.20   HGB 14.0   HCT 43.4   MCV 83.4   RDW 15.5*        CHEMISTRIES:  Recent Labs     11/07/20  1615   *   K 3.5   CL 92*   CO2 27   BUN 10   CREATININE 0.83   GLUCOSE 519*     PT/INR:No results for input(s): PROTIME, INR in the last 72 hours. APTT:No results for input(s): APTT in the last 72 hours. LIVER PROFILE:  Recent Labs     11/07/20  1615   AST 73*   ALT 52*   BILITOT <0.2   ALKPHOS 124       Imaging Last 24 Hours:  No results found. Assessment//Plan           Hospital Problems           Last Modified POA    * (Principal) Cellulitis of abdominal wall 11/7/2020 Yes    Bipolar 1 disorder, depressed ([facility] 11/7/2020 Yes    Uncontrolled type 2 diabetes mellitus with hyperglycemia ([facility] 11/7/2020 Yes        Lab Results   Component Value Date    WBC 13.2 (H) 11/07/2020    HGB 14.0 11/07/2020    HCT 43.4 11/07/2020    MCV 83.4 11/07/2020     11/07/2020     Lab Results   Component Value Date     11/07/2020    K 3.5 11/07/2020    K 4.4 06/22/2020    CL 92 11/07/2020    CO2 27 11/07/2020    BUN 10 11/07/2020    CREATININE 0.83 11/07/2020    GLUCOSE 519 11/07/2020    CALCIUM 9.4 11/07/2020        Assessment & Plan  1) abd wall celullitis  2) abd wound with DM2  3) tobacco abuse  IV antibiotics, follow blood cultures. ID evaluations. Pain control.   Hemoglobin A1c  Increased coverage with meals  Electronically signed by Alvarez Broderick MD on 11/8/20 at 12:44 PM EST

## 2020-11-08 NOTE — CONSULTS
111/63  110/75 (!) 143/88   Pulse: 95 95 86 82   Resp: 16  15    Temp: 98.4 °F (36.9 °C)  97.3 °F (36.3 °C) 97.7 °F (36.5 °C)   TempSrc: Oral  Axillary    SpO2: 95%  97% 97%   Weight:       Height:         General Appearance: alert and oriented to person, place and time, well-developed and well-nourished, in no acute distress  Skin: warm anddry, no rash. Head: normocephalic and atraumatic  Eyes: extraocular eye movements intact, conjunctivae normal, anicteric sclerae  ENT: oropharynx clear and moist with normal mucous membranes.  No thrush  Lungs: normal respiratory effort, Clear Lungs, no rhonchi, no crackles, no wheezes  Heart:RRR, nl S1/S2, no murmur  Abdomen: soft, large area of erythema and tenderness, positive induration and tenderness with draining abscess in the right lower quadrant area, no H-S-megaly, + BS  NEUROLOGICAL: alert and oriented x 3, no focal deficits  No leg edema          DATA:    Lab Results   Component Value Date    WBC 13.2 (H) 11/07/2020    HGB 14.0 11/07/2020    HCT 43.4 11/07/2020    MCV 83.4 11/07/2020     11/07/2020     Lab Results   Component Value Date    CREATININE 0.83 11/07/2020    BUN 10 11/07/2020     (L) 11/07/2020    K 3.5 11/07/2020    CL 92 (L) 11/07/2020    CO2 27 11/07/2020       Hepatic Function Panel:   Lab Results   Component Value Date    ALKPHOS 124 11/07/2020    ALT 52 11/07/2020    AST 73 11/07/2020    PROT 6.9 11/07/2020    BILITOT <0.2 11/07/2020    BILIDIR <0.2 10/28/2019    IBILI see below 10/28/2019    LABALBU 3.9 11/07/2020       Procedure Component Value Units Date/Time Culture, Anaerobic and Aerobic [[id number]] Collected: 11/07/20 1439 Order Status: Completed Specimen: Abdomen Updated: 11/08/20 1312 Gram Stain Result No WBC's   No epithelial cells   Few Gram positive cocci in pairs and short chains- resembling Strep   Rare Small Gram positive rods   Narrative:      IMPRESSION:    · Abdominal wall cellulitis with abscess, probable staph aureus infection  · Morbid obesity and uncontrolled diabetes mellitus 2    Patient Active Problem List   Diagnosis    Chronic cholecystitis with calculus    History of drug abuse ([address]    Spondylosis of lumbar region without myelopathy or radiculopathy    QI (obstructive sleep apnea)    Morbid obesity with body mass index (BMI) of 40.0 to 44.9 in adult [facility])    Bipolar disorder, current episode depressed, moderate ([address]    Bipolar 1 disorder, depressed ([address]    Diarrhea    Bipolar disorder with depression ([address]    Closed fracture of middle or proximal phalanx or phalanges of hand    Major depression, single episode    Uncontrolled type 2 diabetes mellitus with hyperglycemia ([address]    Cellulitis of abdominal wall       PLAN:  · Continue IV Vanco and Zosyn for now  · Check blood and wound cultures and accordingly adjust antibiotic therapy  · Continue local wound care  · Follow-up CBC BMP    Discussed with patient    Daryle Mai, MD

## 2020-11-08 NOTE — CARE COORDINATION
[facility] [place] Case Management Initial Discharge Assessment    Met with Patient to discuss discharge plan. PCP: Uziel Whitaker,                                 Date of Last Visit: 2 months    If no PCP, list provided? N/A    Discharge Planning    Living Arrangements: independently at home    Who do you live with?     Who helps you with your care:  self    If lives at home:     Do you have any barriers navigating in your home? no    Patient can perform ADL? Yes    Current Services (outpatient and in home) :  None    Dialysis: No    Is transportation available to get to your appointments? Yes    DME Equipment:  no    Respiratory equipment: None    Respiratory provider:  no     Pharmacy:  yes - [facility]    Consult with Medication Assistance Program?  No        Does Patient Have a High-Risk for Readmission Diagnosis (CHF, PN, MI, COPD)? No      Initial Discharge Plan? (Note: please see concurrent daily documentation for any updates after initial note). CM to assess for any further d/c needs or referrals.     Electronically signed by Mohamud Guerra on 11/7/2020 at 8:21 PM

## 2020-11-08 NOTE — ACP (ADVANCE CARE PLANNING)
Advance Care Planning     Advance Care Planning Activator (Inpatient)  Conversation Note      Date of ACP Conversation: 11/7/2020    Conversation Conducted with: Patient with Decision Making Capacity    ACP Activator: [address] makes decisions on behalf of the incapacitated patient: Decision Maker is asked to consider and make decisions based on patient values, known preferences, or best interests. Health Care Decision Maker: Oscar Cuello    Current Designated Health Care Decision Maker:   (If there is a valid Health Care Decision Maker named in the [address] [place] [address] Makers\" box in the ACP activity, but it is not visible above, be sure to open that field and then select the health care decision maker relationship (ie \"primary\") in the blank space to the right of the name.) Validate  this information as still accurate & up-to-date; edit [address] [place] [address] field as needed.)    Note: Assess and validate information in current ACP documents, as indicated. If no Decision Maker listed above or available through scanned documents, then:    If no Authorized Decision Maker has previously been identified, then patient chooses [address]:  \"Who would you like to name as your primary health care decision-maker? \"               Name: Oscar Cuello        Relationship:           Phone number: [phone]  Hammond Ards this person be reached easily? \" Yes  \"Who would you like to name as your back-up decision maker? \"   Name: Jesi Colindres        Relationship: daughter          Phone number: [phone]  June Ards this person be reached easily? \" Yes    Note: If the relationship of these Decision-Makers to the patient does NOT follow your state's Next of Kin hierarchy, recommend that patient complete ACP document that meets state-specific requirements to allow them to act on the patient's behalf when appropriate. Care Preferences    Ventilation:   \"If you were in your present state of health and suddenly became very ill and were unable to breathe on your own, what would your preference be about the use of a ventilator (breathing machine) if it were available to you? \"      Would the patient desire the use of ventilator (breathing machine)?: yes    \"If your health worsens and it becomes clear that your chance of recovery is unlikely, what would your preference be about the use of a ventilator (breathing machine) if it were available to you? \"     Would the patient desire the use of ventilator (breathing machine)?: Yes      Resuscitation  \"CPR works best to restart the heart when there is a sudden event, like a heart attack, in someone who is otherwise healthy. Unfortunately, CPR does not typically restart the heart for people who have serious health conditions or who are very sick. \"    \"In the event your heart stopped as a result of an underlying serious health condition, would you want attempts to be made to restart your heart (answer \"yes\" for attempt to resuscitate) or would you prefer a natural death (answer \"no\" for do not attempt to resuscitate)? \" yes      NOTE: If the patient has a valid advance directive AND now provides care preference(s) that are inconsistent with that prior directive, advise the patient to consider either: creating a new advance directive that complies with state-specific requirements; or, if that is not possible, orally revoking that prior directive in accordance with state-specific requirements, which must be documented in the EHR. [x] Yes   [] No   Educated Patient / [facility] regarding differences between Advance Directives and portable DNR orders. Pt declined any information on advance directives at this time.     Length of ACP Conversation in minutes:  10    Conversation Outcomes:  [x] ACP discussion completed  [] Existing advance directive reviewed with patient; no changes to patient's previously recorded wishes  [] New Advance Directive completed  [] Portable Do Not Rescitate prepared for Provider review and signature  [] POLST/POST/MOLST/MOST prepared for Provider review and signature      Follow-up plan:    [] Schedule follow-up conversation to continue planning  [] Referred individual to Provider for additional questions/concerns   [] Advised patient/agent/surrogate to review completed ACP document and update if needed with changes in condition, patient preferences or care setting    [x] This note routed to one or more involved healthcare providers

## 2020-11-09 LAB
ALBUMIN SERPL-MCNC: 3.4 G/DL (ref 3.5–4.6)
ALP BLD-CCNC: 112 U/L (ref 40–130)
ALT SERPL-CCNC: 63 U/L (ref 0–33)
ANION GAP SERPL CALCULATED.3IONS-SCNC: 6 MEQ/L (ref 9–15)
AST SERPL-CCNC: 63 U/L (ref 0–35)
BASOPHILS ABSOLUTE: 0.1 K/UL (ref 0–0.2)
BASOPHILS RELATIVE PERCENT: 1 %
BILIRUB SERPL-MCNC: 0.5 MG/DL (ref 0.2–0.7)
BUN BLDV-MCNC: 11 MG/DL (ref 6–20)
CALCIUM SERPL-MCNC: 8.8 MG/DL (ref 8.5–9.9)
CHLORIDE BLD-SCNC: 95 MEQ/L (ref 95–107)
CO2: 29 MEQ/L (ref 20–31)
CREAT SERPL-MCNC: 0.7 MG/DL (ref 0.5–0.9)
EOSINOPHILS ABSOLUTE: 0.3 K/UL (ref 0–0.7)
EOSINOPHILS RELATIVE PERCENT: 3.3 %
GFR AFRICAN AMERICAN: >60
GFR NON-AFRICAN AMERICAN: >60
GLOBULIN: 2.8 G/DL (ref 2.3–3.5)
GLUCOSE BLD-MCNC: 100 MG/DL (ref 60–115)
GLUCOSE BLD-MCNC: 143 MG/DL (ref 60–115)
GLUCOSE BLD-MCNC: 279 MG/DL (ref 70–99)
GLUCOSE BLD-MCNC: 289 MG/DL (ref 60–115)
HCT VFR BLD CALC: 45.6 % (ref 37–47)
HEMOGLOBIN: 15 G/DL (ref 12–16)
LYMPHOCYTES ABSOLUTE: 1.7 K/UL (ref 1–4.8)
LYMPHOCYTES RELATIVE PERCENT: 19.3 %
MAGNESIUM: 1.8 MG/DL (ref 1.7–2.4)
MCH RBC QN AUTO: 27.3 PG (ref 27–31.3)
MCHC RBC AUTO-ENTMCNC: 33 % (ref 33–37)
MCV RBC AUTO: 82.8 FL (ref 82–100)
MONOCYTES ABSOLUTE: 0.5 K/UL (ref 0.2–0.8)
MONOCYTES RELATIVE PERCENT: 5.5 %
NEUTROPHILS ABSOLUTE: 6.4 K/UL (ref 1.4–6.5)
NEUTROPHILS RELATIVE PERCENT: 70.9 %
PDW BLD-RTO: 14.9 % (ref 11.5–14.5)
PERFORMED ON: ABNORMAL
PERFORMED ON: ABNORMAL
PERFORMED ON: NORMAL
PLATELET # BLD: 215 K/UL (ref 130–400)
POTASSIUM REFLEX MAGNESIUM: 3.3 MEQ/L (ref 3.4–4.9)
RBC # BLD: 5.5 M/UL (ref 4.2–5.4)
SODIUM BLD-SCNC: 130 MEQ/L (ref 135–144)
TOTAL PROTEIN: 6.2 G/DL (ref 6.3–8)
WBC # BLD: 9 K/UL (ref 4.8–10.8)

## 2020-11-09 PROCEDURE — 2580000003 HC RX 258: Performed by: INTERNAL MEDICINE

## 2020-11-09 PROCEDURE — 6370000000 HC RX 637 (ALT 250 FOR IP): Performed by: NURSE PRACTITIONER

## 2020-11-09 PROCEDURE — 6360000002 HC RX W HCPCS: Performed by: NURSE PRACTITIONER

## 2020-11-09 PROCEDURE — 83735 ASSAY OF MAGNESIUM: CPT

## 2020-11-09 PROCEDURE — 6370000000 HC RX 637 (ALT 250 FOR IP): Performed by: INTERNAL MEDICINE

## 2020-11-09 PROCEDURE — 80053 COMPREHEN METABOLIC PANEL: CPT

## 2020-11-09 PROCEDURE — 84681 ASSAY OF C-PEPTIDE: CPT

## 2020-11-09 PROCEDURE — 36415 COLL VENOUS BLD VENIPUNCTURE: CPT

## 2020-11-09 PROCEDURE — 1210000000 HC MED SURG R&B

## 2020-11-09 PROCEDURE — 85025 COMPLETE CBC W/AUTO DIFF WBC: CPT

## 2020-11-09 PROCEDURE — 2580000003 HC RX 258: Performed by: NURSE PRACTITIONER

## 2020-11-09 PROCEDURE — 6360000002 HC RX W HCPCS: Performed by: INTERNAL MEDICINE

## 2020-11-09 PROCEDURE — 99232 SBSQ HOSP IP/OBS MODERATE 35: CPT | Performed by: INTERNAL MEDICINE

## 2020-11-09 PROCEDURE — 99222 1ST HOSP IP/OBS MODERATE 55: CPT | Performed by: INTERNAL MEDICINE

## 2020-11-09 PROCEDURE — 99213 OFFICE O/P EST LOW 20 MIN: CPT

## 2020-11-09 RX ORDER — POTASSIUM CHLORIDE 20 MEQ/1
40 TABLET, EXTENDED RELEASE ORAL ONCE
Status: COMPLETED | OUTPATIENT
Start: 2020-11-09 | End: 2020-11-09

## 2020-11-09 RX ORDER — SODIUM CHLORIDE 9 MG/ML
INJECTION, SOLUTION INTRAVENOUS CONTINUOUS
Status: DISPENSED | OUTPATIENT
Start: 2020-11-09 | End: 2020-11-09

## 2020-11-09 RX ORDER — INSULIN GLARGINE 100 [IU]/ML
30 INJECTION, SOLUTION SUBCUTANEOUS NIGHTLY
Status: DISCONTINUED | OUTPATIENT
Start: 2020-11-09 | End: 2020-11-09

## 2020-11-09 RX ORDER — FLUCONAZOLE 100 MG/1
150 TABLET ORAL DAILY
Status: COMPLETED | OUTPATIENT
Start: 2020-11-09 | End: 2020-11-10

## 2020-11-09 RX ORDER — INSULIN GLARGINE 100 [IU]/ML
20 INJECTION, SOLUTION SUBCUTANEOUS NIGHTLY
Status: DISCONTINUED | OUTPATIENT
Start: 2020-11-10 | End: 2020-11-10 | Stop reason: HOSPADM

## 2020-11-09 RX ADMIN — MORPHINE SULFATE 2 MG: 2 INJECTION, SOLUTION INTRAMUSCULAR; INTRAVENOUS at 13:30

## 2020-11-09 RX ADMIN — POTASSIUM CHLORIDE 40 MEQ: 20 TABLET, EXTENDED RELEASE ORAL at 18:00

## 2020-11-09 RX ADMIN — FLUCONAZOLE 150 MG: 100 TABLET ORAL at 18:12

## 2020-11-09 RX ADMIN — Medication 10 ML: at 10:01

## 2020-11-09 RX ADMIN — BREXPIPRAZOLE 3 MG: 3 TABLET ORAL at 09:48

## 2020-11-09 RX ADMIN — SODIUM CHLORIDE: 9 INJECTION, SOLUTION INTRAVENOUS at 12:39

## 2020-11-09 RX ADMIN — PIPERACILLIN AND TAZOBACTAM 3.38 G: 3; .375 INJECTION, POWDER, LYOPHILIZED, FOR SOLUTION INTRAVENOUS at 02:30

## 2020-11-09 RX ADMIN — MORPHINE SULFATE 2 MG: 2 INJECTION, SOLUTION INTRAMUSCULAR; INTRAVENOUS at 18:05

## 2020-11-09 RX ADMIN — VENLAFAXINE HYDROCHLORIDE 37.5 MG: 37.5 CAPSULE, EXTENDED RELEASE ORAL at 09:48

## 2020-11-09 RX ADMIN — VANCOMYCIN HYDROCHLORIDE 1250 MG: 5 INJECTION, POWDER, LYOPHILIZED, FOR SOLUTION INTRAVENOUS at 12:39

## 2020-11-09 RX ADMIN — ATORVASTATIN CALCIUM 20 MG: 20 TABLET, FILM COATED ORAL at 09:48

## 2020-11-09 RX ADMIN — PIPERACILLIN AND TAZOBACTAM 3.38 G: 3; .375 INJECTION, POWDER, LYOPHILIZED, FOR SOLUTION INTRAVENOUS at 18:00

## 2020-11-09 RX ADMIN — MORPHINE SULFATE 2 MG: 2 INJECTION, SOLUTION INTRAMUSCULAR; INTRAVENOUS at 09:27

## 2020-11-09 RX ADMIN — PIPERACILLIN AND TAZOBACTAM 3.38 G: 3; .375 INJECTION, POWDER, LYOPHILIZED, FOR SOLUTION INTRAVENOUS at 09:49

## 2020-11-09 RX ADMIN — KETOROLAC TROMETHAMINE 15 MG: 15 INJECTION, SOLUTION INTRAMUSCULAR; INTRAVENOUS at 21:37

## 2020-11-09 ASSESSMENT — PAIN SCALES - GENERAL
PAINLEVEL_OUTOF10: 6
PAINLEVEL_OUTOF10: 7
PAINLEVEL_OUTOF10: 7

## 2020-11-09 ASSESSMENT — ENCOUNTER SYMPTOMS
NAUSEA: 0
DIARRHEA: 0
VOMITING: 0
SHORTNESS OF BREATH: 0
COUGH: 0

## 2020-11-09 NOTE — PROGRESS NOTES
open and   draining also diffuse cellulitis of the lower abdominal wall extending above   the umbilicus  H&P-Abdominal cellulitis with draining abscess, uncontrolled DM2 with   hyperglycemia  Treatment: IV Zosyn and Vanco, ID/endocrinology consult, labs/cultures, wound   care, POC glucose with SS coverage    Thank you, Sky Mays RN BSN CDS  [phone]  Options provided:  -- Abdominal wall cellulitis due to Diabetes  -- Abdominal wall cellulitis unrelated to Diabetes  -- Other - I will add my own diagnosis  -- Disagree - Not applicable / Not valid  -- Disagree - Clinically unable to determine / Unknown  -- Refer to Clinical Documentation Reviewer    PROVIDER RESPONSE TEXT:    Abdominal wall cellulitis d/t Diabetes.     Query created by: Park Cortés on 11/9/2020 9:41 AM      Electronically signed by:  Gerri Gonzales MD 11/9/2020 9:51 AM

## 2020-11-09 NOTE — PROGRESS NOTES
Wound Ostomy Continence Nurse  Consult Note       NAME:  Alex Henriquez RECORD NUMBER:  [id number]  AGE: 36 y.o. GENDER: female  : 1980  TODAY'S DATE:  2020    Subjective   Reason for [id number] Nurse Evaluation and Assessment: RLQ abdominal wall cellulitis with abscess       Traci Mcgregor is a 36 y.o. female referred by:   [x] Physician  [] Nursing  [] Other:     Wound Identification:  Wound Type: Cellulittis with abscess  Contributing Factors: diabetes and obesity    Wound History: Patient admitted to [facility] with RLQ abdominal wall cellulitis with abscess. Current Wound Care Treatment: Recommending consultation to surgery - open drainage area to RLQ is draining very small amount of purulent tan drainage and a lot of induration noted behind this area of the abdominal wall. While the redness has decreased since admission, the induration remains.      Patient Goal of Care:  [x] Wound Healing  [] Odor Control  [] Palliative Care  [] Pain Control   [] Other:         PAST MEDICAL HISTORY        Diagnosis Date    B[facility] disorder ([facility]     Depression     DM (diabetes mellitus) ([facility]     History of drug abuse ([facility]     positive drug screens 14 and 14    Hyperlipidemia     Osteoarthritis        PAST SURGICAL HISTORY    Past Surgical History:   Procedure Laterality Date    CHOLECYSTECTOMY, LAPAROSCOPIC N/A 2017    CHOLECYSTECTOMY LAPAROSCOPIC WITH GRAMS POSS OPEN , RECENT LABS  performed by Rebecca Das MD at [address] [facility] SCRN NOT  [address] [place] [address] N/A 2018    COLONOSCOPY ROOM 384 performed by Agustina Miranda MD at [place] [address]    Family History   Problem Relation Age of Onset    Other Mother         CHF    Cirrhosis Brother     Other Maternal Grandmother         CHF       SOCIAL HISTORY    Social History     Tobacco Use    Smoking status: Current Every Day Smoker     Packs/day: 1.00     Years: 24.00     Pack

## 2020-11-09 NOTE — PROGRESS NOTES
Infectious Diseases Inpatient Progress Note          HISTORY OF PRESENT ILLNESS:  Follow up severe abdominal wall cellulitis with abscess on IV Vanco and Zosyn, well tolerated. Patient had remarkable clinical improvement with decreased abdominal redness and swelling. Persistent drainage from abscess site. Pain is well controlled with pain medication. Resolved leukocytosis    Current Medications:     insulin lispro  7 Units Subcutaneous TID WC    potassium chloride  40 mEq Oral Once    nicotine  1 patch Transdermal Daily    sodium chloride flush  10 mL Intravenous 2 times per day    enoxaparin  40 mg Subcutaneous Daily    insulin lispro  0-12 Units Subcutaneous TID WC    insulin lispro  0-6 Units Subcutaneous Nightly    piperacillin-tazobactam  3.375 g Intravenous Q8H    brexpiprazole  3 mg Oral Daily    atorvastatin  20 mg Oral Daily    venlafaxine  37.5 mg Oral Daily with breakfast       Allergies:  Latuda [lurasidone hcl]; Lurasidone; and Adhesive tape      Review of Systems  14 system review is negative other than HPI    Physical Exam  Vitals:    11/08/20 0041 11/08/20 0409 11/08/20 0723 11/08/20 1917   BP:  110/75 (!) 143/88 137/80   Pulse: 95 86 82 75   Resp:  15     Temp:  97.3 °F (36.3 °C) 97.7 °F (36.5 °C) 98.1 °F (36.7 °C)   TempSrc:  Axillary     SpO2:  97% 97% 94%   Weight:       Height:         General Appearance: alert and oriented to person, place and time, well-developed and well-nourished, in no acute distress  Skin: warm and dry, no rash. Head: normocephalic and atraumatic  Eyes: anicteric sclerae  ENT: oropharynx clear and moist with normal mucous membranes.  No oral thrush  Lungs: normal respiratory effort  Abdomen: soft, Remarkable decrease in abdominal erythema, + tenderness, right lower quadrant abscess with continuous drainage decreased surrounding induration  No leg edema      DATA:    Lab Results   Component Value Date    WBC 9.0 11/09/2020    HGB 15.0 11/09/2020    HCT 45.6 11/09/2020    MCV 82.8 11/09/2020     11/09/2020     Lab Results   Component Value Date    CREATININE 0.70 11/09/2020    BUN 11 11/09/2020     (L) 11/09/2020    K 3.3 (L) 11/09/2020    CL 95 11/09/2020    CO2 29 11/09/2020       Hepatic Function Panel:  Lab Results   Component Value Date    ALKPHOS 112 11/09/2020    ALT 63 11/09/2020    AST 63 11/09/2020    PROT 6.2 11/09/2020    BILITOT 0.5 11/09/2020    BILIDIR <0.2 10/28/2019    IBILI see below 10/28/2019    LABALBU 3.4 11/09/2020       Microbiology:   Recent Labs     11/07/20  1624   BC No Growth to date. Any change in status will be called. Recent Labs     11/07/20  1637   BLOODCULT2 No Growth to date. Any change in status will be called. No results for input(s): LABURIN in the last 72 hours.   Recent Labs     11/07/20  1624   WNDABS Mixed skin chase with*  Heavy growth  No further workup           IMPRESSION:    · Severe abdominal wall cellulitis with abscess formation  · Streptococcus viridans infection    Patient Active Problem List   Diagnosis    Chronic cholecystitis with calculus    History of drug abuse ([address]    Spondylosis of lumbar region without myelopathy or radiculopathy    QI (obstructive sleep apnea)    Morbid obesity with body mass index (BMI) of 40.0 to 44.9 in adult [facility])    Bipolar disorder, current episode depressed, moderate (HCC)    Bipolar 1 disorder, depressed ([address]    Diarrhea    Bipolar disorder with depression ([address]    Closed fracture of middle or proximal phalanx or phalanges of hand    Major depression, single episode    Uncontrolled type 2 diabetes mellitus with hyperglycemia (HCC)    Cellulitis of abdominal wall    Abscess    Obesity, morbid (more than 100 lbs over ideal weight or BMI > 40) (HCC)       PLAN:  · DC IV vancomycin  · Continue IV Zosyn  · May discharge in a.m. on oral Augmentin 875 mg twice daily for 2 weeks if patient continues to improve    Discussed with patient and case

## 2020-11-09 NOTE — PROGRESS NOTES
Progress Note  Date:2020       ZGJB:[id number]  Patient Name:Kanchan Cam     Date of Birth:[birth year]     Age:40 y.o. Pt was seen and evaluated,complain of pain    Subjective    Subjective:  Symptoms:  She reports malaise and weakness. No shortness of breath, cough, chest pain, headache, chest pressure, anorexia, diarrhea or anxiety. Diet:  No nausea or vomiting. Review of Systems   Respiratory: Negative for cough and shortness of breath. Cardiovascular: Negative for chest pain. Gastrointestinal: Negative for anorexia, diarrhea, nausea and vomiting. Neurological: Positive for weakness. Objective         Vitals Last 24 Hours:  TEMPERATURE:  Temp  Av.1 °F (36.7 °C)  Min: 98.1 °F (36.7 °C)  Max: 98.1 °F (36.7 °C)  RESPIRATIONS RANGE: No data recorded  PULSE OXIMETRY RANGE: SpO2  Av %  Min: 94 %  Max: 94 %  PULSE RANGE: Pulse  Av  Min: 75  Max: 75  BLOOD PRESSURE RANGE: Systolic (30DMX), IIL:324 , Min:137 , JTM:460   ; Diastolic (51LMA), FTA:06, Min:80, Max:80    I/O (24Hr): No intake or output data in the 24 hours ending 20 1028  Objective:  General Appearance:  Comfortable, well-appearing and in no acute distress. Vital signs: (most recent): Blood pressure 137/80, pulse 75, temperature 98.1 °F (36.7 °C), resp. rate 15, height 5' 5\" (1.651 m), weight 268 lb (121.6 kg), SpO2 94 %, not currently breastfeeding. Lungs:  Normal effort. Heart: Normal rate. Regular rhythm. S1 normal.    Abdomen: Abdomen is soft. Bowel sounds are normal.   There is no epigastric area or suprapubic area tenderness. Extremities: Normal range of motion. Pulses: Distal pulses are intact. Neurological: Patient is alert and oriented to person, place and time. Pupils:  Pupils are equal, round, and reactive to light. Skin:  There is a rash.       Labs/Imaging/Diagnostics    Labs:  CBC:  Recent Labs     20  1615 20  0520   WBC 13.2* 9.0   RBC 5.20 5.50*   HGB 14.0 15.0   HCT 43.4 45.6   MCV 83.4 82.8   RDW 15.5* 14.9*    215     CHEMISTRIES:  Recent Labs     11/07/20  1615 11/09/20 0520   * 130*   K 3.5 3.3*   CL 92* 95   CO2 27 29   BUN 10 11   CREATININE 0.83 0.70   GLUCOSE 519* 279*   MG  --  1.8     PT/INR:No results for input(s): PROTIME, INR in the last 72 hours. APTT:No results for input(s): APTT in the last 72 hours. LIVER PROFILE:  Recent Labs     11/07/20  1615 11/09/20 0520   AST 73* 63*   ALT 52* 63*   BILITOT <0.2 0.5   ALKPHOS 124 112       Imaging Last 24 Hours:  No results found. Assessment//Plan           Hospital Problems           Last Modified POA    * (Principal) Cellulitis of abdominal wall 11/7/2020 Yes    Bipolar 1 disorder, depressed ([facility] 11/7/2020 Yes    Uncontrolled type 2 diabetes mellitus with hyperglycemia ([facility] 11/7/2020 Yes    Abscess 11/8/2020 Yes    Obesity, morbid (more than 100 lbs over ideal weight or BMI > 40) ([facility] 11/8/2020 Yes        Lab Results   Component Value Date    WBC 9.0 11/09/2020    HGB 15.0 11/09/2020    HCT 45.6 11/09/2020    MCV 82.8 11/09/2020     11/09/2020     Lab Results   Component Value Date     11/09/2020    K 3.3 11/09/2020    CL 95 11/09/2020    CO2 29 11/09/2020    BUN 11 11/09/2020    CREATININE 0.70 11/09/2020    GLUCOSE 279 11/09/2020    CALCIUM 8.8 11/09/2020        Assessment & Plan    1) abd wall celullitis with wound  2) abd wound with DM2  3) tobacco abuse  Surgical eval for the abd wound  IV antibiotics, follow blood cultures. ID evaluations. Pain control.   Hemoglobin A1c  Increased coverage with meals  Endo eval,   hgb a1c noted  Electronically signed by Annalisa Weems MD on 11/8/20 at 12:44 PM EST

## 2020-11-10 VITALS
HEIGHT: 65 IN | DIASTOLIC BLOOD PRESSURE: 67 MMHG | RESPIRATION RATE: 16 BRPM | BODY MASS INDEX: 44.65 KG/M2 | HEART RATE: 79 BPM | WEIGHT: 268 LBS | TEMPERATURE: 97.7 F | OXYGEN SATURATION: 96 % | SYSTOLIC BLOOD PRESSURE: 128 MMHG

## 2020-11-10 LAB
ALBUMIN SERPL-MCNC: 3.3 G/DL (ref 3.5–4.6)
ALP BLD-CCNC: 109 U/L (ref 40–130)
ALT SERPL-CCNC: 54 U/L (ref 0–33)
ANAEROBIC CULTURE: ABNORMAL
ANION GAP SERPL CALCULATED.3IONS-SCNC: 9 MEQ/L (ref 9–15)
AST SERPL-CCNC: 42 U/L (ref 0–35)
BASOPHILS ABSOLUTE: 0.1 K/UL (ref 0–0.2)
BASOPHILS RELATIVE PERCENT: 1.1 %
BILIRUB SERPL-MCNC: 0.3 MG/DL (ref 0.2–0.7)
BUN BLDV-MCNC: 13 MG/DL (ref 6–20)
CALCIUM SERPL-MCNC: 8.8 MG/DL (ref 8.5–9.9)
CHLORIDE BLD-SCNC: 106 MEQ/L (ref 95–107)
CO2: 24 MEQ/L (ref 20–31)
CREAT SERPL-MCNC: 0.7 MG/DL (ref 0.5–0.9)
EOSINOPHILS ABSOLUTE: 0.3 K/UL (ref 0–0.7)
EOSINOPHILS RELATIVE PERCENT: 2.9 %
GFR AFRICAN AMERICAN: >60
GFR NON-AFRICAN AMERICAN: >60
GLOBULIN: 3.1 G/DL (ref 2.3–3.5)
GLUCOSE BLD-MCNC: 143 MG/DL (ref 60–115)
GLUCOSE BLD-MCNC: 178 MG/DL (ref 60–115)
GLUCOSE BLD-MCNC: 199 MG/DL (ref 70–99)
GLUCOSE BLD-MCNC: 212 MG/DL (ref 60–115)
GLUCOSE BLD-MCNC: 222 MG/DL (ref 60–115)
GRAM STAIN RESULT: ABNORMAL
HCT VFR BLD CALC: 44.2 % (ref 37–47)
HEMOGLOBIN: 14.5 G/DL (ref 12–16)
LYMPHOCYTES ABSOLUTE: 2.8 K/UL (ref 1–4.8)
LYMPHOCYTES RELATIVE PERCENT: 29.2 %
MCH RBC QN AUTO: 26.9 PG (ref 27–31.3)
MCHC RBC AUTO-ENTMCNC: 32.8 % (ref 33–37)
MCV RBC AUTO: 81.8 FL (ref 82–100)
MONOCYTES ABSOLUTE: 0.6 K/UL (ref 0.2–0.8)
MONOCYTES RELATIVE PERCENT: 5.7 %
NEUTROPHILS ABSOLUTE: 5.9 K/UL (ref 1.4–6.5)
NEUTROPHILS RELATIVE PERCENT: 61.1 %
ORGANISM: ABNORMAL
PDW BLD-RTO: 14.8 % (ref 11.5–14.5)
PERFORMED ON: ABNORMAL
PLATELET # BLD: 225 K/UL (ref 130–400)
POTASSIUM REFLEX MAGNESIUM: 4.1 MEQ/L (ref 3.4–4.9)
RBC # BLD: 5.4 M/UL (ref 4.2–5.4)
SODIUM BLD-SCNC: 139 MEQ/L (ref 135–144)
TOTAL PROTEIN: 6.4 G/DL (ref 6.3–8)
VANCOMYCIN TROUGH: 5.2 UG/ML (ref 10–20)
WBC # BLD: 9.7 K/UL (ref 4.8–10.8)
WOUND/ABSCESS: ABNORMAL
WOUND/ABSCESS: ABNORMAL

## 2020-11-10 PROCEDURE — 2500000003 HC RX 250 WO HCPCS: Performed by: INTERNAL MEDICINE

## 2020-11-10 PROCEDURE — 6360000002 HC RX W HCPCS: Performed by: NURSE PRACTITIONER

## 2020-11-10 PROCEDURE — 6370000000 HC RX 637 (ALT 250 FOR IP): Performed by: INTERNAL MEDICINE

## 2020-11-10 PROCEDURE — 36415 COLL VENOUS BLD VENIPUNCTURE: CPT

## 2020-11-10 PROCEDURE — 99232 SBSQ HOSP IP/OBS MODERATE 35: CPT | Performed by: PHYSICIAN ASSISTANT

## 2020-11-10 PROCEDURE — 80202 ASSAY OF VANCOMYCIN: CPT

## 2020-11-10 PROCEDURE — 99221 1ST HOSP IP/OBS SF/LOW 40: CPT | Performed by: COLON & RECTAL SURGERY

## 2020-11-10 PROCEDURE — 6360000002 HC RX W HCPCS: Performed by: INTERNAL MEDICINE

## 2020-11-10 PROCEDURE — 85025 COMPLETE CBC W/AUTO DIFF WBC: CPT

## 2020-11-10 PROCEDURE — 2580000003 HC RX 258: Performed by: NURSE PRACTITIONER

## 2020-11-10 PROCEDURE — 80053 COMPREHEN METABOLIC PANEL: CPT

## 2020-11-10 PROCEDURE — 99232 SBSQ HOSP IP/OBS MODERATE 35: CPT | Performed by: INTERNAL MEDICINE

## 2020-11-10 PROCEDURE — 6370000000 HC RX 637 (ALT 250 FOR IP): Performed by: NURSE PRACTITIONER

## 2020-11-10 RX ORDER — LACTOBACILLUS ACIDOPH-L.BULGARICUS 1 MILLION CELL CHEWABLE TABLET 1MM CELL
1 TABLET,CHEWABLE ORAL 2 TIMES DAILY
Qty: 28 TABLET | Refills: 0 | Status: SHIPPED | OUTPATIENT
Start: 2020-11-10 | End: 2020-11-24

## 2020-11-10 RX ORDER — LANCETS 28 GAUGE
1 EACH MISCELLANEOUS
Qty: 300 EACH | Refills: 3 | Status: SHIPPED | OUTPATIENT
Start: 2020-11-10

## 2020-11-10 RX ORDER — AMOXICILLIN AND CLAVULANATE POTASSIUM 875; 125 MG/1; MG/1
1 TABLET, FILM COATED ORAL EVERY 12 HOURS SCHEDULED
Qty: 28 TABLET | Refills: 0 | Status: SHIPPED | OUTPATIENT
Start: 2020-11-10 | End: 2020-11-24

## 2020-11-10 RX ORDER — FLASH GLUCOSE SENSOR
2 KIT MISCELLANEOUS
Qty: 2 EACH | Refills: 3 | Status: SHIPPED | OUTPATIENT
Start: 2020-11-10

## 2020-11-10 RX ORDER — OXYCODONE HYDROCHLORIDE AND ACETAMINOPHEN 5; 325 MG/1; MG/1
1 TABLET ORAL EVERY 4 HOURS PRN
Status: DISCONTINUED | OUTPATIENT
Start: 2020-11-10 | End: 2020-11-10 | Stop reason: HOSPADM

## 2020-11-10 RX ORDER — AMOXICILLIN AND CLAVULANATE POTASSIUM 875; 125 MG/1; MG/1
1 TABLET, FILM COATED ORAL EVERY 12 HOURS SCHEDULED
Status: DISCONTINUED | OUTPATIENT
Start: 2020-11-10 | End: 2020-11-10 | Stop reason: HOSPADM

## 2020-11-10 RX ORDER — GLUCOSAMINE HCL/CHONDROITIN SU 500-400 MG
1 CAPSULE ORAL
Qty: 150 STRIP | Refills: 3 | Status: ON HOLD | OUTPATIENT
Start: 2020-11-10 | End: 2022-02-16 | Stop reason: HOSPADM

## 2020-11-10 RX ORDER — FLASH GLUCOSE SCANNING READER
1 EACH MISCELLANEOUS
Qty: 1 DEVICE | Refills: 0 | Status: SHIPPED | OUTPATIENT
Start: 2020-11-10 | End: 2021-01-04 | Stop reason: SDUPTHER

## 2020-11-10 RX ORDER — DULAGLUTIDE 1.5 MG/.5ML
1.5 INJECTION, SOLUTION SUBCUTANEOUS WEEKLY
Qty: 4 PEN | Refills: 3 | Status: SHIPPED | OUTPATIENT
Start: 2020-11-10 | End: 2021-03-08 | Stop reason: SDUPTHER

## 2020-11-10 RX ORDER — INSULIN GLARGINE 100 [IU]/ML
20 INJECTION, SOLUTION SUBCUTANEOUS NIGHTLY
Qty: 6 PEN | Refills: 11 | Status: ON HOLD | OUTPATIENT
Start: 2020-11-10 | End: 2020-12-18 | Stop reason: SDUPTHER

## 2020-11-10 RX ORDER — OXYCODONE HYDROCHLORIDE AND ACETAMINOPHEN 5; 325 MG/1; MG/1
1 TABLET ORAL EVERY 8 HOURS PRN
Qty: 10 TABLET | Refills: 0 | Status: SHIPPED | OUTPATIENT
Start: 2020-11-10 | End: 2020-11-13

## 2020-11-10 RX ORDER — FLUCONAZOLE 150 MG/1
150 TABLET ORAL DAILY
Qty: 3 TABLET | Refills: 0 | Status: SHIPPED | OUTPATIENT
Start: 2020-11-10 | End: 2020-11-13

## 2020-11-10 RX ORDER — INSULIN LISPRO 100 [IU]/ML
6 INJECTION, SOLUTION INTRAVENOUS; SUBCUTANEOUS
Qty: 5 PEN | Refills: 3 | Status: ON HOLD | OUTPATIENT
Start: 2020-11-10 | End: 2020-12-18 | Stop reason: SDUPTHER

## 2020-11-10 RX ADMIN — MICONAZOLE NITRATE: 2 POWDER TOPICAL at 12:03

## 2020-11-10 RX ADMIN — BREXPIPRAZOLE 3 MG: 3 TABLET ORAL at 08:52

## 2020-11-10 RX ADMIN — OXYCODONE HYDROCHLORIDE AND ACETAMINOPHEN 1 TABLET: 5; 325 TABLET ORAL at 13:35

## 2020-11-10 RX ADMIN — MORPHINE SULFATE 2 MG: 2 INJECTION, SOLUTION INTRAMUSCULAR; INTRAVENOUS at 00:56

## 2020-11-10 RX ADMIN — AMOXICILLIN AND CLAVULANATE POTASSIUM 1 TABLET: 875; 125 TABLET, FILM COATED ORAL at 12:03

## 2020-11-10 RX ADMIN — FLUCONAZOLE 150 MG: 100 TABLET ORAL at 08:51

## 2020-11-10 RX ADMIN — ACETAMINOPHEN 650 MG: 325 TABLET ORAL at 17:04

## 2020-11-10 RX ADMIN — VENLAFAXINE HYDROCHLORIDE 37.5 MG: 37.5 CAPSULE, EXTENDED RELEASE ORAL at 09:22

## 2020-11-10 RX ADMIN — ENOXAPARIN SODIUM 40 MG: 40 INJECTION SUBCUTANEOUS at 08:53

## 2020-11-10 RX ADMIN — OXYCODONE HYDROCHLORIDE AND ACETAMINOPHEN 1 TABLET: 5; 325 TABLET ORAL at 09:22

## 2020-11-10 RX ADMIN — ATORVASTATIN CALCIUM 20 MG: 20 TABLET, FILM COATED ORAL at 08:51

## 2020-11-10 RX ADMIN — OXYCODONE HYDROCHLORIDE AND ACETAMINOPHEN 1 TABLET: 5; 325 TABLET ORAL at 17:45

## 2020-11-10 RX ADMIN — PIPERACILLIN AND TAZOBACTAM 3.38 G: 3; .375 INJECTION, POWDER, LYOPHILIZED, FOR SOLUTION INTRAVENOUS at 02:10

## 2020-11-10 ASSESSMENT — PAIN SCALES - GENERAL
PAINLEVEL_OUTOF10: 6
PAINLEVEL_OUTOF10: 5
PAINLEVEL_OUTOF10: 4
PAINLEVEL_OUTOF10: 7

## 2020-11-10 ASSESSMENT — ENCOUNTER SYMPTOMS: DIARRHEA: 1

## 2020-11-10 NOTE — DISCHARGE SUMMARY
Discharge Summary    Date: 11/10/2020  Patient Name: Lavelle Grubbs YOB: 1980 Age: 36 y.o. Admit Date: 11/7/2020  Discharge Date: 11/10/2020  Discharge Condition: [address]    Admission Diagnosis  Cellulitis of abdominal wall (L03.311)     Discharge Diagnosis  Principal Problem: Cellulitis of abdominal wallActive Problems: Bipolar 1 disorder, depressed ([address] Uncontrolled type 2 diabetes mellitus with hyperglycemia (HCC) Abscess Obesity, morbid (more than 100 lbs over ideal weight or BMI > 40) (HCC)Resolved Problems:  * No resolved hospital problems. [facility] Stay  Narrative of Hospital Course:  70-year-old female who comes with lower abdominal cellulitis. Patient started on IV Zosyn and vancomycin due to diabetes. Patient also has abdominal wound on the right lower quadrant. Was eval by Surgery , no  debridement needed at this time. Patient will be discharged on 2 weeks of p.o. antibiotics. Patient needs follow-up with PCP as outpatient since patient is to be insulin-dependent diabetic new insulin-dependent. Counseling was given about tobacco use. Consultants:  IP CONSULT TO ENDOCRINOLOGYIP CONSULT TO INFECTIOUS DISEASESIP CONSULT TO GENERAL SURGERYIP CONSULT TO DIABETES EDUCATORIP CONSULT TO DIABETES EDUCATOR    Surgeries/procedures Performed:       Treatments:    Antibiotics, Insulin and IV Hydration    Zosyn    Discharge Plan/Disposition:  Home    Hospital/Incidental Findings Requiring Follow Up:    Patient Instructions:    Diet: Diabetic Diet    Activity:Activity as Tolerated  For number of days (if applicable): Other Instructions:    Provider Follow-Up:   No follow-ups on file.      Significant Diagnostic Studies:    Recent Labs:  Admission on 11/07/2020Sodium                                        Date: 11/07/2020Value: 130*        Ref range: 135 - 144 mEq/L    Status: FinalPotassium                                     Date: 11/07/2020Value: 3.5         Ref range: 3.4 - 4.9 mEq/L Status: FinalChloride                                      Date: 11/07/2020Value: 92*         Ref range: 95 - 107 mEq/L     Status: FinalCO2                                           Date: 11/07/2020Value: 27          Ref range: 20 - 31 mEq/L      Status: FinalAnion Gap                                     Date: 11/07/2020Value: 11          Ref range: 9 - 15 mEq/L       Status: FinalGlucose                                       Date: 11/07/2020Value: 519*        Ref range: 70 - 99 mg/dL      Status: FinalBUN                                           Date: 11/07/2020Value: 10          Ref range: 6 - 20 mg/dL       Status: FinalCREATININE                                    Date: 11/07/2020Value: 0.83        Ref range: 0.50 - 0.90 mg/dL  Status: FinalGFR Non-                      Date: 11/07/2020Value: >60.0       Ref range: >60                Status: Final              Comment: >60 mL/min/1.73m2 EGFR, calc. for ages 25 and older using theMDRD formula (not corrected for weight), is valid for stablerenal function. GFR                           Date: 11/07/2020Value: >60.0       Ref range: >60                Status: Final              Comment: >60 mL/min/1.73m2 EGFR, calc. for ages 25 and older using theMDRD formula (not corrected for weight), is valid for stablerenal function. Calcium                                       Date: 11/07/2020Value: 9.4         Ref range: 8.5 - 9.9 mg/dL    Status: FinalTotal Protein                                 Date: 11/07/2020Value: 6.9         Ref range: 6.3 - 8.0 g/dL     Status: FinalAlb                                           Date: 11/07/2020Value: 3.9         Ref range: 3.5 - 4.6 g/dL     Status: FinalTotal Bilirubin                               Date: 11/07/2020Value: <0.2        Ref range: 0.2 - 0.7 mg/dL    Status: FinalAlkaline Phosphatase                          Date: 11/07/2020Value: 124         Ref range: 40 - 130 U/L       Status: FinalALT Date: 11/07/2020Value: 46*         Ref range: 0 - 33 U/L         Status: FinalAST                                           Date: 11/07/2020Value: 73*         Ref range: 0 - 35 U/L         Status: FinalGlobulin                                      Date: 11/07/2020Value: 3.0         Ref range: 2.3 - 3.5 g/dL     Status: [address] [facility]                                           Date: 11/07/2020Value: 13.2*       Ref range: 4.8 - 10.8 K/uL    Status: FinalRBC                                           Date: 11/07/2020Value: 5.20        Ref range: 4.20 - 5.40 M/uL   Status: FinalHemoglobin                                    Date: 11/07/2020Value: 14.0        Ref range: 12.0 - 16.0 g/dL   Status: FinalHematocrit                                    Date: 11/07/2020Value: 43.4        Ref range: 37.0 - 47.0 %      Status: FinalMCV                                           Date: 11/07/2020Value: 83.4        Ref range: 82.0 - 100.0 fL    Status: [address]                                           Date: 11/07/2020Value: 26.9*       Ref range: 27.0 - 31.3 pg     Status: [address]                                          Date: 11/07/2020Value: 32.3*       Ref range: 33.0 - 37.0 %      Status: FinalRDW                                           Date: 11/07/2020Value: 15.5*       Ref range: 11.5 - 14.5 %      Status: FinalPlatelets                                     Date: 11/07/2020Value: 219         Ref range: 130 - 400 K/uL     Status: FinalNeutrophils %                                 Date: 11/07/2020Value: 68.8        Ref range: %                  Status: FinalLymphocytes %                                 Date: 11/07/2020Value: 22.9        Ref range: %                  Status: FinalMonocytes %                                   Date: 11/07/2020Value: 5.7         Ref range: %                  Status: FinalEosinophils %                                 Date: 11/07/2020Value: 1.9         Ref range: % Status: FinalBasophils %                                   Date: 11/07/2020Value: 0.7         Ref range: %                  Status: FinalNeutrophils Absolute                          Date: 11/07/2020Value: 9.1*        Ref range: 1.4 - 6.5 K/uL     Status: FinalLymphocytes Absolute                          Date: 11/07/2020Value: 3.0         Ref range: 1.0 - 4.8 K/uL     Status: FinalMonocytes Absolute                            Date: 11/07/2020Value: 0.8         Ref range: 0.2 - 0.8 K/uL     Status: FinalEosinophils Absolute                          Date: 11/07/2020Value: 0.3         Ref range: 0.0 - 0.7 K/uL     Status: FinalBasophils Absolute                            Date: 11/07/2020Value: 0.1         Ref range: 0.0 - 0.2 K/uL     Status: FinalLactic Acid                                   Date: 11/07/2020Value: 2.7*        Ref range: 0.5 - 2.2 mmol/L   Status: FinalCulture, Blood 2                              Date: 11/07/2020Value: No Growth to date. Any change in status will be *                     Status: PreliminaryBlood Culture, Routine                        Date: 11/07/2020Value: No Growth to date.   Any change in status will be *                     Status: PreliminaryHemoglobin A1C                                Date: 11/07/2020Value: 11.1*       Ref range: 4.8 - 5.9 %        Status: FinalGram Stain Result                             Date: 11/07/2020Value:  *            Status: Final                 Value:No WBC'sNo epithelial cellsFew Gram positive cocci in pairs and short chains- resembling StrepRare Small Gram positive rodsWOUND/ABSCESS                                 Date: 11/07/2020Value: Mixed skin chase with                     Status: FinalAnaerobic Culture                             Date: 11/07/2020Value: No Anaerobes Isolated                     Status: FinalOrganism                                      Date: 11/07/2020Value: Streptococcus viridans group Status: FinalWOUND/ABSCESS                                 Date: 11/07/2020Value:               Status: Final                 Value:Heavy growthNo further workupPOC Glucose                                   Date: 11/07/2020Value: 450*        Ref range: 60 - 115 mg/dl     Status: FinalPerformed on                                  Date: 11/07/2020Value: ACCU-CHEK     Status: Final              Comment: Notified RN or MDSodium                                        Date: 11/09/2020Value: 130*        Ref range: 135 - 144 mEq/L    Status: FinalPotassium reflex Magnesium                    Date: 11/09/2020Value: 3.3*        Ref range: 3.4 - 4.9 mEq/L    Status: FinalChloride                                      Date: 11/09/2020Value: 95          Ref range: 95 - 107 mEq/L     Status: FinalCO2                                           Date: 11/09/2020Value: 29          Ref range: 20 - 31 mEq/L      Status: FinalAnion Gap                                     Date: 11/09/2020Value: 6*          Ref range: 9 - 15 mEq/L       Status: FinalGlucose                                       Date: 11/09/2020Value: 279*        Ref range: 70 - 99 mg/dL      Status: FinalBUN                                           Date: 11/09/2020Value: 11          Ref range: 6 - 20 mg/dL       Status: FinalCREATININE                                    Date: 11/09/2020Value: 0.70        Ref range: 0.50 - 0.90 mg/dL  Status: FinalGFR Non-                      Date: 11/09/2020Value: >60.0       Ref range: >60                Status: Final              Comment: >60 mL/min/1.73m2 EGFR, calc. for ages 25 and older using theMDRD formula (not corrected for weight), is valid for stablerenal function. GFR                           Date: 11/09/2020Value: >60.0       Ref range: >60                Status: Final              Comment: >60 mL/min/1.73m2 EGFR, calc. for ages 25 and older using theMDRD formula (not corrected for weight), is valid for stablerenal function. Calcium                                       Date: 11/09/2020Value: 8.8         Ref range: 8.5 - 9.9 mg/dL    Status: FinalTotal Protein                                 Date: 11/09/2020Value: 6.2*        Ref range: 6.3 - 8.0 g/dL     Status: FinalAlb                                           Date: 11/09/2020Value: 3.4*        Ref range: 3.5 - 4.6 g/dL     Status: FinalTotal Bilirubin                               Date: 11/09/2020Value: 0.5         Ref range: 0.2 - 0.7 mg/dL    Status: FinalAlkaline Phosphatase                          Date: 11/09/2020Value: 112         Ref range: 40 - 130 U/L       Status: FinalALT                                           Date: 11/09/2020Value: 63*         Ref range: 0 - 33 U/L         Status: FinalAST                                           Date: 11/09/2020Value: 63*         Ref range: 0 - 35 U/L         Status: FinalGlobulin                                      Date: 11/09/2020Value: 2.8         Ref range: 2.3 - 3.5 g/dL     Status: [address] [facility]                                           Date: 11/09/2020Value: 9.0         Ref range: 4.8 - 10.8 K/uL    Status: FinalRBC                                           Date: 11/09/2020Value: 5.50*       Ref range: 4.20 - 5.40 M/uL   Status: FinalHemoglobin                                    Date: 11/09/2020Value: 15.0        Ref range: 12.0 - 16.0 g/dL   Status: FinalHematocrit                                    Date: 11/09/2020Value: 45.6        Ref range: 37.0 - 47.0 %      Status: FinalMCV                                           Date: 11/09/2020Value: 82.8        Ref range: 82.0 - 100.0 fL    Status: [address]                                           Date: 11/09/2020Value: 27.3        Ref range: 27.0 - 31.3 pg     Status: [address]                                          Date: 11/09/2020Value: 33.0        Ref range: 33.0 - 37.0 %      Status: FinalRDW                                           Date: 11/09/2020Value: 14.9*       Ref range: 11.5 - 14.5 %      Status: FinalPlatelets                                     Date: 11/09/2020Value: 215         Ref range: 130 - 400 K/uL     Status: FinalNeutrophils %                                 Date: 11/09/2020Value: 70.9        Ref range: %                  Status: FinalLymphocytes %                                 Date: 11/09/2020Value: 19.3        Ref range: %                  Status: FinalMonocytes %                                   Date: 11/09/2020Value: 5.5         Ref range: %                  Status: FinalEosinophils %                                 Date: 11/09/2020Value: 3.3         Ref range: %                  Status: FinalBasophils %                                   Date: 11/09/2020Value: 1.0         Ref range: %                  Status: FinalNeutrophils Absolute                          Date: 11/09/2020Value: 6.4         Ref range: 1.4 - 6.5 K/uL     Status: FinalLymphocytes Absolute                          Date: 11/09/2020Value: 1.7         Ref range: 1.0 - 4.8 K/uL     Status: FinalMonocytes Absolute                            Date: 11/09/2020Value: 0.5         Ref range: 0.2 - 0.8 K/uL     Status: FinalEosinophils Absolute                          Date: 11/09/2020Value: 0.3         Ref range: 0.0 - 0.7 K/uL     Status: FinalBasophils Absolute                            Date: 11/09/2020Value: 0.1         Ref range: 0.0 - 0.2 K/uL     Status: FinalPOC Glucose                                   Date: 11/07/2020Value: 294*        Ref range: 60 - 115 mg/dl     Status: FinalPerformed on                                  Date: 11/07/2020Value: ACCU-CHEK     Status: [address] Glucose                                   Date: 11/08/2020Value: 283*        Ref range: 60 - 115 mg/dl     Status: FinalPerformed on                                  Date: 11/08/2020Value: ACCU-CHEK     Status: [address] Glucose                                   Date: 11/08/2020Value: 290* Ref range: 60 - 115 mg/dl     Status: FinalPerformed on                                  Date: 11/08/2020Value: ACCU-CHEK     Status: [address] Glucose                                   Date: 11/08/2020Value: 228*        Ref range: 60 - 115 mg/dl     Status: FinalPerformed on                                  Date: 11/08/2020Value: ACCU-CHEK     Status: [address] Glucose                                   Date: 11/08/2020Value: 201*        Ref range: 60 - 115 mg/dl     Status: FinalPerformed on                                  Date: 11/08/2020Value: ACCU-CHEK     Status: Final              Comment: Sliding ScaleMagnesium                                     Date: 11/09/2020Value: 1.8         Ref range: 1.7 - 2.4 mg/dL    Status: FinalPOC Glucose                                   Date: 11/09/2020Value: 289*        Ref range: 60 - 115 mg/dl     Status: FinalPerformed on                                  Date: 11/09/2020Value: ACCU-CHEK     Status: Final              Comment: Sliding ScaleVancomycin Tr                                 Date: 11/10/2020Value: 5.2*        Ref range: 10.0 - 20.0 ug/mL  Status: FinalPOC Glucose                                   Date: 11/09/2020Value: 143*        Ref range: 60 - 115 mg/dl     Status: FinalPerformed on                                  Date: 11/09/2020Value: ACCU-CHEK     Status: FinalSodium                                        Date: 11/10/2020Value: 139         Ref range: 135 - 144 mEq/L    Status: FinalPotassium reflex Magnesium                    Date: 11/10/2020Value: 4.1         Ref range: 3.4 - 4.9 mEq/L    Status: FinalChloride                                      Date: 11/10/2020Value: 106         Ref range: 95 - 107 mEq/L     Status: FinalCO2                                           Date: 11/10/2020Value: 24          Ref range: 20 - 31 mEq/L      Status: FinalAnion Gap                                     Date: 11/10/2020Value: 9           Ref range: 9 - 15 mEq/L Status: FinalGlucose                                       Date: 11/10/2020Value: 199*        Ref range: 70 - 99 mg/dL      Status: FinalBUN                                           Date: 11/10/2020Value: 13          Ref range: 6 - 20 mg/dL       Status: FinalCREATININE                                    Date: 11/10/2020Value: 0.70        Ref range: 0.50 - 0.90 mg/dL  Status: FinalGFR Non-                      Date: 11/10/2020Value: >60.0       Ref range: >60                Status: Final              Comment: >60 mL/min/1.73m2 EGFR, calc. for ages 25 and older using theMDRD formula (not corrected for weight), is valid for stablerenal function. GFR                           Date: 11/10/2020Value: >60.0       Ref range: >60                Status: Final              Comment: >60 mL/min/1.73m2 EGFR, calc. for ages 25 and older using theMDRD formula (not corrected for weight), is valid for stablerenal function. Calcium                                       Date: 11/10/2020Value: 8.8         Ref range: 8.5 - 9.9 mg/dL    Status: FinalTotal Protein                                 Date: 11/10/2020Value: 6.4         Ref range: 6.3 - 8.0 g/dL     Status: FinalAlb                                           Date: 11/10/2020Value: 3.3*        Ref range: 3.5 - 4.6 g/dL     Status: FinalTotal Bilirubin                               Date: 11/10/2020Value: 0.3         Ref range: 0.2 - 0.7 mg/dL    Status: FinalAlkaline Phosphatase                          Date: 11/10/2020Value: 109         Ref range: 40 - 130 U/L       Status: FinalALT                                           Date: 11/10/2020Value: 54*         Ref range: 0 - 33 U/L         Status: FinalAST                                           Date: 11/10/2020Value: 42*         Ref range: 0 - 35 U/L         Status: FinalGlobulin                                      Date: 11/10/2020Value: 3.1         Ref range: 2.3 - 3.5 g/dL     Status: [address] [facility] Date: 11/10/2020Value: 9.7         Ref range: 4.8 - 10.8 K/uL    Status: FinalRBC                                           Date: 11/10/2020Value: 5.40        Ref range: 4.20 - 5.40 M/uL   Status: FinalHemoglobin                                    Date: 11/10/2020Value: 14.5        Ref range: 12.0 - 16.0 g/dL   Status: FinalHematocrit                                    Date: 11/10/2020Value: 44.2        Ref range: 37.0 - 47.0 %      Status: FinalMCV                                           Date: 11/10/2020Value: 81.8*       Ref range: 82.0 - 100.0 fL    Status: [address]                                           Date: 11/10/2020Value: 26.9*       Ref range: 27.0 - 31.3 pg     Status: [address]                                          Date: 11/10/2020Value: 32.8*       Ref range: 33.0 - 37.0 %      Status: FinalRDW                                           Date: 11/10/2020Value: 14.8*       Ref range: 11.5 - 14.5 %      Status: FinalPlatelets                                     Date: 11/10/2020Value: 225         Ref range: 130 - 400 K/uL     Status: FinalNeutrophils %                                 Date: 11/10/2020Value: 61.1        Ref range: %                  Status: FinalLymphocytes %                                 Date: 11/10/2020Value: 29.2        Ref range: %                  Status: FinalMonocytes %                                   Date: 11/10/2020Value: 5.7         Ref range: %                  Status: FinalEosinophils %                                 Date: 11/10/2020Value: 2.9         Ref range: %                  Status: FinalBasophils %                                   Date: 11/10/2020Value: 1.1         Ref range: %                  Status: FinalNeutrophils Absolute                          Date: 11/10/2020Value: 5.9         Ref range: 1.4 - 6.5 K/uL     Status: FinalLymphocytes Absolute                          Date: 11/10/2020Value: 2.8         Ref range: 1.0 - 4.8 K/uL     Status: FinalMonocytes Absolute                            Date: 11/10/2020Value: 0.6         Ref range: 0.2 - 0.8 K/uL     Status: FinalEosinophils Absolute                          Date: 11/10/2020Value: 0.3         Ref range: 0.0 - 0.7 K/uL     Status: FinalBasophils Absolute                            Date: 11/10/2020Value: 0.1         Ref range: 0.0 - 0.2 K/uL     Status: FinalPOC Glucose                                   Date: 11/09/2020Value: 100         Ref range: 60 - 115 mg/dl     Status: FinalPerformed on                                  Date: 11/09/2020Value: ACCU-CHEK     Status: Final              Comment: Notified RN or [employer]ak[employer] Glucose                                   Date: 11/10/2020Value: 178*        Ref range: 60 - 115 mg/dl     Status: FinalPerformed on                                  Date: 11/10/2020Value: ACCU-CHEK     Status: Final              Comment: Notified RN or [employer] Glucose                                   Date: 11/10/2020Value: 212*        Ref range: 60 - 115 mg/dl     Status: FinalPerformed on                                  Date: 11/10/2020Value: ACCU-CHEK     Status: Final------------    Radiology last 7 days:  No results found. Pending Labs   Order Current Status  POCT Glucose Collected (11/10/20 1204)  C-Peptide In process  Culture, Blood 1 Preliminary result  Culture, Blood 2 Preliminary result      Discharge Medications    Current Discharge Medication ListSTART taking these medicationsoxyCODONE-acetaminophen (PERCOCET) 5-325 MG per tabletTake 1 tablet by mouth every 8 hours as needed for Pain for up to 3 days. Qty: 10 tablet Refills: 0Comments: Reduce doses taken as pain becomes manageableAssociated Diagnoses:Cellulitis of abdominal wallfluconazole (DIFLUCAN) 150 MG tabletTake 1 tablet by mouth daily for 3 daysQty: 3 tablet Refills: 0amoxicillin-clavulanate (AUGMENTIN) 875-125 MG per tabletTake 1 tablet by mouth every 12 hours for 14 daysQty: 28 tablet Refills: 0miconazole (MICOTIN) 2 % powderApply topically 2 times daily. Qty: 45 g Refills: 1lactobacillus acidophilus & bulgar (LACTINEX) chewable tabletTake 1 tablet by mouth 2 times daily for 14 daysQty: 28 tablet Refills: 0    Current Discharge Medication List    Current Discharge Medication ListCONTINUE these medications which have NOT CHANGEDvenlafaxine (EFFEXOR XR) 37.5 MG extended release capsuleTake 1 capsule by mouth daily (with breakfast)Qty: 15 capsule Refills: 3Dulaglutide (TRULICITY) 7.08 TS/7.7KJ SOPNInject 0.75 mg into the skin once a weekQty: 4 pen Refills: 3Brexpiprazole (REXULTI PO)Take 3 mg by mouth dailytraZODone (DESYREL) 50 MG tabletTake 1 tablet by mouth nightlyQty: 15 tablet Refills: 2simvastatin (ZOCOR) 20 MG tabletTake 1 tablet by mouth every eveningQty: 30 tablet Refills: 3glipiZIDE (GLUCOTROL) 10 MG tabletTake 10 mg by mouth 2 times daily (before meals)    Current Discharge Medication List    Time Spent on Discharge:E] minutes were spent in patient examination, evaluation, counseling as well as medication reconciliation, prescriptions for required medications, discharge plan, and follow up.     Electronically signed by Mirna Kussmaul, MD on 11/10/20 at 12:51 PM EST   Overtime on dc summary was 45 min

## 2020-11-10 NOTE — PROGRESS NOTES
Spoke with bedside nurse. Patient may be discharged today. New diagnosis and new to insulin. Diabetes educator not in the office today. Nurse aware. Will perform education. Outpatient order entered and diabetes education will follow up on an outpatient basis.      Valeria Rose RN

## 2020-11-10 NOTE — FLOWSHEET NOTE
Perfect-serve message sent to Dr Maureen Hernandez to inquire about oral pain medication. Patient has c/o abdominal pain, awaiting further orders.

## 2020-11-10 NOTE — PROGRESS NOTES
Infectious Diseases Inpatient Progress Note          HISTORY OF PRESENT ILLNESS:  Follow up severe abdominal wall cellulitis with abscess on IV Vanco and Zosyn, well tolerated. Patient had remarkable clinical improvement with decreased abdominal redness and swelling. Persistent drainage from abscess site. Pain is well controlled with pain medication. Resolved leukocytosis    Current Medications:     amoxicillin-clavulanate  1 tablet Oral 2 times per day    miconazole   Topical BID    insulin lispro  6 Units Subcutaneous TID WC    insulin glargine  20 Units Subcutaneous Nightly    nicotine  1 patch Transdermal Daily    sodium chloride flush  10 mL Intravenous 2 times per day    enoxaparin  40 mg Subcutaneous Daily    insulin lispro  0-12 Units Subcutaneous TID WC    insulin lispro  0-6 Units Subcutaneous Nightly    brexpiprazole  3 mg Oral Daily    atorvastatin  20 mg Oral Daily    venlafaxine  37.5 mg Oral Daily with breakfast       Allergies:  Latuda [lurasidone hcl]; Lurasidone; and Adhesive tape      Review of Systems   Gastrointestinal: Positive for diarrhea. 14 system review is negative other than HPI    Physical Exam  Vitals:    11/08/20 0723 11/08/20 1917 11/09/20 1920 11/10/20 0647   BP: (!) 143/88 137/80 137/76 128/67   Pulse: 82 75 77 79   Resp:    16   Temp: 97.7 °F (36.5 °C) 98.1 °F (36.7 °C) 97.9 °F (36.6 °C) 97.7 °F (36.5 °C)   TempSrc:    Oral   SpO2: 97% 94% 95% 96%   Weight:       Height:         General Appearance: alert and oriented to person, place and time, well-developed and well-nourished, in no acute distress  Skin: warm and dry, no rash. Head: normocephalic and atraumatic  Eyes: anicteric sclerae  ENT: oropharynx clear and moist with normal mucous membranes.  No oral thrush  Lungs: normal respiratory effort  Abdomen: soft, Remarkable decrease in abdominal erythema, + tenderness, right lower quadrant abscess with continuous drainage decreased surrounding induration  No leg for 2 weeks if patient continues to improve  · Lactobacillus  · Stools for C diff if worsening diarrhea    Discussed with patient and RN    Chantal King MD

## 2020-11-10 NOTE — PROGRESS NOTES
Endocrinology Progress Note    Assessment and Plan:   Assessment-  1. Type 2 diabetes, uncontrolled  2. Cellulitis of abdominal wall  3. Hyperglycemia    Plan-  1. Patient may be discharged home from endocrinology standpoint  2. Lantus 20 units at bedtime  3. Humalog 6 units 3 times daily before meals  4. Outpatient diabetic education ordered  5. Follow-up with David Ramos PA-C in 2 weeks    POC Glucose:   Recent Labs     11/09/20  1655 11/09/20  2008 11/10/20  0139 11/10/20  0825 11/10/20  1204   POCGLU 143* 100 178* 212* 222*     HGBA1C:  Lab Results   Component Value Date    LABA1C 11.1 (H) 11/07/2020    LABA1C 11.2 (H) 06/22/2020    LABA1C 6.1 (H) 07/10/2018     CBC:   Recent Labs     11/09/20  0520 11/10/20  0537   WBC 9.0 9.7   HGB 15.0 14.5    225     CMP:    Recent Labs     11/07/20  1615 11/09/20  0520 11/10/20  0536   * 130* 139   K 3.5 3.3* 4.1   CL 92* 95 106   CO2 27 29 24   BUN 10 11 13   CREATININE 0.83 0.70 0.70   GLUCOSE 519* 279* 199*   CALCIUM 9.4 8.8 8.8   LABGLOM >60.0 >60.0 >60.0         CC:   Chief Complaint   Patient presents with    Abscess     abscess on lower abd that has been there for about a week       Subjective: Interval History: Blanco Turner is a 59-year-old type II diabetic female with worsening glycemic control. She was admitted for cellulitis of the abdominal wall which has been well controlled with IV antibiotics. Her A1c was 11.1 back in June 2020 she was started on Trulicity however her glycemic control has not improved and recent A1c is still 11%. She is going to go home on basal and bolus insulin dosing regiment.   Follow-up with me in 2 weeks    Review of systems: denies polyuria, polydipsia, ABD pain, flank pain, N/V/D, or diaphoresis  Medications:   Scheduled Meds:   amoxicillin-clavulanate  1 tablet Oral 2 times per day    miconazole   Topical BID    insulin lispro  6 Units Subcutaneous TID WC    insulin glargine  20 Units Subcutaneous Nightly    nicotine  1 patch Transdermal Daily    sodium chloride flush  10 mL Intravenous 2 times per day    enoxaparin  40 mg Subcutaneous Daily    insulin lispro  0-12 Units Subcutaneous TID WC    insulin lispro  0-6 Units Subcutaneous Nightly    brexpiprazole  3 mg Oral Daily    atorvastatin  20 mg Oral Daily    venlafaxine  37.5 mg Oral Daily with breakfast     Continuous Infusions:   dextrose         Objective:   Vitals: /67   Pulse 79   Temp 97.7 °F (36.5 °C) (Oral)   Resp 16   Ht 5' 5\" (1.651 m)   Wt 268 lb (121.6 kg)   SpO2 96%   BMI 44.60 kg/m²    Wt Readings from Last 3 Encounters:   11/07/20 268 lb (121.6 kg)   05/29/20 280 lb (127 kg)   04/30/20 280 lb (127 kg)        General appearance: alert, appears stated age, cooperative and no distress  Skin: Skin color, texture, turgor normal. No rashes or lesions. Neck: no lymphadenopathy  Lungs: clear to auscultation bilaterally  Heart: regular rate and rhythm, S1, S2 normal, no murmur, click, rub or gallop  Abdomen: Dry sterile dressing right lower abdominal wall, erythema appears to have improved and soft, non-tender. Bowel sounds normal. No masses,  no organomegaly.   Extremities: extremities normal, atraumatic, no cyanosis or edema    Patient Active Problem List:     Chronic cholecystitis with calculus     History of drug abuse ([address]     Spondylosis of lumbar region without myelopathy or radiculopathy     QI (obstructive sleep apnea)     Morbid obesity with body mass index (BMI) of 40.0 to 44.9 in adult [facility])     Bipolar disorder, current episode depressed, moderate (HCC)     Bipolar 1 disorder, depressed ([address]     Diarrhea     Bipolar disorder with depression ([address]     Closed fracture of middle or proximal phalanx or phalanges of hand     Major depression, single episode     Uncontrolled type 2 diabetes mellitus with hyperglycemia ([address]     Cellulitis of abdominal wall     Abscess     Obesity, morbid (more than 100 lbs over ideal weight or BMI > 40) [facility])          Electronically signed by FRANCO Del Rio on 11/10/2020 at 1:22 PM

## 2020-11-10 NOTE — CONSULTS
flush 0.9 % injection 10 mL, 10 mL, Intravenous, PRN  acetaminophen (TYLENOL) tablet 650 mg, 650 mg, Oral, Q6H PRN **OR** acetaminophen (TYLENOL) suppository 650 mg, 650 mg, Rectal, Q6H PRN  polyethylene glycol (GLYCOLAX) packet 17 g, 17 g, Oral, Daily PRN  promethazine (PHENERGAN) tablet 12.5 mg, 12.5 mg, Oral, Q6H PRN **OR** ondansetron (ZOFRAN) injection 4 mg, 4 mg, Intravenous, Q6H PRN  enoxaparin (LOVENOX) injection 40 mg, 40 mg, Subcutaneous, Daily  insulin lispro (HUMALOG) injection vial 0-12 Units, 0-12 Units, Subcutaneous, TID WC  insulin lispro (HUMALOG) injection vial 0-6 Units, 0-6 Units, Subcutaneous, Nightly  glucose (GLUTOSE) 40 % oral gel 15 g, 15 g, Oral, PRN  dextrose 50 % IV solution, 12.5 g, Intravenous, PRN  glucagon (rDNA) injection 1 mg, 1 mg, Intramuscular, PRN  dextrose 5 % solution, 100 mL/hr, Intravenous, PRN  ketorolac (TORADOL) injection 15 mg, 15 mg, Intravenous, Q6H PRN  piperacillin-tazobactam (ZOSYN) 3.375 g in dextrose 5 % 50 mL IVPB extended infusion (mini-bag), 3.375 g, Intravenous, Q8H  brexpiprazole (REXULTI) tablet 3 mg, 3 mg, Oral, Daily  atorvastatin (LIPITOR) tablet 20 mg, 20 mg, Oral, Daily  traZODone (DESYREL) tablet 50 mg, 50 mg, Oral, Nightly PRN  venlafaxine (EFFEXOR XR) extended release capsule 37.5 mg, 37.5 mg, Oral, Daily with breakfast  Allergies:  Latuda [lurasidone hcl]; Lurasidone; and Adhesive tape    Social History:   TOBACCO:   reports that she has been smoking cigarettes. She has a 24.00 pack-year smoking history. She has never used smokeless tobacco.  ETOH:   reports no history of alcohol use. DRUGS:   reports previous drug use. Drug: Marijuana.   Family History:       Problem Relation Age of Onset    Other Mother         CHF    Cirrhosis Brother     Other Maternal Grandmother         CHF       REVIEW OF SYSTEMS:    CONSTITUTIONAL:  negative  EYES:  negative  HEENT:  negative  RESPIRATORY:  negative  CARDIOVASCULAR:  negative  GASTROINTESTINAL:  positive for abdominal pain and drainage from abdominal wall abscess  MUSCULOSKELETAL:  negative  NEUROLOGICAL:  negative  BEHAVIOR/PSYCH:  negative    PHYSICAL EXAM:    VITALS:  /76   Pulse 77   Temp 97.9 °F (36.6 °C)   Resp 15   Ht 5' 5\" (1.651 m)   Wt 268 lb (121.6 kg)   SpO2 95%   BMI 44.60 kg/m²   CONSTITUTIONAL:  awake, alert, cooperative, no apparent distress, and appears stated age  EYES:  Lids and lashes normal, pupils equal, round and reactive to light, extra ocular muscles intact, sclera clear, conjunctiva normal  ENT:  Normocephalic, without obvious abnormality, atraumatic, sinuses nontender on palpation, external ears without lesions, oral pharynx with moist mucus membranes, tonsils without erythema or exudates, gums normal and good dentition. NECK:  Supple, symmetrical, trachea midline, no adenopathy, thyroid symmetric, not enlarged and no tenderness, skin normal  HEMATOLOGIC/LYMPHATICS:  no cervical lymphadenopathy  BACK:  Symmetric, no curvature, spinous processes are non-tender on palpation, paraspinous muscles are non-tender on palpation, no costal vertebral tenderness  LUNGS:  No increased work of breathing, good air exchange, clear to auscultation bilaterally, no crackles or wheezing  CARDIOVASCULAR:  Normal apical impulse, regular rate and rhythm, normal S1 and S2, no S3 or S4, and no murmur noted  ABDOMEN: She has a spontaneous drainage abscess on the right lower abdominal wall. Erythema which had been marked previously is regressing. Inflammation present but no identifiable fluctuance or evidence of undrained abscess. MUSCULOSKELETAL:  There is no redness, warmth, or swelling of the joints. Full range of motion noted. Motor strength is 5 out of 5 all extremities bilaterally.   Tone is normal.  NEUROLOGIC: Awake alert and oriented  SKIN:  no bruising or bleeding  DATA:    CBC:   Lab Results   Component Value Date    WBC 9.7 11/10/2020    RBC 5.40 11/10/2020    HGB 14.5 11/10/2020 HCT 44.2 11/10/2020    MCV 81.8 11/10/2020    MCH 26.9 11/10/2020    MCHC 32.8 11/10/2020    RDW 14.8 11/10/2020     11/10/2020    MPV 10.4 08/11/2015     BMP:    Lab Results   Component Value Date     11/10/2020    K 4.1 11/10/2020     11/10/2020    CO2 24 11/10/2020    BUN 13 11/10/2020    LABALBU 3.3 11/10/2020    CREATININE 0.70 11/10/2020    CALCIUM 8.8 11/10/2020    GFRAA >60.0 11/10/2020    LABGLOM >60.0 11/10/2020    GLUCOSE 199 11/10/2020       IMPRESSION/RECOMMENDATIONS:      Spontaneous drained abscess right lower abdominal wall. Currently receiving IV antibiotics and glucose control. No identifiable undrained abscess present. We will continue to follow. Please call with any questions.

## 2020-11-10 NOTE — CONSULTS
[address]                      [facility], [address] [facility]                                  CONSULTATION    PATIENT NAME: Maci Murry                       :        1980  MED REC NO:   [id number]                            ROOM:       [id number]  ACCOUNT NO:   [de-identified]                           ADMIT DATE: 2020  PROVIDER:     Ching Escalante MD    CONSULT DATE:  2020    ENDOCRINE CONSULTATION    REFERRING PROVIDER:  SIDDHARTH Herron.    REASON FOR CONSULTATION:  Uncontrolled diabetes, hyperglycemia. CHIEF COMPLAINT AND HISTORY OF PRESENT ILLNESS:  The patient is a  51-year-old female with known history of diabetes admitted with draining  abdominal wound infection. Initial admitting diagnosis was cellulitis  of abdominal wall. Infectious Disease consult was reviewed and  diagnosis was abdominal wall cellulitis with abscess, probably from  staph. The patient's blood sugars were high, initially they have come  down to 100. Initial glucose was in the 519 range. A1c was 11.1. Chemistries were reviewed. Sodium was 130, potassium 3.5, chloride was  92, CO2 was 27, BUN 10, creatinine 0.83. The patient was put on Lantus,  Humalog plus coverage. Home medications for diabetes included Trulicity  2.55 mg once a week and Glucotrol 10 mg twice daily. PAST MEDICAL HISTORY:  Significant for type 2 diabetes, depression,  bipolar disorder, hypercholesteremia, osteoarthritis. PAST SURGICAL HISTORY:  Tubal ligation, hysterectomy, cholecystectomy,  colon cancer screening. PERSONAL AND SOCIAL HISTORY:  The patient does smoke cigarettes. Denies  any substance abuse. MEDICATIONS:  Here include Lipitor, Rexulti, Lovenox, Diflucan, Lantus  20 units at bedtime, Humalog coverage, Humalog 6 units with each meals,  Zosyn, and Effexor. ALLERGIES:  LATUDA (LURASIDONE), ADHESIVE TAPE.     REVIEW OF SYSTEMS:  Other than abdominal wall infection no

## 2020-11-10 NOTE — FLOWSHEET NOTE
Reviewed discharge instructions with patient and patient  including follow-up appointments, prescriptions, education on new prescriptions, and diabetic education. Patient states she is very satisfied with care and verbalizes understanding. Transport requested and patient now discharged with  providing ride.

## 2020-11-11 LAB — C-PEPTIDE: 4 NG/ML (ref 1.1–4.4)

## 2020-11-12 LAB
BLOOD CULTURE, ROUTINE: NORMAL
CULTURE, BLOOD 2: NORMAL

## 2020-11-18 ENCOUNTER — TELEPHONE (OUTPATIENT)
Dept: ENDOCRINOLOGY | Age: 40
End: 2020-11-18

## 2020-11-18 NOTE — TELEPHONE ENCOUNTER
[address] sent a fax stating that patient's insurance does not cover Novofine pen needles. Rx needs to be switched to B-D preferred. Please advise.

## 2020-12-15 ENCOUNTER — HOSPITAL ENCOUNTER (INPATIENT)
Age: 40
LOS: 6 days | Discharge: HOME | DRG: 753 | End: 2020-12-21
Attending: STUDENT IN AN ORGANIZED HEALTH CARE EDUCATION/TRAINING PROGRAM | Admitting: PSYCHIATRY & NEUROLOGY
Payer: MEDICAID

## 2020-12-15 PROBLEM — F33.9 MAJOR DEPRESSIVE DISORDER, RECURRENT (HCC): Status: ACTIVE | Noted: 2020-12-15

## 2020-12-15 LAB
ACETAMINOPHEN LEVEL: <5 UG/ML (ref 10–30)
ALBUMIN SERPL-MCNC: 4.4 G/DL (ref 3.5–4.6)
ALP BLD-CCNC: 123 U/L (ref 40–130)
ALT SERPL-CCNC: 45 U/L (ref 0–33)
AMPHETAMINE SCREEN, URINE: NORMAL
ANION GAP SERPL CALCULATED.3IONS-SCNC: 12 MEQ/L (ref 9–15)
AST SERPL-CCNC: 46 U/L (ref 0–35)
BARBITURATE SCREEN URINE: NORMAL
BASOPHILS ABSOLUTE: 0.1 K/UL (ref 0–0.2)
BASOPHILS RELATIVE PERCENT: 1.2 %
BENZODIAZEPINE SCREEN, URINE: NORMAL
BILIRUB SERPL-MCNC: 0.3 MG/DL (ref 0.2–0.7)
BILIRUBIN URINE: NEGATIVE
BLOOD, URINE: NEGATIVE
BUN BLDV-MCNC: 12 MG/DL (ref 6–20)
CALCIUM SERPL-MCNC: 9.4 MG/DL (ref 8.5–9.9)
CANNABINOID SCREEN URINE: NORMAL
CHLORIDE BLD-SCNC: 95 MEQ/L (ref 95–107)
CHOLESTEROL, TOTAL: 210 MG/DL (ref 0–199)
CLARITY: CLEAR
CO2: 25 MEQ/L (ref 20–31)
COCAINE METABOLITE SCREEN URINE: NORMAL
COLOR: YELLOW
CREAT SERPL-MCNC: 0.67 MG/DL (ref 0.5–0.9)
EKG ATRIAL RATE: 72 BPM
EKG P AXIS: 52 DEGREES
EKG P-R INTERVAL: 162 MS
EKG Q-T INTERVAL: 386 MS
EKG QRS DURATION: 88 MS
EKG QTC CALCULATION (BAZETT): 422 MS
EKG R AXIS: 23 DEGREES
EKG T AXIS: 32 DEGREES
EKG VENTRICULAR RATE: 72 BPM
EOSINOPHILS ABSOLUTE: 0.2 K/UL (ref 0–0.7)
EOSINOPHILS RELATIVE PERCENT: 1.6 %
ETHANOL PERCENT: NORMAL G/DL
ETHANOL: <10 MG/DL (ref 0–0.08)
GFR AFRICAN AMERICAN: >60
GFR NON-AFRICAN AMERICAN: >60
GLOBULIN: 2.7 G/DL (ref 2.3–3.5)
GLUCOSE BLD-MCNC: 202 MG/DL (ref 60–115)
GLUCOSE BLD-MCNC: 285 MG/DL (ref 60–115)
GLUCOSE BLD-MCNC: 462 MG/DL (ref 70–99)
GLUCOSE URINE: >=1000 MG/DL
HCT VFR BLD CALC: 49.4 % (ref 37–47)
HDLC SERPL-MCNC: 27 MG/DL (ref 40–59)
HEMOGLOBIN: 16.4 G/DL (ref 12–16)
KETONES, URINE: NEGATIVE MG/DL
LDL CHOLESTEROL CALCULATED: 111 MG/DL (ref 0–129)
LEUKOCYTE ESTERASE, URINE: NEGATIVE
LYMPHOCYTES ABSOLUTE: 3.1 K/UL (ref 1–4.8)
LYMPHOCYTES RELATIVE PERCENT: 26.6 %
Lab: NORMAL
MCH RBC QN AUTO: 28 PG (ref 27–31.3)
MCHC RBC AUTO-ENTMCNC: 33.2 % (ref 33–37)
MCV RBC AUTO: 84.3 FL (ref 82–100)
METHADONE SCREEN, URINE: NORMAL
MONOCYTES ABSOLUTE: 0.7 K/UL (ref 0.2–0.8)
MONOCYTES RELATIVE PERCENT: 5.6 %
NEUTROPHILS ABSOLUTE: 7.7 K/UL (ref 1.4–6.5)
NEUTROPHILS RELATIVE PERCENT: 65 %
NITRITE, URINE: NEGATIVE
OPIATE SCREEN URINE: NORMAL
OXYCODONE URINE: NORMAL
PDW BLD-RTO: 15.7 % (ref 11.5–14.5)
PERFORMED ON: ABNORMAL
PERFORMED ON: ABNORMAL
PH UA: 5.5 (ref 5–9)
PHENCYCLIDINE SCREEN URINE: NORMAL
PLATELET # BLD: 237 K/UL (ref 130–400)
POTASSIUM SERPL-SCNC: 3.3 MEQ/L (ref 3.4–4.9)
PROPOXYPHENE SCREEN: NORMAL
PROTEIN UA: NEGATIVE MG/DL
RBC # BLD: 5.86 M/UL (ref 4.2–5.4)
SALICYLATE, SERUM: <0.3 MG/DL (ref 15–30)
SARS-COV-2, NAAT: NOT DETECTED
SODIUM BLD-SCNC: 132 MEQ/L (ref 135–144)
SPECIFIC GRAVITY UA: 1.04 (ref 1–1.03)
TOTAL CK: 37 U/L (ref 0–170)
TOTAL PROTEIN: 7.1 G/DL (ref 6.3–8)
TRIGL SERPL-MCNC: 361 MG/DL (ref 0–150)
TSH SERPL DL<=0.05 MIU/L-ACNC: 1.41 UIU/ML (ref 0.44–3.86)
URINE REFLEX TO CULTURE: ABNORMAL
UROBILINOGEN, URINE: 0.2 E.U./DL
WBC # BLD: 11.8 K/UL (ref 4.8–10.8)

## 2020-12-15 PROCEDURE — 82550 ASSAY OF CK (CPK): CPT

## 2020-12-15 PROCEDURE — 84443 ASSAY THYROID STIM HORMONE: CPT

## 2020-12-15 PROCEDURE — 81003 URINALYSIS AUTO W/O SCOPE: CPT

## 2020-12-15 PROCEDURE — 6370000000 HC RX 637 (ALT 250 FOR IP): Performed by: PSYCHIATRY & NEUROLOGY

## 2020-12-15 PROCEDURE — G0480 DRUG TEST DEF 1-7 CLASSES: HCPCS

## 2020-12-15 PROCEDURE — 99284 EMERGENCY DEPT VISIT MOD MDM: CPT

## 2020-12-15 PROCEDURE — 85025 COMPLETE CBC W/AUTO DIFF WBC: CPT

## 2020-12-15 PROCEDURE — 80053 COMPREHEN METABOLIC PANEL: CPT

## 2020-12-15 PROCEDURE — 1240000000 HC EMOTIONAL WELLNESS R&B

## 2020-12-15 PROCEDURE — 80061 LIPID PANEL: CPT

## 2020-12-15 PROCEDURE — 80307 DRUG TEST PRSMV CHEM ANLYZR: CPT

## 2020-12-15 PROCEDURE — 93005 ELECTROCARDIOGRAM TRACING: CPT | Performed by: PSYCHIATRY & NEUROLOGY

## 2020-12-15 PROCEDURE — 6370000000 HC RX 637 (ALT 250 FOR IP): Performed by: STUDENT IN AN ORGANIZED HEALTH CARE EDUCATION/TRAINING PROGRAM

## 2020-12-15 PROCEDURE — 36415 COLL VENOUS BLD VENIPUNCTURE: CPT

## 2020-12-15 PROCEDURE — 96372 THER/PROPH/DIAG INJ SC/IM: CPT

## 2020-12-15 PROCEDURE — U0002 COVID-19 LAB TEST NON-CDC: HCPCS

## 2020-12-15 RX ORDER — HYDROXYZINE PAMOATE 50 MG/1
50 CAPSULE ORAL EVERY 6 HOURS PRN
Status: DISCONTINUED | OUTPATIENT
Start: 2020-12-15 | End: 2020-12-17

## 2020-12-15 RX ORDER — HALOPERIDOL 5 MG/ML
5 INJECTION INTRAMUSCULAR EVERY 6 HOURS PRN
Status: DISCONTINUED | OUTPATIENT
Start: 2020-12-15 | End: 2020-12-21 | Stop reason: HOSPADM

## 2020-12-15 RX ORDER — HYDROXYZINE HYDROCHLORIDE 50 MG/ML
50 INJECTION, SOLUTION INTRAMUSCULAR EVERY 6 HOURS PRN
Status: DISCONTINUED | OUTPATIENT
Start: 2020-12-15 | End: 2020-12-17

## 2020-12-15 RX ORDER — FLUCONAZOLE 100 MG/1
200 TABLET ORAL ONCE
Status: COMPLETED | OUTPATIENT
Start: 2020-12-15 | End: 2020-12-15

## 2020-12-15 RX ORDER — TRAZODONE HYDROCHLORIDE 50 MG/1
50 TABLET ORAL NIGHTLY PRN
Status: DISCONTINUED | OUTPATIENT
Start: 2020-12-15 | End: 2020-12-21 | Stop reason: HOSPADM

## 2020-12-15 RX ORDER — MAGNESIUM HYDROXIDE/ALUMINUM HYDROXICE/SIMETHICONE 120; 1200; 1200 MG/30ML; MG/30ML; MG/30ML
30 SUSPENSION ORAL EVERY 6 HOURS PRN
Status: DISCONTINUED | OUTPATIENT
Start: 2020-12-15 | End: 2020-12-21 | Stop reason: HOSPADM

## 2020-12-15 RX ORDER — HYDROXYZINE PAMOATE 50 MG/1
50 CAPSULE ORAL ONCE
Status: COMPLETED | OUTPATIENT
Start: 2020-12-15 | End: 2020-12-15

## 2020-12-15 RX ORDER — NICOTINE 21 MG/24HR
1 PATCH, TRANSDERMAL 24 HOURS TRANSDERMAL ONCE
Status: DISCONTINUED | OUTPATIENT
Start: 2020-12-15 | End: 2020-12-16

## 2020-12-15 RX ORDER — ACETAMINOPHEN 325 MG/1
650 TABLET ORAL EVERY 4 HOURS PRN
Status: DISCONTINUED | OUTPATIENT
Start: 2020-12-15 | End: 2020-12-21 | Stop reason: HOSPADM

## 2020-12-15 RX ORDER — HALOPERIDOL 5 MG
5 TABLET ORAL EVERY 6 HOURS PRN
Status: DISCONTINUED | OUTPATIENT
Start: 2020-12-15 | End: 2020-12-21 | Stop reason: HOSPADM

## 2020-12-15 RX ORDER — INSULIN GLARGINE 100 [IU]/ML
20 INJECTION, SOLUTION SUBCUTANEOUS NIGHTLY
Status: DISCONTINUED | OUTPATIENT
Start: 2020-12-15 | End: 2020-12-16

## 2020-12-15 RX ADMIN — FLUCONAZOLE 200 MG: 100 TABLET ORAL at 16:32

## 2020-12-15 RX ADMIN — TRAZODONE HYDROCHLORIDE 50 MG: 50 TABLET ORAL at 21:03

## 2020-12-15 RX ADMIN — INSULIN GLARGINE 20 UNITS: 100 INJECTION, SOLUTION SUBCUTANEOUS at 21:03

## 2020-12-15 RX ADMIN — HYDROXYZINE PAMOATE 50 MG: 50 CAPSULE ORAL at 18:58

## 2020-12-15 ASSESSMENT — PATIENT HEALTH QUESTIONNAIRE - PHQ9: SUM OF ALL RESPONSES TO PHQ QUESTIONS 1-9: 13

## 2020-12-15 ASSESSMENT — SLEEP AND FATIGUE QUESTIONNAIRES
AVERAGE NUMBER OF SLEEP HOURS: 6
DO YOU USE A SLEEP AID: YES
DIFFICULTY ARISING: NO
DO YOU HAVE DIFFICULTY SLEEPING: NO
SLEEP PATTERN: NORMAL
DIFFICULTY STAYING ASLEEP: YES
DIFFICULTY FALLING ASLEEP: YES
RESTFUL SLEEP: NO

## 2020-12-15 ASSESSMENT — ENCOUNTER SYMPTOMS
SHORTNESS OF BREATH: 0
CHEST TIGHTNESS: 0
SINUS PRESSURE: 0
TROUBLE SWALLOWING: 0
ABDOMINAL PAIN: 0
DIARRHEA: 0
COUGH: 0
BACK PAIN: 0
VOMITING: 0
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
## 2020-12-15 NOTE — ED TRIAGE NOTES
Pt arrived to ED after being brought in by . Pt has been having overwhelming health issues and has had difficulty managing and feels overwhelmed.

## 2020-12-15 NOTE — ED PROVIDER NOTES
[address]  eMERGENCY dEPARTMENT eNCOUnter      Pt Name: Jackie Mercado  MRN: [id number]  Diegogfjyoti 1980  Date of evaluation: 12/15/2020  Provider: Karlie Randall, [facility]       Chief Complaint   Patient presents with    Depression         HISTORY OF PRESENT ILLNESS   (Location/Symptom, Timing/Onset,Context/Setting, Quality, Duration, Modifying Factors, Severity)  Note limiting factors. Jackie Mercado is a 36 y.o. female who presents to the emergency department with complaints of depression. Patient states that time she feels suicidal but she has no particular plan. Patient states that her blood sugars have been poorly controlled and she does not understand why this makes her even more depressed. Patient upon further questioning is instantly injecting at the left lower quadrant of her abdomen. The ER physician explained site rotation to the patient and that the skin underneath the injected area can toughen up and become fibrotic which would not absorb as much insulin. Patient denies any fever, chills or cough. States that she thinks he might have some thrush to her tongue and that she has a recurrent and chronic yeast infection to the vaginal area. Patient states that she has been using the antiyeast cream to her vaginal area. Patient denies any vomiting or diarrhea. Patient denies any homicidal ideation. Patient denies any auditory or visual hallucinations. Patient states she feels extremely down. Patient does admit that she is not been taking any psychiatric medicines for several weeks. States she missed too many visits with violence counseling in a fired her as a patient. To the patient psychiatric illness further history is not able to be obtained. The history is provided by the patient. NursingNotes were reviewed.     REVIEW OF SYSTEMS    (2-9 systems for level 4, 10 or more for level 5)     Review of Systems Constitutional: Negative for activity change, appetite change, chills, fever and unexpected weight change. HENT: Negative for drooling, ear pain, nosebleeds, sinus pressure and trouble swallowing. Respiratory: Negative for cough, chest tightness and shortness of breath. Cardiovascular: Negative for chest pain and leg swelling. Gastrointestinal: Negative for abdominal pain, diarrhea and vomiting. Endocrine: Negative for polydipsia and polyphagia. Genitourinary: Negative for dysuria, flank pain and frequency. Musculoskeletal: Negative for back pain and myalgias. Skin: Negative for pallor and rash. Neurological: Negative for syncope, weakness and headaches. Hematological: Does not bruise/bleed easily. Psychiatric/Behavioral: Positive for dysphoric mood. Negative for agitation, behavioral problems, confusion and hallucinations. All other systems reviewed and are negative. Except as noted above the remainder of the review of systems was reviewed and negative.        PAST MEDICAL HISTORY     Past Medical History:   Diagnosis Date    B[facility] disorder ([facility]     Depression     DM (diabetes mellitus) ([facility]     History of drug abuse ([facility]     positive drug screens 9/22/14 and 5/18/14    Hyperlipidemia     Osteoarthritis          SURGICALHISTORY       Past Surgical History:   Procedure Laterality Date    CHOLECYSTECTOMY, LAPAROSCOPIC N/A 2/28/2017    CHOLECYSTECTOMY LAPAROSCOPIC WITH GRAMS POSS OPEN , RECENT LABS  performed by Kali Casper MD at [facility], [facility] [address] [place] [address] [address] [place] [address] N/A 11/27/2018    COLONOSCOPY ROOM 384 performed by Yordy Florence MD at [address] [place] [address]       Current Discharge Medication List      CONTINUE these medications which have NOT CHANGED    Details   insulin lispro, 1 Unit Dial, (HUMALOG KWIKPEN) 100 UNIT/ML SOPN Inject 6 Units into the skin 3 times daily (before meals) Qty: 5 pen, Refills: 03      insulin glargine (LANTUS SOLOSTAR) 100 UNIT/ML injection pen Inject 20 Units into the skin nightly  Qty: 6 pen, Refills: 11      Insulin Pen Needle (NOVOFINE) 32G X 6 MM MISC 1 Device by Does not apply route 4 times daily (before meals and nightly)  Qty: 300 each, Refills: 3      blood glucose monitor kit and supplies 1 kit by Other route 4 times daily (before meals and nightly) Pt test 4x daily Dx E11.65. May substitute for generic or insurance covered product  Qty: 1 kit, Refills: 0      blood glucose monitor strips 1 strip by Other route 4 times daily (before meals and nightly) Pt test 4x daily Dx E11.65. May substitute for generic or insurance covered product  Qty: 150 strip, Refills: 3      FreeStyle Lancets MISC 1 each by Does not apply route 4 times daily (before meals and nightly)  Qty: 300 each, Refills: 3      Continuous Blood Gluc Sensor (FREESTYLE [place] 14 DAY SENSOR) MISC 2 Devices by Does not apply route every 14 days  Qty: 2 each, Refills: 3      Continuous Blood Gluc  (FREESTYLE [place] 14 DAY READER) KATARINA 1 Device by Does not apply route 4 times daily (before meals and nightly)  Qty: 1 Device, Refills: 0      miconazole (MICOTIN) 2 % powder Apply topically 2 times daily.   Qty: 45 g, Refills: 1      Dulaglutide (TRULICITY) 1.5 KE/8.5TH SOPN Inject 1.5 mg into the skin once a week  Qty: 4 pen, Refills: 3      venlafaxine (EFFEXOR XR) 37.5 MG extended release capsule Take 1 capsule by mouth daily (with breakfast)  Qty: 15 capsule, Refills: 3      Brexpiprazole (REXULTI PO) Take 3 mg by mouth daily      traZODone (DESYREL) 50 MG tablet Take 1 tablet by mouth nightly  Qty: 15 tablet, Refills: 2      simvastatin (ZOCOR) 20 MG tablet Take 1 tablet by mouth every evening  Qty: 30 tablet, Refills: 3             ALLERGIES     Latuda [lurasidone hcl], Lurasidone, and Adhesive tape    FAMILY HISTORY       Family History   Problem Relation Age of Onset    Other Mother CHF    Cirrhosis Brother     Other Maternal Grandmother         CHF          SOCIAL HISTORY       Social History     Socioeconomic History    Marital status: Single     Spouse name: Not on file    Number of children: Not on file    Years of education: Not on file    Highest education level: Not on file   Occupational History    Not on file   Social Needs    Financial resource strain: Not on file    Food insecurity     Worry: Not on file     Inability: Not on file    Transportation needs     Medical: Not on file     Non-medical: Not on file   Tobacco Use    Smoking status: Current Every Day Smoker     Packs/day: 1.00     Years: 24.00     Pack years: 24.00     Types: Cigarettes    Smokeless tobacco: Never Used   Substance and Sexual Activity    Alcohol use: No    Drug use: Not Currently     Types: Marijuana    Sexual activity: Yes   Lifestyle    Physical activity     Days per week: Not on file     Minutes per session: Not on file    Stress: Not on file   Relationships    Social connections     Talks on phone: Not on file     Gets together: Not on file     Attends Protestant service: Not on file     Active member of club or organization: Not on file     Attends meetings of clubs or organizations: Not on file     Relationship status: Not on file    Intimate partner violence     Fear of current or ex partner: Not on file     Emotionally abused: Not on file     Physically abused: Not on file     Forced sexual activity: Not on file   Other Topics Concern    Not on file   Social History Narrative    Not on file       SCREENINGS      @GGDD([id number])@      PHYSICAL EXAM    (up to 7 for level 4, 8 or more for level 5)     ED Triage Vitals [12/15/20 1448]   BP Temp Temp Source Pulse Resp SpO2 Height Weight   134/88 97.6 °F (36.4 °C) Temporal 107 16 95 % 5' 5\" (1.651 m) 275 lb (124.7 kg)       Physical Exam  Vitals signs and nursing note reviewed.    Constitutional: General: She is awake. She is not in acute distress. Appearance: Normal appearance. She is well-developed. She is morbidly obese. She is not ill-appearing, toxic-appearing or diaphoretic. Comments: No photophobia. No phonophobia. HENT:      Head: Normocephalic and atraumatic. No Spain's sign. Right Ear: External ear normal.      Left Ear: External ear normal.      Nose: Nose normal. No congestion or rhinorrhea. Mouth/Throat:      Mouth: Mucous membranes are moist.      Pharynx: Oropharynx is clear. No oropharyngeal exudate or posterior oropharyngeal erythema. Eyes:      General: No scleral icterus. Right eye: No foreign body or discharge. Left eye: No discharge. Extraocular Movements: Extraocular movements intact. Conjunctiva/sclera: Conjunctivae normal.      Left eye: No exudate. Pupils: Pupils are equal, round, and reactive to light. Neck:      Musculoskeletal: Normal range of motion and neck supple. No neck rigidity. Vascular: No JVD. Trachea: No tracheal deviation. Comments: No meningismus. Cardiovascular:      Rate and Rhythm: Normal rate and regular rhythm. Heart sounds: Normal heart sounds. Heart sounds not distant. No murmur. No friction rub. No gallop. Pulmonary:      Effort: Pulmonary effort is normal. No respiratory distress. Breath sounds: Normal breath sounds. No stridor. No wheezing, rhonchi or rales. Chest:      Chest wall: No tenderness. Abdominal:      General: Abdomen is flat. Bowel sounds are normal. There is no distension. Palpations: Abdomen is soft. There is no mass. Tenderness: There is no abdominal tenderness. There is no right CVA tenderness, left CVA tenderness, guarding or rebound. Hernia: No hernia is present. Musculoskeletal: Normal range of motion. General: No swelling, tenderness, deformity or signs of injury.    Lymphadenopathy:      Head: LIPID PANEL - Abnormal; Notable for the following components:    Cholesterol, Total 210 (*)     Triglycerides 361 (*)     HDL 27 (*)     All other components within normal limits    Narrative:     CALL  [place]  [facility] tel. [phone],  GLU results called to and read back by Jonathan Vail, 12/15/2020 69:48, by Lordago Slider   SALICYLATE LEVEL - Abnormal; Notable for the following components:    Salicylate, Serum <9.0 (*)     All other components within normal limits   URINE RT REFLEX TO CULTURE - Abnormal; Notable for the following components:    Glucose, Ur >=1000 (*)     All other components within normal limits   COMPREHENSIVE METABOLIC PANEL - Abnormal; Notable for the following components:    Sodium 132 (*)     Potassium 3.3 (*)     Glucose 462 (*)     ALT 45 (*)     AST 46 (*)     All other components within normal limits    Narrative:     CALL  [facility] tel. [phone],  GLU results called to and read back by Jonathan Vail, 12/15/2020 16:18, by Lorette Slider   CBC WITH AUTO DIFFERENTIAL - Abnormal; Notable for the following components:    WBC 11.8 (*)     RBC 5.86 (*)     Hemoglobin 16.4 (*)     Hematocrit 49.4 (*)     RDW 15.7 (*)     Neutrophils Absolute 7.7 (*)     All other components within normal limits   POCT GLUCOSE - Abnormal; Notable for the following components:    POC Glucose 285 (*)     All other components within normal limits   TSH WITHOUT REFLEX   URINE DRUG SCREEN   ETHANOL   CK    Narrative:     CALL  [facility] tel. [phone],  GLU results called to and read back by Jonathan Vail, 12/15/2020 16:18, by Lordago Slider   COVID-19   POCT GLUCOSE   POCT GLUCOSE       All other labs were within normal range or not returned as of this dictation.     EMERGENCY DEPARTMENT COURSE and DIFFERENTIAL DIAGNOSIS/MDM:   Vitals:    Vitals:    12/15/20 1448   BP: 134/88   Pulse: 107   Resp: 16   Temp: 97.6 °F (36.4 °C)   TempSrc: Temporal   SpO2: 95%   Weight: 275 lb (124.7 kg)   Height: 5' 5\" (1.651 m) She is hyperglycemic and ordered subcu insulin. MDM  Patient is consuming fluids. She has mild dehydration. The patient is medically cleared for psychiatric services. CONSULTS:  IP CONSULT TO HOSPITALIST  IP CONSULT TO SOCIAL WORK  IP CONSULT TO ENDOCRINOLOGY  IP CONSULT TO DIABETES EDUCATOR  IP CONSULT TO PODIATRY    PROCEDURES:  Unless otherwise noted below, none     Procedures    FINAL IMPRESSION      1. Severe episode of recurrent major depressive disorder, without psychotic features ([facility]          DISPOSITION/PLAN   DISPOSITION Admitted 12/15/2020 06:05:56 PM      PATIENT REFERRED TO:  No follow-up provider specified.     DISCHARGE MEDICATIONS:  Current Discharge Medication List             (Please note that portions of this note were completed with a voice recognition program.  Efforts were made to edit the dictations but occasionally words are mis-transcribed.)    Rocco Denney DO (electronically signed)  Attending Emergency Physician          Rocco Denney DO  12/15/20 2035

## 2020-12-15 NOTE — ED NOTES
Pt has dry and superficial appearance cracked soles of feet. Pt educated of the importance of seeing podiatry.       Ruddy Molina RN  12/15/20 6636

## 2020-12-15 NOTE — PROGRESS NOTES
Pt arrived to unit. Pt oriented to unit surroundings, policies and services. Pt shown to room and advised of the need to complete admission assessment and sign consents. Pt denies any needs at this time. Pt noted to have areas on feet near heel area, podiatry consult noted.

## 2020-12-16 LAB
GLUCOSE BLD-MCNC: 224 MG/DL (ref 60–115)
GLUCOSE BLD-MCNC: 248 MG/DL (ref 60–115)
GLUCOSE BLD-MCNC: 290 MG/DL (ref 60–115)
GLUCOSE BLD-MCNC: 295 MG/DL (ref 60–115)
PERFORMED ON: ABNORMAL

## 2020-12-16 PROCEDURE — 6370000000 HC RX 637 (ALT 250 FOR IP): Performed by: INTERNAL MEDICINE

## 2020-12-16 PROCEDURE — 6370000000 HC RX 637 (ALT 250 FOR IP): Performed by: NURSE PRACTITIONER

## 2020-12-16 PROCEDURE — 6370000000 HC RX 637 (ALT 250 FOR IP): Performed by: STUDENT IN AN ORGANIZED HEALTH CARE EDUCATION/TRAINING PROGRAM

## 2020-12-16 PROCEDURE — 6370000000 HC RX 637 (ALT 250 FOR IP): Performed by: PSYCHIATRY & NEUROLOGY

## 2020-12-16 PROCEDURE — 99222 1ST HOSP IP/OBS MODERATE 55: CPT | Performed by: INTERNAL MEDICINE

## 2020-12-16 PROCEDURE — 1240000000 HC EMOTIONAL WELLNESS R&B

## 2020-12-16 PROCEDURE — 93010 ELECTROCARDIOGRAM REPORT: CPT | Performed by: INTERNAL MEDICINE

## 2020-12-16 PROCEDURE — 99223 1ST HOSP IP/OBS HIGH 75: CPT | Performed by: PSYCHIATRY & NEUROLOGY

## 2020-12-16 RX ORDER — DEXTROSE MONOHYDRATE 25 G/50ML
12.5 INJECTION, SOLUTION INTRAVENOUS PRN
Status: DISCONTINUED | OUTPATIENT
Start: 2020-12-16 | End: 2020-12-21 | Stop reason: HOSPADM

## 2020-12-16 RX ORDER — NICOTINE 21 MG/24HR
1 PATCH, TRANSDERMAL 24 HOURS TRANSDERMAL DAILY
Status: DISCONTINUED | OUTPATIENT
Start: 2020-12-16 | End: 2020-12-21 | Stop reason: HOSPADM

## 2020-12-16 RX ORDER — OMEGA-3-ACID ETHYL ESTERS 1 G/1
2 CAPSULE, LIQUID FILLED ORAL 2 TIMES DAILY
Status: DISCONTINUED | OUTPATIENT
Start: 2020-12-16 | End: 2020-12-21 | Stop reason: HOSPADM

## 2020-12-16 RX ORDER — CLONIDINE HYDROCHLORIDE 0.1 MG/1
0.1 TABLET ORAL 2 TIMES DAILY
Status: DISCONTINUED | OUTPATIENT
Start: 2020-12-16 | End: 2020-12-17

## 2020-12-16 RX ORDER — PRENATAL VIT 91/IRON/FOLIC/DHA 28-975-200
COMBINATION PACKAGE (EA) ORAL 2 TIMES DAILY
Status: DISCONTINUED | OUTPATIENT
Start: 2020-12-16 | End: 2020-12-21 | Stop reason: HOSPADM

## 2020-12-16 RX ORDER — VENLAFAXINE HYDROCHLORIDE 37.5 MG/1
37.5 CAPSULE, EXTENDED RELEASE ORAL
Status: DISCONTINUED | OUTPATIENT
Start: 2020-12-16 | End: 2020-12-21 | Stop reason: HOSPADM

## 2020-12-16 RX ORDER — GLYCOPYRROLATE 1 MG/1
1 TABLET ORAL 3 TIMES DAILY
Status: DISCONTINUED | OUTPATIENT
Start: 2020-12-16 | End: 2020-12-21 | Stop reason: HOSPADM

## 2020-12-16 RX ORDER — TRAZODONE HYDROCHLORIDE 50 MG/1
50 TABLET ORAL NIGHTLY
Status: DISCONTINUED | OUTPATIENT
Start: 2020-12-16 | End: 2020-12-16 | Stop reason: SDUPTHER

## 2020-12-16 RX ORDER — ATORVASTATIN CALCIUM 20 MG/1
20 TABLET, FILM COATED ORAL DAILY
Refills: 3 | Status: DISCONTINUED | OUTPATIENT
Start: 2020-12-16 | End: 2020-12-21 | Stop reason: HOSPADM

## 2020-12-16 RX ORDER — INSULIN GLARGINE 100 [IU]/ML
50 INJECTION, SOLUTION SUBCUTANEOUS NIGHTLY
Status: DISCONTINUED | OUTPATIENT
Start: 2020-12-16 | End: 2020-12-17

## 2020-12-16 RX ORDER — FENOFIBRATE 54 MG/1
54 TABLET ORAL DAILY
Status: DISCONTINUED | OUTPATIENT
Start: 2020-12-16 | End: 2020-12-21 | Stop reason: HOSPADM

## 2020-12-16 RX ORDER — DEXTROSE MONOHYDRATE 50 MG/ML
100 INJECTION, SOLUTION INTRAVENOUS PRN
Status: DISCONTINUED | OUTPATIENT
Start: 2020-12-16 | End: 2020-12-21 | Stop reason: HOSPADM

## 2020-12-16 RX ORDER — NICOTINE POLACRILEX 4 MG
15 LOZENGE BUCCAL PRN
Status: DISCONTINUED | OUTPATIENT
Start: 2020-12-16 | End: 2020-12-21 | Stop reason: HOSPADM

## 2020-12-16 RX ADMIN — VENLAFAXINE HYDROCHLORIDE 37.5 MG: 37.5 CAPSULE, EXTENDED RELEASE ORAL at 10:47

## 2020-12-16 RX ADMIN — ATORVASTATIN CALCIUM 20 MG: 20 TABLET, FILM COATED ORAL at 10:47

## 2020-12-16 RX ADMIN — OMEGA-3-ACID ETHYL ESTERS 2 G: 1 CAPSULE, LIQUID FILLED ORAL at 20:44

## 2020-12-16 RX ADMIN — INSULIN LISPRO 6 UNITS: 100 INJECTION, SOLUTION INTRAVENOUS; SUBCUTANEOUS at 12:01

## 2020-12-16 RX ADMIN — INSULIN LISPRO 6 UNITS: 100 INJECTION, SOLUTION INTRAVENOUS; SUBCUTANEOUS at 18:44

## 2020-12-16 RX ADMIN — TERBINAFINE HYDROCHLORIDE: 1 CREAM TOPICAL at 20:48

## 2020-12-16 RX ADMIN — GLYCOPYRROLATE 1 MG: 1 TABLET ORAL at 20:45

## 2020-12-16 RX ADMIN — OMEGA-3-ACID ETHYL ESTERS 2 G: 1 CAPSULE, LIQUID FILLED ORAL at 12:07

## 2020-12-16 RX ADMIN — INSULIN GLARGINE 50 UNITS: 100 INJECTION, SOLUTION SUBCUTANEOUS at 22:28

## 2020-12-16 RX ADMIN — HYDROXYZINE PAMOATE 50 MG: 50 CAPSULE ORAL at 16:31

## 2020-12-16 RX ADMIN — BREXPIPRAZOLE 1 MG: 1 TABLET ORAL at 10:47

## 2020-12-16 RX ADMIN — CLONIDINE HYDROCHLORIDE 0.1 MG: 0.1 TABLET ORAL at 10:47

## 2020-12-16 RX ADMIN — HYDROXYZINE PAMOATE 50 MG: 50 CAPSULE ORAL at 08:49

## 2020-12-16 RX ADMIN — FENOFIBRATE 54 MG: 54 TABLET ORAL at 12:07

## 2020-12-16 RX ADMIN — CLONIDINE HYDROCHLORIDE 0.1 MG: 0.1 TABLET ORAL at 20:44

## 2020-12-16 RX ADMIN — INSULIN LISPRO 6 UNITS: 100 INJECTION, SOLUTION INTRAVENOUS; SUBCUTANEOUS at 09:22

## 2020-12-16 NOTE — CONSULTS
[address] MEDICINE    HISTORY AND PHYSICAL EXAM    PATIENT NAME:  Susan Graham    MRN:  [id number]  SERVICE DATE:  12/16/2020   SERVICE TIME:  11:28 AM    Primary Care Physician: Svetlana Alaniz DO         SUBJECTIVE  CHIEF COMPLAINT:  Medically appropriate for inpatient psychiatry admission. Consult for medical H/P encounter. HPI:  This is a 36 y.o. female with PMHx of bipolar depression, DMII, HLD and remote drug abuse who presented to emergency room with increased depression and suicidal ideations. Patient also reports poorly controlled blood sugars. Recently placed on insulin however, patient blood sugars have still been in the 250 range. She also reports vaginal itching and yeast infection as well as fungal infection on feet. Patient cleared from emergency room for psychiatric care. Patient Seen, Chart, Labs, Radiologystudies, and Consults reviewed. Patient denies headache, chest pain, shortness of breath, N/V/D/C, fever/chills.        PAST MEDICAL HISTORY:    Past Medical History:   Diagnosis Date    B[facility] disorder ([facility]     Depression     DM (diabetes mellitus) ([facility]     History of drug abuse ([facility]     positive drug screens 9/22/14 and 5/18/14    Hyperlipidemia     Osteoarthritis      PAST SURGICAL HISTORY:    Past Surgical History:   Procedure Laterality Date    CHOLECYSTECTOMY, LAPAROSCOPIC N/A 2/28/2017    CHOLECYSTECTOMY LAPAROSCOPIC WITH GRAMS POSS OPEN , RECENT LABS  [facility]  [address] [place] [address] N/A 11/27/2018    COLONOSCOPY ROOM 384 performed by Lukas Zaragoza MD at [address]:    Family History   Problem Relation Age of Onset    Other Mother         CHF    Cirrhosis Brother     Other Maternal Grandmother         CHF     SOCIAL HISTORY:    Social History     Socioeconomic History    Marital status: Single     Spouse name: Not on file  Number of children: Not on file    Years of education: Not on file    Highest education level: Not on file   Occupational History    Not on file   Social Needs    Financial resource strain: Not on file    Food insecurity     Worry: Not on file     Inability: Not on file    Transportation needs     Medical: Not on file     Non-medical: Not on file   Tobacco Use    Smoking status: Current Every Day Smoker     Packs/day: 1.00     Years: 24.00     Pack years: 24.00     Types: Cigarettes    Smokeless tobacco: Never Used   Substance and Sexual Activity    Alcohol use: No    Drug use: Not Currently     Types: Marijuana    Sexual activity: Yes   Lifestyle    Physical activity     Days per week: Not on file     Minutes per session: Not on file    Stress: Not on file   Relationships    Social connections     Talks on phone: Not on file     Gets together: Not on file     Attends Gnosticist service: Not on file     Active member of club or organization: Not on file     Attends meetings of clubs or organizations: Not on file     Relationship status: Not on file    Intimate partner violence     Fear of current or ex partner: Not on file     Emotionally abused: Not on file     Physically abused: Not on file     Forced sexual activity: Not on file   Other Topics Concern    Not on file   Social History Narrative    Not on file     MEDICATIONS:    Current Facility-Administered Medications   Medication Dose Route Frequency Provider Last Rate Last Admin    nicotine (NICODERM CQ) 21 MG/24HR 1 patch  1 patch Transdermal Daily Jaylene Vaughn MD   1 patch at 12/16/20 0846    influenza quadrivalent split vaccine (FLUZONE;FLUARIX;FLULAVAL;AFLURIA) injection 0.5 mL  0.5 mL Intramuscular Prior to discharge Jaylene Vaughn MD        brexpiprazole (REXULTI) tablet 1 mg  1 mg Oral Daily Jaylene Vaughn MD   1 mg at 12/16/20 2341 CONSTITUTIONAL:  awake, alert, cooperative, no apparent distress, and appears stated age  EYES:  Lids and lashes normal, pupils equal, round and reactive to light, extra ocular muscles intact, sclera clear, conjunctiva normal  ENT:  Normocephalic, without obvious abnormality, atraumatic, sinuses nontender on palpation, external ears without lesions, oral pharynx with moist mucus membranes, tonsils without erythema or exudates, gums normal and good dentition. NECK:  Supple, symmetrical, trachea midline, no adenopathy, thyroid symmetric, not enlarged and no tenderness, skin normal  LUNGS:  No increased work of breathing, good air exchange, clear to auscultation bilaterally, no crackles or wheezing  CARDIOVASCULAR:  Normal apical impulse, regular rate and rhythm, normal S1 and S2, no S3 or S4, and no murmur noted  ABDOMEN:  No scars, normal bowel sounds, soft, non-distended, non-tender, no masses palpated, no hepatosplenomegally  MUSCULOSKELETAL:  There is no redness, warmth, or swelling of the joints. Full range of motion noted. Motor strength is 5 out of 5 all extremities bilaterally. Tone is normal.  NEUROLOGIC:  Awake, alert, oriented to name, place and time. Cranial nerves II-XII are grossly intact. Motor is 5 out of 5 bilaterally. Sensory is intact.  gait is normal.  SKIN:  no bruising or bleeding, normal skin color, texture, turgor, no redness, warmth, or swelling and no jaundice    DATA:     Diagnostic tests reviewed for today's visit:    Most recent labs and imaging results reviewed. VTE Prophylaxis:     ASSESSMENT AND PLAN  Active Problems:    Major depressive disorder, recurrent ([address]  Plan: Patient admitted to behavorial health for evaluation and treatment     DMII with hyperglycemia: A1c 11.1.   Endocrinology consulted, continue 20 units Lantus nightly, 6 units lispro with meals and ISS, hypoglycemia protocol POCT Glucose TIDAC & QHS Candida vaginitis: Patient received 200 mg Diflucan in the emergency room. Will order 2 additional doses. Tinea pedis: Terbinafine cream, miconazole powder. Patient educated on proper foot care. Hyperlipidemia: Continue atorvastatin add TriCor and omega fatty acids. This is only a history and physical examination and not medical management. The patient is to contact and follow up with their primary care physician and go over any abnormal labs, imaging, findings, medical concerns, or conditions that we have and have not addressed during this encounter.     Plan of care discussed with: patient    SIGNATURE: NAOMI Barnes CNP  DATE: December 16, 2020  TIME: 11:28 AM

## 2020-12-16 NOTE — CARE COORDINATION
Pt. Calm, cooperative, and pleasant. Denies all.  6/10 depression and 2/10 anxiety after taking Vistaril. Bright and pleasant. Social with peers and good eye contact. Reports good sleep and appetite.

## 2020-12-16 NOTE — CARE COORDINATION
Pt approached writer to get her clothes. Writer provided pt with her shirt and informed her that her sweatpants had a string in them so we are unable to provide them to her. Pt requested the string to be cut out so she can wear them. Writer cut out the string from the pants as requested by pt.  Electronically signed by CARMEN Hudson on 12/16/2020 at 9:57 AM

## 2020-12-16 NOTE — CONSULTS
PODIATRIC MEDICINE AND SURGERY  CONSULT HISTORY AND PHYSICAL    Consulting Service:  Primary  Requesting Provider: Dr. Marcelino Evangelista regarding: Hell fissure B/L, diabetic. Staff Doctor:  Dr. Salter Embs:  36 y.o. female with PMH significant for Bipolar disorder, Depression, DM(A1c 11.1 on 11/7/2020) , HLD for who podiatry was consulted for Fissuring of heel. In the setting of uncontrolled diabetes it is not unreasonable to believe that this is the cause for dry or fissured heels. Patient also admitted very sweaty feet, that are often moist throughout the day. It is possible she has concurrent tinea pedis infection in a moccasin distribution at this time. The dry scaly skin is mostly at the heel and sides of the feet, with supple, moist skin on the plantar aspect. PLAN AND RECOMMENDATIONS[de-identified]  Patient's case  discussed with staff, Dr. Ronnell Alcantara, who will provide final recommendations going forward  Wound care: Terbinafine cream 1%  to be applied to feet BID after drying and out of a shower  Recommend to use cotton socks to change 2x daily. Keep feet dry as much as possible, wear light breathable shoes. Can use antifungal powder to help decrease the amount of moisture in feet. When out of shower dry feet immediately and in between toes. May need to switch to a urea or ammonium lactate based cream if no improvement is seen. WB as tolerated to B/L feet  Agree with consult for diabetes educator  Patient would benefit from smoking cessation information. Patient would benefit from podiatric care as an outpatient to establish care to deter from future problems. Podiatry to peripherally follow. Please advise if questions arise. Patient will need follow up with Dr. Ronnell Alcantara within 7-10 days of discharge. HPI: This 36y.o. year old female was seen today for B/L fissures of the heel. Patient presented to the ED with complaints of compression and uncontrolled diabetes. Patient has had problems controlling her sugars since being diagnosed and has had previous passive thoughts of suicide or death. Per social work note patient has stopped her diabetic medication and psychiatric meds as well. Podiatry was consulted for fissured heels B/L. Patient Patient states that she has had the fissures in her feet for a couple of months. Patient states she also notices her feet are often moist and believes it is due to her anxiety. Patient currently denies any numbness, tingling, burning sensations to her extremities at this current time. Patient denies having any wounds or previous open ulcerations of her feet. Patient denies any symptoms of intermitted claudication or previous DVTs. Patients states she has used OTC cream but to no avail. Patient would like information for smoking cessation. Patient said she feels better currently with improved mood. Patient denies nausea, vomiting, diarrhea, fevers, chills, chest pain, shortness of breath, head ache, or calf pain. No other pedal complaints. Past Medical History:   Diagnosis Date    Bipolar disorder ([address]     Depression     DM [facility]     History of drug abuse ([facility]     positive drug screens 9/22/14 and 5/18/14    Hyperlipidemia     Osteoarthritis        Past Surgical History:   Procedure Laterality Date    CHOLECYSTECTOMY, LAPAROSCOPIC N/A 2/28/2017    CHOLECYSTECTOMY LAPAROSCOPIC WITH GRAMS POSS OPEN , RECENT LABS  performed by Vale Paredes MD at [address] [place] [address] SCRN NOT  [address] [place] [address] N/A 11/27/2018    COLONOSCOPY ROOM 384 performed by Ebony Lin MD at [facility]         No current facility-administered medications on file prior to encounter. Current Outpatient Medications on File Prior to Encounter   Medication Sig Dispense Refill    insulin lispro, 1 Unit Dial, (HUMALOG KWIKPEN) 100 UNIT/ML SOPN Inject 6 Units into the skin 3 times daily (before meals) 5 pen 03    insulin glargine (LANTUS SOLOSTAR) 100 UNIT/ML injection pen Inject 20 Units into the skin nightly 6 pen 11    Insulin Pen Needle (NOVOFINE) 32G X 6 MM MISC 1 Device by Does not apply route 4 times daily (before meals and nightly) 300 each 3    blood glucose monitor kit and supplies 1 kit by Other route 4 times daily (before meals and nightly) Pt test 4x daily Dx E11.65. May substitute for generic or insurance covered product 1 kit 0    blood glucose monitor strips 1 strip by Other route 4 times daily (before meals and nightly) Pt test 4x daily Dx E11.65. May substitute for generic or insurance covered product 150 strip 3    FreeStyle Lancets MISC 1 each by Does not apply route 4 times daily (before meals and nightly) 300 each 3    Continuous Blood Gluc Sensor (FREESTYLE [place] 14 DAY SENSOR) MISC 2 Devices by Does not apply route every 14 days 2 each 3    Continuous Blood Gluc  (FREESTYLE [place] 14 DAY READER) KATARINA 1 Device by Does not apply route 4 times daily (before meals and nightly) 1 Device 0    miconazole (MICOTIN) 2 % powder Apply topically 2 times daily.  45 g 1    Dulaglutide (TRULICITY) 1.5 WF/1.1OW SOPN Inject 1.5 mg into the skin once a week 4 pen 3    venlafaxine (EFFEXOR XR) 37.5 MG extended release capsule Take 1 capsule by mouth daily (with breakfast) 15 capsule 3    Brexpiprazole (REXULTI PO) Take 3 mg by mouth daily      traZODone (DESYREL) 50 MG tablet Take 1 tablet by mouth nightly (Patient taking differently: Take 50 mg by mouth nightly as needed ) 15 tablet 2    simvastatin (ZOCOR) 20 MG tablet Take 1 tablet by mouth every evening 30 tablet 3       Allergies   Allergen Reactions    Latuda [Lurasidone Hcl] Hives    Lurasidone Hives  Adhesive Tape        Family History   Problem Relation Age of Onset    Other Mother         CHF    Cirrhosis Brother     Other Maternal Grandmother         CHF       Social History     Socioeconomic History    Marital status: Single     Spouse name: Not on file    Number of children: Not on file    Years of education: Not on file    Highest education level: Not on file   Occupational History    Not on file   Social Needs    Financial resource strain: Not on file    Food insecurity     Worry: Not on file     Inability: Not on file    Transportation needs     Medical: Not on file     Non-medical: Not on file   Tobacco Use    Smoking status: Current Every Day Smoker     Packs/day: 1.00     Years: 24.00     Pack years: 24.00     Types: Cigarettes    Smokeless tobacco: Never Used   Substance and Sexual Activity    Alcohol use: No    Drug use: Not Currently     Types: Marijuana    Sexual activity: Yes   Lifestyle    Physical activity     Days per week: Not on file     Minutes per session: Not on file    Stress: Not on file   Relationships    Social connections     Talks on phone: Not on file     Gets together: Not on file     Attends Sikh service: Not on file     Active member of club or organization: Not on file     Attends meetings of clubs or organizations: Not on file     Relationship status: Not on file    Intimate partner violence     Fear of current or ex partner: Not on file     Emotionally abused: Not on file     Physically abused: Not on file     Forced sexual activity: Not on file   Other Topics Concern    Not on file   Social History Narrative    Not on file       Review of Systems  CONSTITUTIONAL: No fevers, chills, diaphoresis  HEENT: No epistaxis, tinnitus  EYES: No diplopia, blurry vision.   CARDIOVASCULAR:  No chest pain, palpitations, lower extremity edema  PULM: No dyspnea, tachypnea, wheezing  GI: No nausea, vomiting, constipation, diarrhea : No dysuria, gross hematuria, or pyuria  NEURO:  No new balance problems, peripheral weakness, paresthesias, numbness  MSK: N pain  PSY:  concerns regarding depression, anxiety  INTEGUMENTARY: No open skin lesion to feet, but does have dry fissured skin    OBJECTIVE:  BP (!) 143/95   Pulse 96   Temp 97 °F (36.1 °C) (Oral)   Resp 20   Ht 5' 5\" (1.651 m)   Wt 275 lb (124.7 kg)   SpO2 96%   BMI 45.76 kg/m²   Patient is alert and oriented to person, place, and time    Vascular:   Palpable Dorsalis Pedis and Palpable Posterior Tibial Pulses B/L   Capillary Fill time < 3 seconds to B/L digits  Skin temperature warm to warm tibial tuberosity to the digits B/L  Hair growth present to digits  mild non pitting edema  No varicosities     Neurological:   Sensation to light touch intact B/L  Protective sensation via monofilament testing intact B/L    Musculoskeletal/Orthopaedic:   Structural Deformities: None noted  5/5 muscle strength Dorsiflexion, Plantarflexion, Inversion, Eversion B/L  No tenderness on palpation of the fissured or cracked area    Dermatological:   Skin appears well hydrated and supple with good temperature, texture, turgor on the dorsum of the foot B/L. The heel posterior and planter, is scaly, dry, with fissuring on the B/L, non of the fissures are deep enough with drainage to be worried about infection currently  Sub 5th met L hyperkeratotic lesions noted  Nails 1-5 B/L appear currently painted, normal thickness and length  Interspaces 1-4 B/L are clear and without debris      Media Information       Document Information    Wound   Right heel   12/16/2020 11:08   Attached To: Hospital Encounter on 12/15/20   Source Information    [place], [place]  [facility]     Media Information       Document Information    Wound   Left heel   12/16/2020 11:08   Attached To:    Hospital Encounter on 12/15/20   Source [address] [place] [address]  [facility]           LABS:   Lab Results Component Value Date    WBC 11.8 (H) 12/15/2020    HGB 16.4 (H) 12/15/2020    HCT 49.4 (H) 12/15/2020    MCV 84.3 12/15/2020     12/15/2020     Lab Results   Component Value Date     12/15/2020    K 3.3 12/15/2020    K 4.1 11/10/2020    CL 95 12/15/2020    CO2 25 12/15/2020    BUN 12 12/15/2020    CREATININE 0.67 12/15/2020    GLUCOSE 462 12/15/2020    CALCIUM 9.4 12/15/2020      Lab Results   Component Value Date    LABALBU 4.4 12/15/2020     No results found for: [place]  No results found for: CRP  Lab Results   Component Value Date    LABA1C 11.1 (H) 11/07/2020           Vitaly Sanchez DPM PGY-1  Podiatric Surgery Resident  Podiatry On Call Pager: [phone]  December 16, 2020  11:40 AM     The resident/student was under my direct supervision during today's patient encounter. reflection of my personal examination, assessment and treatment plan. The patient was seen and examined with the resident/student. The above findings and recommendations were reviewed and I agree with above treatment plan. Any questions or concerns, please Dr. Mario Coleman or podiatry resident on call.     Ibis Isaac DPM  Podiatry Attending  12/16/20  1:09 PM

## 2020-12-16 NOTE — CARE COORDINATION
[facility] Biopsychosocial Assessment    Current Level of Psychosocial Functioning     Independent   Dependent    Minimal Assist x     Comments:    Patient is . Patient is unemployed, Patient receives SSI, medicaid and foodstamps. Patient was linked to [facility] for her mental health needs. Patient would like to be linked with the [facility]. Psychosocial High Risk Factors (check all that apply)    Unable to obtain meds   Chronic illness/pain    Substance abuse   Lack of Family Support   Financial stress  x  Isolation   Inadequate Community Resources   Suicide attempt(s)  Not taking medications x  Victim of crime   Developmental Delay  Unable to manage personal needs    Age 72 or older   Homeless  No transportation   Readmission within 30 days  Unemployment x  Traumatic Event  Patient has at least 3 high risk factors. Psychiatric Advanced Directive:BEVERLY    Family to involve in treatment:     Sexual Orientation:  Patient is in a heterosexual marriage    Patient Strengths: Patient has a positive support system and wants to be linked with an outside provider for her mental health needs. Patient Barriers: symptoms    Opiate education provided:n/a    Safety plan:compleed    CMHC/MH history:  Per ER notes, patient had previous admission to  in June 2020. Per ER notes, Patient was hospitalized in 2013 for attempted suicide. Plan of Care:  medication management, group/individual therapies, family meetings, psycho -education, treatment team meetings to assist with stabilization    Initial Discharge Plan: To return to the home she shares with her . To be connected with the [facility]    Clinical Summary:  Patient is a 36year old female who presented to the ER with depression. Per ER notes, patient also had fleeting suicidal thoughts but no plan to act on the them. Patient was forthcoming about her depression and SI during this assessment. Patient reports feeling depressed for the past few weeks. .Patient was linked to [facility] for her mental health needs. Patient reported she missed too many scheduled appointments, and as a result, she was \"kicked out\" of the program. Patient hopes to get connected with the [facility]. Patient reports that she has stopped taking her blood sugar medication. In addition, she has stopped taking her psychiatric medication and believes this is why she is feeling more depressed. Patient was recently approved for SSII and medicaid and wants to resume taking her medications. Patient has a positive support system.

## 2020-12-16 NOTE — GROUP NOTE
Group Therapy Note    Date: 12/16/2020    Group Start Time: 1630  Group End Time: 1700  Group Topic: Healthy Living/Wellness    [facility]    Eliana Crump        Group Therapy Note    Attendees: 10         Patient's Goal:  Attend & participate Healthy Living/Wellness group     Notes: Minimal participation    Status After Intervention:  Unchanged    Participation Level:  Active Listener    Participation Quality: Appropriate      Speech:  normal      Thought Process/Content: Logical      Affective Functioning: Congruent      Mood: euthymic      Level of consciousness:  Alert, Oriented x4 and Attentive      Response to Learning: Able to verbalize current knowledge/experience      Endings: None Reported    Modes of Intervention: Support      Discipline Responsible: [address], Cell Therapy      Signature:  Eliana Crump

## 2020-12-16 NOTE — PROGRESS NOTES
Patient did not attend group despite staff encouragement due to being asleep for the night.  Electronically signed by Marky Clark on 12/15/2020 at 10:27 PM

## 2020-12-16 NOTE — PROGRESS NOTES
Pt. attended the 0900 community meeting. Electronically signed by Candelario Lozano [address] on 12/16/2020 at 9:59 AM

## 2020-12-16 NOTE — PROGRESS NOTES
Cooperative with admission assessment/ unit consents. Pt reports admission d/t depression and anxiety. Pt states she has not been taking her psych meds and is feeling overwhelmed with medical concerns (uncontrolled diabetes). Denies current SI, HI, or AV hallucinations. Reports intense anxiety since childhood. Pt reports possible childhood trauma and but states she only remembers bits and pieces. Pt reports increase in sleep, typically sleeping 8-10 hours per night but more recently 6 hours per night. Reports good appetite but with limited access to food d/t financial constraints. Pt lives with  and states he is supportive. Pt's goal for this admission is to get her diabetes under control and to get reconnected with mental health services. Pt states she was dismissed from [facility] d/t not going to multiple appointments and is interested in connecting with [facility]. Pt requesting trazodone for sleep. EKG completed, new orders received for PRN trazodone.

## 2020-12-16 NOTE — PROGRESS NOTES
Pt. refused to attend the 1000 skills group, despite staff encouragement. Electronically signed by Clara Garcia, [address] on 12/16/2020 at 11:52 AM

## 2020-12-16 NOTE — PROGRESS NOTES
Patient was laying in bed in her room. She was awake and alert. She had a sad affect, was tearful and was worrisome but she was cooperative. Patient stated she is very depressed, stressed, overwhelmed and frustrated. Major stressors are finances. She has a lot of repairs she needs to do but can't afford to fix them. She feels bad that the holidays are coming and she can't buy any gifts. She doesn't have a lot of food at home. She stated she got approved for disability but they keep asking for more paperwork and she has not gotten any money. She has been non compliant with her medication. She stated her diabetes is out of control. She has a passive death wish and wishes she would never wake up. She lack motivation and has no leisure interests.  Electronically signed by David Chatman, [address] on 12/16/2020 at 8:53 AM

## 2020-12-16 NOTE — SUICIDE SAFETY PLAN
SAFETY PLAN    A suicide Safety Plan is a document that supports someone when they are having thoughts of suicide. Warning Signs that indicate a suicidal crisis may be developing: What (situations, thoughts, feelings, body sensations, behaviors, etc.) do you experience that lets you know you are beginning to think about suicide? 1. Anxious  2. Seclude myself  3. Neglect personal hygiene     Internal Coping Strategies:  What things can I do (relaxation techniques, hobbies, physical activities, etc.) to take my mind off my problems without contacting another person? 1. Watch videos  2. Breathing   3. Guided meditation    People and social settings that provide distraction: Who can I call or where can I go to distract me? 1. Name:  Phone: [phone]  2. Name: son             # in phone  3. Place:  beach         4. Place:daughter's house    People whom I can ask for help: Who can I call when I need help - for example, friends, family, clergy, someone else? 1. Name:          Phone: [phone]  2. Name: Cristian Maxwell    Phone: # in phone  3. Name: Dr. Shannon Miller     Phone: # in phone phone    Professionals or [address] [place] [address] I can contact during a crisis:     1. Suicide Prevention Lifeline: 7-818-095-TALK (5868)  2. [address] [place] [address] Emergency Services -  for example, [facility] suicide hotline, [facility] Hotline: 201   Emergency Services Address: [address] [facility]   Emergency Services Phone: [phone]    Making the environment safe: How can I make my environment (house/apartment/living space) safer? For example, can I remove guns, medications, and other items? 1. Taking medication on time  2.  Locking doors at the house

## 2020-12-16 NOTE — H&P
[address] [place] [address] - Department of Psychiatry    History and Physical - Adult     CHIEF COMPLAINT:  Depression SI    History obtained from:  patient    Patient was seen after discussing with the treatment team and reviewing the chart        CIRCUMSTANCES OF ADMISSION:     brought to the ED by  due to having increased depression and anxiety. Pt having thoughts of SI ideations. Pt has no plan. Pt would like to go to sleep and not wake up. Pt has had attempt in of suicide in 2013 with tramadol and was hospitalized. Pt also states that she has a past attempt this summer taking vistaril. Pt is sad and tearful. Pt stating no food too eat in the house. Pt also stating that holidays are hard due to no money for gifts for new grandchild. Pt stating that she has had a yeast infection and is having a tough time dealing with her diabetes \"I'm just tired of it all\". Pt states that \"If I could get back on my mental health meds that would help with dealing with my diabetes\". Pt had previous admission to  in June. Pt negative for alcohol and tox negative. HISTORY OF PRESENT ILLNESS:      The patient is a 36 y.o. female with significant past history of bipolar  Pt lives with . Pt was just approved for disability recently and is just awaiting to start receiving.      Pt was discharged in June 2020, after few weeks of treatment she fell off follow up appointment  Pt has been feeling depressed for few months since stopping meds in August.  Pt was taking effexor, rexulti, clonidine, trazodone    Duration: few months  Severity: Rating mood to be around 2/10 (10- good)  Quality:melancholic  Worse in the morning  Content: Hopeless, worthless and helpless feeling  Suicidal thoughts - better off dead, not want to wake up, thought of taking overdose  Associated symptoms:  Poor concentration, anhedonia, decrease motivation  Sleep and appetite- poor Stressors:Physical health, poorly controlled DM, Saw Dr Evgeny Bae in the past, financial issues, fighting for disability- got approved for disability, not seen any money yet, asking for more paperwork    The patient is not currently receiving care for the above psychiatric illness. Medications Prior to Admission:   Medications Prior to Admission: insulin lispro, 1 Unit Dial, (HUMALOG KWIKPEN) 100 UNIT/ML SOPN, Inject 6 Units into the skin 3 times daily (before meals)  insulin glargine (LANTUS SOLOSTAR) 100 UNIT/ML injection pen, Inject 20 Units into the skin nightly  Insulin Pen Needle (NOVOFINE) 32G X 6 MM MISC, 1 Device by Does not apply route 4 times daily (before meals and nightly)  blood glucose monitor kit and supplies, 1 kit by Other route 4 times daily (before meals and nightly) Pt test 4x daily Dx E11.65. May substitute for generic or insurance covered product  blood glucose monitor strips, 1 strip by Other route 4 times daily (before meals and nightly) Pt test 4x daily Dx E11.65. May substitute for generic or insurance covered product  FreeStyle Lancets MISC, 1 each by Does not apply route 4 times daily (before meals and nightly)  Continuous Blood Gluc Sensor (FREESTYLE [place] 14 DAY SENSOR) MISC, 2 Devices by Does not apply route every 14 days  Continuous Blood Gluc  (FREESTYLE [place] 14 DAY READER) KATARINA, 1 Device by Does not apply route 4 times daily (before meals and nightly)  miconazole (MICOTIN) 2 % powder, Apply topically 2 times daily.   Dulaglutide (TRULICITY) 1.5 RA/2.9YB SOPN, Inject 1.5 mg into the skin once a week  venlafaxine (EFFEXOR XR) 37.5 MG extended release capsule, Take 1 capsule by mouth daily (with breakfast)  Brexpiprazole (REXULTI PO), Take 3 mg by mouth daily  traZODone (DESYREL) 50 MG tablet, Take 1 tablet by mouth nightly (Patient taking differently: Take 50 mg by mouth nightly as needed )  simvastatin (ZOCOR) 20 MG tablet, Take 1 tablet by mouth every evening Compliance:no    Psychiatric Review of Systems       Depression: yes     Jessica or Hypomania:  yes - mood swings, labile and irritable     Panic Attacks:  yes      Phobias:  no     Obsessions and Compulsions:  no     PTSD : no     Hallucinations:  no     Delusions:  no    Substance Abuse History:  ETOH: no   Marijuana: no  Opiates: no  Other Drugs: no      Past Psychiatric History:  She has a history of Bipolar disorder, diagnosed 10 years ago. She was previously admitted to , and her last admission was in June 2020. She had suicide attempt in the past, twice by overdose. She is seeing a provider at [facility]. She was on Lithium in the past, but she said it had been discontinued because of ?side effects vs. Toxicity.     Past Medical History:        Diagnosis Date    [facility] ([facility]     Depression     DM (diabetes mellitus) ([facility]     History of drug abuse ([facility]     positive drug screens 9/22/14 and 5/18/14    Hyperlipidemia     Osteoarthritis        Past Surgical History:        Procedure Laterality Date    CHOLECYSTECTOMY, LAPAROSCOPIC N/A 2/28/2017    CHOLECYSTECTOMY LAPAROSCOPIC WITH GRAMS POSS OPEN , RECENT LABS  performed by Charlie Zaman MD at [address] [place] [address] S[facility]N NOT  [address] [place] [address] N/A 11/27/2018    COLONOSCOPY ROOM 384 performed by Leilani Moreno MD at [facility]         Allergies:   Over[place] Orn hcl], Lurasidone, and Adhesive tape    Family History  Family History   Problem Relation Age of Onset    Other Mother         CHF    Cirrhosis Brother     Other Maternal Grandmother         CHF         Social History: She was born in [facility], and raised in the [address]. She has a 10th grade education. She said she had a \"hard time focusing\" when she was in school. She is . She has 4 children. She lives with her ; two children are now living with her (the third one, the 20 yo, she said she \"kicked out\" now). She is unemployed, has no income. 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     EXAMINATION:    REVIEW OF SYSTEMS:    ROS:  [x] All negative/unchanged except if checked.  Explain positive(checked items) below:  [] Constitutional  [] Eyes  [] Ear/Nose/Mouth/Throat  [] Respiratory  [] CV  [] GI  []   [] Musculoskeletal  [] Skin/Breast  [] Neurological  [] Endocrine  [] Heme/Lymph  [] Allergic/Immunologic    Explanation:     Vitals:  /88   Pulse 107   Temp 97.6 °F (36.4 °C) (Temporal)   Resp 16   Ht 5' 5\" (1.651 m)   Wt 275 lb (124.7 kg)   SpO2 95%   BMI 45.76 kg/m²      Neurologic Exam:   Muscle Strength & Tone: full ROM  Gait: normal gait   Involuntary Movements: No    Mental Status Examination:    Level of consciousness:  within normal limits   Appearance:  ill-appearing  Behavior/Motor:  psychomotor retardation  Attitude toward examiner:  cooperative  Speech:  slow   Mood: constricted, decreased range and depressed  Affect:  blunted  Thought processes:  slow   Thought content:  Suicidal Ideation:  active  Cognition:  oriented to person, place, and time   Concentration poor  Memory intact  Insight poor   Judgement poor   Fund of Knowledge adequate    Mini Mental Status 30/30      DIAGNOSIS:     Bipolar depression Severe   ELLIE      RISK ASSESSMENT:    SUICIDE RISK ASSESSMENT: high  HOMICIDE: low  AGITATION/VIOLENCE: low  ELOPEMENT: low    LABS: REVIEWED TODAY:  Recent Labs     12/15/20  1514   WBC 11.8*   HGB 16.4*        Recent Labs     12/15/20  1514   *   K 3.3*   CL 95   CO2 25   BUN 12   CREATININE 0.67   GLUCOSE 462*     Recent Labs     12/15/20 1514   BILITOT 0.3   ALKPHOS 123   AST 46*   ALT 45*     Lab Results   Component Value Date    LABAMPH Neg 12/15/2020    BARBSCNU Neg 12/15/2020    LABBENZ Neg 12/15/2020    LABMETH Neg 12/15/2020    OPIATESCREENURINE Neg 12/15/2020    PHENCYCLIDINESCREENURINE Neg 12/15/2020    ETOH <10 12/15/2020     Lab Results   Component Value Date    TSH 1.410 12/15/2020     Lab Results   Component Value Date    LITHIUM 0.5 (L) 02/08/2019     No results found for: VALPROATE, CBMZ  Lab Results   Component Value Date    LITHIUM 0.5 02/08/2019       FURTHER LABS ORDERED :      Radiology   No results found. EKG: TRACING REVIEWED    TREATMENT PLAN:    Risk Management:  close watch and suicide risk    Collateral Information:  Will obtain collateral information from the family or friends. Will obtain medical records as appropriate from out patient providers  Will consult the hospitalist for a physical exam to rule out any co-morbid physical condition. Home medication Reconciled       New Medications started during this admission :    See orders  Prn Haldol 5mg and Vistaril 50mg q6hr for extreme agitation. Trazodone as ordered for insomnia  Vistaril as ordered for anxiety  Discussed with the patient risk, benefit, alternative and common side effects for the  proposed medication treatment. Patient is consenting to the treatment. Psychotherapy:   Encourage participation in milieu and group therapy  Individual therapy as needed        Behavioral Services  Medicare Certification      Admission Day 1  I certify that this patient's inpatient psychiatric hospital admission is medically necessary for:     (1) treatment which could reasonably be expected to improve this patient's condition, or     (2) diagnostic study or its equivalent.        Electronically signed by Christopher Moore MD on 12/16/2020 at 8:44 AM

## 2020-12-17 LAB
CORTISOL - AM: 11.4 UG/DL (ref 6.2–19.4)
GLUCOSE BLD-MCNC: 104 MG/DL (ref 60–115)
GLUCOSE BLD-MCNC: 197 MG/DL (ref 60–115)
GLUCOSE BLD-MCNC: 200 MG/DL (ref 60–115)
GLUCOSE BLD-MCNC: 225 MG/DL (ref 60–115)
PERFORMED ON: ABNORMAL
PERFORMED ON: NORMAL

## 2020-12-17 PROCEDURE — 6370000000 HC RX 637 (ALT 250 FOR IP): Performed by: PSYCHIATRY & NEUROLOGY

## 2020-12-17 PROCEDURE — 99233 SBSQ HOSP IP/OBS HIGH 50: CPT | Performed by: PSYCHIATRY & NEUROLOGY

## 2020-12-17 PROCEDURE — 6370000000 HC RX 637 (ALT 250 FOR IP): Performed by: INTERNAL MEDICINE

## 2020-12-17 PROCEDURE — 99232 SBSQ HOSP IP/OBS MODERATE 35: CPT | Performed by: INTERNAL MEDICINE

## 2020-12-17 PROCEDURE — 36415 COLL VENOUS BLD VENIPUNCTURE: CPT

## 2020-12-17 PROCEDURE — 1240000000 HC EMOTIONAL WELLNESS R&B

## 2020-12-17 PROCEDURE — 82533 TOTAL CORTISOL: CPT

## 2020-12-17 PROCEDURE — 6370000000 HC RX 637 (ALT 250 FOR IP): Performed by: NURSE PRACTITIONER

## 2020-12-17 RX ORDER — CLONIDINE HYDROCHLORIDE 0.1 MG/1
0.1 TABLET ORAL DAILY
Status: DISCONTINUED | OUTPATIENT
Start: 2020-12-18 | End: 2020-12-20

## 2020-12-17 RX ORDER — INSULIN GLARGINE 100 [IU]/ML
60 INJECTION, SOLUTION SUBCUTANEOUS NIGHTLY
Status: DISCONTINUED | OUTPATIENT
Start: 2020-12-17 | End: 2020-12-21 | Stop reason: HOSPADM

## 2020-12-17 RX ORDER — HYDROXYZINE PAMOATE 50 MG/1
50 CAPSULE ORAL 4 TIMES DAILY
Status: DISCONTINUED | OUTPATIENT
Start: 2020-12-17 | End: 2020-12-21 | Stop reason: HOSPADM

## 2020-12-17 RX ADMIN — TERBINAFINE HYDROCHLORIDE: 1 CREAM TOPICAL at 20:26

## 2020-12-17 RX ADMIN — HYDROXYZINE PAMOATE 50 MG: 50 CAPSULE ORAL at 17:27

## 2020-12-17 RX ADMIN — BREXPIPRAZOLE 1 MG: 1 TABLET ORAL at 08:30

## 2020-12-17 RX ADMIN — GLYCOPYRROLATE 1 MG: 1 TABLET ORAL at 15:49

## 2020-12-17 RX ADMIN — HYDROXYZINE PAMOATE 50 MG: 50 CAPSULE ORAL at 12:00

## 2020-12-17 RX ADMIN — GLYCOPYRROLATE 1 MG: 1 TABLET ORAL at 10:48

## 2020-12-17 RX ADMIN — OMEGA-3-ACID ETHYL ESTERS 2 G: 1 CAPSULE, LIQUID FILLED ORAL at 20:26

## 2020-12-17 RX ADMIN — CLONIDINE HYDROCHLORIDE 0.1 MG: 0.1 TABLET ORAL at 08:30

## 2020-12-17 RX ADMIN — INSULIN GLARGINE 60 UNITS: 100 INJECTION, SOLUTION SUBCUTANEOUS at 20:24

## 2020-12-17 RX ADMIN — ATORVASTATIN CALCIUM 20 MG: 20 TABLET, FILM COATED ORAL at 08:29

## 2020-12-17 RX ADMIN — OMEGA-3-ACID ETHYL ESTERS 2 G: 1 CAPSULE, LIQUID FILLED ORAL at 08:30

## 2020-12-17 RX ADMIN — FENOFIBRATE 54 MG: 54 TABLET ORAL at 08:30

## 2020-12-17 RX ADMIN — TERBINAFINE HYDROCHLORIDE: 1 CREAM TOPICAL at 08:35

## 2020-12-17 RX ADMIN — GLYCOPYRROLATE 1 MG: 1 TABLET ORAL at 20:26

## 2020-12-17 RX ADMIN — TRAZODONE HYDROCHLORIDE 50 MG: 50 TABLET ORAL at 21:36

## 2020-12-17 RX ADMIN — VENLAFAXINE HYDROCHLORIDE 37.5 MG: 37.5 CAPSULE, EXTENDED RELEASE ORAL at 08:30

## 2020-12-17 RX ADMIN — HYDROXYZINE PAMOATE 50 MG: 50 CAPSULE ORAL at 20:26

## 2020-12-17 NOTE — CONSULTS
MEDICATIONS:  Here include Humalog coverage, Lipitor, Rexulti, Catapres,  TriCor, Lantus 20 at night, Humalog 6 with each meals, Lovaza, Effexor  and Lamisil. ALLERGIES:  _____ Sander Obdulio and ADHESIVE TAPE. REVIEW OF SYSTEMS:  Other than suicidal ideation, hyperglycemia and  yeast infection, a 14-point review of systems is negative. PHYSICAL EXAMINATION:  GENERAL:  The patient is alert, awake and walking around in the floor,  in no obvious distress. VITAL SIGNS:  Blood pressure was 135/97, pulse rate of 92, respiratory  rate of 16 and temperature 98. HEENT:  Normocephalic, atraumatic. Pupils equal, reacting to light. Acanthosis nigricans was noted. NECK:  Supple. Trachea in midline. CHEST:  Lungs were clear to auscultation bilaterally. No wheezing or  crackles were heard. CARDIOVASCULAR:  Heart sounds were normal.  No murmurs or thrills were  present. ABDOMEN:  Soft, significantly obese. Bowel sounds were present. No  organomegaly or tenderness. Some stretch marks noted which were light  pink in color. EXTREMITIES:  Lower extremities reveal no edema. Skin was otherwise  dry. MUSCULOSKELETAL:  No joint swelling. NEUROLOGIC:  Showed cranial nerves I through XII were intact. PSYCHIATRIC:  Appears to have depressed affect. SKIN:  Intact. LABORATORY DATA:  As above, reviewed. Chemistries:  Sodium 132,  potassium 3.3, chloride was 95, CO2 was 25, BUN was 12, creatinine was  0.67 and glucose 462. TSH 1.450. Cholesterol 210. Triglyceride was  361. LDL was 111. Alcohol level was not detected. COVID test was  negative. ASSESSMENT:  1. Uncontrolled type 2 diabetes. 2.  Morbid obesity. 3.  Hypercholesterolemia. 4.  Insulin resistance, rule out any _____ increased cortisol. 5.  Overt Cushing's syndrome. 6.  Hyperhidrosis. 7.  Depression. PLAN:  Increase Lantus to 50 at night, Humalog 14 units with each meals  with medium dose coverage.   Start the patient on Robinul for excessive sweating, get the cortisol level. The patient also discussed about  bariatric surgery, weight loss for improved diabetes control. Might  consider adding Trulicity as outpatient. Continue other supportive  measures. Blood sugar goal here 140-200. Long-term A1c goal of 7 or  lower. Thank you for the consult.         Magalie Dawson MD    D: 12/16/2020 22:28:21       T: 12/16/2020 22:34:54     [email or web address]  Job#: [id number]     Doc#: [id number]    CC:

## 2020-12-17 NOTE — PROGRESS NOTES
[facility] FOLLOW-UP NOTE       12/17/2020     Patient was seen and examined in person, Chart reviewed   Patient's case discussed with staff/team    Chief Complaint: Depression    Interim History:     No change in her presentation since yesterday  She still continues to remain depressed with hopeless and worthless feeling  She continued to remain suicidal  Patient blood pressure was low this morning  She remains very anxious  Patient admitted taking Vistaril 4 times a day at home  She slept better last night  Appetite:   [] Normal/Unchanged  [] Increased  [x] Decreased      Sleep:       [] Normal/Unchanged  [x] Fair       [] Poor              Energy:    [] Normal/Unchanged  [] Increased  [x] Decreased        SI [x] Present  [] Absent    HI  []Present  [x] Absent     Aggression:  [] yes  [x] no    Patient is [] able  [x] unable to CONTRACT FOR SAFETY     PAST MEDICAL/PSYCHIATRIC HISTORY:   Past Medical History:   Diagnosis Date    Bipolar disorder ([facility]     Depression     DM (diabetes mellitus) ([facility]     History of drug abuse ([facility]     positive drug screens 9/22/14 and 5/18/14    Hyperlipidemia     Osteoarthritis        FAMILY/SOCIAL HISTORY:  Family History   Problem Relation Age of Onset    Other Mother         CHF    Cirrhosis Brother     Other Maternal Grandmother         CHF     Social History     Socioeconomic History    Marital status: Single     Spouse name: Not on file    Number of children: Not on file    Years of education: Not on file    Highest education level: Not on file   Occupational History    Not on file   Social Needs    Financial resource strain: Not on file    Food insecurity     Worry: Not on file     Inability: Not on file    Transportation needs     Medical: Not on file     Non-medical: Not on file   Tobacco Use    Smoking status: Current Every Day Smoker     Packs/day: 1.00     Years: 24.00     Pack years: 24.00 Types: Cigarettes    Smokeless tobacco: Never Used   Substance and Sexual Activity    Alcohol use: No    Drug use: Not Currently     Types: Marijuana    Sexual activity: Yes   Lifestyle    Physical activity     Days per week: Not on file     Minutes per session: Not on file    Stress: Not on file   Relationships    Social connections     Talks on phone: Not on file     Gets together: Not on file     Attends Hindu service: Not on file     Active member of club or organization: Not on file     Attends meetings of clubs or organizations: Not on file     Relationship status: Not on file    Intimate partner violence     Fear of current or ex partner: Not on file     Emotionally abused: Not on file     Physically abused: Not on file     Forced sexual activity: Not on file   Other Topics Concern    Not on file   Social History Narrative    Not on file           ROS:  [x] All negative/unchanged except if checked.  Explain positive(checked items) below:  [] Constitutional  [] Eyes  [] Ear/Nose/Mouth/Throat  [] Respiratory  [] CV  [] GI  []   [] Musculoskeletal  [] Skin/Breast  [] Neurological  [] Endocrine  [] Heme/Lymph  [] Allergic/Immunologic    Explanation:     MEDICATIONS:    Current Facility-Administered Medications:     nicotine (NICODERM CQ) 21 MG/24HR 1 patch, 1 patch, Transdermal, Daily, Wan Wells MD, 1 patch at 12/17/20 0358    influenza quadrivalent split vaccine (FLUZONE;FLUARIX;FLULAVAL;AFLURIA) injection 0.5 mL, 0.5 mL, Intramuscular, Prior to discharge, Wan Wells MD    brexpiprazole (REXULTI) tablet 1 mg, 1 mg, Oral, Daily, Wan Wells MD, 1 mg at 12/17/20 0830    venlafaxine (EFFEXOR XR) extended release capsule 37.5 mg, 37.5 mg, Oral, Daily with breakfast, Wan Wells MD, 37.5 mg at 12/17/20 0830    cloNIDine (CATAPRES) tablet 0.1 mg, 0.1 mg, Oral, BID, Wan Wells MD, 0.1 mg at 12/17/20 0830   atorvastatin (LIPITOR) tablet 20 mg, 20 mg, Oral, Daily, Grupo Fajardo MD, 20 mg at 12/17/20 0829    insulin lispro (HUMALOG) injection vial 0-12 Units, 0-12 Units, Subcutaneous, TID WC, NAOMI Thompson - CNP, 4 Units at 12/17/20 0828    insulin lispro (HUMALOG) injection vial 0-6 Units, 0-6 Units, Subcutaneous, Nightly, NAOMI Thompson - CNP, 2 Units at 12/16/20 2050    glucose (GLUTOSE) 40 % oral gel 15 g, 15 g, Oral, PRN, NAOMI Thompson - CNP    dextrose 50 % IV solution, 12.5 g, Intravenous, PRN, NAOMI Thompson - CNP    glucagon (rDNA) injection 1 mg, 1 mg, Intramuscular, PRN, NAOMI Thompson - CNP    dextrose 5 % solution, 100 mL/hr, Intravenous, PRN, Mor Vega APRN - CNP    omega-3 acid ethyl esters (LOVAZA) capsule 2 g, 2 g, Oral, BID, NAOMI Thompson - CNP, 2 g at 12/17/20 0830    fenofibrate (TRICOR) tablet 54 mg, 54 mg, Oral, Daily, NAOMI Thompson - CNP, 54 mg at 12/17/20 0830    terbinafine (LAMISIL) 1 % cream, , Topical, BID, Chrissie Curtis DPM, Given at 12/17/20 0835    insulin glargine (LANTUS) injection vial 50 Units, 50 Units, Subcutaneous, Nightly, Fidel Pearce MD, 50 Units at 12/16/20 2228    insulin lispro (HUMALOG) injection vial 14 Units, 14 Units, Subcutaneous, TID WC, Fidel Pearce MD, 14 Units at 12/17/20 0827    glycopyrrolate (ROBINUL) tablet 1 mg, 1 mg, Oral, TID, Karis Roberson MD, 1 mg at 12/16/20 2045    hydrOXYzine (VISTARIL) injection 50 mg, 50 mg, Intramuscular, Q6H PRN **OR** hydrOXYzine (VISTARIL) capsule 50 mg, 50 mg, Oral, Q6H PRN, Grupo Fajardo MD, 50 mg at 12/16/20 1631    haloperidol lactate (HALDOL) injection 5 mg, 5 mg, Intramuscular, Q6H PRN **OR** haloperidol (HALDOL) tablet 5 mg, 5 mg, Oral, Q6H PRN, Grupo Fajardo MD    acetaminophen (TYLENOL) tablet 650 mg, 650 mg, Oral, Q4H PRN, Grupo Fajardo MD   magnesium hydroxide (MILK OF MAGNESIA) 400 MG/5ML suspension 30 mL, 30 mL, Oral, Daily PRN, Agustin De Guzman MD    aluminum & magnesium hydroxide-simethicone (MAALOX) 200-200-20 MG/5ML suspension 30 mL, 30 mL, Oral, Q6H PRN, Agustin De Guzman MD    traZODone (DESYREL) tablet 50 mg, 50 mg, Oral, Nightly PRN, Nette Hull MD, 50 mg at 12/15/20 2103      Examination:  BP (!) 100/58   Pulse 105   Temp 97 °F (36.1 °C) (Oral)   Resp 18   Ht 5' 5\" (1.651 m)   Wt 275 lb (124.7 kg)   SpO2 92%   BMI 45.76 kg/m²   Gait - steady  Medication side effects(SE): no    Mental Status Examination:    Level of consciousness:  within normal limits   Appearance:  fair grooming and fair hygiene  Behavior/Motor:  psychomotor retardation  Attitude toward examiner:  cooperative  Speech:  slow   Mood: decreased range and depressed  Affect:  blunted  Thought processes:  slow   Thought content:  Suicidal Ideation:  active  Cognition:  oriented to person, place, and time   Concentration poor  Insight poor   Judgement poor     ASSESSMENT:   Patient symptoms are:  [] Well controlled  [] Improving  [] Worsening  [x] No change      Diagnosis:   Principal Problem:    Bipolar 1 disorder, depressed ([facility]  Resolved Problems:    * No resolved hospital problems.  *      LABS:    Recent Labs     12/15/20  1514   WBC 11.8*   HGB 16.4*        Recent Labs     12/15/20  1514   *   K 3.3*   CL 95   CO2 25   BUN 12   CREATININE 0.67   GLUCOSE 462*     Recent Labs     12/15/20  1514   BILITOT 0.3   ALKPHOS 123   AST 46*   ALT 45*     Lab Results   Component Value Date    LABAMPH Neg 12/15/2020    BARBSCNU Neg 12/15/2020    LABBENZ Neg 12/15/2020    LABMETH Neg 12/15/2020    OPIATESCREENURINE Neg 12/15/2020    PHENCYCLIDINESCREENURINE Neg 12/15/2020    ETOH <10 12/15/2020     Lab Results   Component Value Date    TSH 1.410 12/15/2020     Lab Results   Component Value Date    LITHIUM 0.5 (L) 02/08/2019 No results found for: VALPROATE, CBMZ    RISK ASSESSMENT: high suicide risk    Treatment Plan:  Reviewed current Medications with the patient. Decrease clonidine to once a day  Monitor for blood pressure and hold the clonidine if the systolic blood pressure falls less than 100   Added Vistaril 4 times a day 50 mg  Risks, benefits, side effects, drug-to-drug interactions and alternatives to treatment were discussed. Collateral information:   CD evaluation  Encourage patient to attend group and other milieu activities.   Discharge planning discussed with the patient and treatment team.    PSYCHOTHERAPY/COUNSELING:  [x] Therapeutic interview  [x] Supportive  [] CBT  [] Ongoing  [] Other    [x] Patient continues to need, on a daily basis, active treatment furnished directly by or requiring the supervision of inpatient psychiatric personnel      Anticipated Length of stay:            Electronically signed by Neot Castle MD on 12/17/2020 at 8:49 AM

## 2020-12-17 NOTE — PROGRESS NOTES
Pt. refused to attend the 1000 skills group, despite staff encouragement. Electronically signed by Maty Patel, [address] on 12/17/2020 at 1:16 PM

## 2020-12-17 NOTE — PROGRESS NOTES
Pt. declined to attend the 0900 community meeting, despite staff encouragement. Electronically signed by Francine Villanueva, [address] on 12/17/2020 at 9:58 AM

## 2020-12-17 NOTE — GROUP NOTE
Group Therapy Note    Date: 12/17/2020    Group Start Time: 1100  Group End Time: 1150  Group Topic: Psychotherapy    [facility] ADILSON Chung        Group Therapy Note    Attendees: 11         Patient's Goal:  To participate in group therapy process    Notes:  Patient reported possible discharge on Monday.  Patient is also a diabetic    Status After Intervention:  Improved    Participation Level: Interactive    Participation Quality: Appropriate      Speech:  normal      Thought Process/Content: Logical      Affective Functioning: Congruent      Mood: euthymic      Level of consciousness:  Alert and Oriented x4      Response to Learning: Able to verbalize current knowledge/experience      Endings: None Reported    Modes of Intervention: Support      Discipline Responsible: /Counselor      Signature:  Jaden Chung

## 2020-12-17 NOTE — CARE COORDINATION
Patient did not attend group despite staff encouragement.   Electronically signed by Joni Mcintosh on 12/17/2020 at 2:12 PM

## 2020-12-17 NOTE — CARE COORDINATION
FAMILY COLLATERAL NOTE    Family/Support Name: Samuel Israel  Contact #:[phone]  Relationship to Pt[de-identified]         Family/Support contact aware of hospitalization:yes  Presenting Symptoms/Current Concerns:   stated patient was really depressed and no motivation to do anything and staying in bed a lot. Stressed from social security giving her a hard time with backpay. Been going through this for a few months.  said she has been feeling like this for a month or two.  is unsure if she is taking her meds when at home. Having more passive suicidal thought stating, \" If I was not here. \" no plans. Top 3 Life Stressors: holidays, financially drained, backpay with social security not coming through. Background History Relevant to Current Hospitalization:        Family Mental Health/Substance Use History:   Patients mom had depression and brother had mental health issues. Support Network's Goal for Hospitalization: get back on meds and intake at [facility]: to come back home      Support Network Supportive of Discharge Plan:   yes    Support can confirm Safety of Location and Security of Weapons: no weapons in the home      Support agreeable to Safeguard and Monitor Medications (including Prescription and OTC):   Patient monitors her own meds  Identified Barriers to Compliance with Discharge Plan: none  Recommendations for Support Network:   Call [facility] if any questions.        Sonali Mendez [facility]

## 2020-12-17 NOTE — PROGRESS NOTES
Pt is encouraged fluids and given fresh water in pitcher. Patient comes out asking for meds, eats and takes a shower. Pt then goes back to bed. Pt is checked on and she is sleeping in room. Pt is easily aroused and given the water and agrees to drink more today. Pt is eager to get her sugars under control and is stating that she will talk with Dr. Arlene Granados about a weight loss regimen. Patient polite and cooperative with meds and care. Pt anx out on the unit anxiety is 6/10. Pt denies dep at this time. Pt states that she had decent sleep and appetite is good. Pt will be monitored.

## 2020-12-18 LAB
GLUCOSE BLD-MCNC: 136 MG/DL (ref 60–115)
GLUCOSE BLD-MCNC: 172 MG/DL (ref 60–115)
GLUCOSE BLD-MCNC: 174 MG/DL (ref 60–115)
GLUCOSE BLD-MCNC: 98 MG/DL (ref 60–115)
PERFORMED ON: ABNORMAL
PERFORMED ON: NORMAL

## 2020-12-18 PROCEDURE — 6370000000 HC RX 637 (ALT 250 FOR IP): Performed by: PSYCHIATRY & NEUROLOGY

## 2020-12-18 PROCEDURE — 90833 PSYTX W PT W E/M 30 MIN: CPT | Performed by: PSYCHIATRY & NEUROLOGY

## 2020-12-18 PROCEDURE — 6370000000 HC RX 637 (ALT 250 FOR IP): Performed by: INTERNAL MEDICINE

## 2020-12-18 PROCEDURE — 1240000000 HC EMOTIONAL WELLNESS R&B

## 2020-12-18 PROCEDURE — 6370000000 HC RX 637 (ALT 250 FOR IP): Performed by: NURSE PRACTITIONER

## 2020-12-18 PROCEDURE — 99231 SBSQ HOSP IP/OBS SF/LOW 25: CPT | Performed by: PHYSICIAN ASSISTANT

## 2020-12-18 PROCEDURE — 99232 SBSQ HOSP IP/OBS MODERATE 35: CPT | Performed by: PSYCHIATRY & NEUROLOGY

## 2020-12-18 RX ORDER — INSULIN GLARGINE 100 [IU]/ML
60 INJECTION, SOLUTION SUBCUTANEOUS NIGHTLY
Qty: 6 PEN | Refills: 11 | Status: SHIPPED | OUTPATIENT
Start: 2020-12-18 | End: 2021-01-04 | Stop reason: SDUPTHER

## 2020-12-18 RX ORDER — INSULIN LISPRO 100 [IU]/ML
12 INJECTION, SOLUTION INTRAVENOUS; SUBCUTANEOUS
Qty: 5 PEN | Refills: 3 | Status: SHIPPED | OUTPATIENT
Start: 2020-12-18 | End: 2021-01-04 | Stop reason: SDUPTHER

## 2020-12-18 RX ADMIN — ATORVASTATIN CALCIUM 20 MG: 20 TABLET, FILM COATED ORAL at 08:31

## 2020-12-18 RX ADMIN — CLONIDINE HYDROCHLORIDE 0.1 MG: 0.1 TABLET ORAL at 08:31

## 2020-12-18 RX ADMIN — TERBINAFINE HYDROCHLORIDE: 1 CREAM TOPICAL at 21:36

## 2020-12-18 RX ADMIN — OMEGA-3-ACID ETHYL ESTERS 2 G: 1 CAPSULE, LIQUID FILLED ORAL at 20:24

## 2020-12-18 RX ADMIN — HYDROXYZINE PAMOATE 50 MG: 50 CAPSULE ORAL at 20:24

## 2020-12-18 RX ADMIN — GLYCOPYRROLATE 1 MG: 1 TABLET ORAL at 20:24

## 2020-12-18 RX ADMIN — OMEGA-3-ACID ETHYL ESTERS 2 G: 1 CAPSULE, LIQUID FILLED ORAL at 08:31

## 2020-12-18 RX ADMIN — FENOFIBRATE 54 MG: 54 TABLET ORAL at 08:31

## 2020-12-18 RX ADMIN — HYDROXYZINE PAMOATE 50 MG: 50 CAPSULE ORAL at 08:31

## 2020-12-18 RX ADMIN — TRAZODONE HYDROCHLORIDE 50 MG: 50 TABLET ORAL at 21:59

## 2020-12-18 RX ADMIN — HYDROXYZINE PAMOATE 50 MG: 50 CAPSULE ORAL at 12:46

## 2020-12-18 RX ADMIN — GLYCOPYRROLATE 1 MG: 1 TABLET ORAL at 08:31

## 2020-12-18 RX ADMIN — BREXPIPRAZOLE 1 MG: 1 TABLET ORAL at 08:31

## 2020-12-18 RX ADMIN — GLYCOPYRROLATE 1 MG: 1 TABLET ORAL at 14:17

## 2020-12-18 RX ADMIN — HYDROXYZINE PAMOATE 50 MG: 50 CAPSULE ORAL at 17:14

## 2020-12-18 RX ADMIN — TERBINAFINE HYDROCHLORIDE: 1 CREAM TOPICAL at 08:38

## 2020-12-18 RX ADMIN — INSULIN GLARGINE 60 UNITS: 100 INJECTION, SOLUTION SUBCUTANEOUS at 20:26

## 2020-12-18 RX ADMIN — VENLAFAXINE HYDROCHLORIDE 37.5 MG: 37.5 CAPSULE, EXTENDED RELEASE ORAL at 08:31

## 2020-12-18 NOTE — PROGRESS NOTES
Pt. attended the 0900 community meeting. Electronically signed by Jani Caliocent on 12/18/2020 at 11:18 AM

## 2020-12-18 NOTE — GROUP NOTE
Group Therapy Note    Date: 12/18/2020    Group Start Time: 1100  Group End Time: 1200  Group Topic: Psychotherapy    [facility]    Madie Garduno        Group Therapy Note    Attendees: 11         Patient's Goal:  To participate in group therapy process    Notes:Patient joined in discussion regarding concerns about being treated as a person while on the BHI unit      Status After Intervention:  Improved    Participation Level: Interactive    Participation Quality: Appropriate      Speech:  normal      Thought Process/Content: Logical      Affective Functioning: Congruent      Mood: euthymic      Level of consciousness:  Alert and Oriented x4      Response to Learning: Able to verbalize current knowledge/experience      Endings: None Reported    Modes of Intervention: Support      Discipline Responsible: /Counselor      Signature:  Madie Garduno

## 2020-12-18 NOTE — PROGRESS NOTES
Patient voiced restlessness, medicated with Trazodone, per Patient request. Will continue to monitor.

## 2020-12-18 NOTE — PROGRESS NOTES
Progress Note  Date:2020       Room:W388/W388-02  Patient Name:Kanchan Cam     Date of Birth:[birth year]     Age:40 y.o. Chief complaint uncontrolled diabetes    Subjective    Subjective:  Symptoms:  Stable. Diet:  Adequate intake. Activity level: Normal.       Review of Systems   All other systems reviewed and are negative. Objective         Vitals Last 24 Hours:  TEMPERATURE:  Temp  Av.5 °F (36.4 °C)  Min: 97 °F (36.1 °C)  Max: 98 °F (36.7 °C)  RESPIRATIONS RANGE: Resp  Av  Min: 18  Max: 18  PULSE OXIMETRY RANGE: SpO2  Av.3 %  Min: 92 %  Max: 93 %  PULSE RANGE: Pulse  Av.3  Min: 82  Max: 105  BLOOD PRESSURE RANGE: Systolic (01WXB), LXH:700 , Min:100 , VCH:889   ; Diastolic (92XBT), PHP:30, Min:58, Max:86    I/O (24Hr): No intake or output data in the 24 hours ending 201  Objective:  General Appearance:  Comfortable. Vital signs: (most recent): Blood pressure 126/84, pulse 102, temperature 98 °F (36.7 °C), temperature source Oral, resp. rate 18, height 5' 5\" (1.651 m), weight 275 lb (124.7 kg), SpO2 92 %, not currently breastfeeding. Vital signs are normal.    HEENT: Normal HEENT exam.    Lungs:  Normal effort. Heart: Normal rate. Abdomen: Abdomen is soft. Extremities: Normal range of motion. Neurological: Patient is alert. Results for Concepción Hilario (MRN [id number]) as of 2020 22:42   Ref.  Range 2020 06:21 2020 06:33 2020 10:51 2020 16:36 2020 20:20   POC Glucose Latest Ref Range: 60 - 115 mg/dl 200 (H)  225 (H) 104 197 (H)   Cortisol - AM Latest Ref Range: 6.2 - 19.4 ug/dL  11.4          Labs/Imaging/Diagnostics    Labs:  CBC:  Recent Labs     12/15/20  1514   WBC 11.8*   RBC 5.86*   HGB 16.4*   HCT 49.4*   MCV 84.3   RDW 15.7*        CHEMISTRIES:  Recent Labs     12/15/20  1514   *   K 3.3*   CL 95   CO2 25   BUN 12   CREATININE 0.67   GLUCOSE 462* PT/INR:No results for input(s): PROTIME, INR in the last 72 hours. APTT:No results for input(s): APTT in the last 72 hours. LIVER PROFILE:  Recent Labs     12/15/20  1514   AST 46*   ALT 45*   BILITOT 0.3   ALKPHOS 123       Imaging Last 24 Hours:  No results found. Assessment//Plan           Hospital Problems           Last Modified POA    * (Principal) Bipolar 1 disorder, depressed ([facility] 12/16/2020 Yes        Assessment:    Condition: In stable condition. Improving. (Uncontrolled type 2 diabetes blood sugars are improving  Cortisol level appropriate for her current status  Morbid obesity  Hyperhidrosis). Plan:   (Continue Lantus 60 units at bedtime plus Humalog 12 units each meals  Continue Robinul 1 mg 3 times daily  Patient to follow-up in the office after discharge  Discussed about freestyle ananth continuous glucose monitoring long-term A1c goal of 7 or lower).        Electronically signed by Denisa Waldron MD on 12/17/20 at 10:41 PM EST

## 2020-12-18 NOTE — FLOWSHEET NOTE
Pt has been isolative to bed this afternoon. Says she feels \"tired. \" Feels her medications have not yet kicked in. Admits to improving depression and anxiety. Denies any major stressors in her life. Talks of 's sister recently passing from covid but says they only recently met her. Denies SI/H/AVH. Talks of future plans to get blood sugars under control.  Complains of vivid dreams, acknowledges could be from nicotine patch,

## 2020-12-18 NOTE — PROGRESS NOTES
Pt is asked how she is feeling today, and pt responds \"I feel about the same, no better really. \"  Pt takes a shower after morning 1000 group before 1100 group as she states she was sweating so much. Pt is taking a new med, Robinul for her sweating condition, given by Dr. Mala James. Pt is polite and cooperative with care, going to groups and participating in her care. Patient is on the phone and is social with peers. pts appetite is a little better today, pt eats fruit in the am.  Patient sleep is not so good. Dep and anx are still high. Pt will be monitored.   Electronically signed by Park Fam LPN on 52/46/0360 at 12:14 PM

## 2020-12-18 NOTE — PROGRESS NOTES
Endocrinology Progress Note    Assessment and Plan:   Assessment-  1. Type 2 insulin-dependent diabetes  2. Bipolar 1 disorder    Plan-  1. Continue Lantus 60 units at bedtime  2. Continue Humalog 14 units 3 times daily before meals  3. Monitor glycemic control closely  4. Patient may be discharged home from endocrinology standpoint  5. Follow-up with Salma Mao PA-C in 2 weeks    POC Glucose:   Recent Labs     12/17/20  1051 12/17/20  1636 12/17/20 2020 12/18/20  0628 12/18/20  1109   POCGLU 225* 104 197* 174* 172*     HGBA1C:  Lab Results   Component Value Date    LABA1C 11.1 (H) 11/07/2020    LABA1C 11.2 (H) 06/22/2020    LABA1C 6.1 (H) 07/10/2018     CBC:   No results for input(s): WBC, HGB, PLT in the last 72 hours. CMP:  No results for input(s): NA, K, CL, CO2, BUN, CREATININE, GLUCOSE, CALCIUM, LABGLOM in the last 72 hours. CC:   Chief Complaint   Patient presents with    Depression       Subjective: Interval History: Ryan Hobson is a 57-year-old type 2 insulin-dependent diabetic female admitted to the behavioral health unit. She was noted to be hyperglycemic. Her glycemic control has improved however on current insulin dosing regiment. We will monitor her glycemic control closely through this hospital stay.     Review of systems: denies polyuria, polydipsia, ABD pain, flank pain, N/V/D, or diaphoresis  Medications:   Scheduled Meds:   [START ON 12/19/2020] brexpiprazole  2 mg Oral Daily    cloNIDine  0.1 mg Oral Daily    hydrOXYzine  50 mg Oral 4x Daily    insulin glargine  60 Units Subcutaneous Nightly    nicotine  1 patch Transdermal Daily    influenza virus vaccine  0.5 mL Intramuscular Prior to discharge    venlafaxine  37.5 mg Oral Daily with breakfast    atorvastatin  20 mg Oral Daily    insulin lispro  0-12 Units Subcutaneous TID WC    insulin lispro  0-6 Units Subcutaneous Nightly    omega-3 acid ethyl esters  2 g Oral BID    fenofibrate  54 mg Oral Daily  terbinafine   Topical BID    insulin lispro  14 Units Subcutaneous TID WC    glycopyrrolate  1 mg Oral TID     Continuous Infusions:   dextrose         Objective:   Vitals: BP (!) 140/85   Pulse 117   Temp 97 °F (36.1 °C) (Oral)   Resp 18   Ht 5' 5\" (1.651 m)   Wt 275 lb (124.7 kg)   SpO2 92%   BMI 45.76 kg/m²    Wt Readings from Last 3 Encounters:   12/15/20 275 lb (124.7 kg)   11/07/20 268 lb (121.6 kg)   06/19/20 274 lb (124.3 kg)        General appearance: alert, appears stated age, cooperative and no distress  Skin: Skin color, texture, turgor normal. No rashes or lesions. Neck: no lymphadenopathy  Lungs: clear to auscultation bilaterally  Heart: regular rate and rhythm, S1, S2 normal, no murmur, click, rub or gallop  Abdomen: soft, non-tender. Bowel sounds normal. No masses,  no organomegaly.   Extremities: extremities normal, atraumatic, no cyanosis or edema    Patient Active Problem List:     Chronic cholecystitis with calculus     History of drug abuse ([address]     Spondylosis of lumbar region without myelopathy or radiculopathy     QI (obstructive sleep apnea)     Morbid obesity with body mass index (BMI) of 40.0 to 44.9 in adult [facility])     Bipolar disorder, current episode depressed, moderate ([address]     Bipolar 1 disorder, depressed ([address]     Diarrhea     Bipolar disorder with depression ([address]     Closed fracture of middle or proximal phalanx or phalanges of hand     Major depression, single episode     Uncontrolled type 2 diabetes mellitus with hyperglycemia ([address]     Cellulitis of abdominal wall     Abscess     Obesity, morbid (more than 100 lbs over ideal weight or BMI > 40) ([address]     Major depressive disorder, recurrent [facility])      Physical Exam      Electronically signed by FRANCO De Luna on 12/18/2020 at 3:55 PM

## 2020-12-18 NOTE — PROGRESS NOTES
[facility] FOLLOW-UP NOTE       12/18/2020     Patient was seen and examined in person, Chart reviewed   Patient's case discussed with staff/team    Chief Complaint: Depression SI    Interim History:     Pt report feeling slightly better  Less depressed  No agitation   Slept better last night  Pt continue to remain anxious  Hopeless and worthless feeling  Passive SI  Appetite:   [] Normal/Unchanged  [] Increased  [x] Decreased      Sleep:       [] Normal/Unchanged  [x] Fair       [] Poor              Energy:    [] Normal/Unchanged  [] Increased  [x] Decreased        SI [x] Present  [] Absent    HI  []Present  [] Absent     Aggression:  [] yes  [] no    Patient is [] able  [x] unable to CONTRACT FOR SAFETY     PAST MEDICAL/PSYCHIATRIC HISTORY:   Past Medical History:   Diagnosis Date    [facility]ipolar disorder ([email or web address]     Depression     DM (diabetes mellitus) ([email or web address]     History of drug abuse ([email or web address]     positive drug screens 9/22/14 and 5/18/14    Hyperlipidemia     Osteoarthritis        FAMILY/SOCIAL HISTORY:  Family History   Problem Relation Age of Onset    Other Mother         CHF    Cirrhosis Brother     Other Maternal Grandmother         CHF     Social History     Socioeconomic History    Marital status: Single     Spouse name: Not on file    Number of children: Not on file    Years of education: Not on file    Highest education level: Not on file   Occupational History    Not on file   Social Needs    Financial resource strain: Not on file    Food insecurity     Worry: Not on file     Inability: Not on file    Transportation needs     Medical: Not on file     Non-medical: Not on file   Tobacco Use    Smoking status: Current Every Day Smoker     Packs/day: 1.00     Years: 24.00     Pack years: 24.00     Types: Cigarettes    Smokeless tobacco: Never Used   Substance and Sexual Activity    Alcohol use: No    Drug use: Not Currently     Types: Marijuana  Sexual activity: Yes   Lifestyle    Physical activity     Days per week: Not on file     Minutes per session: Not on file    Stress: Not on file   Relationships    Social connections     Talks on phone: Not on file     Gets together: Not on file     Attends Christian service: Not on file     Active member of club or organization: Not on file     Attends meetings of clubs or organizations: Not on file     Relationship status: Not on file    Intimate partner violence     Fear of current or ex partner: Not on file     Emotionally abused: Not on file     Physically abused: Not on file     Forced sexual activity: Not on file   Other Topics Concern    Not on file   Social History Narrative    Not on file           ROS:  [x] All negative/unchanged except if checked.  Explain positive(checked items) below:  [] Constitutional  [] Eyes  [] Ear/Nose/Mouth/Throat  [] Respiratory  [] CV  [] GI  []   [] Musculoskeletal  [] Skin/Breast  [] Neurological  [] Endocrine  [] Heme/Lymph  [] Allergic/Immunologic    Explanation:     MEDICATIONS:    Current Facility-Administered Medications:     cloNIDine (CATAPRES) tablet 0.1 mg, 0.1 mg, Oral, Daily, Raymond Roque MD, 0.1 mg at 12/18/20 0831    hydrOXYzine (VISTARIL) capsule 50 mg, 50 mg, Oral, 4x Daily, Raymond Roque MD, 50 mg at 12/18/20 0831    insulin glargine (LANTUS) injection vial 60 Units, 60 Units, Subcutaneous, Nightly, Fidel Pearce MD, 60 Units at 12/17/20 2024    nicotine (NICODERM CQ) 21 MG/24HR 1 patch, 1 patch, Transdermal, Daily, Raymond Roque MD, 1 patch at 12/18/20 0831    influenza quadrivalent split vaccine (FLUZONE;FLUARIX;FLULAVAL;AFLURIA) injection 0.5 mL, 0.5 mL, Intramuscular, Prior to discharge, Raymond Roque MD    brexpiprazole (REXULTI) tablet 1 mg, 1 mg, Oral, Daily, Raymond Roque MD, 1 mg at 12/18/20 1037   venlafaxine (EFFEXOR XR) extended release capsule 37.5 mg, 37.5 mg, Oral, Daily with breakfast, Niels Bardales MD, 37.5 mg at 12/18/20 0831    atorvastatin (LIPITOR) tablet 20 mg, 20 mg, Oral, Daily, Niels Bardales MD, 20 mg at 12/18/20 0831    insulin lispro (HUMALOG) injection vial 0-12 Units, 0-12 Units, Subcutaneous, TID WC, Prachi Andino, APRN - CNP, 2 Units at 12/18/20 0835    insulin lispro (HUMALOG) injection vial 0-6 Units, 0-6 Units, Subcutaneous, Nightly, Prachi [place], APRN - CNP, 1 Units at 12/17/20 2022    glucose (GLUTOSE) 40 % oral gel 15 g, 15 g, Oral, PRN, Prachi [place], APRN - CNP    dextrose 50 % IV solution, 12.5 g, Intravenous, PRN, Prachi [place], APRN - CNP    glucagon (rDNA) injection 1 mg, 1 mg, Intramuscular, PRN, Prachi [place], APRN - CNP    dextrose 5 % solution, 100 mL/hr, Intravenous, PRN, Prachi [place], APRN - CNP    omega-3 acid ethyl esters (LOVAZA) capsule 2 g, 2 g, Oral, BID, Prachi [place], APRN - CNP, 2 g at 12/18/20 0831    fenofibrate (TRICOR) tablet 54 mg, 54 mg, Oral, Daily, Prachi Thu, APRN - CNP, 54 mg at 12/18/20 0831    terbinafine (LAMISIL) 1 % cream, , Topical, BID, Owen Denton DPM, Given at 12/18/20 0838    insulin lispro (HUMALOG) injection vial 14 Units, 14 Units, Subcutaneous, TID WC, Elvi Wilson MD, 14 Units at 12/18/20 8290    glycopyrrolate (ROBINUL) tablet 1 mg, 1 mg, Oral, TID, Elvi Wilson MD, 1 mg at 12/18/20 0831    haloperidol lactate (HALDOL) injection 5 mg, 5 mg, Intramuscular, Q6H PRN **OR** haloperidol (HALDOL) tablet 5 mg, 5 mg, Oral, Q6H PRN, Niels Bardales MD    acetaminophen (TYLENOL) tablet 650 mg, 650 mg, Oral, Q4H PRN, Niels Bardales MD    magnesium hydroxide (MILK OF MAGNESIA) 400 MG/5ML suspension 30 mL, 30 mL, Oral, Daily PRN, Niels Bardales MD    aluminum & magnesium hydroxide-simethicone (MAALOX) 594-971-56 MG/5ML suspension 30 mL, 30 mL, Oral, Q6H PRN, Niels Bardales MD   traZODone (DESYREL) tablet 50 mg, 50 mg, Oral, Nightly PRN, Terrell Vo MD, 50 mg at 12/17/20 8826      Examination:  /84   Pulse 102   Temp 98 °F (36.7 °C) (Oral)   Resp 18   Ht 5' 5\" (1.651 m)   Wt 275 lb (124.7 kg)   SpO2 92%   BMI 45.76 kg/m²   Gait - steady  Medication side effects(SE): no    Mental Status Examination:    Level of consciousness:  within normal limits   Appearance:  fair grooming and fair hygiene  Behavior/Motor:  no abnormalities noted  Attitude toward examiner:  attentive  Speech:  slow   Mood: depressed  Affect:  anxious  Thought processes:  slow   Thought content:  Suicidal Ideation:  passive  Cognition:  oriented to person, place, and time   Concentration poor  Insight fair   Judgement fair     ASSESSMENT:   Patient symptoms are:  [] Well controlled  [] Improving  [] Worsening  [] No change      Diagnosis:   Principal Problem:    Bipolar 1 disorder, depressed ([facility]  Resolved Problems:    * No resolved hospital problems. *      LABS:    Recent Labs     12/15/20  1514   WBC 11.8*   HGB 16.4*        Recent Labs     12/15/20  1514   *   K 3.3*   CL 95   CO2 25   BUN 12   CREATININE 0.67   GLUCOSE 462*     Recent Labs     12/15/20  1514   BILITOT 0.3   ALKPHOS 123   AST 46*   ALT 45*     Lab Results   Component Value Date    LABAMPH Neg 12/15/2020    BARBSCNU Neg 12/15/2020    LABBENZ Neg 12/15/2020    LABMETH Neg 12/15/2020    OPIATESCREENURINE Neg 12/15/2020    PHENCYCLIDINESCREENURINE Neg 12/15/2020    ETOH <10 12/15/2020     Lab Results   Component Value Date    TSH 1.410 12/15/2020     Lab Results   Component Value Date    LITHIUM 0.5 (L) 02/08/2019     No results found for: VALPROATE, CBMZ      Treatment Plan:  Reviewed current Medications with the patient. Medication as listed  Risks, benefits, side effects, drug-to-drug interactions and alternatives to treatment were discussed.   Collateral information:   CD evaluation Encourage patient to attend group and other milieu activities.   Discharge planning discussed with the patient and treatment team.    PSYCHOTHERAPY/COUNSELING:  [x] Therapeutic interview  [x] Supportive  [] CBT  [] Ongoing  [] Other  Patient was seen 1:1 for 20 minutes, other than E&M time spent, focusing on      - coping skills techniques     - Anxiety management techniques discussed including deep breathing exercise and PMR     - discussing patients strength and weakness        - Focusing on negative cognition and maladaptive thoughts, which is feeding and maintaining the depression symptoms       [x] Patient continues to need, on a daily basis, active treatment furnished directly by or requiring the supervision of inpatient psychiatric personnel      Anticipated Length of stay: monday            Electronically signed by Abeba Mercado MD on 12/18/2020 at 8:44 AM

## 2020-12-18 NOTE — GROUP NOTE
Group Therapy Note    Date: 12/17/2020    Group Start Time: 2529  Group End Time: 1900  Group Topic: Recreational    [facility]    Michael Elliott        Group Therapy Note    Attendees: 10         Patient's Goal:  Attend and participate in Recreational group.  We discussed first impressions and how others see us.     Notes:  Great participation    Status After Intervention:  Improved    Participation Level:  Active Listener and Interactive    Participation Quality: Appropriate, Attentive, Sharing and Supportive      Speech:  normal      Thought Process/Content: Logical  Linear      Affective Functioning: Congruent      Mood: euthymic      Level of consciousness:  Alert, Oriented x4 and Attentive      Response to Learning: Progressing to goal      Endings: None Reported    Modes of Intervention: Socialization      Discipline Responsible: Behavorial Health Tech      Signature:  Michael Elliott

## 2020-12-18 NOTE — GROUP NOTE
Group Therapy Note    Date: 12/17/2020    Group Start Time: 2100  Group End Time: 2130  Group Topic: Wrap-Up    [facility]    Ainsley Singh        Group Therapy Note    Attendees: 10         Patient's Goal:  Attend and participate in Wrap Up. Discussion re: was goal met and/or 1 accomplishment. Notes:  Good participation    Status After Intervention:  Unchanged    Participation Level:  Active Listener and Interactive    Participation Quality: Appropriate, Attentive, Sharing and Supportive      Speech:  normal      Thought Process/Content: Logical      Affective Functioning: Congruent      Mood: euthymic      Level of consciousness:  Alert, Oriented x4 and Attentive      Response to Learning: Progressing to goal      Endings: None Reported    Modes of Intervention: Socialization      Discipline Responsible: Behavorial Health Tech      Signature:  Ainsley Singh

## 2020-12-18 NOTE — GROUP NOTE
Group Therapy Note    Date: 12/18/2020    Group Start Time: 1630  Group End Time: 1700  Group Topic: Healthy Living/Wellness    [facility]    Rod Manrique        Group Therapy Note    Attendees: 7         Patient's Goal:  Attend and participate in Healthy Living/Wellness group. Discuss self care. Notes:  Great participation    Status After Intervention:  Unchanged    Participation Level:  Active Listener and Interactive    Participation Quality: Appropriate, Attentive, Sharing and Supportive      Speech:  normal      Thought Process/Content: Logical      Affective Functioning: Congruent      Mood: euthymic      Level of consciousness:  Alert, Oriented x4 and Attentive      Response to Learning: Progressing to goal      Endings: None Reported    Modes of Intervention: Socialization      Discipline Responsible: Behavorial Health Tech      Signature:  Rod Manrique

## 2020-12-18 NOTE — GROUP NOTE
Group Therapy Note    Date: 12/18/2020    Group Start Time: 1000  Group End Time: 1100  Group Topic: Recreational    [facility]    Kathrin Mccord        Group Therapy Note    Attendees: 12         Patient's Goal:  To have a good day    Notes:  Pt was attentive     Status After Intervention:  Unchanged    Participation Level: Interactive    Participation Quality: Attentive      Speech:  normal      Thought Process/Content: Logical      Affective Functioning: Congruent      Mood: calm      Level of consciousness:  Oriented x4      Response to Learning: Able to verbalize current knowledge/experience      Endings: None Reported    Modes of Intervention: Activity      Discipline Responsible: PCA      Signature:   Karolyn Pack

## 2020-12-18 NOTE — FLOWSHEET NOTE
Pt has been lying in bed more this afternoon. Says she feels \" down in the dumps\" a bit today. Admits to anxiety. Denies SI/HI/AVH. Admits to withdrawing from people and phone calls due to lack of motivation/ energy. Admits she woke at 0300 and did not fall back asleep. Eating well. Attending groups.

## 2020-12-19 LAB
GLUCOSE BLD-MCNC: 111 MG/DL (ref 60–115)
GLUCOSE BLD-MCNC: 118 MG/DL (ref 60–115)
GLUCOSE BLD-MCNC: 174 MG/DL (ref 60–115)
GLUCOSE BLD-MCNC: 178 MG/DL (ref 60–115)
PERFORMED ON: ABNORMAL
PERFORMED ON: NORMAL

## 2020-12-19 PROCEDURE — 1240000000 HC EMOTIONAL WELLNESS R&B

## 2020-12-19 PROCEDURE — 6370000000 HC RX 637 (ALT 250 FOR IP): Performed by: NURSE PRACTITIONER

## 2020-12-19 PROCEDURE — 6370000000 HC RX 637 (ALT 250 FOR IP): Performed by: INTERNAL MEDICINE

## 2020-12-19 PROCEDURE — 6370000000 HC RX 637 (ALT 250 FOR IP): Performed by: PSYCHIATRY & NEUROLOGY

## 2020-12-19 RX ADMIN — HYDROXYZINE PAMOATE 50 MG: 50 CAPSULE ORAL at 16:56

## 2020-12-19 RX ADMIN — TERBINAFINE HYDROCHLORIDE: 1 CREAM TOPICAL at 21:35

## 2020-12-19 RX ADMIN — HYDROXYZINE PAMOATE 50 MG: 50 CAPSULE ORAL at 13:50

## 2020-12-19 RX ADMIN — VENLAFAXINE HYDROCHLORIDE 37.5 MG: 37.5 CAPSULE, EXTENDED RELEASE ORAL at 08:35

## 2020-12-19 RX ADMIN — HYDROXYZINE PAMOATE 50 MG: 50 CAPSULE ORAL at 20:38

## 2020-12-19 RX ADMIN — HYDROXYZINE PAMOATE 50 MG: 50 CAPSULE ORAL at 08:35

## 2020-12-19 RX ADMIN — OMEGA-3-ACID ETHYL ESTERS 2 G: 1 CAPSULE, LIQUID FILLED ORAL at 20:38

## 2020-12-19 RX ADMIN — CLONIDINE HYDROCHLORIDE 0.1 MG: 0.1 TABLET ORAL at 09:48

## 2020-12-19 RX ADMIN — INSULIN GLARGINE 60 UNITS: 100 INJECTION, SOLUTION SUBCUTANEOUS at 20:34

## 2020-12-19 RX ADMIN — GLYCOPYRROLATE 1 MG: 1 TABLET ORAL at 20:38

## 2020-12-19 RX ADMIN — TERBINAFINE HYDROCHLORIDE: 1 CREAM TOPICAL at 08:35

## 2020-12-19 RX ADMIN — OMEGA-3-ACID ETHYL ESTERS 2 G: 1 CAPSULE, LIQUID FILLED ORAL at 08:34

## 2020-12-19 RX ADMIN — TRAZODONE HYDROCHLORIDE 50 MG: 50 TABLET ORAL at 20:38

## 2020-12-19 RX ADMIN — GLYCOPYRROLATE 1 MG: 1 TABLET ORAL at 13:49

## 2020-12-19 RX ADMIN — GLYCOPYRROLATE 1 MG: 1 TABLET ORAL at 08:35

## 2020-12-19 RX ADMIN — ATORVASTATIN CALCIUM 20 MG: 20 TABLET, FILM COATED ORAL at 08:35

## 2020-12-19 RX ADMIN — BREXPIPRAZOLE 2 MG: 1 TABLET ORAL at 08:34

## 2020-12-19 RX ADMIN — FENOFIBRATE 54 MG: 54 TABLET ORAL at 08:34

## 2020-12-19 NOTE — PROGRESS NOTES
Pt. refused to attend the Activity Group. Electronically signed by Jody Finch on 12/19/2020 at 12:12 PM

## 2020-12-19 NOTE — GROUP NOTE
Group Therapy Note    Date: 12/19/2020    Group Start Time: 5209  Group End Time: 7617  Group Topic: Healthy Living/Wellness    [facility]    Pedro Chinchilla        Group Therapy Note    Attendees: 11/17       Patient's Goal:  To discuss interpersonal skills re: objective effectiveness and learn ways to clearly express needs and desires using the acronym \"DEAR MAN. \"    Notes:  Patient observed the Healthy Living Group. Status After Intervention:  Unchanged    Participation Level:  Active Listener    Participation Quality: Appropriate and Attentive      Speech:  normal      Thought Process/Content: Logical      Affective Functioning: Flat      Mood: euthymic      Level of consciousness:  Alert and Attentive      Response to Learning: Able to verbalize current knowledge/experience and Progressing to goal      Endings: None Reported    Modes of Intervention: Education      Discipline Responsible: [address], [employer]      Signature:  Pedro Chinchilla

## 2020-12-19 NOTE — PROGRESS NOTES
[facility]. FOLLOW-UP NOTE   THE PATIENT WAS SEEN THROUGH TELEPSYCHIATRY, IN A REAL-TIME, AUDIO-VIDEO ENCOUNTER, WITH THE PATIENT IN [place], [place] AND THE PROVIDER IN [place], [place]        12/19/2020     Patient was seen and examined in person, Chart reviewed   Patient's case discussed with staff/team    Chief Complaint: Depression SI    Interim History:     Patient said she is feeling \" a little better\". She said she slept 'pretty good', and that her appetite was \"pretty good\" too. She denied having suicidal or homicidal ideation. She denied having hallucinations, paranoia or other delusions. She rated her depression 3/10, but said she is still anxious though.      Appetite:   [x] Normal/Unchanged  [] Increased  [] Decreased      Sleep:       [x] Normal/Unchanged  [] Fair       [] Poor              Energy:    [x] Normal/Unchanged  [] Increased  [] Decreased        SI [] Present  [x] Absent    HI  []Present  [x] Absent     Aggression:  [] yes  [x] no    Patient is [x] able  [] unable to CONTRACT FOR SAFETY     PAST MEDICAL/PSYCHIATRIC HISTORY:   Past Medical History:   Diagnosis Date    [facility]     Depression     [facility]     History of drug abuse ([facility]     positive drug screens 9/22/14 and 5/18/14    Hyperlipidemia     Osteoarthritis        FAMILY/SOCIAL HISTORY:  Family History   Problem Relation Age of Onset    Other Mother         CHF    Cirrhosis Brother     Other Maternal Grandmother         CHF     Social History     Socioeconomic History    Marital status: Single     Spouse name: Not on file    Number of children: Not on file    Years of education: Not on file    Highest education level: Not on file   Occupational History    Not on file   Social Needs    Financial resource strain: Not on file    Food insecurity     Worry: Not on file     Inability: Not on file    Transportation needs     Medical: Not on file   magnesium hydroxide (MILK OF MAGNESIA) 400 MG/5ML suspension 30 mL, 30 mL, Oral, Daily PRN, Bassem Christine MD    aluminum & magnesium hydroxide-simethicone (MAALOX) 200-200-20 MG/5ML suspension 30 mL, 30 mL, Oral, Q6H PRN, Bassem Christine MD    traZODone (DESYREL) tablet 50 mg, 50 mg, Oral, Nightly PRN, Yajaira Waterman MD, 50 mg at 12/18/20 2266      Examination:  /84   Pulse 112   Temp 98 °F (36.7 °C) (Oral)   Resp 18   Ht 5' 5\" (1.651 m)   Wt 275 lb (124.7 kg)   SpO2 93%   BMI 45.76 kg/m²   Gait - steady  Medication side effects(SE): no    Mental Status Examination:    Level of consciousness: within normal limits   Appearance:  fair grooming and fair hygiene  Behavior/Motor:  no abnormalities noted  Attitude toward examiner: seems cooperative  Speech: with normal rate, rhythm, prosody  Mood: depression improving  Affect:  anxious  Thought processes:  slow   Thought content:  Suicidal Ideation: denies now  Cognition:  oriented to person, place, and time   Concentration poor  Insight fair   Judgement fair     ASSESSMENT:   Patient symptoms are:  [x] Well controlled  [x] Improving  [] Worsening  [] No change      Diagnosis:   Principal Problem:    Bipolar 1 disorder, depressed ([facility]  Resolved Problems:    * No resolved hospital problems. *      LABS:    No results for input(s): WBC, HGB, PLT in the last 72 hours. No results for input(s): NA, K, CL, CO2, BUN, CREATININE, GLUCOSE in the last 72 hours. No results for input(s): BILITOT, ALKPHOS, AST, ALT in the last 72 hours.   Lab Results   Component Value Date    LABAMPH Neg 12/15/2020    BARBSCNU Neg 12/15/2020    LABBENZ Neg 12/15/2020    LABMETH Neg 12/15/2020    OPIATESCREENURINE Neg 12/15/2020    PHENCYCLIDINESCREENURINE Neg 12/15/2020    ETOH <10 12/15/2020     Lab Results   Component Value Date    TSH 1.410 12/15/2020     Lab Results   Component Value Date    LITHIUM 0.5 (L) 02/08/2019     No results found for: VALPROATE, CBMZ Treatment Plan:  Reviewed current Medications with the patient. Medication as listed  Risks, benefits, side effects, drug-to-drug interactions and alternatives to treatment were discussed. Collateral information:   CD evaluation  Encourage patient to attend group and other milieu activities.   Discharge planning discussed with the patient and treatment team.    PSYCHOTHERAPY/COUNSELING:  [x] Therapeutic interview  [x] Supportive  [] CBT  [] Ongoing  [] Other       [x] Patient continues to need, on a daily basis, active treatment furnished directly by or requiring the supervision of inpatient psychiatric personnel      Anticipated Length of stay: monday            Electronically signed by Lori Jurado MD on 12/19/2020 at 5:02 PM

## 2020-12-19 NOTE — CARE COORDINATION
Pt. Calm, cooperative, and pleasant. Denies all. \"I'm feeling better. \"  Good eye contact and bright affect. 3/10 depression and anxiety is [place]. \"  Pt. Naps all day, but is social when she's out.

## 2020-12-19 NOTE — GROUP NOTE
Group Therapy Note    Date: 12/18/2020    Group Start Time: 1930  Group End Time: 2000  Group Topic: Recreational    [facility]    Stevie Munguia        Group Therapy Note    Attendees: 9         Patient's Goal:  Attend and participate in recreational group. Notes:  Good participation    Status After Intervention:  Unchanged    Participation Level:  Active Listener    Participation Quality: Appropriate      Speech:  normal      Thought Process/Content: Logical      Affective Functioning: Congruent      Mood: euthymic      Level of consciousness:  Alert and Oriented x4      Response to Learning: Progressing to goal      Endings: None Reported    Modes of Intervention: Socialization      Discipline Responsible: Behavorial Health Tech      Signature:  Stevie Munguia

## 2020-12-19 NOTE — PROGRESS NOTES
Pt. attended the 0900 community meeting. Electronically signed by Deja Quinteros on 12/19/2020 at 9:41 AM

## 2020-12-20 LAB
GLUCOSE BLD-MCNC: 164 MG/DL (ref 60–115)
GLUCOSE BLD-MCNC: 202 MG/DL (ref 60–115)
GLUCOSE BLD-MCNC: 224 MG/DL (ref 60–115)
GLUCOSE BLD-MCNC: 95 MG/DL (ref 60–115)
PERFORMED ON: ABNORMAL
PERFORMED ON: NORMAL

## 2020-12-20 PROCEDURE — 1240000000 HC EMOTIONAL WELLNESS R&B

## 2020-12-20 PROCEDURE — 6370000000 HC RX 637 (ALT 250 FOR IP): Performed by: NURSE PRACTITIONER

## 2020-12-20 PROCEDURE — 6370000000 HC RX 637 (ALT 250 FOR IP): Performed by: PSYCHIATRY & NEUROLOGY

## 2020-12-20 PROCEDURE — 6370000000 HC RX 637 (ALT 250 FOR IP): Performed by: INTERNAL MEDICINE

## 2020-12-20 RX ORDER — CLONIDINE HYDROCHLORIDE 0.1 MG/1
0.1 TABLET ORAL NIGHTLY
Status: DISCONTINUED | OUTPATIENT
Start: 2020-12-20 | End: 2020-12-21 | Stop reason: HOSPADM

## 2020-12-20 RX ADMIN — OMEGA-3-ACID ETHYL ESTERS 2 G: 1 CAPSULE, LIQUID FILLED ORAL at 08:47

## 2020-12-20 RX ADMIN — HYDROXYZINE PAMOATE 50 MG: 50 CAPSULE ORAL at 13:01

## 2020-12-20 RX ADMIN — OMEGA-3-ACID ETHYL ESTERS 2 G: 1 CAPSULE, LIQUID FILLED ORAL at 20:51

## 2020-12-20 RX ADMIN — HYDROXYZINE PAMOATE 50 MG: 50 CAPSULE ORAL at 16:44

## 2020-12-20 RX ADMIN — GLYCOPYRROLATE 1 MG: 1 TABLET ORAL at 08:47

## 2020-12-20 RX ADMIN — VENLAFAXINE HYDROCHLORIDE 37.5 MG: 37.5 CAPSULE, EXTENDED RELEASE ORAL at 08:47

## 2020-12-20 RX ADMIN — GLYCOPYRROLATE 1 MG: 1 TABLET ORAL at 13:01

## 2020-12-20 RX ADMIN — CLONIDINE HYDROCHLORIDE 0.1 MG: 0.1 TABLET ORAL at 20:51

## 2020-12-20 RX ADMIN — ATORVASTATIN CALCIUM 20 MG: 20 TABLET, FILM COATED ORAL at 08:47

## 2020-12-20 RX ADMIN — TERBINAFINE HYDROCHLORIDE: 1 CREAM TOPICAL at 20:51

## 2020-12-20 RX ADMIN — BREXPIPRAZOLE 2 MG: 1 TABLET ORAL at 08:47

## 2020-12-20 RX ADMIN — TRAZODONE HYDROCHLORIDE 50 MG: 50 TABLET ORAL at 21:59

## 2020-12-20 RX ADMIN — INSULIN GLARGINE 60 UNITS: 100 INJECTION, SOLUTION SUBCUTANEOUS at 20:40

## 2020-12-20 RX ADMIN — HYDROXYZINE PAMOATE 50 MG: 50 CAPSULE ORAL at 08:47

## 2020-12-20 RX ADMIN — GLYCOPYRROLATE 1 MG: 1 TABLET ORAL at 20:51

## 2020-12-20 RX ADMIN — TERBINAFINE HYDROCHLORIDE: 1 CREAM TOPICAL at 08:58

## 2020-12-20 RX ADMIN — HYDROXYZINE PAMOATE 50 MG: 50 CAPSULE ORAL at 20:51

## 2020-12-20 RX ADMIN — FENOFIBRATE 54 MG: 54 TABLET ORAL at 08:47

## 2020-12-20 NOTE — GROUP NOTE
Group Therapy Note    Date: 12/19/2020    Group Start Time: 1805  Group End Time: 1436  Group Topic: Recreational    [facility]    Sebastian Hill        Group Therapy Note    Attendees: 12/18       Patient's Goal:  To participate in the Activity Group's game of [address]. Notes:  Patient briefly attended the Activity Group but departed early without participating.     Status After Intervention:  Unchanged    Participation Level: Minimal    Participation Quality: Appropriate      Speech:  normal      Thought Process/Content: Logical      Affective Functioning: Flat      Mood: euthymic      Level of consciousness:  Alert      Response to Learning: Able to verbalize current knowledge/experience      Endings: None Reported    Modes of Intervention: Activity      Discipline Responsible: [address], [employer]      Signature:  Sebastian Hill

## 2020-12-20 NOTE — ED NOTES
Pt noted to be out of her room more today then yesterday. Pt brightens when she talks about  possibly being discharged on Monday. Pt has been calm and pleasant with good eye contact and does attend groups. Pt denies SI,HI,AVH.

## 2020-12-20 NOTE — PROGRESS NOTES
[facility]. FOLLOW-UP NOTE   THE PATIENT WAS SEEN THROUGH TELEPSYCHIATRY, IN A REAL-TIME, AUDIO-VIDEO ENCOUNTER, WITH THE PATIENT IN [place], [place] AND THE PROVIDER IN [place], [place]        12/20/2020     Patient was seen and examined in person, Chart reviewed   Patient's case discussed with staff/team    Chief Complaint: Depression SI    Interim History:     Patient said she is feeling \" pretty good\". She said she slept \"good\", and that her appetite was \"good\" too. She denied having suicidal or homicidal ideation. She denied having hallucinations, paranoia or other delusions. She said her mood was improved overall.     Appetite:   [x] Normal/Unchanged  [] Increased  [] Decreased      Sleep:       [x] Normal/Unchanged  [] Fair       [] Poor              Energy:    [x] Normal/Unchanged  [] Increased  [] Decreased        SI [] Present  [x] Absent    HI  []Present  [x] Absent     Aggression:  [] yes  [x] no    Patient is [x] able  [] unable to CONTRACT FOR SAFETY     PAST MEDICAL/PSYCHIATRIC HISTORY:   Past Medical History:   Diagnosis Date    [facility] ([facility]     Depression     DM [facility]diabetes mellitus) ([facility]     History of drug abuse ([facility]     positive drug screens 9/22/14 and 5/18/14    Hyperlipidemia     Osteoarthritis        FAMILY/SOCIAL HISTORY:  Family History   Problem Relation Age of Onset    Other Mother         CHF    Cirrhosis Brother     Other Maternal Grandmother         CHF     Social History     Socioeconomic History    Marital status: Single     Spouse name: Not on file    Number of children: Not on file    Years of education: Not on file    Highest education level: Not on file   Occupational History    Not on file   Social Needs    Financial resource strain: Not on file    Food insecurity     Worry: Not on file     Inability: Not on file    Transportation needs     Medical: Not on file     Non-medical: Not on file   Tobacco Use  Smoking status: Current Every Day Smoker     Packs/day: 1.00     Years: 24.00     Pack years: 24.00     Types: Cigarettes    Smokeless tobacco: Never Used   Substance and Sexual Activity    Alcohol use: No    Drug use: Not Currently     Types: Marijuana    Sexual activity: Yes   Lifestyle    Physical activity     Days per week: Not on file     Minutes per session: Not on file    Stress: Not on file   Relationships    Social connections     Talks on phone: Not on file     Gets together: Not on file     Attends Mu-ism service: Not on file     Active member of club or organization: Not on file     Attends meetings of clubs or organizations: Not on file     Relationship status: Not on file    Intimate partner violence     Fear of current or ex partner: Not on file     Emotionally abused: Not on file     Physically abused: Not on file     Forced sexual activity: Not on file   Other Topics Concern    Not on file   Social History Narrative    Not on file           ROS:  [x] All negative/unchanged except if checked.  Explain positive(checked items) below:  [] Constitutional  [] Eyes  [] Ear/Nose/Mouth/Throat  [] Respiratory  [] CV  [] GI  []   [] Musculoskeletal  [] Skin/Breast  [] Neurological  [] Endocrine  [] Heme/Lymph  [] Allergic/Immunologic    Explanation:     MEDICATIONS:    Current Facility-Administered Medications:     cloNIDine (CATAPRES) tablet 0.1 mg, 0.1 mg, Oral, Nightly, Bebo Church MD    brexpiprazole (REXULTI) tablet 2 mg, 2 mg, Oral, Daily, Babar Roy MD, 2 mg at 12/20/20 0847    hydrOXYzine (VISTARIL) capsule 50 mg, 50 mg, Oral, 4x Daily, Babar Roy MD, 50 mg at 12/20/20 1644    insulin glargine (LANTUS) injection vial 60 Units, 60 Units, Subcutaneous, Nightly, Fidel Pearce MD, 60 Units at 12/19/20 2034    nicotine (NICODERM CQ) 21 MG/24HR 1 patch, 1 patch, Transdermal, Daily, Babar Roy MD, 1 patch at 12/20/20 4129   aluminum & magnesium hydroxide-simethicone (MAALOX) 200-200-20 MG/5ML suspension 30 mL, 30 mL, Oral, Q6H PRN, Sadi Whiteside MD    traZODone (DESYREL) tablet 50 mg, 50 mg, Oral, Nightly PRN, Sadia Escalante MD, 50 mg at 12/19/20 2038      Examination:  /86   Pulse 117   Temp 98.1 °F (36.7 °C) (Oral)   Resp 18   Ht 5' 5\" (1.651 m)   Wt 275 lb (124.7 kg)   SpO2 96%   BMI 45.76 kg/m²   Gait - steady  Medication side effects(SE): no    Mental Status Examination:    Level of consciousness: within normal limits   Appearance:  fair grooming and fair hygiene  Behavior/Motor:  no abnormalities noted  Attitude toward examiner: seems cooperative  Speech: with normal rate, rhythm, prosody  Mood: depression improving  Affect:  anxious  Thought processes:  slow   Thought content:  Suicidal Ideation: denies  Cognition:  oriented to person, place, and time   Concentration poor  Insight fair   Judgement fair     ASSESSMENT:   Patient symptoms are:  [x] Well controlled  [x] Improving  [] Worsening  [] No change      Diagnosis:   Principal Problem:    Bipolar 1 disorder, depressed ([facility]  Resolved Problems:    * No resolved hospital problems. *      LABS:    No results for input(s): WBC, HGB, PLT in the last 72 hours. No results for input(s): NA, K, CL, CO2, BUN, CREATININE, GLUCOSE in the last 72 hours. No results for input(s): BILITOT, ALKPHOS, AST, ALT in the last 72 hours. Lab Results   Component Value Date    LABAMPH Neg 12/15/2020    BARBSCNU Neg 12/15/2020    LABBENZ Neg 12/15/2020    LABMETH Neg 12/15/2020    OPIATESCREENURINE Neg 12/15/2020    PHENCYCLIDINESCREENURINE Neg 12/15/2020    ETOH <10 12/15/2020     Lab Results   Component Value Date    TSH 1.410 12/15/2020     Lab Results   Component Value Date    LITHIUM 0.5 (L) 02/08/2019     No results found for: VALPROATE, CBMZ      Treatment Plan:  Reviewed current Medications with the patient.    Medication as listed Risks, benefits, side effects, drug-to-drug interactions and alternatives to treatment were discussed. Collateral information:   CD evaluation  Encourage patient to attend group and other milieu activities.   Discharge planning discussed with the patient and treatment team.    PSYCHOTHERAPY/COUNSELING:  [x] Therapeutic interview  [x] Supportive  [] CBT  [] Ongoing  [] Other       [x] Patient continues to need, on a daily basis, active treatment furnished directly by or requiring the supervision of inpatient psychiatric personnel      Anticipated Length of stay: monday            Electronically signed by Dimitri Miranda MD on 12/20/2020 at 5:11 PM

## 2020-12-20 NOTE — PROGRESS NOTES
PODIATRIC MEDICINE AND SURGERY  CONSULT PROGRESS NOTE      Follow up of: Heel fissure  Staff Doctor:  Dr. Chan Kingdom:  36 y.o. female with PMH significant for Bipolar disorder, Depression, DM(A1c 11.1 on 11/7/2020) , HLD for who podiatry was consulted for Fissuring of heel. In the setting of uncontrolled diabetes it is not unreasonable to believe that this is the cause for dry or fissured heels. Patient also admitted very sweaty feet, that are often moist throughout the day. It is possible she has concurrent tinea pedis infection in a moccasin distribution at this time. The dry scaly skin is mostly at the heel and sides of the feet, with supple, moist skin on the plantar aspect. PLAN AND RECOMMENDATIONS[de-identified]  Patient's case  discussed with staff, Dr. Ronnell Alcantara, who will provide final recommendations going forward  Wound care: Terbinafine cream 1%  to be applied to feet BID after drying and out of a shower  Recommend to use cotton socks to change 2x daily. Keep feet dry as much as possible, wear light breathable shoes. Can use antifungal powder to help decrease the amount of moisture in feet. When out of shower dry feet immediately and in between toes. May need to switch to a urea or ammonium lactate based cream if no improvement is seen. WB as tolerated to B/L feet  Agree with consult for diabetes educator  Patient would benefit from smoking cessation information. Patient would benefit from podiatric care as an outpatient to establish care to deter from future problems. Podiatry to peripherally follow. Please advise if questions arise. Patient will need follow up with Dr. Ronnell Alcantara within 7-10 days of discharge. Interval HPI:   Patient sitting in chair in common area  Patient states she is doing much better and is ready for D/C  Patient states her feet look better and has been applying the cream after showers in the morning. No current facility-administered medications on file prior to encounter. Current Outpatient Medications on File Prior to Encounter   Medication Sig Dispense Refill    Insulin Pen Needle (NOVOFINE) 32G X 6 MM MISC 1 Device by Does not apply route 4 times daily (before meals and nightly) 300 each 3    blood glucose monitor kit and supplies 1 kit by Other route 4 times daily (before meals and nightly) Pt test 4x daily Dx E11.65. May substitute for generic or insurance covered product 1 kit 0    blood glucose monitor strips 1 strip by Other route 4 times daily (before meals and nightly) Pt test 4x daily Dx E11.65. May substitute for generic or insurance covered product 150 strip 3    FreeStyle Lancets MISC 1 each by Does not apply route 4 times daily (before meals and nightly) 300 each 3    Continuous Blood Gluc Sensor (FREESTYLE [place] 14 DAY SENSOR) MISC 2 Devices by Does not apply route every 14 days 2 each 3    Continuous Blood Gluc  (FREESTYLE [place] 14 DAY READER) KATARINA 1 Device by Does not apply route 4 times daily (before meals and nightly) 1 Device 0    miconazole (MICOTIN) 2 % powder Apply topically 2 times daily.  45 g 1    Dulaglutide (TRULICITY) 1.5 KJ/7.3OU SOPN Inject 1.5 mg into the skin once a week 4 pen 3    venlafaxine (EFFEXOR XR) 37.5 MG extended release capsule Take 1 capsule by mouth daily (with breakfast) 15 capsule 3    Brexpiprazole (REXULTI PO) Take 3 mg by mouth daily      traZODone (DESYREL) 50 MG tablet Take 1 tablet by mouth nightly (Patient taking differently: Take 50 mg by mouth nightly as needed ) 15 tablet 2    simvastatin (ZOCOR) 20 MG tablet Take 1 tablet by mouth every evening 30 tablet 3         LABS:   Lab Results   Component Value Date     12/15/2020    K 3.3 12/15/2020    K 4.1 11/10/2020    CL 95 12/15/2020    CO2 25 12/15/2020    BUN 12 12/15/2020    CREATININE 0.67 12/15/2020    GLUCOSE 462 12/15/2020    CALCIUM 9.4 12/15/2020     Lab Results Component Value Date    WBC 11.8 (H) 12/15/2020    HGB 16.4 (H) 12/15/2020    HCT 49.4 (H) 12/15/2020    MCV 84.3 12/15/2020     12/15/2020     Lab Results   Component Value Date    LABALBU 4.4 12/15/2020     No results found for: SEDRATE  No results found for: CRP  Lab Results   Component Value Date    LABA1C 11.1 (H) 11/07/2020           OBJECTIVE:  /63   Pulse 55   Temp 98.1 °F (36.7 °C) (Oral)   Resp 18   Ht 6' 1\" (1.854 m)   Wt 220 lb (99.8 kg)   SpO2 97%   BMI 29.03 kg/m²   Patient is alert and oriented to person, place, and time    Vascular:   Palpable Dorsalis Pedis and Palpable Posterior Tibial Pulses B/L   Capillary Fill time < 3 seconds to B/L digits  Skin temperature warm to warm tibial tuberosity to the digits B/L  Hair growth present to digits  mild non pitting edema  No varicosities      Neurological:   Sensation to light touch intact B/L  Protective sensation via monofilament testing intact B/L     Musculoskeletal/Orthopaedic:   Structural Deformities: None noted  5/5 muscle strength Dorsiflexion, Plantarflexion, Inversion, Eversion B/L  No tenderness on palpation of the fissured or cracked area     Dermatological:   Skin appears well hydrated and supple with good temperature, texture, turgor on the dorsum of the foot B/L. The heel posterior and planter, is scaly, dry, with fissuring on the B/L, none of the fissures are deep enough with drainage to be worried about infection currently.  Improvement in scaly plantar skin and fissures with terbinafine application   Sub 5th met L hyperkeratotic lesions noted  Nails 1-5 B/L appear currently painted, normal thickness and length  Interspaces 1-4 B/L are clear and without debris        Early  PETRA, PGY-1  Podiatric Surgery Resident  Podiatry On Call Pager: [phone]  12/20/20  12:39 PM The resident/student was under my direct supervision during today's patient encounter. reflection of my personal examination, assessment and treatment plan. The patient was seen and examined with the resident/student. The above findings and recommendations were reviewed and I agree with above treatment plan. Any questions or concerns, please Dr. Cony Stacy or podiatry resident on call.     Siena Smart DPM  Podiatry Attending  12/22/20  11:01 AM

## 2020-12-20 NOTE — GROUP NOTE
Group Therapy Note    Date: 12/20/2020    Group Start Time: 1100  Group End Time: 9616  Group Topic: Psychoeducation    [facility]    MIMI Mendes LSW        Group Therapy Note    Attendees: 9         Patient's Goal:  To participate in group therapy and to identify one new goal.    Notes:  Patient stated that she plans on starting and sticking to a new routine. Patient left the group early and returned to her room.     Status After Intervention:  Unchanged    Participation Level: Minimal    Participation Quality: Resistant      Speech:  normal      Thought Process/Content: Logical      Affective Functioning: Congruent      Mood: sad      Level of consciousness:  Alert      Response to Learning: Able to verbalize current knowledge/experience      Endings: None Reported    Modes of Intervention: Education      Discipline Responsible: /Counselor      Signature:  MIMI Mendes LSW

## 2020-12-20 NOTE — GROUP NOTE
Group Therapy Note    Date: 12/20/2020    Group Start Time: 1000  Group End Time: 1100  Group Topic: Recreational    [facility]    Kathrin Mccord        Group Therapy Note    Attendees: 11         Patient's Goal:  To have a good day    Notes:  Pt was attentive     Status After Intervention:  Unchanged    Participation Level: Interactive    Participation Quality: Attentive      Speech:  normal      Thought Process/Content: Logical      Affective Functioning: Congruent      Mood: calm      Level of consciousness:  Oriented x4      Response to Learning: Able to verbalize current knowledge/experience      Endings: None Reported    Modes of Intervention: Activity      Discipline Responsible: PCA      Signature:   Jamaal Morales

## 2020-12-20 NOTE — PROGRESS NOTES
Patient did not attend group despite staff encouragement.   Electronically signed by Lizbeth Magdaleno on 12/19/2020 at 10:05 PM

## 2020-12-21 VITALS
DIASTOLIC BLOOD PRESSURE: 87 MMHG | HEIGHT: 65 IN | HEART RATE: 109 BPM | SYSTOLIC BLOOD PRESSURE: 126 MMHG | WEIGHT: 275 LBS | OXYGEN SATURATION: 94 % | BODY MASS INDEX: 45.82 KG/M2 | RESPIRATION RATE: 16 BRPM | TEMPERATURE: 98 F

## 2020-12-21 LAB
GLUCOSE BLD-MCNC: 107 MG/DL (ref 60–115)
GLUCOSE BLD-MCNC: 143 MG/DL (ref 60–115)
PERFORMED ON: ABNORMAL
PERFORMED ON: NORMAL

## 2020-12-21 PROCEDURE — 99239 HOSP IP/OBS DSCHRG MGMT >30: CPT | Performed by: PSYCHIATRY & NEUROLOGY

## 2020-12-21 PROCEDURE — 6370000000 HC RX 637 (ALT 250 FOR IP): Performed by: PSYCHIATRY & NEUROLOGY

## 2020-12-21 PROCEDURE — 6370000000 HC RX 637 (ALT 250 FOR IP): Performed by: INTERNAL MEDICINE

## 2020-12-21 PROCEDURE — 6370000000 HC RX 637 (ALT 250 FOR IP): Performed by: NURSE PRACTITIONER

## 2020-12-21 RX ORDER — HYDROXYZINE PAMOATE 50 MG/1
50 CAPSULE ORAL 4 TIMES DAILY
Qty: 56 CAPSULE | Refills: 2 | Status: SHIPPED | OUTPATIENT
Start: 2020-12-21 | End: 2021-01-04

## 2020-12-21 RX ORDER — VENLAFAXINE HYDROCHLORIDE 37.5 MG/1
37.5 CAPSULE, EXTENDED RELEASE ORAL
Qty: 15 CAPSULE | Refills: 3 | Status: ON HOLD | OUTPATIENT
Start: 2020-12-22 | End: 2021-06-16

## 2020-12-21 RX ORDER — OMEGA-3-ACID ETHYL ESTERS 1 G/1
2 CAPSULE, LIQUID FILLED ORAL 2 TIMES DAILY
Qty: 60 CAPSULE | Refills: 3 | Status: ON HOLD | OUTPATIENT
Start: 2020-12-21 | End: 2022-02-16 | Stop reason: HOSPADM

## 2020-12-21 RX ORDER — FENOFIBRATE 54 MG/1
54 TABLET ORAL DAILY
Qty: 30 TABLET | Refills: 3 | Status: ON HOLD | OUTPATIENT
Start: 2020-12-22 | End: 2022-02-16 | Stop reason: HOSPADM

## 2020-12-21 RX ORDER — CLONIDINE HYDROCHLORIDE 0.1 MG/1
0.1 TABLET ORAL NIGHTLY
Qty: 15 TABLET | Refills: 3 | Status: SHIPPED | OUTPATIENT
Start: 2020-12-21 | End: 2021-02-22

## 2020-12-21 RX ORDER — GLYCOPYRROLATE 1 MG/1
1 TABLET ORAL 3 TIMES DAILY
Qty: 90 TABLET | Refills: 3 | Status: SHIPPED | OUTPATIENT
Start: 2020-12-21 | End: 2021-02-01 | Stop reason: SDUPTHER

## 2020-12-21 RX ORDER — TRAZODONE HYDROCHLORIDE 50 MG/1
50 TABLET ORAL NIGHTLY PRN
Qty: 15 TABLET | Refills: 2 | Status: ON HOLD | OUTPATIENT
Start: 2020-12-21 | End: 2021-06-16 | Stop reason: ALTCHOICE

## 2020-12-21 RX ADMIN — TERBINAFINE HYDROCHLORIDE: 1 CREAM TOPICAL at 08:27

## 2020-12-21 RX ADMIN — ATORVASTATIN CALCIUM 20 MG: 20 TABLET, FILM COATED ORAL at 08:28

## 2020-12-21 RX ADMIN — FENOFIBRATE 54 MG: 54 TABLET ORAL at 08:28

## 2020-12-21 RX ADMIN — OMEGA-3-ACID ETHYL ESTERS 2 G: 1 CAPSULE, LIQUID FILLED ORAL at 08:28

## 2020-12-21 RX ADMIN — VENLAFAXINE HYDROCHLORIDE 37.5 MG: 37.5 CAPSULE, EXTENDED RELEASE ORAL at 08:28

## 2020-12-21 RX ADMIN — BREXPIPRAZOLE 2 MG: 1 TABLET ORAL at 08:27

## 2020-12-21 RX ADMIN — HYDROXYZINE PAMOATE 50 MG: 50 CAPSULE ORAL at 12:49

## 2020-12-21 RX ADMIN — HYDROXYZINE PAMOATE 50 MG: 50 CAPSULE ORAL at 08:28

## 2020-12-21 RX ADMIN — GLYCOPYRROLATE 1 MG: 1 TABLET ORAL at 08:28

## 2020-12-21 NOTE — GROUP NOTE
Group Therapy Note    Date: 12/20/2020    Group Start Time: 1800  Group End Time: 0783  Group Topic: Recreational    [facility]    Liliam Ornelas        Group Therapy Note    Attendees: 12/18       Patient's Goal:  To participate in the Activity Group's game of Table Topics. Notes:  Patient actively participated in the Activity Group. Status After Intervention:  Improved    Participation Level:  Active Listener and Interactive    Participation Quality: Appropriate, Attentive, Sharing and Supportive      Speech:  normal      Thought Process/Content: Logical      Affective Functioning: Congruent      Mood: euthymic      Level of consciousness:  Alert and Attentive      Response to Learning: Able to verbalize current knowledge/experience      Endings: None Reported    Modes of Intervention: Activity      Discipline Responsible: [address], [employer]      Signature:  Liliam Ornelas

## 2020-12-21 NOTE — DISCHARGE SUMMARY
DISCHARGE SUMMARY      Patient ID:  Melisa Loza  [id number]  36 y.o.  1980      Admit date: 12/15/2020    Discharge date and time: 12/21/2020    Admitting Physician: Bassem Christine MD     Discharge Physician: Dr Natividad Hansen MD    Admission Diagnoses: Major depressive disorder, recurrent ([facility] [F33.9]    Admission Condition: poor    Discharged Condition: stable    Admission Circumstance:     brought to the ED by  due to having increased depression and anxiety.  Pt having thoughts of SI ideations.  Pt has no plan. Pt would like to go to sleep and not wake up. Pt has had attempt in of suicide in 2013 with tramadol and was hospitalized.  Pt also states that she has a past attempt this summer taking vistaril.  Pt is sad and tearful. Pt stating no food too eat in the house.  Pt also stating that holidays are hard due to no money for gifts for new grandchild.  Pt stating that she has had a yeast infection and is having a tough time dealing with her diabetes \"I'm just tired of it all\".  Pt states that \"If I could get back on my mental health meds that would help with dealing with my diabetes\".  Pt had previous admission to  in June. Pt negative for alcohol and tox negative.     HISTORY OF PRESENT ILLNESS:       The patient is a 36 y.o. female with significant past history of bipolar  Pt lives with . Isma Davalos was just approved for disability recently and is just awaiting to start receiving.      Pt was discharged in June 2020, after few weeks of treatment she fell off follow up appointment  Pt has been feeling depressed for few months since stopping meds in August.  Pt was taking effexor, rexulti, clonidine, trazodone     Duration: few months  Severity: Rating mood to be around 2/10 (10- good)  Quality:melancholic  Worse in the morning  Content: Hopeless, worthless and helpless feeling  Suicidal thoughts - better off dead, not want to wake up, thought of taking overdose traZODone (DESYREL) 50 MG tablet, Take 1 tablet by mouth nightly (Patient taking differently: Take 50 mg by mouth nightly as needed )  simvastatin (ZOCOR) 20 MG tablet, Take 1 tablet by mouth every evening        Compliance:no     Psychiatric Review of Systems       Depression: yes     Jessica or Hypomania:  yes - mood swings, labile and irritable     Panic Attacks:  yes      Phobias:  no     Obsessions and Compulsions:  no     PTSD : no     Hallucinations:  no     Delusions:  no     Substance Abuse History:  ETOH: no   Marijuana: no  Opiates: no  Other Drugs: no        Past Psychiatric History:  She has a history of Bipolar disorder, diagnosed 10 years ago. She was previously admitted to , and her last admission was in June 2020. She had suicide attempt in the past, twice by overdose. She is seeing a provider at [facility]. She was on Lithium in the past, but she said it had been discontinued because of ?side effects vs. Toxicity.         PAST MEDICAL/PSYCHIATRIC HISTORY:   Past Medical History:   Diagnosis Date    [facility] ([facility]     Depression     [facility]     History of drug abuse ([facility]     positive drug screens 9/22/14 and 5/18/14    Hyperlipidemia     Osteoarthritis        FAMILY/SOCIAL HISTORY:  Family History   Problem Relation Age of Onset    Other Mother         CHF    Cirrhosis Brother     Other Maternal Grandmother         CHF     Social History     Socioeconomic History    Marital status: Single     Spouse name: Not on file    Number of children: Not on file    Years of education: Not on file    Highest education level: Not on file   Occupational History    Not on file   Social Needs    Financial resource strain: Not on file    Food insecurity     Worry: Not on file     Inability: Not on file    Transportation needs     Medical: Not on file     Non-medical: Not on file   Tobacco Use    Smoking status: Current Every Day Smoker     Packs/day: 1.00 Years: 24.00     Pack years: 24.00     Types: Cigarettes    Smokeless tobacco: Never Used   Substance and Sexual Activity    Alcohol use: No    Drug use: Not Currently     Types: Marijuana    Sexual activity: Yes   Lifestyle    Physical activity     Days per week: Not on file     Minutes per session: Not on file    Stress: Not on file   Relationships    Social connections     Talks on phone: Not on file     Gets together: Not on file     Attends Zoroastrianism service: Not on file     Active member of club or organization: Not on file     Attends meetings of clubs or organizations: Not on file     Relationship status: Not on file    Intimate partner violence     Fear of current or ex partner: Not on file     Emotionally abused: Not on file     Physically abused: Not on file     Forced sexual activity: Not on file   Other Topics Concern    Not on file   Social History Narrative    Not on file       MEDICATIONS:    Current Facility-Administered Medications:     cloNIDine (CATAPRES) tablet 0.1 mg, 0.1 mg, Oral, Nightly, Nisa Boyle MD, 0.1 mg at 12/20/20 2051    brexpiprazole (REXULTI) tablet 2 mg, 2 mg, Oral, Daily, Vin Hdz MD, 2 mg at 12/21/20 0827    hydrOXYzine (VISTARIL) capsule 50 mg, 50 mg, Oral, 4x Daily, Vin Hdz MD, 50 mg at 12/21/20 0828    insulin glargine (LANTUS) injection vial 60 Units, 60 Units, Subcutaneous, Nightly, Jackie Whitt MD, 60 Units at 12/20/20 2040    nicotine (NICODERM CQ) 21 MG/24HR 1 patch, 1 patch, Transdermal, Daily, Vin Hdz MD, 1 patch at 12/21/20 0830    influenza quadrivalent split vaccine (FLUZONE;FLUARIX;FLULAVAL;AFLURIA) injection 0.5 mL, 0.5 mL, Intramuscular, Prior to discharge, Vin Hdz MD    venlafaxine (EFFEXOR XR) extended release capsule 37.5 mg, 37.5 mg, Oral, Daily with breakfast, Vin Hdz MD, 37.5 mg at 12/21/20 9618   atorvastatin (LIPITOR) tablet 20 mg, 20 mg, Oral, Daily, Meg Gomez MD, 20 mg at 12/21/20 0828    insulin lispro (HUMALOG) injection vial 0-12 Units, 0-12 Units, Subcutaneous, TID WC, Eden Arnold APRN - CNP, 2 Units at 12/21/20 9797    insulin lispro (HUMALOG) injection vial 0-6 Units, 0-6 Units, Subcutaneous, Nightly, Roqueella Clayton, APRN - CNP, 2 Units at 12/20/20 2040    glucose (GLUTOSE) 40 % oral gel 15 g, 15 g, Oral, PRN, Roqueella Clayton, APRN - CNP    dextrose 50 % IV solution, 12.5 g, Intravenous, PRN, Roqueella Clayton, APRN - CNP    glucagon (rDNA) injection 1 mg, 1 mg, Intramuscular, PRN, Carnella Mantel, APRN - CNP    dextrose 5 % solution, 100 mL/hr, Intravenous, PRN, Roqueella Clayton, APRN - CNP    omega-3 acid ethyl esters (LOVAZA) capsule 2 g, 2 g, Oral, BID, Carnella Clayton, APRN - CNP, 2 g at 12/21/20 1828    fenofibrate (TRICOR) tablet 54 mg, 54 mg, Oral, Daily, Eden Arnold, APRN - CNP, 54 mg at 12/21/20 0828    terbinafine (LAMISIL) 1 % cream, , Topical, BID, Sal Cat DPM, Given at 12/21/20 0827    insulin lispro (HUMALOG) injection vial 14 Units, 14 Units, Subcutaneous, TID , Fidel Pearce MD, 14 Units at 12/21/20 3534    glycopyrrolate (ROBINUL) tablet 1 mg, 1 mg, Oral, TID, Clemetine MD Joanna, 1 mg at 12/21/20 6356    haloperidol lactate (HALDOL) injection 5 mg, 5 mg, Intramuscular, Q6H PRN **OR** haloperidol (HALDOL) tablet 5 mg, 5 mg, Oral, Q6H PRN, Meg Gomez MD    acetaminophen (TYLENOL) tablet 650 mg, 650 mg, Oral, Q4H PRN, Meg Gomez MD    magnesium hydroxide (MILK OF MAGNESIA) 400 MG/5ML suspension 30 mL, 30 mL, Oral, Daily PRN, Meg Gomez MD    aluminum & magnesium hydroxide-simethicone (MAALOX) 200-200-20 MG/5ML suspension 30 mL, 30 mL, Oral, Q6H PRN, Meg Gomez MD    traZODone (DESYREL) tablet 50 mg, 50 mg, Oral, Nightly PRN, Dimitri Miranda MD, 50 mg at 12/20/20 9026    Examination: /87   Pulse 109   Temp 98 °F (36.7 °C) (Oral)   Resp 16   Ht 5' 5\" (1.651 m)   Wt 275 lb (124.7 kg)   SpO2 94%   BMI 45.76 kg/m²   Gait - steady    HOSPITAL COURSE[de-identified]  Following admission to the hospital, patient had a complete physical exam and blood work up  Patient was monitored closely with suicide precaution  Patient was started on medication   Was encouraged to participate in group and other milieu activity  Patient started to feel better with this combination of treatment. Significant progress in the symptoms since admission. Mood better, with the score of 2/10 - bad  No AVH or paranoid thoughts  No Hopeless or worthless feeling  No active SI/HI  Appetite:  [x] Normal  [] Increased  [] Decreased    Sleep:       [x] Normal  [] Fair       [] Poor            Energy:    [x] Normal  [] Increased  [] Decreased     SI [] Present  [x] Absent  HI  []Present  [x] Absent   Aggression:  [] yes  [] no  Patient is [x] able  [] unable to CONTRACT FOR SAFETY   Medication side effects(SE):  [x] None(Psych. Meds.) [] Other      Mental Status Examination on discharge:    Level of consciousness:  within normal limits   Appearance:  well-appearing  Behavior/Motor:  no abnormalities noted  Attitude toward examiner:  attentive and good eye contact  Speech:  spontaneous, normal rate and normal volume   Mood: euthymic  Affect:  mood congruent  Thought processes:  coherent   Thought content:  Suicidal Ideation:  denies suicidal ideation  Delusions:  no evidence of delusions  Perceptual Disturbance:  denies any perceptual disturbance  Cognition:  oriented to person, place, and time   Concentration intact  Memory intact  Insight good   Judgement fair   Fund of Knowledge adequate      ASSESSMENT:  Patient symptoms are:  [x] Well controlled  [x] Improving  [] Worsening  [] No change      Diagnosis:  Principal Problem:    Bipolar 1 disorder, depressed ([facility]  Resolved Problems:    * No resolved hospital problems.  * LABS:    No results for input(s): WBC, HGB, PLT in the last 72 hours. No results for input(s): NA, K, CL, CO2, BUN, CREATININE, GLUCOSE in the last 72 hours. No results for input(s): BILITOT, ALKPHOS, AST, ALT in the last 72 hours. Lab Results   Component Value Date    LABAMPH Neg 12/15/2020    BARBSCNU Neg 12/15/2020    LABBENZ Neg 12/15/2020    LABMETH Neg 12/15/2020    OPIATESCREENURINE Neg 12/15/2020    PHENCYCLIDINESCREENURINE Neg 12/15/2020    ETOH <10 12/15/2020     Lab Results   Component Value Date    TSH 1.410 12/15/2020     Lab Results   Component Value Date    LITHIUM 0.5 (L) 02/08/2019     No results found for: VALPROATE, CBMZ    RISK ASSESSMENT AT DISCHARGE: Low risk for suicide and homicide. Treatment Plan:  Reviewed current Medications with the patient. Education provided on the complaince with treatment. Risks, benefits, side effects, drug-to-drug interactions and alternatives to treatment were discussed. Encourage patient to attend outpatient follow up appointment and therapy. Patient was advised to call the outpatient provider, visit the nearest ED or call 911 if symptoms are not manageable. Patient's family member was contacted prior to the discharge.          Medication List      START taking these medications    cloNIDine 0.1 MG tablet  Commonly known as: CATAPRES  Take 1 tablet by mouth nightly     hydrOXYzine 50 MG capsule  Commonly known as: VISTARIL  Take 1 capsule by mouth 4 times daily for 14 days        CHANGE how you take these medications    brexpiprazole 2 MG Tabs tablet  Commonly known as: Rexulti  Take 1 tablet by mouth daily  Start taking on: December 22, 2020  What changed:   · medication strength  · how much to take     insulin lispro (1 Unit Dial) 100 UNIT/ML Sopn  Commonly known as: HumaLOG KwikPen  Inject 12 Units into the skin 3 times daily (before meals)  What changed: how much to take     Lantus SoloStar 100 UNIT/ML injection pen Generic drug: insulin glargine  Inject 60 Units into the skin nightly  What changed: how much to take     traZODone 50 MG tablet  Commonly known as: DESYREL  Take 1 tablet by mouth nightly as needed for Sleep  What changed:   · when to take this  · reasons to take this        CONTINUE taking these medications    blood glucose monitor kit and supplies  1 kit by Other route 4 times daily (before meals and nightly) Pt test 4x daily Dx E11.65. May substitute for generic or insurance covered product     blood glucose test strips  1 strip by Other route 4 times daily (before meals and nightly) Pt test 4x daily Dx E11.65.   May substitute for generic or insurance covered product     FreeStyle Lancets Misc  1 each by Does not apply route 4 times daily (before meals and nightly)     FreeStyle Juan 14 Day [place]  1 Device by Does not apply route 4 times daily (before meals and nightly)     FreeStyle Juan 14 Day Sensor Misc  2 Devices by Does not apply route every 14 days     NovoFine 32G X 6 MM Misc  Generic drug: Insulin Pen Needle  1 Device by Does not apply route 4 times daily (before meals and nightly)     Trulicity 1.5 IZ/8.7WZ Sopn  Generic drug: Dulaglutide  Inject 1.5 mg into the skin once a week     venlafaxine 37.5 MG extended release capsule  Commonly known as: EFFEXOR XR  Take 1 capsule by mouth daily (with breakfast)  Start taking on: December 22, 2020        STOP taking these medications    miconazole 2 % powder  Commonly known as: MICOTIN        ASK your doctor about these medications    simvastatin 20 MG tablet  Commonly known as: Zocor  Take 1 tablet by mouth every evening           Where to Get Your Medications      These medications were sent to [address] [place] [address] - [address] [place] [address] CHAPITO Ocasio P [phone] - F [phone] [address] [address]    Hours: 24-hours Phone: [phone]   · insulin lispro (1 Unit Dial) 100 UNIT/ML Sopn · Lantus SoloStar 100 UNIT/ML injection pen     These medications were sent to [facility] [address]  [address] [facility] [address]    Phone: [phone]   · brexpiprazole 2 MG Tabs tablet  · cloNIDine 0.1 MG tablet  · hydrOXYzine 50 MG capsule  · traZODone 50 MG tablet  · venlafaxine 37.5 MG extended release capsule           Reason for more than one antipsychotic:   [x] N/A  [] 3 failed monotherapy(drugs tried):  [] Cross over to a new antipsychotic  [] Taper to monotherapy from polypharmacy  [] Augmentation of Clozapine therapy due to treatment resistance to single therapy        TIME SPEND - 35 MINUTES TO COMPLETE THE EVALUATION, DISCHARGE SUMMARY, MEDICATION RECONCILIATION AND FOLLOW UP CARE     Donna García  12/21/2020  9:31 AM

## 2020-12-21 NOTE — CARE COORDINATION
Discharge instructions reviewed verbally and in writing including f/u appointments. Patient verbalizes understanding and signed as such. All belongings returned for discharge. Patient provided with food/drug interaction booklet. Patient denies SI, HI, A/V hallucinations, mood is stable.    Discharged with  for transport home

## 2020-12-21 NOTE — PROGRESS NOTES
Patient is offered and refuses the flu shot.  Electronically signed by Omaira Mejia LPN on 25/71/2730 at 10:16 AM  \

## 2020-12-21 NOTE — GROUP NOTE
Group Therapy Note    Date: 12/21/2020    Group Start Time: 1105  Group End Time: 1150  Group Topic: Psychoeducation    [facility]    Loma Lennox, MSW, LSW        Group Therapy Note    Attendees: 12         Patient's Goal:  To participate in group and identify one new goal.    Notes:  Patient came to group but left before the session was over.     Status After Intervention:  Unchanged    Participation Level: Minimal    Participation Quality: Appropriate      Speech:  normal      Thought Process/Content: Logical      Affective Functioning: Congruent      Mood: calm      Level of consciousness:  Alert      Response to Learning: Able to verbalize current knowledge/experience      Endings: None Reported    Modes of Intervention: Education      Discipline Responsible: /Counselor      Signature:  Loma Lennox, MSW, LSW

## 2020-12-21 NOTE — GROUP NOTE
Group Therapy Note    Date: 12/21/2020    Group Start Time: 1000  Group End Time: 1050  Group Topic: Psychoeducation    [facility]    Jo Cecy        Group Therapy Note    Attendees: 10         Patient's Goal:  \"To go home\"    Notes:  Patient's affect has improved, she is more relax and she work fairly well on her task in group. Status After Intervention:  Improved    Participation Level:  Active Listener    Participation Quality: Appropriate and Attentive      Speech:  normal      Thought Process/Content: Logical      Affective Functioning: Congruent      Mood: Improved      Level of consciousness:  Alert      Response to Learning: Able to verbalize current knowledge/experience      Endings: None Reported    Modes of Intervention: Education, Socialization and Activity      Discipline Responsible: Psychoeducational Specialist      Signature:  [place]

## 2020-12-21 NOTE — PROGRESS NOTES
Pt. attended the 0900 community meeting. Electronically signed by Emmett Lombard, [address] on 12/21/2020 at 10:42 AM

## 2020-12-21 NOTE — PROGRESS NOTES
Pt denies all, is very excited to be discharge today. Patient polite and cooperative with care. Pt denies SI/HI or AVH. Patient going to groups and social with peers. Sleep is better and appetite is better. Patient has no complaints at this time other than anxiety which pt always has but feels better.  Electronically signed by Maryann Jay LPN on 04/85/0863 at 10:57 AM

## 2020-12-21 NOTE — GROUP NOTE
Group Therapy Note    Date: 12/20/2020    Group Start Time: 2100  Group End Time: 2130  Group Topic: Wrap-Up    [facility]    Anne Said        Group Therapy Note    Attendees: 15/18       Patient's Goal:  \"To have a good day. \"    Notes:  Patient reports meeting their goal for today. Patient chose to participate in a guided body scan meditation for relaxation. Status After Intervention:  Improved    Participation Level:  Active Listener and Interactive    Participation Quality: Appropriate, Attentive, Sharing and Supportive      Speech:  normal      Thought Process/Content: Logical      Affective Functioning: Congruent      Mood: euthymic      Level of consciousness:  Alert and Attentive      Response to Learning: Able to verbalize current knowledge/experience      Endings: None Reported    Modes of Intervention: Support      Discipline Responsible: Behavorial Health Tech      Signature:  Anne Gimenez

## 2020-12-21 NOTE — DISCHARGE INSTR - DIET
? Good nutrition is important when healing from an illness, injury, or surgery. Follow any nutrition recommendations given to you during your hospital stay. ? If you were given an oral nutrition supplement while in the hospital, continue to take this supplement at home. You can take it with meals, in-between meals, and/or before bedtime. These supplements can be purchased at most local grocery stores, pharmacies, and chain [employer]-stores.    ? If you have any questions about your diet or nutrition, call the hospital and ask for the dietitian.   carb control

## 2020-12-28 ENCOUNTER — TELEPHONE (OUTPATIENT)
Dept: ENDOCRINOLOGY | Age: 40
End: 2020-12-28

## 2020-12-28 NOTE — TELEPHONE ENCOUNTER
Patient calling to inform you that a PA needs to be completed for RX of Freestyle Juan [place]. Please advise.

## 2021-01-04 ENCOUNTER — OFFICE VISIT (OUTPATIENT)
Dept: ENDOCRINOLOGY | Age: 41
End: 2021-01-04
Payer: MEDICAID

## 2021-01-04 VITALS
DIASTOLIC BLOOD PRESSURE: 77 MMHG | WEIGHT: 266 LBS | OXYGEN SATURATION: 93 % | SYSTOLIC BLOOD PRESSURE: 104 MMHG | HEIGHT: 66 IN | HEART RATE: 87 BPM | BODY MASS INDEX: 42.75 KG/M2

## 2021-01-04 DIAGNOSIS — E66.01 MORBID OBESITY (HCC): ICD-10-CM

## 2021-01-04 DIAGNOSIS — E11.65 UNCONTROLLED TYPE 2 DIABETES MELLITUS WITH HYPERGLYCEMIA (HCC): Primary | ICD-10-CM

## 2021-01-04 LAB
CHP ED QC CHECK: NORMAL
GLUCOSE BLD-MCNC: 189 MG/DL

## 2021-01-04 PROCEDURE — 1111F DSCHRG MED/CURRENT MED MERGE: CPT | Performed by: INTERNAL MEDICINE

## 2021-01-04 PROCEDURE — G8427 DOCREV CUR MEDS BY ELIG CLIN: HCPCS | Performed by: INTERNAL MEDICINE

## 2021-01-04 PROCEDURE — G8417 CALC BMI ABV UP PARAM F/U: HCPCS | Performed by: INTERNAL MEDICINE

## 2021-01-04 PROCEDURE — 2022F DILAT RTA XM EVC RTNOPTHY: CPT | Performed by: INTERNAL MEDICINE

## 2021-01-04 PROCEDURE — 99213 OFFICE O/P EST LOW 20 MIN: CPT | Performed by: INTERNAL MEDICINE

## 2021-01-04 PROCEDURE — 4004F PT TOBACCO SCREEN RCVD TLK: CPT | Performed by: INTERNAL MEDICINE

## 2021-01-04 PROCEDURE — 82962 GLUCOSE BLOOD TEST: CPT | Performed by: INTERNAL MEDICINE

## 2021-01-04 PROCEDURE — G8484 FLU IMMUNIZE NO ADMIN: HCPCS | Performed by: INTERNAL MEDICINE

## 2021-01-04 PROCEDURE — 3046F HEMOGLOBIN A1C LEVEL >9.0%: CPT | Performed by: INTERNAL MEDICINE

## 2021-01-04 RX ORDER — INSULIN GLARGINE 100 [IU]/ML
60 INJECTION, SOLUTION SUBCUTANEOUS NIGHTLY
Qty: 10 PEN | Refills: 5 | Status: SHIPPED | OUTPATIENT
Start: 2021-01-04 | End: 2021-03-08 | Stop reason: ALTCHOICE

## 2021-01-04 RX ORDER — DULAGLUTIDE 3 MG/.5ML
3 INJECTION, SOLUTION SUBCUTANEOUS WEEKLY
Qty: 4 PEN | Refills: 3 | Status: SHIPPED | OUTPATIENT
Start: 2021-01-04 | End: 2021-03-08 | Stop reason: SDUPTHER

## 2021-01-04 RX ORDER — INSULIN LISPRO 100 [IU]/ML
INJECTION, SOLUTION INTRAVENOUS; SUBCUTANEOUS
Qty: 10 PEN | Refills: 3 | Status: SHIPPED | OUTPATIENT
Start: 2021-01-04 | End: 2021-03-08 | Stop reason: ALTCHOICE

## 2021-01-04 RX ORDER — PHENTERMINE HYDROCHLORIDE 37.5 MG/1
37.5 TABLET ORAL
Qty: 30 TABLET | Refills: 0 | Status: SHIPPED | OUTPATIENT
Start: 2021-01-04 | End: 2021-02-01 | Stop reason: SDUPTHER

## 2021-01-04 RX ORDER — FLASH GLUCOSE SENSOR
KIT MISCELLANEOUS
Qty: 2 EACH | Refills: 3 | Status: SHIPPED | OUTPATIENT
Start: 2021-01-04

## 2021-01-04 RX ORDER — BLOOD-GLUCOSE METER
1 KIT MISCELLANEOUS 4 TIMES DAILY
Qty: 200 EACH | Refills: 3 | Status: ON HOLD | OUTPATIENT
Start: 2021-01-04 | End: 2022-02-16 | Stop reason: HOSPADM

## 2021-01-04 RX ORDER — FLASH GLUCOSE SCANNING READER
1 EACH MISCELLANEOUS
Qty: 1 DEVICE | Refills: 0 | Status: SHIPPED | OUTPATIENT
Start: 2021-01-04

## 2021-01-04 NOTE — PROGRESS NOTES
Subjective:      Patient ID: Jose Armando Alejandre is a 36 y.o. female. Patient was seen in the hospital at [facility] currently on Lantus 60 units at night Humalog 14 with each meals patient wants to look into getting freestyle ananth also wants to start on phentermine for obesity  Glucose in 100-200 range   Pt on lantus huamlog plus trulicity   Diabetes  She presents for her follow-up diabetic visit. She has type 2 diabetes mellitus. Symptoms are improving. Current diabetic treatment includes insulin injections. She is currently taking insulin pre-breakfast, pre-lunch, pre-dinner and at bedtime. (Lab Results       Component                Value               Date                       LABA1C                   11.1 (H)            11/07/2020              )     Hyperhidrosis on robinul stable      Obesity Body mass index is 42.93 kg/m². Results for Chely Dietrich (MRN [id number]) as of 1/4/2021 09:49   Ref.  Range 12/21/2020 12:02 1/4/2021 09:43   Glucose Latest Units: mg/dL  189   POC Glucose Latest Ref Range: 60 - 115 mg/dl 107      Lab Results   Component Value Date     (L) 12/15/2020    K 3.3 (L) 12/15/2020    CL 95 12/15/2020    CO2 25 12/15/2020    BUN 12 12/15/2020    CREATININE 0.67 12/15/2020    GLUCOSE 189 01/04/2021    CALCIUM 9.4 12/15/2020    PROT 7.1 12/15/2020    LABALBU 4.4 12/15/2020    BILITOT 0.3 12/15/2020    ALKPHOS 123 12/15/2020    AST 46 (H) 12/15/2020    ALT 45 (H) 12/15/2020    LABGLOM >60.0 12/15/2020    GFRAA >60.0 12/15/2020    GLOB 2.7 12/15/2020         Patient Active Problem List   Diagnosis    Chronic cholecystitis with calculus    History of drug abuse ([facility] [address]    Spondylosis of lumbar region without myelopathy or radiculopathy    QI (obstructive sleep apnea)    Morbid obesity with body mass index (BMI) of 40.0 to 44.9 in adult [facility])    Bipolar disorder, current episode depressed, moderate (HCC)    Bipolar 1 disorder, depressed ([address]    Diarrhea  Bipolar disorder with depression ([facility] [address]    Closed fracture of middle or proximal phalanx or phalanges of hand    Major depression, single episode    Uncontrolled type 2 diabetes mellitus with hyperglycemia (HCC)    Cellulitis of abdominal wall    Abscess    Obesity, morbid (more than 100 lbs over ideal weight or BMI > 40) (HCC)    Major depressive disorder, recurrent (HCC)     Allergies   Allergen Reactions    Latuda [Lurasidone Hcl] Hives    Lurasidone Hives    Adhesive Tape        Current Outpatient Medications:     blood glucose test strips (FREESTYLE LITE) strip, 1 each by In Vitro route 4 times daily As needed. , Disp: 200 each, Rfl: 3    Dulaglutide (TRULICITY) 3 PS/9.4JL SOPN, Inject 3 mg into the skin once a week, Disp: 4 pen, Rfl: 3    insulin lispro, 1 Unit Dial, (HUMALOG KWIKPEN) 100 UNIT/ML SOPN, 14 units at each meals, Disp: 10 pen, Rfl: 3    Continuous Blood Gluc  (FREESTYLE [place] 14 DAY READER) KATARINA, 1 Device by Does not apply route 4 times daily (before meals and nightly), Disp: 1 Device, Rfl: 0    insulin glargine (LANTUS SOLOSTAR) 100 UNIT/ML injection pen, Inject 60 Units into the skin nightly, Disp: 10 pen, Rfl: 5    Continuous Blood Gluc Sensor (FREESTYLE [place] 14 DAY SENSOR) MISC, Every 2 weeks, Disp: 2 each, Rfl: 03    phentermine (ADIPEX-P) 37.5 MG tablet, Take 1 tablet by mouth every morning (before breakfast) for 30 days. , Disp: 30 tablet, Rfl: 0    traZODone (DESYREL) 50 MG tablet, Take 1 tablet by mouth nightly as needed for Sleep, Disp: 15 tablet, Rfl: 2    venlafaxine (EFFEXOR XR) 37.5 MG extended release capsule, Take 1 capsule by mouth daily (with breakfast), Disp: 15 capsule, Rfl: 3    cloNIDine (CATAPRES) 0.1 MG tablet, Take 1 tablet by mouth nightly, Disp: 15 tablet, Rfl: 3    brexpiprazole (REXULTI) 2 MG TABS tablet, Take 1 tablet by mouth daily, Disp: 15 tablet, Rfl: 2   fenofibrate (TRICOR) 54 MG tablet, Take 1 tablet by mouth daily s [facility] pharmacy: Please dispense generic fenofibrate unless prescriber denote, Disp: 30 tablet, Rfl: 3    omega-3 acid ethyl esters (LOVAZA) 1 g capsule, Take 2 capsules by mouth 2 times daily, Disp: 60 capsule, Rfl: 3    glycopyrrolate (ROBINUL) 1 MG tablet, Take 1 tablet by mouth 3 times daily, Disp: 90 tablet, Rfl: 3    Dulaglutide (TRULICITY) 1.5 ZB/1.9UX SOPN, Inject 1.5 mg into the skin once a week, Disp: 4 pen, Rfl: 3    Insulin Pen Needle (NOVOFINE) 32G X 6 MM MISC, 1 Device by Does not apply route 4 times daily (before meals and nightly), Disp: 300 each, Rfl: 3    blood glucose monitor kit and supplies, 1 kit by Other route 4 times daily (before meals and nightly) Pt test 4x daily Dx E11.65. May substitute for generic or insurance covered product, Disp: 1 kit, Rfl: 0    blood glucose monitor strips, 1 strip by Other route 4 times daily (before meals and nightly) Pt test 4x daily Dx E11.65. May substitute for generic or insurance covered product, Disp: 150 strip, Rfl: 3    FreeStyle Lancets MISC, 1 each by Does not apply route 4 times daily (before meals and nightly), Disp: 300 each, Rfl: 3    Continuous Blood Gluc Sensor (FREESTYLE [place] 14 DAY SENSOR) MISC, 2 Devices by Does not apply route every 14 days, Disp: 2 each, Rfl: 3    simvastatin (ZOCOR) 20 MG tablet, Take 1 tablet by mouth every evening, Disp: 30 tablet, Rfl: 3      Review of Systems   Cardiovascular: Negative. Psychiatric/Behavioral: Positive for dysphoric mood. All other systems reviewed and are negative. Vitals:    01/04/21 0943   BP: 104/77   Pulse: 87   SpO2: 93%   Weight: 266 lb (120.7 kg)   Height: 5' 6\" (1.676 m)       Objective:   Physical Exam  Vitals signs reviewed. Constitutional:       Appearance: Normal appearance. She is obese. HENT:      Head: Normocephalic and atraumatic.       Right Ear: External ear normal. Left Ear: External ear normal.   Eyes:      Extraocular Movements: Extraocular movements intact. Neck:      Musculoskeletal: Normal range of motion and neck supple. Cardiovascular:      Rate and Rhythm: Normal rate. Pulmonary:      Effort: Pulmonary effort is normal.   Musculoskeletal: Normal range of motion. Neurological:      General: No focal deficit present. Mental Status: She is alert and oriented to person, place, and time. Psychiatric:         Mood and Affect: Mood normal.         Assessment:       Diagnosis Orders   1. Uncontrolled type 2 diabetes mellitus with hyperglycemia ([facility])  POCT Glucose    Basic Metabolic Panel    Hemoglobin A1C    phentermine (ADIPEX-P) 37.5 MG tablet   2. Morbid obesity ([address]             Plan:      Orders Placed This Encounter   Procedures    Basic Metabolic Panel     Standing Status:   Future     Standing Expiration Date:   2022    Hemoglobin A1C     Standing Status:   Future     Standing Expiration Date:   2022    POCT Glucose     Orders Placed This Encounter   Medications    blood glucose test strips (FREESTYLE LITE) strip     Si each by In Vitro route 4 times daily As needed.      Dispense:  200 each     Refill:  3    Dulaglutide (TRULICITY) 3 GL/4.7JI SOPN     Sig: Inject 3 mg into the skin once a week     Dispense:  4 pen     Refill:  3    insulin lispro, 1 Unit Dial, (HUMALOG KWIKPEN) 100 UNIT/ML SOPN     Si units at each meals     Dispense:  10 pen     Refill:  3    Continuous Blood Gluc  (FREESTYLE [place] 14 DAY READER) KATARINA     Si Device by Does not apply route 4 times daily (before meals and nightly)     Dispense:  1 Device     Refill:  0    insulin glargine (LANTUS SOLOSTAR) 100 UNIT/ML injection pen     Sig: Inject 60 Units into the skin nightly     Dispense:  10 pen     Refill:  5    Continuous Blood Gluc Sensor (FREESTYLE [place] 14 DAY SENSOR) MISC     Sig: Every 2 weeks     Dispense:  2 each     Refill:  03  phentermine (ADIPEX-P) 37.5 MG tablet     Sig: Take 1 tablet by mouth every morning (before breakfast) for 30 days.      Dispense:  30 tablet     Refill:  0     Increase trulicity dose   Add adipex   4-week follow-up on type 2 diabetes        Andrew Wilson MD

## 2021-01-29 DIAGNOSIS — E11.65 UNCONTROLLED TYPE 2 DIABETES MELLITUS WITH HYPERGLYCEMIA (HCC): ICD-10-CM

## 2021-01-29 LAB
ANION GAP SERPL CALCULATED.3IONS-SCNC: 14 MEQ/L (ref 9–15)
BUN BLDV-MCNC: 7 MG/DL (ref 6–20)
CALCIUM SERPL-MCNC: 9.5 MG/DL (ref 8.5–9.9)
CHLORIDE BLD-SCNC: 105 MEQ/L (ref 95–107)
CO2: 20 MEQ/L (ref 20–31)
CREAT SERPL-MCNC: 0.64 MG/DL (ref 0.5–0.9)
GFR AFRICAN AMERICAN: >60
GFR NON-AFRICAN AMERICAN: >60
GLUCOSE BLD-MCNC: 87 MG/DL (ref 70–99)
HBA1C MFR BLD: 9.7 % (ref 4.8–5.9)
POTASSIUM SERPL-SCNC: 3.9 MEQ/L (ref 3.4–4.9)
SODIUM BLD-SCNC: 139 MEQ/L (ref 135–144)

## 2021-02-01 ENCOUNTER — OFFICE VISIT (OUTPATIENT)
Dept: ENDOCRINOLOGY | Age: 41
End: 2021-02-01
Payer: MEDICAID

## 2021-02-01 VITALS
SYSTOLIC BLOOD PRESSURE: 135 MMHG | BODY MASS INDEX: 42.32 KG/M2 | OXYGEN SATURATION: 94 % | WEIGHT: 254 LBS | HEIGHT: 65 IN | HEART RATE: 111 BPM | DIASTOLIC BLOOD PRESSURE: 88 MMHG

## 2021-02-01 DIAGNOSIS — E66.01 MORBID OBESITY (HCC): ICD-10-CM

## 2021-02-01 DIAGNOSIS — E11.65 UNCONTROLLED TYPE 2 DIABETES MELLITUS WITH HYPERGLYCEMIA (HCC): Primary | ICD-10-CM

## 2021-02-01 DIAGNOSIS — R61 HYPERHIDROSIS: ICD-10-CM

## 2021-02-01 PROCEDURE — G8427 DOCREV CUR MEDS BY ELIG CLIN: HCPCS | Performed by: INTERNAL MEDICINE

## 2021-02-01 PROCEDURE — 99213 OFFICE O/P EST LOW 20 MIN: CPT | Performed by: INTERNAL MEDICINE

## 2021-02-01 PROCEDURE — 4004F PT TOBACCO SCREEN RCVD TLK: CPT | Performed by: INTERNAL MEDICINE

## 2021-02-01 PROCEDURE — G8484 FLU IMMUNIZE NO ADMIN: HCPCS | Performed by: INTERNAL MEDICINE

## 2021-02-01 PROCEDURE — 3046F HEMOGLOBIN A1C LEVEL >9.0%: CPT | Performed by: INTERNAL MEDICINE

## 2021-02-01 PROCEDURE — 2022F DILAT RTA XM EVC RTNOPTHY: CPT | Performed by: INTERNAL MEDICINE

## 2021-02-01 PROCEDURE — G8417 CALC BMI ABV UP PARAM F/U: HCPCS | Performed by: INTERNAL MEDICINE

## 2021-02-01 RX ORDER — LANCETS 30 GAUGE
EACH MISCELLANEOUS
Qty: 100 EACH | Refills: 3 | Status: SHIPPED | OUTPATIENT
Start: 2021-02-01

## 2021-02-01 RX ORDER — GLUCOSAMINE HCL/CHONDROITIN SU 500-400 MG
CAPSULE ORAL
Qty: 100 STRIP | Refills: 3 | Status: SHIPPED | OUTPATIENT
Start: 2021-02-01 | End: 2021-03-08 | Stop reason: SDUPTHER

## 2021-02-01 RX ORDER — GLYCOPYRROLATE 1 MG/1
1 TABLET ORAL 3 TIMES DAILY
Qty: 90 TABLET | Refills: 3 | Status: SHIPPED | OUTPATIENT
Start: 2021-02-01 | End: 2021-02-01 | Stop reason: DRUGHIGH

## 2021-02-01 RX ORDER — GLYCOPYRROLATE 1 MG/1
TABLET ORAL
Qty: 180 TABLET | Refills: 3 | Status: ON HOLD | OUTPATIENT
Start: 2021-02-01 | End: 2022-02-16 | Stop reason: HOSPADM

## 2021-02-01 RX ORDER — PHENTERMINE HYDROCHLORIDE 37.5 MG/1
37.5 TABLET ORAL
Qty: 30 TABLET | Refills: 0 | Status: SHIPPED | OUTPATIENT
Start: 2021-02-01 | End: 2021-03-03

## 2021-02-01 NOTE — PROGRESS NOTES
Subjective:      Patient ID: Barrera Mendez is a 36 y.o. female. Follow-up of type 2 diabetes obesity and hyperhidrosis excessive sweating symptoms have improved but she wants to increase the dose of Robinul has not been able to test her sugar wants a generic meter which will be covered has lost 12 pounds over the last 4 weeks and phentermine  Diabetes  She presents for her follow-up diabetic visit. She has type 2 diabetes mellitus. Risk factors for coronary artery disease include obesity. Current diabetic treatment includes insulin injections (Lantus Humalog plus Trulicity). She is currently taking insulin pre-breakfast, pre-lunch, pre-dinner and at bedtime. Obesity Body mass index is 42.27 kg/m².     Hyperhidrosis worse over her palms improving slightly on Robinul    Patient Active Problem List   Diagnosis    Chronic cholecystitis with calculus    History of drug abuse ([address]    Spondylosis of lumbar region without myelopathy or radiculopathy    QI (obstructive sleep apnea)    Morbid obesity with body mass index (BMI) of 40.0 to 44.9 in [facility])    Bipolar disorder, current episode depressed, moderate ([address]    Bipolar 1 disorder, depressed ([address]    Diarrhea    Bipolar disorder with depression ([address]    Closed fracture of middle or proximal phalanx or phalanges of hand    Major depression, single episode    Uncontrolled type 2 diabetes mellitus with hyperglycemia ([address]    Cellulitis of abdominal wall    Abscess    Obesity, morbid (more than 100 lbs over ideal weight or BMI > 40) (HCC)    Major depressive disorder, recurrent (HCC)     Allergies   Allergen Reactions    Latuda [Lurasidone Hcl] Hives    Lurasidone Hives    Adhesive Tape        Current Outpatient Medications:   blood glucose monitor kit and supplies, Dispense sufficient amount for indicated testing frequency plus additional to accommodate PRN testing needs. Dispense all needed supplies to include: monitor, strips, lancing device, lancets, control solutions, alcohol swabs., Disp: 1 kit, Rfl: 0    blood glucose monitor strips, Patient test 3x daily E65.11, Disp: 100 strip, Rfl: 3    Lancets MISC, Patient test 3x daily E65.11, Disp: 100 each, Rfl: 3    phentermine (ADIPEX-P) 37.5 MG tablet, Take 1 tablet by mouth every morning (before breakfast) for 30 days. , Disp: 30 tablet, Rfl: 0    glycopyrrolate (ROBINUL) 1 MG tablet, Take 1 tablet by mouth 3 times daily, Disp: 90 tablet, Rfl: 3    glycopyrrolate (ROBINUL) 1 MG tablet, 2 po tid, Disp: 180 tablet, Rfl: 3    blood glucose test strips (FREESTYLE LITE) strip, 1 each by In Vitro route 4 times daily As needed. , Disp: 200 each, Rfl: 3    Dulaglutide (TRULICITY) 3 CT/8.8CL SOPN, Inject 3 mg into the skin once a week, Disp: 4 pen, Rfl: 3    insulin lispro, 1 Unit Dial, (HUMALOG KWIKPEN) 100 UNIT/ML SOPN, 14 units at each meals, Disp: 10 pen, Rfl: 3    Continuous Blood Gluc  (FREESTYLE [place] 14 DAY READER) KATARINA, 1 Device by Does not apply route 4 times daily (before meals and nightly), Disp: 1 Device, Rfl: 0    insulin glargine (LANTUS SOLOSTAR) 100 UNIT/ML injection pen, Inject 60 Units into the skin nightly, Disp: 10 pen, Rfl: 5    Continuous Blood Gluc Sensor (FREESTYLE [place] 14 DAY SENSOR) MISC, Every 2 weeks, Disp: 2 each, Rfl: 03    traZODone (DESYREL) 50 MG tablet, Take 1 tablet by mouth nightly as needed for Sleep, Disp: 15 tablet, Rfl: 2    venlafaxine (EFFEXOR XR) 37.5 MG extended release capsule, Take 1 capsule by mouth daily (with breakfast), Disp: 15 capsule, Rfl: 3    cloNIDine (CATAPRES) 0.1 MG tablet, Take 1 tablet by mouth nightly, Disp: 15 tablet, Rfl: 3   brexpiprazole (REXULTI) 2 MG TABS tablet, Take 1 tablet by mouth daily, Disp: 15 tablet, Rfl: 2    fenofibrate (TRICOR) 54 MG tablet, Take 1 tablet by mouth daily s [facility] pharmacy: Please dispense generic fenofibrate unless prescriber denote, Disp: 30 tablet, Rfl: 3    omega-3 acid ethyl esters (LOVAZA) 1 g capsule, Take 2 capsules by mouth 2 times daily, Disp: 60 capsule, Rfl: 3    Dulaglutide (TRULICITY) 1.5 IJ/3.3SK SOPN, Inject 1.5 mg into the skin once a week, Disp: 4 pen, Rfl: 3    Insulin Pen Needle (NOVOFINE) 32G X 6 MM MISC, 1 Device by Does not apply route 4 times daily (before meals and nightly), Disp: 300 each, Rfl: 3    blood glucose monitor kit and supplies, 1 kit by Other route 4 times daily (before meals and nightly) Pt test 4x daily Dx E11.65. May substitute for generic or insurance covered product, Disp: 1 kit, Rfl: 0    blood glucose monitor strips, 1 strip by Other route 4 times daily (before meals and nightly) Pt test 4x daily Dx E11.65. May substitute for generic or insurance covered product, Disp: 150 strip, Rfl: 3    FreeStyle Lancets MISC, 1 each by Does not apply route 4 times daily (before meals and nightly), Disp: 300 each, Rfl: 3    Continuous Blood Gluc Sensor (FREESTYLE [place] 14 DAY SENSOR) MISC, 2 Devices by Does not apply route every 14 days, Disp: 2 each, Rfl: 3    simvastatin (ZOCOR) 20 MG tablet, Take 1 tablet by mouth every evening, Disp: 30 tablet, Rfl: 3      Review of Systems   Constitutional:        Excessive sweating       Vitals:    02/01/21 0936   BP: 135/88   Pulse: 111   SpO2: 94%   Weight: 254 lb (115.2 kg)   Height: 5' 5\" (1.651 m)       Objective:   Physical Exam  Vitals signs reviewed. Constitutional:       Appearance: Normal appearance. She is obese. HENT:      Head: Normocephalic and atraumatic.       Right Ear: External ear normal.      Left Ear: External ear normal.      Nose: Nose normal.   Neck: Musculoskeletal: Normal range of motion and neck supple. Cardiovascular:      Rate and Rhythm: Tachycardia present. Musculoskeletal: Normal range of motion. Neurological:      General: No focal deficit present. Mental Status: She is alert and oriented to person, place, and time. Psychiatric:         Mood and Affect: Mood normal.         Behavior: Behavior normal.         Assessment:       Diagnosis Orders   1. Uncontrolled type 2 diabetes mellitus with hyperglycemia ([facility])  blood glucose monitor kit and supplies    blood glucose monitor strips    Lancets MISC    phentermine (ADIPEX-P) 37.5 MG tablet   2. Morbid obesity ([address]     3. Hyperhidrosis             Plan:      Orders Placed This Encounter   Medications    blood glucose monitor kit and supplies     Sig: Dispense sufficient amount for indicated testing frequency plus additional to accommodate PRN testing needs. Dispense all needed supplies to include: monitor, strips, lancing device, lancets, control solutions, alcohol swabs. Dispense:  1 kit     Refill:  0    blood glucose monitor strips     Sig: Patient test 3x daily E65.11     Dispense:  100 strip     Refill:  3    Lancets MISC     Sig: Patient test 3x daily E65.11     Dispense:  100 each     Refill:  3    phentermine (ADIPEX-P) 37.5 MG tablet     Sig: Take 1 tablet by mouth every morning (before breakfast) for 30 days.      Dispense:  30 tablet     Refill:  0    glycopyrrolate (ROBINUL) 1 MG tablet     Sig: Take 1 tablet by mouth 3 times daily     Dispense:  90 tablet     Refill:  3    glycopyrrolate (ROBINUL) 1 MG tablet     Si po tid     Dispense:  180 tablet     Refill:  3     Continue Lantus 60 units at bedtime Humalog 14 units with each meals plus Trulicity 3 mg once a week    Increased dose of Robinul to 2 mg 3 times a day continue with Shoshana Payton MD

## 2021-02-22 ENCOUNTER — HOSPITAL ENCOUNTER (EMERGENCY)
Age: 41
Discharge: OTHER INSTITUTION | End: 2021-02-23
Attending: EMERGENCY MEDICINE
Payer: MEDICAID

## 2021-02-22 DIAGNOSIS — F31.64 BIPOLAR AFFECTIVE DISORDER, MIXED, SEVERE, WITH PSYCHOTIC BEHAVIOR (HCC): Primary | ICD-10-CM

## 2021-02-22 LAB
ALBUMIN SERPL-MCNC: 4 G/DL (ref 3.5–4.6)
ALP BLD-CCNC: 91 U/L (ref 40–130)
ALT SERPL-CCNC: 28 U/L (ref 0–33)
AMPHETAMINE SCREEN, URINE: POSITIVE
ANION GAP SERPL CALCULATED.3IONS-SCNC: 12 MEQ/L (ref 9–15)
AST SERPL-CCNC: 25 U/L (ref 0–35)
BARBITURATE SCREEN URINE: ABNORMAL
BASOPHILS ABSOLUTE: 0.1 K/UL (ref 0–0.2)
BASOPHILS RELATIVE PERCENT: 0.9 %
BENZODIAZEPINE SCREEN, URINE: ABNORMAL
BILIRUB SERPL-MCNC: 0.4 MG/DL (ref 0.2–0.7)
BILIRUBIN URINE: NEGATIVE
BLOOD, URINE: NEGATIVE
BUN BLDV-MCNC: 7 MG/DL (ref 6–20)
CALCIUM SERPL-MCNC: 9.2 MG/DL (ref 8.5–9.9)
CANNABINOID SCREEN URINE: POSITIVE
CHLORIDE BLD-SCNC: 102 MEQ/L (ref 95–107)
CHOLESTEROL, TOTAL: 176 MG/DL (ref 0–199)
CLARITY: CLEAR
CO2: 23 MEQ/L (ref 20–31)
COCAINE METABOLITE SCREEN URINE: ABNORMAL
COLOR: YELLOW
CREAT SERPL-MCNC: 0.71 MG/DL (ref 0.5–0.9)
EKG ATRIAL RATE: 93 BPM
EKG P AXIS: 67 DEGREES
EKG P-R INTERVAL: 150 MS
EKG Q-T INTERVAL: 406 MS
EKG QRS DURATION: 94 MS
EKG QTC CALCULATION (BAZETT): 504 MS
EKG R AXIS: 12 DEGREES
EKG T AXIS: 55 DEGREES
EKG VENTRICULAR RATE: 93 BPM
EOSINOPHILS ABSOLUTE: 0 K/UL (ref 0–0.7)
EOSINOPHILS RELATIVE PERCENT: 0.3 %
ETHANOL PERCENT: NORMAL G/DL
ETHANOL: <10 MG/DL (ref 0–0.08)
GFR AFRICAN AMERICAN: >60
GFR NON-AFRICAN AMERICAN: >60
GLOBULIN: 2.9 G/DL (ref 2.3–3.5)
GLUCOSE BLD-MCNC: 109 MG/DL (ref 70–99)
GLUCOSE URINE: NEGATIVE MG/DL
HCG(URINE) PREGNANCY TEST: NEGATIVE
HCT VFR BLD CALC: 48.7 % (ref 37–47)
HDLC SERPL-MCNC: 32 MG/DL (ref 40–59)
HEMOGLOBIN: 16.2 G/DL (ref 12–16)
KETONES, URINE: 15 MG/DL
LDL CHOLESTEROL CALCULATED: 117 MG/DL (ref 0–129)
LEUKOCYTE ESTERASE, URINE: NEGATIVE
LYMPHOCYTES ABSOLUTE: 3.5 K/UL (ref 1–4.8)
LYMPHOCYTES RELATIVE PERCENT: 34.6 %
Lab: ABNORMAL
MAGNESIUM: 2 MG/DL (ref 1.7–2.4)
MCH RBC QN AUTO: 27.6 PG (ref 27–31.3)
MCHC RBC AUTO-ENTMCNC: 33.3 % (ref 33–37)
MCV RBC AUTO: 82.9 FL (ref 82–100)
METHADONE SCREEN, URINE: ABNORMAL
MONOCYTES ABSOLUTE: 0.9 K/UL (ref 0.2–0.8)
MONOCYTES RELATIVE PERCENT: 8.6 %
NEUTROPHILS ABSOLUTE: 5.6 K/UL (ref 1.4–6.5)
NEUTROPHILS RELATIVE PERCENT: 55.6 %
NITRITE, URINE: NEGATIVE
OPIATE SCREEN URINE: ABNORMAL
PDW BLD-RTO: 16 % (ref 11.5–14.5)
PH UA: 5 (ref 5–9)
PHENCYCLIDINE SCREEN URINE: ABNORMAL
PLATELET # BLD: 218 K/UL (ref 130–400)
POTASSIUM SERPL-SCNC: 3.1 MEQ/L (ref 3.4–4.9)
PROPOXYPHENE SCREEN, URINE: ABNORMAL
PROTEIN UA: NEGATIVE MG/DL
RBC # BLD: 5.87 M/UL (ref 4.2–5.4)
SARS-COV-2, NAAT: NOT DETECTED
SODIUM BLD-SCNC: 137 MEQ/L (ref 135–144)
SPECIFIC GRAVITY UA: 1.01 (ref 1–1.03)
TOTAL PROTEIN: 6.9 G/DL (ref 6.3–8)
TRIGL SERPL-MCNC: 137 MG/DL (ref 0–150)
UR OXYCODONE RAPID SCREEN: ABNORMAL
UROBILINOGEN, URINE: 0.2 E.U./DL
WBC # BLD: 10 K/UL (ref 4.8–10.8)

## 2021-02-22 PROCEDURE — 36415 COLL VENOUS BLD VENIPUNCTURE: CPT

## 2021-02-22 PROCEDURE — 84443 ASSAY THYROID STIM HORMONE: CPT

## 2021-02-22 PROCEDURE — 84703 CHORIONIC GONADOTROPIN ASSAY: CPT

## 2021-02-22 PROCEDURE — 83735 ASSAY OF MAGNESIUM: CPT

## 2021-02-22 PROCEDURE — 80143 DRUG ASSAY ACETAMINOPHEN: CPT

## 2021-02-22 PROCEDURE — 80306 DRUG TEST PRSMV INSTRMNT: CPT

## 2021-02-22 PROCEDURE — 99285 EMERGENCY DEPT VISIT HI MDM: CPT

## 2021-02-22 PROCEDURE — 80053 COMPREHEN METABOLIC PANEL: CPT

## 2021-02-22 PROCEDURE — 81003 URINALYSIS AUTO W/O SCOPE: CPT

## 2021-02-22 PROCEDURE — 80179 DRUG ASSAY SALICYLATE: CPT

## 2021-02-22 PROCEDURE — 80061 LIPID PANEL: CPT

## 2021-02-22 PROCEDURE — 82077 ASSAY SPEC XCP UR&BREATH IA: CPT

## 2021-02-22 PROCEDURE — 87635 SARS-COV-2 COVID-19 AMP PRB: CPT

## 2021-02-22 PROCEDURE — 85025 COMPLETE CBC W/AUTO DIFF WBC: CPT

## 2021-02-22 PROCEDURE — 84484 ASSAY OF TROPONIN QUANT: CPT

## 2021-02-22 PROCEDURE — 93005 ELECTROCARDIOGRAM TRACING: CPT | Performed by: EMERGENCY MEDICINE

## 2021-02-22 ASSESSMENT — ENCOUNTER SYMPTOMS
VOMITING: 0
NAUSEA: 0
DIARRHEA: 0
COUGH: 0
BACK PAIN: 0
SORE THROAT: 0
SHORTNESS OF BREATH: 0
ABDOMINAL PAIN: 0

## 2021-02-23 VITALS
TEMPERATURE: 96.4 F | RESPIRATION RATE: 18 BRPM | BODY MASS INDEX: 40.18 KG/M2 | HEIGHT: 66 IN | SYSTOLIC BLOOD PRESSURE: 107 MMHG | WEIGHT: 250 LBS | HEART RATE: 93 BPM | DIASTOLIC BLOOD PRESSURE: 60 MMHG | OXYGEN SATURATION: 95 %

## 2021-02-23 LAB
ACETAMINOPHEN LEVEL: <5 UG/ML (ref 10–30)
SALICYLATE, SERUM: <0.3 MG/DL (ref 15–30)
TROPONIN: <0.01 NG/ML (ref 0–0.01)
TSH SERPL DL<=0.05 MIU/L-ACNC: 1.62 UIU/ML (ref 0.44–3.86)

## 2021-02-23 PROCEDURE — 99285 EMERGENCY DEPT VISIT HI MDM: CPT

## 2021-02-23 PROCEDURE — 93010 ELECTROCARDIOGRAM REPORT: CPT | Performed by: INTERNAL MEDICINE

## 2021-02-23 PROCEDURE — 6370000000 HC RX 637 (ALT 250 FOR IP): Performed by: EMERGENCY MEDICINE

## 2021-02-23 RX ORDER — POTASSIUM CHLORIDE 20 MEQ/1
40 TABLET, EXTENDED RELEASE ORAL ONCE
Status: COMPLETED | OUTPATIENT
Start: 2021-02-23 | End: 2021-02-23

## 2021-02-23 RX ORDER — TRAZODONE HYDROCHLORIDE 100 MG/1
100 TABLET ORAL ONCE
Status: COMPLETED | OUTPATIENT
Start: 2021-02-23 | End: 2021-02-23

## 2021-02-23 RX ADMIN — POTASSIUM CHLORIDE 40 MEQ: 20 TABLET, EXTENDED RELEASE ORAL at 01:42

## 2021-02-23 RX ADMIN — TRAZODONE HYDROCHLORIDE 100 MG: 100 TABLET ORAL at 00:19

## 2021-02-23 NOTE — ED NOTES
[facility] called, accepted pending packet review. Packet sent.      Tracy Zepeda RN  02/23/21 [id number]

## 2021-02-23 NOTE — ED NOTES
Patient is sleeping respirations are even and unlabored no distress noted at this time.      Dex Anderson RN  02/23/21 [id number]

## 2021-02-23 NOTE — ED NOTES
Patient changed into psych safe clothes and verbalized she would not try and hurt herself while in the hospital. Patient did say she has been on adipex and thinks it is what caused her kenna. Patient stated she is a diabetic and stated that if she loses the weight she would not have to be a on medication for her diabetes. Patient very teary eyed stating that she feels hopeless and helpless.      Danial Wyman, HUNG  02/22/21 6076

## 2021-02-23 NOTE — ED NOTES
[facility] ([facility]) here to transport patient to [facility].      Dori Gunn RN  02/23/21 4808

## 2021-02-23 NOTE — ED TRIAGE NOTES
Patient states that she has not slept in the past 2-3 days. She believes she is on the edge of a psychotic breakdown. She is very upset and started to see shadows while reading the bible yesterday. She is alert and orientated x 4. Pink, warm and dry. Abc's are intact. VSS. Afebrile. Will continue to monitor.

## 2021-02-23 NOTE — ED NOTES
Patient resting quietly respirations are even and unlabored no distress noted at this time.      Elma Bass RN  02/22/21 8250

## 2021-02-23 NOTE — ED NOTES
Report to Fuentes Lane RN at [facility].      Yuniel Dawson RN  02/23/21 6298

## 2021-02-23 NOTE — ED NOTES
Provisional Diagnosis:    Bipolar Mixed, with psychotic features. Psychosocial and Contextual Factors:    Lives at home with her kids. C-SSRS Summary:     Patient: C-SSRS Suicide Screening  1) Within the past month, have you wished you were dead or wished you could go to sleep and not wake up? : Yes  2) Have you actually had any thoughts of killing yourself? : No  6) Have you ever done anything, started to do anything, or prepared to do anything to end your life?: Yes  Did this occur within the past 3 months? : No    Family: 2 kids    Agency: None          Abuse Assessment  Physical Abuse: Denies  Verbal Abuse: Denies  Emotional abuse: Denies  Financial Abuse: Denies  Sexual abuse: Denies  Elder abuse: No    Clinical Summary:    36year old female presents to ED with complaints of kenna, depression, and suicidal ideations. She describes worsening symptoms of her kenna, starting 3-4 weeks ago. She reports disorganized thought process, and difficulty organ zing tasks. She states she is starting to see shadows. She report no sleep and no appetite. She reports obsessive and intrusive thought process. She reports constant worry, and she believes her children are starting to have mental illness too, unknown if its undiagnosed or folie en famille due to children mimicking mothers behaviors. She reports suicidal ideations, no plan, but doesn't feel she can keep herself safe if discharged. She reports non-compliance with her outside therapy, but states she does continue to take her Rexulti, Effexor, and Vistaril. Denies any HI. Positive for seeing shadows, negative for auditory hallucinations.     Level of Care Disposition:      Per Dr Nelda Doherty, RN  02/22/21 0496

## 2021-02-23 NOTE — ED PROVIDER NOTES
[address] [facility] ED  eMERGENCYdEPARTMENT eNCOUnter      Pt Name: Emmanuelle Mccarthy  MRN: [id number]  [place] 1980  Date of evaluation: 2/22/2021  Patricia Felix MD    CHIEF COMPLAINT           HPI  Emmanuelle Mccarthy is a 36 y.o. female per chart review has a h/o bipolar, depression/anxiety, schizoaffective disorder, QI presents to the ED with depression, anxiety, AH. Pt notes gradual onset, moderate, constant, worsening depression x 2 months. Pt started adipex in January. Pt denies HI, VH. Pt notes seeing dark shadows for the past week. ROS  Review of Systems   Constitutional: Negative for activity change, chills and fever. HENT: Negative for ear pain and sore throat. Eyes: Negative for visual disturbance. Respiratory: Negative for cough and shortness of breath. Cardiovascular: Negative for chest pain, palpitations and leg swelling. Gastrointestinal: Negative for abdominal pain, diarrhea, nausea and vomiting. Genitourinary: Negative for dysuria. Musculoskeletal: Negative for back pain. Skin: Negative for rash. Neurological: Negative for dizziness and weakness. Psychiatric/Behavioral: Positive for decreased concentration, dysphoric mood, self-injury and suicidal ideas. The patient is nervous/anxious. Except as noted above the remainder of the review of systems was reviewed and negative.        PAST MEDICAL HISTORY     Past Medical History:   Diagnosis Date    Bipolar disorder ([facility] [facility]     Depression     DM (diabetes mellitus) ([facility]     History of drug abuse ([facility] [address] [facility]     positive drug screens 9/22/14 and 5/18/14    Hyperlipidemia     Osteoarthritis          SURGICAL HISTORY       Past Surgical History:   Procedure Laterality Date    CHOLECYSTECTOMY, LAPAROSCOPIC N/A 2/28/2017    CHOLECYSTECTOMY LAPAROSCOPIC WITH GRAMS POSS OPEN , RECENT LABS  performed by Ned Mcneil MD at [facility], [address] IND N/A 11/27/2018    COLONOSCOPY ROOM 384 performed by Junior Arechiga MD at [address] [facility]       Previous Medications    BLOOD GLUCOSE MONITOR KIT AND SUPPLIES    1 kit by Other route 4 times daily (before meals and nightly) Pt test 4x daily Dx E11.65. May substitute for generic or insurance covered product    BLOOD GLUCOSE MONITOR KIT AND SUPPLIES    Dispense sufficient amount for indicated testing frequency plus additional to accommodate PRN testing needs. Dispense all needed supplies to include: monitor, strips, lancing device, lancets, control solutions, alcohol swabs. BLOOD GLUCOSE MONITOR STRIPS    1 strip by Other route 4 times daily (before meals and nightly) Pt test 4x daily Dx E11.65. May substitute for generic or insurance covered product    BLOOD GLUCOSE MONITOR STRIPS    Patient test 3x daily E65.11    BLOOD GLUCOSE TEST STRIPS (FREESTYLE LITE) STRIP    1 each by In Vitro route 4 times daily As needed.     BREXPIPRAZOLE (REXULTI) 2 MG TABS TABLET    Take 1 tablet by mouth daily    CONTINUOUS BLOOD GLUC  (FREESTYLE [place] 14 DAY READER) KATARINA    1 Device by Does not apply route 4 times daily (before meals and nightly)    CONTINUOUS BLOOD GLUC SENSOR (FREESTYLE [place] 14 DAY SENSOR) MISC    2 Devices by Does not apply route every 14 days    CONTINUOUS BLOOD GLUC SENSOR (FREESTYLE [place] 14 DAY SENSOR) MISC    Every 2 weeks    DULAGLUTIDE (TRULICITY) 1.5 LV/0.8VG SOPN    Inject 1.5 mg into the skin once a week    DULAGLUTIDE (TRULICITY) 3 OA/2.9VM SOPN    Inject 3 mg into the skin once a week    FENOFIBRATE (TRICOR) 54 MG TABLET    [facility] Please dispense generic fenofibrate unless prescriber denote    FREESTYLE LANCETS MISC    1 each by Does not apply route 4 times daily (before meals and nightly)    GLYCOPYRROLATE (ROBINUL) 1 MG TABLET    2 po tid    INSULIN GLARGINE (LANTUS SOLOSTAR) 100 UNIT/ML INJECTION PEN    Inject 60 Units into the skin nightly INSULIN LISPRO, 1 UNIT DIAL, (HUMALOG KWIKPEN) 100 UNIT/ML SOPN    14 units at each meals    INSULIN PEN NEEDLE (NOVOFINE) 32G X 6 MM MISC    1 Device by Does not apply route 4 times daily (before meals and nightly)    LANCETS MISC    Patient test 3x daily E65.11    OMEGA-3 ACID ETHYL ESTERS (LOVAZA) 1 G CAPSULE    Take 2 capsules by mouth 2 times daily    PHENTERMINE (ADIPEX-P) 37.5 MG TABLET    Take 1 tablet by mouth every morning (before breakfast) for 30 days.     SIMVASTATIN (ZOCOR) 20 MG TABLET    Take 1 tablet by mouth every evening    TRAZODONE (DESYREL) 50 MG TABLET    Take 1 tablet by mouth nightly as needed for Sleep    VENLAFAXINE (EFFEXOR XR) 37.5 MG EXTENDED RELEASE CAPSULE    Take 1 capsule by mouth daily (with breakfast)       ALLERGIES     Latuda [lurasidone hcl], Lurasidone, and Adhesive tape    FAMILY HISTORY       Family History   Problem Relation Age of Onset    Other Mother         CHF    Cirrhosis Brother     Other Maternal Grandmother         CHF          SOCIAL HISTORY       Social History     Socioeconomic History    Marital status: Single     Spouse name: None    Number of children: None    Years of education: None    Highest education level: None   Occupational History    None   Social Needs    Financial resource strain: None    Food insecurity     Worry: None     Inability: None    Transportation needs     Medical: None     Non-medical: None   Tobacco Use    Smoking status: Current Every Day Smoker     Packs/day: 1.00     Years: 24.00     Pack years: 24.00     Types: Cigarettes    Smokeless tobacco: Never Used   Substance and Sexual Activity    Alcohol use: No    Drug use: Not Currently     Types: Marijuana    Sexual activity: Yes   Lifestyle    Physical activity     Days per week: None     Minutes per session: None    Stress: None   Relationships    Social connections     Talks on phone: None     Gets together: None     Attends Hoahaoism service: None     Active member of club or organization: None     Attends meetings of clubs or organizations: None     Relationship status: None    Intimate partner violence     Fear of current or ex partner: None     Emotionally abused: None     Physically abused: None     Forced sexual activity: None   Other Topics Concern    None   Social History Narrative    None         PHYSICAL EXAM       ED Triage Vitals [02/22/21 2116]   BP Temp Temp Source Pulse Resp SpO2 Height Weight   -- 96.4 °F (35.8 °C) Tympanic 127 18 95 % 5' 6\" (1.676 m) 250 lb (113.4 kg)       Physical Exam  Vitals signs and nursing note reviewed. Constitutional:       Appearance: She is well-developed. HENT:      Head: Normocephalic. Right Ear: External ear normal.      Left Ear: External ear normal.   Eyes:      Conjunctiva/sclera: Conjunctivae normal.      Pupils: Pupils are equal, round, and reactive to light. Neck:      Musculoskeletal: Normal range of motion and neck supple. Cardiovascular:      Rate and Rhythm: Normal rate and regular rhythm. Heart sounds: Normal heart sounds. Pulmonary:      Effort: Pulmonary effort is normal.      Breath sounds: Normal breath sounds. Abdominal:      General: Bowel sounds are normal. There is no distension. Palpations: Abdomen is soft. Tenderness: There is no abdominal tenderness. Musculoskeletal: Normal range of motion. Skin:     General: Skin is warm and dry. Neurological:      Mental Status: She is alert and oriented to person, place, and time. Psychiatric:      Comments: Flat affect           MDM  37 yo female presents to the ED with depression/anxiety, SI, AH. Pt is afebrile, hemodynamically stable. EKG shows NSR with HR 93, normal axis, QTc 504, no ST changes. Labs remarkable for K 3.1, Hb 16.2.  UA negative. Tox positive for THC, amphetamines. Tox positive for amphetamines due to pt being on adipex for weight loss. Pt is medically clear for psych evaluation.   Case discussed with Dr. Rae Vasquez at [facility] who recommended transfer. Pt given PO KDUR in the ED. Pt transferred to [facility] in stable condition. FINAL IMPRESSION      1.  Bipolar affective disorder, mixed, severe, with psychotic behavior [facility])          DISPOSITION/PLAN   DISPOSITION Decision To Transfer 02/23/2021 01:34:45 AM        DISCHARGE MEDICATIONS:  [unfilled]         Autumn Miller MD(electronically signed)  Attending Emergency Physician            Autumn Miller MD  02/23/21 4062

## 2021-03-08 ENCOUNTER — OFFICE VISIT (OUTPATIENT)
Dept: ENDOCRINOLOGY | Age: 41
End: 2021-03-08
Payer: MEDICAID

## 2021-03-08 VITALS
HEIGHT: 65 IN | SYSTOLIC BLOOD PRESSURE: 129 MMHG | DIASTOLIC BLOOD PRESSURE: 86 MMHG | BODY MASS INDEX: 39.65 KG/M2 | HEART RATE: 67 BPM | OXYGEN SATURATION: 97 % | WEIGHT: 238 LBS

## 2021-03-08 DIAGNOSIS — E11.65 UNCONTROLLED TYPE 2 DIABETES MELLITUS WITH HYPERGLYCEMIA (HCC): ICD-10-CM

## 2021-03-08 DIAGNOSIS — E66.01 MORBID OBESITY (HCC): Primary | ICD-10-CM

## 2021-03-08 LAB
CHP ED QC CHECK: NORMAL
GLUCOSE BLD-MCNC: 114 MG/DL

## 2021-03-08 PROCEDURE — 82962 GLUCOSE BLOOD TEST: CPT | Performed by: INTERNAL MEDICINE

## 2021-03-08 PROCEDURE — 4004F PT TOBACCO SCREEN RCVD TLK: CPT | Performed by: INTERNAL MEDICINE

## 2021-03-08 PROCEDURE — 2022F DILAT RTA XM EVC RTNOPTHY: CPT | Performed by: INTERNAL MEDICINE

## 2021-03-08 PROCEDURE — 3046F HEMOGLOBIN A1C LEVEL >9.0%: CPT | Performed by: INTERNAL MEDICINE

## 2021-03-08 PROCEDURE — G8427 DOCREV CUR MEDS BY ELIG CLIN: HCPCS | Performed by: INTERNAL MEDICINE

## 2021-03-08 PROCEDURE — G8417 CALC BMI ABV UP PARAM F/U: HCPCS | Performed by: INTERNAL MEDICINE

## 2021-03-08 PROCEDURE — G8484 FLU IMMUNIZE NO ADMIN: HCPCS | Performed by: INTERNAL MEDICINE

## 2021-03-08 PROCEDURE — 99213 OFFICE O/P EST LOW 20 MIN: CPT | Performed by: INTERNAL MEDICINE

## 2021-03-08 RX ORDER — FLUVOXAMINE MALEATE 50 MG/1
50 TABLET, COATED ORAL NIGHTLY
Status: ON HOLD | COMMUNITY
End: 2022-02-16 | Stop reason: SDUPTHER

## 2021-03-08 RX ORDER — CLONAZEPAM 1 MG/1
1 TABLET ORAL 2 TIMES DAILY PRN
COMMUNITY

## 2021-03-08 RX ORDER — DULAGLUTIDE 1.5 MG/.5ML
1.5 INJECTION, SOLUTION SUBCUTANEOUS WEEKLY
Qty: 4 PEN | Refills: 3 | Status: SHIPPED | OUTPATIENT
Start: 2021-03-08 | End: 2021-10-12

## 2021-03-08 NOTE — PROGRESS NOTES
Subjective:      Patient ID: Alexandrea Medina is a 36 y.o. female. Follow-up on type 2 diabetes patient seen ER for psychotic breakdown possible side effect  Reviewed labs blood sugars are stable between 1-200 range  Patient not taking Lantus and Humalog as changing diet  Diabetes  She presents for her follow-up diabetic visit. She has type 2 diabetes mellitus. Symptoms are improving. Risk factors for coronary artery disease include obesity. Current diabetic treatments: Trulicity. Her overall blood glucose range is 130-140 mg/dl. morbdi obesity Body mass index is 39.61 kg/m². Results for Luciano Bolanos (MRN [id number]) as of 3/14/2021 22:21   Ref.  Range 2/24/2021 06:20 3/8/2021 10:48   Sodium Latest Ref Range: 135 - 144 mEq/L 140    Potassium Latest Ref Range: 3.4 - 4.9 mEq/L 3.6    Chloride Latest Ref Range: 95 - 107 mEq/L 105    CO2 Latest Ref Range: 20 - 31 mEq/L 25    BUN Latest Ref Range: 6 - 20 mg/dL 6    Creatinine Latest Ref Range: 0.50 - 0.90 mg/dL 0.70    Anion Gap Latest Ref Range: 9 - 15 mEq/L 10    GFR Non- Latest Ref Range: >60  >60.0    GFR African American Latest Ref Range: >60  >60.0    Glucose Latest Units: mg/dL 104 (H) 114   Calcium Latest Ref Range: 8.5 - 9.9 mg/dL 8.9    Vit D, 25-Hydroxy Latest Ref Range: 30.0 - 100.0 ng/mL 20.1 (L)    Folate Latest Ref Range: 7.3 - 26.1 ng/mL 8.9    Vitamin B-12 Latest Ref Range: 232 - 1245 pg/mL 615        Patient Active Problem List   Diagnosis    Chronic cholecystitis with calculus    History of drug abuse (HCC)    Spondylosis of lumbar region without myelopathy or radiculopathy    QI (obstructive sleep apnea)    Morbid obesity with body mass index (BMI) of 40.0 to 44.9 in adult [facility])    Bipolar disorder, current episode depressed, moderate (HCC)    Bipolar 1 disorder, depressed (HCC)    Diarrhea    Bipolar disorder with depression (HCC)    Closed fracture of middle or proximal phalanx or phalanges of hand    Major depression, single episode    Uncontrolled type 2 diabetes mellitus with hyperglycemia (HCC)    Cellulitis of abdominal wall    Abscess    Obesity, morbid (more than 100 lbs over ideal weight or BMI > 40) (HCC)    Major depressive disorder, recurrent (HCC)     Allergies   Allergen Reactions    Latuda [Lurasidone Hcl] Hives    Lurasidone Hives    Adhesive Tape     Adipex-P [Phentermine]     Metformin And Related        Current Outpatient Medications:     clonazePAM (KLONOPIN) 1 MG tablet, Take 1 mg by mouth 2 times daily as needed. , Disp: , Rfl:     fluvoxaMINE (LUVOX) 50 MG tablet, Take 50 mg by mouth nightly, Disp: , Rfl:     Dulaglutide (TRULICITY) 1.5 TN/5.1PE SOPN, Inject 1.5 mg into the skin once a week, Disp: 4 pen, Rfl: 3    blood glucose monitor kit and supplies, Dispense sufficient amount for indicated testing frequency plus additional to accommodate PRN testing needs. Dispense all needed supplies to include: monitor, strips, lancing device, lancets, control solutions, alcohol swabs., Disp: 1 kit, Rfl: 0    Lancets MISC, Patient test 3x daily E65.11, Disp: 100 each, Rfl: 3    glycopyrrolate (ROBINUL) 1 MG tablet, 2 po tid, Disp: 180 tablet, Rfl: 3    blood glucose test strips (FREESTYLE LITE) strip, 1 each by In Vitro route 4 times daily As needed. , Disp: 200 each, Rfl: 3    traZODone (DESYREL) 50 MG tablet, Take 1 tablet by mouth nightly as needed for Sleep, Disp: 15 tablet, Rfl: 2    venlafaxine (EFFEXOR XR) 37.5 MG extended release capsule, Take 1 capsule by mouth daily (with breakfast), Disp: 15 capsule, Rfl: 3    brexpiprazole (REXULTI) 2 MG TABS tablet, Take 1 tablet by mouth daily, Disp: 15 tablet, Rfl: 2    omega-3 acid ethyl esters (LOVAZA) 1 g capsule, Take 2 capsules by mouth 2 times daily, Disp: 60 capsule, Rfl: 3    Insulin Pen Needle (NOVOFINE) 32G X 6 MM MISC, 1 Device by Does not apply route 4 times daily (before meals and nightly), Disp: 300 each, Rfl: 3    blood glucose monitor kit and supplies, 1 kit by Other route 4 times daily (before meals and nightly) Pt test 4x daily Dx E11.65. May substitute for generic or insurance covered product, Disp: 1 kit, Rfl: 0    blood glucose monitor strips, 1 strip by Other route 4 times daily (before meals and nightly) Pt test 4x daily Dx E11.65. May substitute for generic or insurance covered product, Disp: 150 strip, Rfl: 3    FreeStyle Lancets MISC, 1 each by Does not apply route 4 times daily (before meals and nightly), Disp: 300 each, Rfl: 3    simvastatin (ZOCOR) 20 MG tablet, Take 1 tablet by mouth every evening, Disp: 30 tablet, Rfl: 3    Continuous Blood Gluc  (FREESTYLE [place] 14 DAY READER) KATARINA, 1 Device by Does not apply route 4 times daily (before meals and nightly) (Patient not taking: Reported on 3/8/2021), Disp: 1 Device, Rfl: 0    Continuous Blood Gluc Sensor (FREESTYLE [place] 14 DAY SENSOR) MISC, Every 2 weeks (Patient not taking: Reported on 3/8/2021), Disp: 2 each, Rfl: 03    fenofibrate (TRICOR) 54 MG tablet, Take 1 tablet by mouth daily s [facility] pharmacy: Please dispense generic fenofibrate unless prescriber denote (Patient not taking: Reported on 3/8/2021), Disp: 30 tablet, Rfl: 3    Continuous Blood Gluc Sensor (FREESTYLE [place] 14 DAY SENSOR) MISC, 2 Devices by Does not apply route every 14 days (Patient not taking: Reported on 3/8/2021), Disp: 2 each, Rfl: 3    Review of Systems   Psychiatric/Behavioral: Positive for behavioral problems and dysphoric mood. All other systems reviewed and are negative. Vitals:    03/08/21 1042   BP: 129/86   Pulse: 67   SpO2: 97%   Weight: 238 lb (108 kg)   Height: 5' 5\" (1.651 m)       Objective:   Physical Exam  Vitals signs reviewed. Constitutional:       Appearance: Normal appearance. She is obese. HENT:      Head: Normocephalic and atraumatic.       Right Ear: External ear normal.      Left Ear: External ear normal.   Neck:      Musculoskeletal: Normal range of motion and neck supple. Cardiovascular:      Rate and Rhythm: Normal rate. Musculoskeletal: Normal range of motion. Neurological:      General: No focal deficit present. Mental Status: She is alert and oriented to person, place, and time. Psychiatric:         Mood and Affect: Mood normal.         Assessment:       Diagnosis Orders   1. Morbid obesity (HCC)  Basic Metabolic Panel    Hemoglobin A1C   2.  Uncontrolled type 2 diabetes mellitus with hyperglycemia ([facility])  POCT Glucose           Plan:      Orders Placed This Encounter   Procedures    Basic Metabolic Panel     Standing Status:   Future     Standing Expiration Date:   3/8/2022    Hemoglobin A1C     Standing Status:   Future     Standing Expiration Date:   3/8/2022    POCT Glucose     Lower dose of Trulicity discontinue Lantus and Humalog continue Robinul for hyperhidrosis follow-up in 2 to 3 months time  Orders Placed This Encounter   Medications    Dulaglutide (TRULICITY) 1.5 JW/7.9LO SOPN     Sig: Inject 1.5 mg into the skin once a week     Dispense:  4 pen     Refill:  3     Medications Discontinued During This Encounter   Medication Reason    blood glucose monitor strips DUPLICATE    Dulaglutide (TRULICITY) 3 FA/1.4PP SOPN DUPLICATE    insulin glargine (LANTUS SOLOSTAR) 100 UNIT/ML injection pen Therapy completed    insulin lispro, 1 Unit Dial, (HUMALOG KWIKPEN) 100 UNIT/ML SOPN Therapy completed    Dulaglutide (TRULICITY) 1.5 SX/5.3WO SOPN REORDER                 Irene Prather MD

## 2021-06-13 ENCOUNTER — HOSPITAL ENCOUNTER (EMERGENCY)
Age: 41
Discharge: HOME | End: 2021-06-13
Payer: MEDICAID

## 2021-06-13 VITALS
RESPIRATION RATE: 18 BRPM | BODY MASS INDEX: 39.15 KG/M2 | HEIGHT: 65 IN | HEART RATE: 74 BPM | DIASTOLIC BLOOD PRESSURE: 78 MMHG | TEMPERATURE: 98.2 F | OXYGEN SATURATION: 99 % | SYSTOLIC BLOOD PRESSURE: 132 MMHG | WEIGHT: 235 LBS

## 2021-06-13 DIAGNOSIS — L02.91 ABSCESS: Primary | ICD-10-CM

## 2021-06-13 PROCEDURE — 99283 EMERGENCY DEPT VISIT LOW MDM: CPT

## 2021-06-13 PROCEDURE — 6370000000 HC RX 637 (ALT 250 FOR IP): Performed by: PHYSICIAN ASSISTANT

## 2021-06-13 RX ORDER — SULFAMETHOXAZOLE AND TRIMETHOPRIM 800; 160 MG/1; MG/1
1 TABLET ORAL 2 TIMES DAILY
Qty: 20 TABLET | Refills: 0 | Status: SHIPPED | OUTPATIENT
Start: 2021-06-13 | End: 2021-06-23

## 2021-06-13 RX ORDER — SULFAMETHOXAZOLE AND TRIMETHOPRIM 800; 160 MG/1; MG/1
1 TABLET ORAL ONCE
Status: COMPLETED | OUTPATIENT
Start: 2021-06-13 | End: 2021-06-13

## 2021-06-13 RX ORDER — FLUCONAZOLE 150 MG/1
150 TABLET ORAL ONCE
Qty: 2 TABLET | Refills: 0 | Status: SHIPPED | OUTPATIENT
Start: 2021-06-13 | End: 2021-06-13

## 2021-06-13 RX ORDER — OXYCODONE HYDROCHLORIDE AND ACETAMINOPHEN 5; 325 MG/1; MG/1
1 TABLET ORAL EVERY 6 HOURS PRN
Qty: 12 TABLET | Refills: 0 | Status: SHIPPED | OUTPATIENT
Start: 2021-06-13 | End: 2021-06-16

## 2021-06-13 RX ORDER — OXYCODONE HYDROCHLORIDE AND ACETAMINOPHEN 5; 325 MG/1; MG/1
1 TABLET ORAL ONCE
Status: COMPLETED | OUTPATIENT
Start: 2021-06-13 | End: 2021-06-13

## 2021-06-13 RX ADMIN — OXYCODONE HYDROCHLORIDE AND ACETAMINOPHEN 1 TABLET: 5; 325 TABLET ORAL at 06:45

## 2021-06-13 RX ADMIN — SULFAMETHOXAZOLE AND TRIMETHOPRIM 1 TABLET: 800; 160 TABLET ORAL at 06:45

## 2021-06-13 ASSESSMENT — PAIN DESCRIPTION - PAIN TYPE
TYPE: ACUTE PAIN
TYPE: ACUTE PAIN

## 2021-06-13 ASSESSMENT — PAIN DESCRIPTION - DESCRIPTORS: DESCRIPTORS: ACHING

## 2021-06-13 ASSESSMENT — ENCOUNTER SYMPTOMS
ABDOMINAL PAIN: 0
CONSTIPATION: 0
EYE DISCHARGE: 0
SHORTNESS OF BREATH: 0
ABDOMINAL DISTENTION: 0
SORE THROAT: 0
RHINORRHEA: 0
COLOR CHANGE: 0

## 2021-06-13 ASSESSMENT — PAIN DESCRIPTION - LOCATION
LOCATION: COCCYX
LOCATION: BUTTOCKS

## 2021-06-13 ASSESSMENT — PAIN DESCRIPTION - FREQUENCY
FREQUENCY: CONTINUOUS
FREQUENCY: CONTINUOUS

## 2021-06-13 ASSESSMENT — PAIN SCALES - GENERAL
PAINLEVEL_OUTOF10: 10
PAINLEVEL_OUTOF10: 7
PAINLEVEL_OUTOF10: 9

## 2021-06-13 ASSESSMENT — PAIN DESCRIPTION - ONSET: ONSET: PROGRESSIVE

## 2021-06-13 NOTE — ED PROVIDER NOTES
PAST MEDICAL HISTORY     Past Medical History:   Diagnosis Date    [facility]     Depression     DM (diabetes mellitus) [facility]     [facility] of [facility]     positive drug screens 9/22/14 and 5/18/14    Hyperlipidemia     Osteoarthritis          SURGICALHISTORY       Past Surgical History:   Procedure Laterality Date    CHOLECYSTECTOMY, LAPAROSCOPIC N/A 2/28/2017    CHOLECYSTECTOMY LAPAROSCOPIC WITH GRAMS POSS OPEN , RECENT LABS  performed by Radha Hilliard MD at [facility], [address]  IND N/A 11/27/2018    COLONOSCOPY ROOM 384 performed by Catrina Mensah MD at [facility] 6/16/2021    I&D LEFT  PERIRECTAL  ABSCESS performed by Teresa Jiménez MD at [address] [place] [address]       Discharge Medication List as of 6/13/2021  6:54 AM      CONTINUE these medications which have NOT CHANGED    Details   clonazePAM (KLONOPIN) 1 MG tablet Take 1 mg by mouth 2 times daily as needed. Historical Med      fluvoxaMINE (LUVOX) 50 MG tablet Take 50 mg by mouth nightlyHistorical Med      Dulaglutide (TRULICITY) 1.5 DG/6.7IE SOPN Inject 1.5 mg into the skin once a week, Disp-4 pen, R-3Normal      !! blood glucose monitor kit and supplies Dispense sufficient amount for indicated testing frequency plus additional to accommodate PRN testing needs. Dispense all needed supplies to include: monitor, strips, lancing device, lancets, control solutions, alcohol swabs., Disp-1 kit, R-0, Normal      !! Lancets MISC Disp-100 each, R-3, NormalPatient test 3x daily E65.11      glycopyrrolate (ROBINUL) 1 MG tablet 2 po tid, Disp-180 tablet, R-3Normal      !! blood glucose test strips (FREESTYLE LITE) strip 1 each by In Vitro route 4 times daily As needed. , Disp-200 each, R-3Normal      Continuous Blood Gluc  (FREESTYLE [place] 14 DAY READER) KATARINA 1 Device by Does not apply route 4 times daily (before meals and nightly), Disp-1 Device, R-0Normal      !! Continuous Blood Gluc Sensor (FREESTYLE [place] 14 DAY SENSOR) MISC Every 2 weeks, Disp-2 each, R-03Normal      brexpiprazole (REXULTI) 2 MG TABS tablet Take 1 tablet by mouth daily, Disp-15 tablet, R-2Normal      fenofibrate (TRICOR) 54 MG tablet Take 1 tablet by mouth daily s [facility] pharmacy: Please dispense generic fenofibrate unless prescriber denote, Disp-30 tablet, R-3Normal      omega-3 acid ethyl esters (LOVAZA) 1 g capsule Take 2 capsules by mouth 2 times daily, Disp-60 capsule, R-3Normal      traZODone (DESYREL) 50 MG tablet Take 1 tablet by mouth nightly as needed for Sleep, Disp-15 tablet, R-2Normal      venlafaxine (EFFEXOR XR) 37.5 MG extended release capsule Take 1 capsule by mouth daily (with breakfast), Disp-15 capsule, R-3Normal      Insulin Pen Needle (NOVOFINE) 32G X 6 MM MISC 4 TIMES DAILY BEFORE MEALS & NIGHTLY Starting Tue 11/10/2020, Disp-300 each,R-3, Normal      !! blood glucose monitor kit and supplies 1 kit by Other route 4 times daily (before meals and nightly) Pt test 4x daily Dx E11.65. May substitute for generic or insurance covered product, Other, 4 TIMES DAILY BEFORE MEALS & NIGHTLY Starting Tue 11/10/2020, Disp-1 kit,R-0, Normal      !! blood glucose monitor strips 1 strip by Other route 4 times daily (before meals and nightly) Pt test 4x daily Dx E11.65. May substitute for generic or insurance covered product, Other, 4 TIMES DAILY BEFORE MEALS & NIGHTLY Starting Tue 11/10/2020, Disp-150 strip,R-3, Normal      !! FreeStyle Lancets MISC 4 TIMES DAILY BEFORE MEALS & NIGHTLY Starting Tue 11/10/2020, Disp-300 each,R-3, Normal      !! Continuous Blood Gluc Sensor (FREESTYLE [place] 14 DAY SENSOR) MISC 2 Devices by Does not apply route every 14 days, Disp-2 each,R-3Normal      simvastatin (ZOCOR) 20 MG tablet Take 1 tablet by mouth every evening, Disp-30 tablet, R-3Normal       !! - Potential duplicate medications found. Please discuss with provider. ALLERGIES     Latuda [lurasidone hcl], Lurasidone, Adhesive tape, Adipex-p [phentermine], and Metformin and related    FAMILY HISTORY       Family History   Problem Relation Age of Onset    Other Mother         CHF    Cirrhosis Brother     Other Maternal Grandmother         CHF          SOCIAL HISTORY       Social History     Socioeconomic History    Marital status: Single     Spouse name: None    Number of children: None    Years of education: None    Highest education level: None   Occupational History    None   Tobacco Use    Smoking status: Current Every Day Smoker     Packs/day: 1.00     Years: 24.00     Pack years: 24.00     Types: Cigarettes    Smokeless tobacco: Never Used   Vaping Use    Vaping Use: Never used   Substance and Sexual Activity    Alcohol use: No    Drug use: Not Currently     Types: Marijuana    Sexual activity: Yes   Other Topics Concern    None   Social History Narrative    None     Social Determinants of Health     Financial Resource Strain:     Difficulty of Paying Living Expenses:    Food Insecurity:     Worried About Running Out of Food in the Last Year:     Ran Out of Food in the Last Year:    Transportation Needs:     Lack of Transportation (Medical):      Lack of Transportation (Non-Medical):    Physical Activity:     Days of Exercise per Week:     Minutes of Exercise per Session:    Stress:     Feeling of Stress :    Social Connections:     Frequency of Communication with Friends and Family:     Frequency of Social Gatherings with Friends and Family:     Attends Worship Services:     Active Member of Clubs or Organizations:     Attends Club or Organization Meetings:     Marital Status:    Intimate Partner Violence:     Fear of Current or Ex-Partner:     Emotionally Abused:     Physically Abused:     Sexually Abused:        SCREENINGS      @FLOW([id number])@      PHYSICAL EXAM    (up to 7 for level 4, 8 or more for level 5)     ED Triage Vitals [06/13/21 0623]   BP Temp Temp Source Pulse Resp SpO2 Height Weight   132/78 98.2 °F (36.8 °C) Oral 74 18 99 % 5' 5\" (1.651 m) 235 lb (106.6 kg)       Physical Exam  Vitals and nursing note reviewed. Constitutional:       General: She is not in acute distress. Appearance: She is well-developed. She is not ill-appearing, toxic-appearing or diaphoretic. HENT:      Head: Normocephalic. Nose: No congestion. Mouth/Throat:      Mouth: Mucous membranes are moist.      Pharynx: No oropharyngeal exudate or posterior oropharyngeal erythema. Eyes:      Extraocular Movements: Extraocular movements intact. Conjunctiva/sclera: Conjunctivae normal.      Pupils: Pupils are equal, round, and reactive to light. Neck:      Vascular: No JVD. Trachea: No tracheal deviation. Cardiovascular:      Rate and Rhythm: Normal rate. Pulses: Normal pulses. Heart sounds: Normal heart sounds. No murmur heard. No friction rub. No gallop. Pulmonary:      Effort: Pulmonary effort is normal. No tachypnea, accessory muscle usage, respiratory distress or retractions. Breath sounds: No stridor. No wheezing, rhonchi or rales. Chest:      Chest wall: No tenderness. Abdominal:      General: Abdomen is flat. Bowel sounds are normal. There is no distension or abdominal bruit. Palpations: There is no shifting dullness, fluid wave, hepatomegaly, splenomegaly, mass or pulsatile mass. Tenderness: There is no abdominal tenderness. There is no right CVA tenderness, left CVA tenderness, guarding or rebound. Negative signs include Wakefield's sign, Rovsing's sign and McBurney's sign. Musculoskeletal:         General: No deformity. Cervical back: Normal range of motion and neck supple. No rigidity. Skin:     General: Skin is warm and dry. Capillary Refill: Capillary refill takes less than 2 seconds. Coloration: Skin is not jaundiced.       Comments: Patient has approximately 2 cm area of cellulitis,/abscess to superior gluteal fold on the left side, is nonfluctuant, there is no drainage or discharge. Neurological:      General: No focal deficit present. Mental Status: She is alert and oriented to person, place, and time. Mental status is at baseline. Cranial Nerves: No cranial nerve deficit. Sensory: No sensory deficit. Motor: No weakness. Coordination: Coordination normal.   Psychiatric:         Mood and Affect: Mood normal.         DIAGNOSTIC RESULTS     EKG: All EKG's are interpreted by the Emergency Department Physician who either signs or Co-signsthis chart in the absence of a cardiologist.        RADIOLOGY:   [place] such as CT, Ultrasound and MRI are read by the radiologist. Plain radiographic images are visualized and preliminarily interpreted by the emergency physician with the below findings:        Interpretation per the Radiologist below, if available at the time ofthis note:    No orders to display         ED BEDSIDE ULTRASOUND:   Performed by ED Physician - none    LABS:  Labs Reviewed - No data to display    All other labs were within normal range or not returned as of this dictation. EMERGENCY DEPARTMENT COURSE and DIFFERENTIAL DIAGNOSIS/MDM:   Vitals:    Vitals:    06/13/21 0623   BP: 132/78   Pulse: 74   Resp: 18   Temp: 98.2 °F (36.8 °C)   TempSrc: Oral   SpO2: 99%   Weight: 235 lb (106.6 kg)   Height: 5' 5\" (1.651 m)          MDM  Number of Diagnoses or Management Options  Abscess  Diagnosis management comments: 2 cm area of abscess to left superior gluteal fold, nonfluctuant this time. Patient was given a prescription for Bactrim, Percocet for pain control, she was advised to contact her family physician on the next 2 days for follow-up. She was advised if this gets worse, or she begins to drain, she should return to the ER for reevaluation. She was also given referral to general surgery should this require I&D.       CRITICAL CARE TIME   Total Critical Care time was 0 minutes, excluding separately reportableprocedures. There was a high probability of clinicallysignificant/life threatening deterioration in the patient's condition which required my urgent intervention. CONSULTS:  None    PROCEDURES:  Unless otherwise noted below, none     Procedures    FINAL IMPRESSION      1. Abscess          DISPOSITION/PLAN   DISPOSITION Decision To Discharge 06/13/2021 06:38:33 AM      PATIENT REFERRED TO:  Anna Ospina DO  [address] [facility] , [address] [place] [address] [phone]    In 2 days      Patt Morrison MD  [address] [facility]  [phone]    In 3 days        DISCHARGE MEDICATIONS:  Discharge Medication List as of 6/13/2021  6:54 AM      START taking these medications    Details   sulfamethoxazole-trimethoprim (BACTRIM DS) 800-160 MG per tablet Take 1 tablet by mouth 2 times daily for 10 days, Disp-20 tablet, R-0Print      oxyCODONE-acetaminophen (PERCOCET) 5-325 MG per tablet Take 1 tablet by mouth every 6 hours as needed for Pain for up to 3 days. Intended supply: 3 days.  Take lowest dose possible to manage pain, Disp-12 tablet, R-0Print      fluconazole (DIFLUCAN) 150 MG tablet Take 1 tablet by mouth once for 1 dose Take one and repeat in 1 week, Disp-2 tablet, R-0Print                (Please note that portions of this note were completed with a voice recognition program.  Efforts were made to edit the dictations but occasionally words are mis-transcribed.)    Hubert Arreola PA-C (electronically signed)  Attending Emergency Physician         Hubert Arreola PA-C  06/17/21 932 [address], [place]  06/17/21 6765

## 2021-06-15 ENCOUNTER — OFFICE VISIT (OUTPATIENT)
Dept: SURGERY | Age: 41
End: 2021-06-15
Payer: MEDICAID

## 2021-06-15 VITALS
OXYGEN SATURATION: 97 % | WEIGHT: 235 LBS | HEIGHT: 65 IN | TEMPERATURE: 97.4 F | BODY MASS INDEX: 39.15 KG/M2 | HEART RATE: 88 BPM

## 2021-06-15 DIAGNOSIS — K61.1 PERIRECTAL ABSCESS: Primary | ICD-10-CM

## 2021-06-15 PROCEDURE — G8427 DOCREV CUR MEDS BY ELIG CLIN: HCPCS | Performed by: COLON & RECTAL SURGERY

## 2021-06-15 PROCEDURE — G8417 CALC BMI ABV UP PARAM F/U: HCPCS | Performed by: COLON & RECTAL SURGERY

## 2021-06-15 PROCEDURE — 4004F PT TOBACCO SCREEN RCVD TLK: CPT | Performed by: COLON & RECTAL SURGERY

## 2021-06-15 PROCEDURE — 99203 OFFICE O/P NEW LOW 30 MIN: CPT | Performed by: COLON & RECTAL SURGERY

## 2021-06-15 ASSESSMENT — ENCOUNTER SYMPTOMS
CONSTIPATION: 0
COLOR CHANGE: 0
ABDOMINAL PAIN: 0
CHEST TIGHTNESS: 0
BLOOD IN STOOL: 0
SHORTNESS OF BREATH: 0
RECTAL PAIN: 1

## 2021-06-15 NOTE — PROGRESS NOTES
Subjective:      Patient ID: Tobin Medeiros is a 39 y.o. female who presents for:  Chief Complaint   Patient presents with    New Patient       This is a 69-year-old female who has a 3-day history of worsening left perirectal swelling. She had previous abscess in the past which did not get to this extent. She was referred directly over from Dr. Elenita Espitia at [facility] for a perirectal abscess. She was crying and histrionic when I came in to see her. She complains of pain in that area she denies fever. Her bowels are working. Her boyfriend/ was present during our discussion.       Past Medical History:   Diagnosis Date    [facility]     Depression     DM (diabetes mellitus) ([facility]     History of drug abuse ([facility]     positive drug screens 9/22/14 and 5/18/14    Hyperlipidemia     Osteoarthritis      Past Surgical History:   Procedure Laterality Date    CHOLECYSTECTOMY, LAPAROSCOPIC N/A 2/28/2017    CHOLECYSTECTOMY LAPAROSCOPIC WITH GRAMS POSS OPEN , RECENT LABS  performed by Shantell Lechuga MD at [facility] [address] IND N/A 11/27/2018    COLONOSCOPY ROOM 384 performed by Otilia Fabian MD at [address] History     Socioeconomic History    Marital status: Single     Spouse name: Not on file    Number of children: Not on file    Years of education: Not on file    Highest education level: Not on file   Occupational History    Not on file   Tobacco Use    Smoking status: Current Every Day Smoker     Packs/day: 1.00     Years: 24.00     Pack years: 24.00     Types: Cigarettes    Smokeless tobacco: Never Used   Vaping Use    Vaping Use: Never used   Substance and Sexual Activity    Alcohol use: No    Drug use: Not Currently     Types: Marijuana    Sexual activity: Yes   Other Topics Concern    Not on file   Social History Narrative    Not on file     Social Determinants of Health     Financial Resource Strain:    [facility] well-developed. HENT:      Head: Normocephalic and atraumatic. Eyes:      Pupils: Pupils are equal, round, and reactive to light. Cardiovascular:      Rate and Rhythm: Normal rate and regular rhythm. Heart sounds: Normal heart sounds. Pulmonary:      Effort: Pulmonary effort is normal. No respiratory distress. Breath sounds: Normal breath sounds. No wheezing. Abdominal:      General: There is no distension. Palpations: There is no mass. Tenderness: There is no abdominal tenderness. There is no guarding or rebound. Genitourinary:     Comments: She has a large perirectal abscess on the left anterior portion of her perineum. No spontaneous drainage has occurred. Erythema present. Musculoskeletal:         General: Normal range of motion. Cervical back: Normal range of motion and neck supple. Skin:     General: Skin is warm and dry. Coloration: Skin is not pale. Findings: No erythema or rash. Neurological:      Mental Status: She is alert and oriented to person, place, and time. Psychiatric:         Behavior: Behavior normal.         Judgment: Judgment normal.              Assessment/Plan:          Diagnosis Orders   1. Perirectal abscess       I gave her 2 options to deal with this abscess. The first and most ideal is to use local analgesia and make a small incision to relieve the pressure and drain the abscess right here and now in the office. The idea of doing something here in the office caused her to begin crying immediately. The second option is to do this in the operating room tomorrow afternoon. I do not wish her to wait so I could do that right here in the office if she chose. She immediately chose the operating room.     I know that she is going to be very miserable over today/tonight dealing with the pain related to this abscess and have asked again if she wants to have this done here in the office under local.  This idea was not going to go over with her. Her boyfriend/ tried to encourage her to have it done now but she refused. I made arrangements to have this done in the operating room tomorrow. Consent obtained. Please note this report has beenpartially produced using speech recognition software and may cause contain errors related to that system including grammar, punctuation and spelling as well as words and phrases that may seem inappropriate.  If there arequestions or concerns please feel free to contact me to clarify

## 2021-06-16 ENCOUNTER — ANESTHESIA (OUTPATIENT)
Dept: OPERATING ROOM | Age: 41
End: 2021-06-16
Payer: MEDICAID

## 2021-06-16 ENCOUNTER — HOSPITAL ENCOUNTER (OUTPATIENT)
Age: 41
Setting detail: OUTPATIENT SURGERY
Discharge: HOME | End: 2021-06-16
Attending: COLON & RECTAL SURGERY | Admitting: COLON & RECTAL SURGERY
Payer: MEDICAID

## 2021-06-16 ENCOUNTER — ANESTHESIA EVENT (OUTPATIENT)
Dept: OPERATING ROOM | Age: 41
End: 2021-06-16
Payer: MEDICAID

## 2021-06-16 VITALS — OXYGEN SATURATION: 81 % | DIASTOLIC BLOOD PRESSURE: 65 MMHG | SYSTOLIC BLOOD PRESSURE: 94 MMHG

## 2021-06-16 VITALS
RESPIRATION RATE: 16 BRPM | OXYGEN SATURATION: 98 % | HEART RATE: 84 BPM | DIASTOLIC BLOOD PRESSURE: 57 MMHG | HEIGHT: 65 IN | TEMPERATURE: 98.1 F | SYSTOLIC BLOOD PRESSURE: 100 MMHG | BODY MASS INDEX: 36.32 KG/M2 | WEIGHT: 218 LBS

## 2021-06-16 DIAGNOSIS — L02.91 ABSCESS: Primary | ICD-10-CM

## 2021-06-16 LAB
ANION GAP SERPL CALCULATED.3IONS-SCNC: 12 MEQ/L (ref 9–15)
BUN BLDV-MCNC: 12 MG/DL (ref 6–20)
CALCIUM SERPL-MCNC: 10.3 MG/DL (ref 8.5–9.9)
CHLORIDE BLD-SCNC: 106 MEQ/L (ref 95–107)
CO2: 22 MEQ/L (ref 20–31)
CREAT SERPL-MCNC: 0.76 MG/DL (ref 0.5–0.9)
GFR AFRICAN AMERICAN: >60
GFR NON-AFRICAN AMERICAN: >60
GLUCOSE BLD-MCNC: 84 MG/DL (ref 60–115)
GLUCOSE BLD-MCNC: 95 MG/DL (ref 60–115)
GLUCOSE BLD-MCNC: 97 MG/DL (ref 70–99)
HCT VFR BLD CALC: 49.6 % (ref 37–47)
HEMOGLOBIN: 16.7 G/DL (ref 12–16)
MCH RBC QN AUTO: 28.3 PG (ref 27–31.3)
MCHC RBC AUTO-ENTMCNC: 33.7 % (ref 33–37)
MCV RBC AUTO: 83.9 FL (ref 82–100)
PDW BLD-RTO: 16 % (ref 11.5–14.5)
PERFORMED ON: NORMAL
PERFORMED ON: NORMAL
PLATELET # BLD: 246 K/UL (ref 130–400)
POTASSIUM SERPL-SCNC: 4.1 MEQ/L (ref 3.4–4.9)
RBC # BLD: 5.91 M/UL (ref 4.2–5.4)
SARS-COV-2, NAAT: NOT DETECTED
SODIUM BLD-SCNC: 140 MEQ/L (ref 135–144)
WBC # BLD: 13.8 K/UL (ref 4.8–10.8)

## 2021-06-16 PROCEDURE — 87185 SC STD ENZYME DETCJ PER NZM: CPT

## 2021-06-16 PROCEDURE — 6360000002 HC RX W HCPCS: Performed by: NURSE ANESTHETIST, CERTIFIED REGISTERED

## 2021-06-16 PROCEDURE — 3600000003 HC SURGERY LEVEL 3 BASE: Performed by: COLON & RECTAL SURGERY

## 2021-06-16 PROCEDURE — 7100000011 HC PHASE II RECOVERY - ADDTL 15 MIN: Performed by: COLON & RECTAL SURGERY

## 2021-06-16 PROCEDURE — 7100000001 HC PACU RECOVERY - ADDTL 15 MIN: Performed by: COLON & RECTAL SURGERY

## 2021-06-16 PROCEDURE — 3600000013 HC SURGERY LEVEL 3 ADDTL 15MIN: Performed by: COLON & RECTAL SURGERY

## 2021-06-16 PROCEDURE — 6370000000 HC RX 637 (ALT 250 FOR IP): Performed by: ANESTHESIOLOGY

## 2021-06-16 PROCEDURE — 6360000002 HC RX W HCPCS: Performed by: COLON & RECTAL SURGERY

## 2021-06-16 PROCEDURE — 87075 CULTR BACTERIA EXCEPT BLOOD: CPT

## 2021-06-16 PROCEDURE — 3700000001 HC ADD 15 MINUTES (ANESTHESIA): Performed by: COLON & RECTAL SURGERY

## 2021-06-16 PROCEDURE — 80048 BASIC METABOLIC PNL TOTAL CA: CPT

## 2021-06-16 PROCEDURE — 85027 COMPLETE CBC AUTOMATED: CPT

## 2021-06-16 PROCEDURE — 2709999900 HC NON-CHARGEABLE SUPPLY: Performed by: COLON & RECTAL SURGERY

## 2021-06-16 PROCEDURE — 7100000000 HC PACU RECOVERY - FIRST 15 MIN: Performed by: COLON & RECTAL SURGERY

## 2021-06-16 PROCEDURE — 87205 SMEAR GRAM STAIN: CPT

## 2021-06-16 PROCEDURE — 7100000010 HC PHASE II RECOVERY - FIRST 15 MIN: Performed by: COLON & RECTAL SURGERY

## 2021-06-16 PROCEDURE — 6360000002 HC RX W HCPCS: Performed by: ANESTHESIOLOGY

## 2021-06-16 PROCEDURE — 3700000000 HC ANESTHESIA ATTENDED CARE: Performed by: COLON & RECTAL SURGERY

## 2021-06-16 PROCEDURE — 2580000003 HC RX 258: Performed by: COLON & RECTAL SURGERY

## 2021-06-16 PROCEDURE — 2500000003 HC RX 250 WO HCPCS: Performed by: NURSE ANESTHETIST, CERTIFIED REGISTERED

## 2021-06-16 PROCEDURE — 87077 CULTURE AEROBIC IDENTIFY: CPT

## 2021-06-16 PROCEDURE — 46040 I&D ISCHIORCT&/PERIRCT ABSC: CPT | Performed by: COLON & RECTAL SURGERY

## 2021-06-16 PROCEDURE — 87070 CULTURE OTHR SPECIMN AEROBIC: CPT

## 2021-06-16 PROCEDURE — 87635 SARS-COV-2 COVID-19 AMP PRB: CPT

## 2021-06-16 PROCEDURE — 2500000003 HC RX 250 WO HCPCS: Performed by: COLON & RECTAL SURGERY

## 2021-06-16 RX ORDER — SODIUM CHLORIDE, SODIUM LACTATE, POTASSIUM CHLORIDE, CALCIUM CHLORIDE 600; 310; 30; 20 MG/100ML; MG/100ML; MG/100ML; MG/100ML
INJECTION, SOLUTION INTRAVENOUS CONTINUOUS
Status: DISCONTINUED | OUTPATIENT
Start: 2021-06-16 | End: 2021-06-16 | Stop reason: HOSPADM

## 2021-06-16 RX ORDER — MAGNESIUM HYDROXIDE 1200 MG/15ML
LIQUID ORAL CONTINUOUS PRN
Status: COMPLETED | OUTPATIENT
Start: 2021-06-16 | End: 2021-06-16

## 2021-06-16 RX ORDER — MULTIVIT-MIN/IRON/FOLIC ACID/K 18-600-40
3 CAPSULE ORAL DAILY
COMMUNITY
Start: 2021-06-01

## 2021-06-16 RX ORDER — LIDOCAINE HYDROCHLORIDE 10 MG/ML
1 INJECTION, SOLUTION EPIDURAL; INFILTRATION; INTRACAUDAL; PERINEURAL ONCE
Status: COMPLETED | OUTPATIENT
Start: 2021-06-16 | End: 2021-06-16

## 2021-06-16 RX ORDER — ALBUTEROL SULFATE 90 UG/1
AEROSOL, METERED RESPIRATORY (INHALATION)
COMMUNITY
Start: 2021-03-09

## 2021-06-16 RX ORDER — MIDAZOLAM HYDROCHLORIDE 2 MG/2ML
INJECTION, SOLUTION INTRAMUSCULAR; INTRAVENOUS PRN
Status: DISCONTINUED | OUTPATIENT
Start: 2021-06-16 | End: 2021-06-16 | Stop reason: SDUPTHER

## 2021-06-16 RX ORDER — HYDROCODONE BITARTRATE AND ACETAMINOPHEN 5; 325 MG/1; MG/1
2 TABLET ORAL PRN
Status: COMPLETED | OUTPATIENT
Start: 2021-06-16 | End: 2021-06-16

## 2021-06-16 RX ORDER — PIPERACILLIN SODIUM, TAZOBACTAM SODIUM 3; .375 G/15ML; G/15ML
INJECTION, POWDER, LYOPHILIZED, FOR SOLUTION INTRAVENOUS
Status: DISCONTINUED
Start: 2021-06-16 | End: 2021-06-16 | Stop reason: WASHOUT

## 2021-06-16 RX ORDER — HYDROCODONE BITARTRATE AND ACETAMINOPHEN 5; 325 MG/1; MG/1
1 TABLET ORAL PRN
Status: COMPLETED | OUTPATIENT
Start: 2021-06-16 | End: 2021-06-16

## 2021-06-16 RX ORDER — METOCLOPRAMIDE HYDROCHLORIDE 5 MG/ML
10 INJECTION INTRAMUSCULAR; INTRAVENOUS
Status: DISCONTINUED | OUTPATIENT
Start: 2021-06-16 | End: 2021-06-16 | Stop reason: HOSPADM

## 2021-06-16 RX ORDER — FENTANYL CITRATE 50 UG/ML
50 INJECTION, SOLUTION INTRAMUSCULAR; INTRAVENOUS EVERY 10 MIN PRN
Status: DISCONTINUED | OUTPATIENT
Start: 2021-06-16 | End: 2021-06-16 | Stop reason: HOSPADM

## 2021-06-16 RX ORDER — MULTIVIT,CALC,MINS/IRON/FOLIC 9MG-400MCG
TABLET ORAL
Status: ON HOLD | COMMUNITY
Start: 2021-06-01 | End: 2022-02-16 | Stop reason: HOSPADM

## 2021-06-16 RX ORDER — FLUCONAZOLE 150 MG/1
TABLET ORAL
Status: ON HOLD | COMMUNITY
Start: 2021-06-13 | End: 2022-02-16 | Stop reason: HOSPADM

## 2021-06-16 RX ORDER — ONDANSETRON 2 MG/ML
INJECTION INTRAMUSCULAR; INTRAVENOUS PRN
Status: DISCONTINUED | OUTPATIENT
Start: 2021-06-16 | End: 2021-06-16 | Stop reason: SDUPTHER

## 2021-06-16 RX ORDER — MEPERIDINE HYDROCHLORIDE 25 MG/ML
12.5 INJECTION INTRAMUSCULAR; INTRAVENOUS; SUBCUTANEOUS EVERY 5 MIN PRN
Status: DISCONTINUED | OUTPATIENT
Start: 2021-06-16 | End: 2021-06-16 | Stop reason: HOSPADM

## 2021-06-16 RX ORDER — ONDANSETRON 2 MG/ML
4 INJECTION INTRAMUSCULAR; INTRAVENOUS
Status: DISCONTINUED | OUTPATIENT
Start: 2021-06-16 | End: 2021-06-16 | Stop reason: HOSPADM

## 2021-06-16 RX ORDER — LIDOCAINE HYDROCHLORIDE 20 MG/ML
INJECTION, SOLUTION INTRAVENOUS PRN
Status: DISCONTINUED | OUTPATIENT
Start: 2021-06-16 | End: 2021-06-16 | Stop reason: SDUPTHER

## 2021-06-16 RX ORDER — OXYCODONE HYDROCHLORIDE AND ACETAMINOPHEN 5; 325 MG/1; MG/1
1 TABLET ORAL EVERY 6 HOURS PRN
Qty: 12 TABLET | Refills: 0 | Status: SHIPPED | OUTPATIENT
Start: 2021-06-16 | End: 2021-06-19

## 2021-06-16 RX ORDER — OXCARBAZEPINE 300 MG/1
TABLET, FILM COATED ORAL
Status: ON HOLD | COMMUNITY
Start: 2021-06-01 | End: 2022-02-16 | Stop reason: HOSPADM

## 2021-06-16 RX ORDER — ROCURONIUM BROMIDE 10 MG/ML
INJECTION, SOLUTION INTRAVENOUS PRN
Status: DISCONTINUED | OUTPATIENT
Start: 2021-06-16 | End: 2021-06-16 | Stop reason: SDUPTHER

## 2021-06-16 RX ORDER — DEXAMETHASONE SODIUM PHOSPHATE 10 MG/ML
INJECTION INTRAMUSCULAR; INTRAVENOUS PRN
Status: DISCONTINUED | OUTPATIENT
Start: 2021-06-16 | End: 2021-06-16 | Stop reason: SDUPTHER

## 2021-06-16 RX ORDER — DIPHENHYDRAMINE HYDROCHLORIDE 50 MG/ML
12.5 INJECTION INTRAMUSCULAR; INTRAVENOUS
Status: DISCONTINUED | OUTPATIENT
Start: 2021-06-16 | End: 2021-06-16 | Stop reason: HOSPADM

## 2021-06-16 RX ORDER — FENTANYL CITRATE 50 UG/ML
INJECTION, SOLUTION INTRAMUSCULAR; INTRAVENOUS PRN
Status: DISCONTINUED | OUTPATIENT
Start: 2021-06-16 | End: 2021-06-16 | Stop reason: SDUPTHER

## 2021-06-16 RX ORDER — PROPOFOL 10 MG/ML
INJECTION, EMULSION INTRAVENOUS PRN
Status: DISCONTINUED | OUTPATIENT
Start: 2021-06-16 | End: 2021-06-16 | Stop reason: SDUPTHER

## 2021-06-16 RX ADMIN — FENTANYL CITRATE 50 MCG: 50 INJECTION, SOLUTION INTRAMUSCULAR; INTRAVENOUS at 14:01

## 2021-06-16 RX ADMIN — PIPERACILLIN AND TAZOBACTAM 3375 MG: 3; .375 INJECTION, POWDER, LYOPHILIZED, FOR SOLUTION INTRAVENOUS at 13:51

## 2021-06-16 RX ADMIN — FENTANYL CITRATE 50 MCG: 50 INJECTION INTRAMUSCULAR; INTRAVENOUS at 14:21

## 2021-06-16 RX ADMIN — HYDROCODONE BITARTRATE AND ACETAMINOPHEN 2 TABLET: 5; 325 TABLET ORAL at 15:55

## 2021-06-16 RX ADMIN — HYDROMORPHONE HYDROCHLORIDE 0.5 MG: 1 INJECTION, SOLUTION INTRAMUSCULAR; INTRAVENOUS; SUBCUTANEOUS at 15:17

## 2021-06-16 RX ADMIN — DEXAMETHASONE SODIUM PHOSPHATE 5 MG: 10 INJECTION INTRAMUSCULAR; INTRAVENOUS at 14:01

## 2021-06-16 RX ADMIN — HYDROMORPHONE HYDROCHLORIDE 0.5 MG: 1 INJECTION, SOLUTION INTRAMUSCULAR; INTRAVENOUS; SUBCUTANEOUS at 14:53

## 2021-06-16 RX ADMIN — ROCURONIUM BROMIDE 40 MG: 10 INJECTION INTRAVENOUS at 13:45

## 2021-06-16 RX ADMIN — MIDAZOLAM HYDROCHLORIDE 2 MG: 1 INJECTION, SOLUTION INTRAMUSCULAR; INTRAVENOUS at 13:43

## 2021-06-16 RX ADMIN — FENTANYL CITRATE 50 MCG: 50 INJECTION, SOLUTION INTRAMUSCULAR; INTRAVENOUS at 13:45

## 2021-06-16 RX ADMIN — ONDANSETRON 4 MG: 2 INJECTION INTRAMUSCULAR; INTRAVENOUS at 14:01

## 2021-06-16 RX ADMIN — FENTANYL CITRATE 50 MCG: 50 INJECTION INTRAMUSCULAR; INTRAVENOUS at 14:33

## 2021-06-16 RX ADMIN — LIDOCAINE HYDROCHLORIDE 0.1 ML: 10 INJECTION, SOLUTION EPIDURAL; INFILTRATION; INTRACAUDAL; PERINEURAL at 12:36

## 2021-06-16 RX ADMIN — LIDOCAINE HYDROCHLORIDE 50 MG: 20 INJECTION, SOLUTION INTRAVENOUS at 13:45

## 2021-06-16 RX ADMIN — PROPOFOL 200 MG: 10 INJECTION, EMULSION INTRAVENOUS at 13:45

## 2021-06-16 RX ADMIN — SODIUM CHLORIDE, POTASSIUM CHLORIDE, SODIUM LACTATE AND CALCIUM CHLORIDE: 600; 310; 30; 20 INJECTION, SOLUTION INTRAVENOUS at 12:36

## 2021-06-16 RX ADMIN — SUGAMMADEX 200 MG: 100 INJECTION, SOLUTION INTRAVENOUS at 14:09

## 2021-06-16 ASSESSMENT — PAIN SCALES - GENERAL
PAINLEVEL_OUTOF10: 7
PAINLEVEL_OUTOF10: 7
PAINLEVEL_OUTOF10: 8

## 2021-06-16 ASSESSMENT — PULMONARY FUNCTION TESTS
PIF_VALUE: 1
PIF_VALUE: 2
PIF_VALUE: 29
PIF_VALUE: 35
PIF_VALUE: 28
PIF_VALUE: 28
PIF_VALUE: 2
PIF_VALUE: 2
PIF_VALUE: 28
PIF_VALUE: 2
PIF_VALUE: 5
PIF_VALUE: 28
PIF_VALUE: 28
PIF_VALUE: 29
PIF_VALUE: 28
PIF_VALUE: 0
PIF_VALUE: 22
PIF_VALUE: 4
PIF_VALUE: 25
PIF_VALUE: 29
PIF_VALUE: 33
PIF_VALUE: 28
PIF_VALUE: 28
PIF_VALUE: 30
PIF_VALUE: 26
PIF_VALUE: 6
PIF_VALUE: 33
PIF_VALUE: 5
PIF_VALUE: 29
PIF_VALUE: 33
PIF_VALUE: 26

## 2021-06-16 NOTE — ANESTHESIA POSTPROCEDURE EVALUATION
Department of Anesthesiology  Postprocedure Note    Patient: Sheldon Lane  MRN: [id number]  YOB: 1980  Date of evaluation: 6/16/2021  Time:  2:19 PM     Procedure Summary     Date: 06/16/21 Room / Location: [address]    Anesthesia Start: 2768 Anesthesia Stop: 8009    Procedure: I&D LEFT  PERIRECTAL  ABSCESS (N/A ) Diagnosis: (PERIRECTAL ABSCESS)    Surgeons: Santi Uribe MD Responsible Provider: Burak Fermin MD    Anesthesia Type: general ASA Status: 3          Anesthesia Type: general    Leann Phase I:      Leann Phase II:      Last vitals: Reviewed and per EMR flowsheets.        Anesthesia Post Evaluation    Patient location during evaluation: PACU  Patient participation: complete - patient participated  Level of consciousness: awake and alert  Pain score: 0  Airway patency: patent  Nausea & Vomiting: no vomiting and no nausea  Complications: no  Cardiovascular status: hemodynamically stable  Respiratory status: face mask and acceptable  Hydration status: stable

## 2021-06-16 NOTE — ANESTHESIA PRE PROCEDURE
Department of Anesthesiology  Preprocedure Note       Name:  Samina Meza   Age:  39 y.o.  :  1980                                          MRN:  [id number]         Date:  2021      Surgeon: Wayne Newsome):  Natalya Martinez MD    Procedure: Procedure(s):  I&D LEFT  PERIRECTAL  ABSCESS, PAT ON ADMIT    Medications prior to admission:   Prior to Admission medications    Medication Sig Start Date End Date Taking? Authorizing Provider   sulfamethoxazole-trimethoprim (BACTRIM DS) 800-160 MG per tablet Take 1 tablet by mouth 2 times daily for 10 days 21  Jorge Salmon PA-C   oxyCODONE-acetaminophen (PERCOCET) 5-325 MG per tablet Take 1 tablet by mouth every 6 hours as needed for Pain for up to 3 days. Intended supply: 3 days. Take lowest dose possible to manage pain 21  Jorge Salmon PA-C   clonazePAM (KLONOPIN) 1 MG tablet Take 1 mg by mouth 2 times daily as needed. Historical Provider, MD   fluvoxaMINE (LUVOX) 50 MG tablet Take 50 mg by mouth nightly    Historical Provider, MD   Dulaglutide (TRULICITY) 1.5 RE/1.3EJ SOPN Inject 1.5 mg into the skin once a week 3/8/21   Choco Xie MD   blood glucose monitor kit and supplies Dispense sufficient amount for indicated testing frequency plus additional to accommodate PRN testing needs. Dispense all needed supplies to include: monitor, strips, lancing device, lancets, control solutions, alcohol swabs. 21   Choco Xie MD   Lancets [facility] Patient test 3x daily E65.11 21   Choco Xie MD   glycopyrrolate (ROBINUL) 1 MG tablet 2 po tid 21   Choco Xie MD   blood glucose test strips (FREESTYLE LITE) strip 1 each by In Vitro route 4 times daily As needed.  21   Choco Xie MD   Continuous Blood Gluc  (FREESTYLE [place] 14 DAY READER) KATARINA 1 Device by Does not apply route 4 times daily (before meals and nightly) 21   Choco Xie MD   Continuous Blood Gluc Sensor (FREESTYLE [place] 14 DAY SENSOR) [facility] Every 2 Josesito Tejeda MD        meperidine (DEMEROL) injection 12.5 mg  12.5 mg Intravenous Q5 Min PRN Zander Cordova MD        fentaNYL (SUBLIMAZE) injection 50 mcg  50 mcg Intravenous Q10 Min PRN Zander Cordova MD        HYDROmorphone (DILAUDID) injection 0.5 mg  0.5 mg Intravenous Q10 Min PRN Zander Cordova MD        HYDROcodone-acetaminophen [facility]) 5-325 MG per tablet 1 tablet  1 tablet Oral PRN Zander Cordova MD        Or    HYDROcodone-acetaminophen [facility]) 5-325 MG per tablet 2 tablet  2 tablet Oral PRN Zander Cordova MD        ondansetron [facility]) injection 4 mg  4 mg Intravenous Once PRN Zander Cordova MD        metoclopramide [facility]) injection 10 mg  10 mg Intravenous Once PRN Zander Cordova MD        diphenhydrAMINE (BENADRYL) injection 12.5 mg  12.5 mg Intravenous Once PRN Zander Cordova MD           Allergies:     Allergies   Allergen Reactions    Latuda [Lurasidone Hcl] Hives    Lurasidone Hives    Adhesive Tape     Adipex-P [Phentermine]     Metformin And Related        Problem List:    Patient Active Problem List   Diagnosis Code    Chronic cholecystitis with calculus K80.10    History of drug abuse ([facility] F19.11    Spondylosis of lumbar region without myelopathy or radiculopathy M47.816    QI (obstructive sleep apnea) G47.33    Morbid obesity with body mass index (BMI) of 40.0 to 44.9 in adult ([facility]) E66.01, Z68.41    Bipolar disorder, current episode depressed, moderate ([facility]) F31.32    Bipolar 1 disorder, depressed ([facility]) F31.9    Diarrhea R19.7    Bipolar disorder with depression ([facility]) F31.9    Closed fracture of middle or proximal phalanx or phalanges of hand [id number]    Major depression, single episode F32.9    Uncontrolled type 2 diabetes mellitus with hyperglycemia ([facility]) E11.65    Cellulitis of abdominal wall L03.311    Abscess L02.91    Obesity, morbid (more than 100 lbs over ideal weight or BMI > 40) ([facility]) E66.01  Major depressive disorder, recurrent (HCC) F33.9       Past Medical History:        Diagnosis Date    [facility]     Depression     DM (diabetes mellitus) ([facility]     History of drug abuse ([facility]     positive drug screens 9/22/14 and 5/18/14    Hyperlipidemia     Osteoarthritis        Past Surgical History:        Procedure Laterality Date    CHOLECYSTECTOMY, LAPAROSCOPIC N/A 2/28/2017    CHOLECYSTECTOMY LAPAROSCOPIC WITH GRAMS POSS OPEN , RECENT LABS  performed by Dony Parada MD at [facility] [facility] N/A 11/27/2018    COLONOSCOPY ROOM 384 performed by Hillary Ann MD at [facility]         Social History:    Social History     Tobacco Use    Smoking status: Current Every Day Smoker     Packs/day: 1.00     Years: 24.00     Pack years: 24.00     Types: Cigarettes    Smokeless tobacco: Never Used   Substance Use Topics    Alcohol use: No                                Ready to quit: Not Answered  Counseling given: Not Answered      Vital Signs (Current):   Vitals:    06/16/21 1211 06/16/21 1221   BP: (!) 82/52 (!) 91/53   Pulse: 70    Resp: 14    Temp: 97.4 °F (36.3 °C)    TempSrc: Temporal    SpO2: 97%    Weight: 218 lb (98.9 kg)    Height: 5' 5\" (1.651 m)                                               BP Readings from Last 3 Encounters:   06/16/21 (!) 91/53   06/13/21 132/78   03/08/21 129/86       NPO Status:                                                                                 BMI:   Wt Readings from Last 3 Encounters:   06/16/21 218 lb (98.9 kg)   06/15/21 235 lb (106.6 kg)   06/13/21 235 lb (106.6 kg)     Body mass index is 36.28 kg/m².     CBC:   Lab Results   Component Value Date    WBC 10.0 02/22/2021    RBC 5.87 02/22/2021    HGB 16.2 02/22/2021    HCT 48.7 02/22/2021    MCV 82.9 02/22/2021    RDW 16.0 02/22/2021     02/22/2021       CMP:   Lab Results   Component Value Date     02/24/2021    K 3.6 MD   6/16/2021

## 2021-06-17 NOTE — OP NOTE
[address]                      [address], [address] [facility]                                OPERATIVE REPORT    PATIENT NAME: Rizwan Saab                       :        1980  MED REC NO:   [id number]                            ROOM:  ACCOUNT NO:   [de-identified]                           ADMIT DATE: 2021  PROVIDER:     Suresh Awad MD    DATE OF PROCEDURE:  2021    PREOPERATIVE DIAGNOSIS:  Left anterior perirectal abscess. POSTOPERATIVE DIAGNOSIS:  Left anterior perirectal abscess. OPERATIONS PERFORMED:  1. Exam under anesthesia. 2.  Incision and drainage of left anterior perirectal abscess. SURGEON:  Suresh Awad MD    EBL: 30 cc    ANESTHESIA:  1. General endotracheal anesthesia. 2.  Prone jackknife. SPECIMENS:  Cultures. COMPLICATIONS:  None. INDICATIONS:  A 51-year-old female who has a large perirectal abscess  that partially spontaneously drained. She was here in my office to  discuss definitive drainage. She wished to have this done in the  operating room. Risks and benefits of surgery were explained. Consent  obtained. OPERATIVE PROCEDURE:  She was taken to the operating room, placed in the  supine position. General endotracheal anesthesia was administered. She  was placed in a prone jackknife position. All pressure points were  properly padded. Her perianal area was prepped and draped with a  Betadine-containing solution. She received Zosyn preoperatively. A  time-out was taken for appropriate verification. In the left anterior portion of the perianal area was fluctuant abscess  partially drained. I made a 1-inch incision transverse into the  residual abscess cavity which was cultured. A Pulsavac was used for  complete irrigation. All the loculations were broken up. A Betadine-soaked Augustine Quin was placed in the wound. Dressings were applied  and mesh pants were used to secure the dressings. Prior to and after closure, lap, needle, instrument counts were all  correct. The patient was extubated after she was placed in the supine  position and taken to recovery room for postop monitoring.         Edgar Daugherty MD    D: 06/16/2021 14:15:27       T: 06/16/2021 14:21:55     [email or web address]  Job#: [id number]     Doc#: [id number]    CC:

## 2021-06-18 ENCOUNTER — OFFICE VISIT (OUTPATIENT)
Dept: SURGERY | Age: 41
End: 2021-06-18

## 2021-06-18 VITALS
TEMPERATURE: 96.7 F | HEIGHT: 65 IN | HEART RATE: 72 BPM | OXYGEN SATURATION: 94 % | WEIGHT: 218 LBS | BODY MASS INDEX: 36.32 KG/M2

## 2021-06-18 DIAGNOSIS — K61.1 PERIRECTAL ABSCESS: Primary | ICD-10-CM

## 2021-06-18 LAB
ANAEROBIC CULTURE: ABNORMAL
CULTURE SURGICAL: ABNORMAL
GRAM STAIN RESULT: ABNORMAL
ORGANISM: ABNORMAL
ORGANISM: ABNORMAL

## 2021-06-18 PROCEDURE — 99024 POSTOP FOLLOW-UP VISIT: CPT | Performed by: COLON & RECTAL SURGERY

## 2021-06-18 NOTE — PROGRESS NOTES
Subjective:      Patient ID: Sedonia Collet is a 39 y.o. female who presents for:  Chief Complaint   Patient presents with    Post-Op Check       She returns to the office 2 days out from perirectal abscess incision and drainage. She has been doing fine. She is called the office a number of times being rude to the . I am sure this is because she has been having pain around her incision and drainage site.       Past Medical History:   Diagnosis Date    [facility] disorder ([facility]     Depression     [facility]     History of drug abuse ([facility]     positive drug screens 9/22/14 and 5/18/14    Hyperlipidemia     Osteoarthritis      Past Surgical History:   Procedure Laterality Date    CHOLECYSTECTOMY, LAPAROSCOPIC N/A 2/28/2017    CHOLECYSTECTOMY LAPAROSCOPIC WITH GRAMS POSS OPEN , RECENT LABS  [facility]  [address] [place] [address] N/A 11/27/2018    COLONOSCOPY ROOM 384 performed by Carlita Syed MD at [address] [place] [address] N/A 6/16/2021    I&D LEFT  PERIRECTAL  ABSCESS performed by Angel Wright MD at [facility] History     Socioeconomic History    Marital status: Single     Spouse name: Not on file    Number of children: Not on file    Years of education: Not on file    Highest education level: Not on file   Occupational History    Not on file   Tobacco Use    Smoking status: Current Every Day Smoker     Packs/day: 1.00     Years: 24.00     Pack years: 24.00     Types: Cigarettes    Smokeless tobacco: Never Used   Vaping Use    Vaping Use: Never used   Substance and Sexual Activity    Alcohol use: No    Drug use: Not Currently     Types: Marijuana    Sexual activity: Yes   Other Topics Concern    Not on file   Social History Narrative    Not on file     Social Determinants of Health     Financial Resource Strain:     Difficulty of Paying Living Expenses:    Food Insecurity:     Worried About Running Out of Food in the Last Year:     [address] in the Last Year:    Transportation Needs:     Lack of Transportation (Medical):  Lack of Transportation (Non-Medical):    Physical Activity:     Days of Exercise per Week:     Minutes of Exercise per Session:    Stress:     Feeling of Stress :    Social Connections:     Frequency of Communication with Friends and Family:     Frequency of Social Gatherings with Friends and Family:     Attends Anglican Services:     Active Member of Clubs or Organizations:     Attends Club or Organization Meetings:     Marital Status:    Intimate Partner Violence:     Fear of Current or Ex-Partner:     Emotionally Abused:     Physically Abused:     Sexually Abused:      Family History   Problem Relation Age of Onset    Other Mother         CHF    Cirrhosis Brother     Other Maternal Grandmother         CHF     Allergies:  Latuda [lurasidone hcl], Lurasidone, Adhesive tape, Adipex-p [phentermine], and Metformin and related    Review of Systems    Objective:    Pulse 72   Temp 96.7 °F (35.9 °C) (Temporal)   Ht 5' 5\" (1.651 m)   Wt 218 lb (98.9 kg)   SpO2 94%   BMI 36.28 kg/m²     Physical Exam  On exam the packing was wettened and then removed. The abscess cavity site is healing very well and is very clean without purulence. All skin changes have resolved. Assessment/Plan:          Diagnosis Orders   1. Perirectal abscess       Wound care and activity instructions were given. She will return back to see me in the office in 2 weeks. All questions were answered. Please note this report has beenpartially produced using speech recognition software and may cause contain errors related to that system including grammar, punctuation and spelling as well as words and phrases that may seem inappropriate.  If there arequestions or concerns please feel free to contact me to clarify

## 2021-08-30 ENCOUNTER — HOSPITAL ENCOUNTER (EMERGENCY)
Age: 41
Discharge: HOME | End: 2021-08-30
Attending: EMERGENCY MEDICINE
Payer: MEDICAID

## 2021-08-30 VITALS
SYSTOLIC BLOOD PRESSURE: 136 MMHG | WEIGHT: 206 LBS | HEART RATE: 95 BPM | DIASTOLIC BLOOD PRESSURE: 88 MMHG | OXYGEN SATURATION: 96 % | BODY MASS INDEX: 33.11 KG/M2 | RESPIRATION RATE: 16 BRPM | HEIGHT: 66 IN | TEMPERATURE: 98.3 F

## 2021-08-30 DIAGNOSIS — Z79.899 MEDICATION DOSE CHANGED: Primary | ICD-10-CM

## 2021-08-30 LAB
ACETAMINOPHEN LEVEL: <5 UG/ML (ref 10–30)
ALBUMIN SERPL-MCNC: 4.4 G/DL (ref 3.5–4.6)
ALP BLD-CCNC: 96 U/L (ref 40–130)
ALT SERPL-CCNC: 11 U/L (ref 0–33)
AMPHETAMINE SCREEN, URINE: NORMAL
ANION GAP SERPL CALCULATED.3IONS-SCNC: 11 MEQ/L (ref 9–15)
AST SERPL-CCNC: 13 U/L (ref 0–35)
BARBITURATE SCREEN URINE: NORMAL
BASOPHILS ABSOLUTE: 0.1 K/UL (ref 0–0.2)
BASOPHILS RELATIVE PERCENT: 0.7 %
BENZODIAZEPINE SCREEN, URINE: NORMAL
BILIRUB SERPL-MCNC: <0.2 MG/DL (ref 0.2–0.7)
BILIRUBIN URINE: NEGATIVE
BLOOD, URINE: NEGATIVE
BUN BLDV-MCNC: 14 MG/DL (ref 6–20)
CALCIUM SERPL-MCNC: 9.8 MG/DL (ref 8.5–9.9)
CANNABINOID SCREEN URINE: NORMAL
CHLORIDE BLD-SCNC: 101 MEQ/L (ref 95–107)
CHOLESTEROL, TOTAL: 188 MG/DL (ref 0–199)
CLARITY: ABNORMAL
CO2: 25 MEQ/L (ref 20–31)
COCAINE METABOLITE SCREEN URINE: NORMAL
COLOR: YELLOW
CREAT SERPL-MCNC: 0.63 MG/DL (ref 0.5–0.9)
EOSINOPHILS ABSOLUTE: 0.1 K/UL (ref 0–0.7)
EOSINOPHILS RELATIVE PERCENT: 0.8 %
ETHANOL PERCENT: NORMAL G/DL
ETHANOL: <10 MG/DL (ref 0–0.08)
GFR AFRICAN AMERICAN: >60
GFR NON-AFRICAN AMERICAN: >60
GLOBULIN: 2.8 G/DL (ref 2.3–3.5)
GLUCOSE BLD-MCNC: 95 MG/DL (ref 70–99)
GLUCOSE URINE: NEGATIVE MG/DL
HCG(URINE) PREGNANCY TEST: NEGATIVE
HCT VFR BLD CALC: 50 % (ref 37–47)
HDLC SERPL-MCNC: 40 MG/DL (ref 40–59)
HEMOGLOBIN: 17 G/DL (ref 12–16)
KETONES, URINE: ABNORMAL MG/DL
LDL CHOLESTEROL CALCULATED: 107 MG/DL (ref 0–129)
LEUKOCYTE ESTERASE, URINE: NEGATIVE
LYMPHOCYTES ABSOLUTE: 3.5 K/UL (ref 1–4.8)
LYMPHOCYTES RELATIVE PERCENT: 21.9 %
Lab: NORMAL
MCH RBC QN AUTO: 29 PG (ref 27–31.3)
MCHC RBC AUTO-ENTMCNC: 34.1 % (ref 33–37)
MCV RBC AUTO: 85.2 FL (ref 82–100)
METHADONE SCREEN, URINE: NORMAL
MONOCYTES ABSOLUTE: 0.8 K/UL (ref 0.2–0.8)
MONOCYTES RELATIVE PERCENT: 5 %
NEUTROPHILS ABSOLUTE: 11.3 K/UL (ref 1.4–6.5)
NEUTROPHILS RELATIVE PERCENT: 71.6 %
NITRITE, URINE: NEGATIVE
OPIATE SCREEN URINE: NORMAL
OXYCODONE URINE: NORMAL
PDW BLD-RTO: 16.4 % (ref 11.5–14.5)
PH UA: 7 (ref 5–9)
PHENCYCLIDINE SCREEN URINE: NORMAL
PLATELET # BLD: 239 K/UL (ref 130–400)
POTASSIUM SERPL-SCNC: 4.2 MEQ/L (ref 3.4–4.9)
PROPOXYPHENE SCREEN: NORMAL
PROTEIN UA: NEGATIVE MG/DL
RBC # BLD: 5.86 M/UL (ref 4.2–5.4)
SALICYLATE, SERUM: <0.3 MG/DL (ref 15–30)
SARS-COV-2, NAAT: NOT DETECTED
SODIUM BLD-SCNC: 137 MEQ/L (ref 135–144)
SPECIFIC GRAVITY UA: 1.02 (ref 1–1.03)
TOTAL CK: 42 U/L (ref 0–170)
TOTAL PROTEIN: 7.2 G/DL (ref 6.3–8)
TRIGL SERPL-MCNC: 207 MG/DL (ref 0–150)
TSH SERPL DL<=0.05 MIU/L-ACNC: 2.04 UIU/ML (ref 0.44–3.86)
URINE REFLEX TO CULTURE: ABNORMAL
UROBILINOGEN, URINE: 0.2 E.U./DL
WBC # BLD: 15.8 K/UL (ref 4.8–10.8)

## 2021-08-30 PROCEDURE — 99283 EMERGENCY DEPT VISIT LOW MDM: CPT

## 2021-08-30 PROCEDURE — 6370000000 HC RX 637 (ALT 250 FOR IP): Performed by: EMERGENCY MEDICINE

## 2021-08-30 PROCEDURE — 80179 DRUG ASSAY SALICYLATE: CPT

## 2021-08-30 PROCEDURE — 80143 DRUG ASSAY ACETAMINOPHEN: CPT

## 2021-08-30 PROCEDURE — 87635 SARS-COV-2 COVID-19 AMP PRB: CPT

## 2021-08-30 PROCEDURE — 85025 COMPLETE CBC W/AUTO DIFF WBC: CPT

## 2021-08-30 PROCEDURE — 82550 ASSAY OF CK (CPK): CPT

## 2021-08-30 PROCEDURE — 84443 ASSAY THYROID STIM HORMONE: CPT

## 2021-08-30 PROCEDURE — 80053 COMPREHEN METABOLIC PANEL: CPT

## 2021-08-30 PROCEDURE — 82077 ASSAY SPEC XCP UR&BREATH IA: CPT

## 2021-08-30 PROCEDURE — 84703 CHORIONIC GONADOTROPIN ASSAY: CPT

## 2021-08-30 PROCEDURE — 36415 COLL VENOUS BLD VENIPUNCTURE: CPT

## 2021-08-30 PROCEDURE — 80307 DRUG TEST PRSMV CHEM ANLYZR: CPT

## 2021-08-30 PROCEDURE — 81003 URINALYSIS AUTO W/O SCOPE: CPT

## 2021-08-30 PROCEDURE — 80061 LIPID PANEL: CPT

## 2021-08-30 RX ORDER — OLANZAPINE 7.5 MG/1
7.5 TABLET ORAL ONCE
Status: COMPLETED | OUTPATIENT
Start: 2021-08-30 | End: 2021-08-30

## 2021-08-30 RX ADMIN — OLANZAPINE 7.5 MG: 7.5 TABLET, FILM COATED ORAL at 22:29

## 2021-08-30 ASSESSMENT — PAIN SCALES - GENERAL: PAINLEVEL_OUTOF10: 0

## 2021-08-31 NOTE — ED TRIAGE NOTES
Patient had medication change, prescribed Zyprexa, insurance won't cover med until next month. Patient began feeling out of control today with agressive thoughts and increased depression.

## 2021-08-31 NOTE — ED NOTES
Pt given dc instructions and verbalized understanding. Ambulatory out of er with steady gait.        Gustavo Skinner RN  08/30/21 5083

## 2021-08-31 NOTE — ED NOTES
In to speak with patient with Dr Radha Zhao and patient advised that she is not suicidal or homicidal.  States that there is a mix up with her insurance and she just needs a dose of zyprexa until she can speak with her dr tomorrow. Pt states she was on 2.5 mg and increased to 7.5 mg and insurance isn't covering it at this time until they speak with her physician. Pt is calm and cooperative. Pt has a ride home with her spouse.        Elisabeth Loyola RN  08/30/21 8066

## 2021-09-28 ENCOUNTER — HOSPITAL ENCOUNTER (EMERGENCY)
Age: 41
Discharge: OTHER INSTITUTION | End: 2021-09-29
Payer: MEDICAID

## 2021-09-28 VITALS
BODY MASS INDEX: 34.16 KG/M2 | WEIGHT: 205 LBS | HEART RATE: 111 BPM | TEMPERATURE: 98.3 F | RESPIRATION RATE: 18 BRPM | HEIGHT: 65 IN | OXYGEN SATURATION: 98 % | SYSTOLIC BLOOD PRESSURE: 119 MMHG | DIASTOLIC BLOOD PRESSURE: 83 MMHG

## 2021-09-28 DIAGNOSIS — E87.1 HYPONATREMIA: ICD-10-CM

## 2021-09-28 DIAGNOSIS — F31.9 BIPOLAR 1 DISORDER (HCC): Primary | ICD-10-CM

## 2021-09-28 LAB
ACETAMINOPHEN LEVEL: <5 UG/ML (ref 10–30)
ALBUMIN SERPL-MCNC: 3.8 G/DL (ref 3.5–4.6)
ALP BLD-CCNC: 74 U/L (ref 40–130)
ALT SERPL-CCNC: 10 U/L (ref 0–33)
AMPHETAMINE SCREEN, URINE: NORMAL
ANION GAP SERPL CALCULATED.3IONS-SCNC: 11 MEQ/L (ref 9–15)
ANION GAP SERPL CALCULATED.3IONS-SCNC: 8 MEQ/L (ref 9–15)
AST SERPL-CCNC: 13 U/L (ref 0–35)
BARBITURATE SCREEN URINE: NORMAL
BASOPHILS ABSOLUTE: 0 K/UL (ref 0–0.2)
BASOPHILS RELATIVE PERCENT: 0.3 %
BENZODIAZEPINE SCREEN, URINE: NORMAL
BILIRUB SERPL-MCNC: 0.3 MG/DL (ref 0.2–0.7)
BILIRUBIN URINE: NEGATIVE
BLOOD, URINE: NEGATIVE
BUN BLDV-MCNC: 10 MG/DL (ref 6–20)
BUN BLDV-MCNC: 10 MG/DL (ref 6–20)
CALCIUM SERPL-MCNC: 8.5 MG/DL (ref 8.5–9.9)
CALCIUM SERPL-MCNC: 8.8 MG/DL (ref 8.5–9.9)
CANNABINOID SCREEN URINE: NORMAL
CHLORIDE BLD-SCNC: 104 MEQ/L (ref 95–107)
CHLORIDE BLD-SCNC: 91 MEQ/L (ref 95–107)
CHOLESTEROL, TOTAL: 162 MG/DL (ref 0–199)
CK MB: 3.6 NG/ML (ref 0–3.8)
CLARITY: CLEAR
CO2: 22 MEQ/L (ref 20–31)
CO2: 23 MEQ/L (ref 20–31)
COCAINE METABOLITE SCREEN URINE: NORMAL
COLOR: ABNORMAL
CREAT SERPL-MCNC: 0.59 MG/DL (ref 0.5–0.9)
CREAT SERPL-MCNC: 0.62 MG/DL (ref 0.5–0.9)
CREATINE KINASE-MB INDEX: 3.1 % (ref 0–3.5)
EOSINOPHILS ABSOLUTE: 0.1 K/UL (ref 0–0.7)
EOSINOPHILS RELATIVE PERCENT: 0.8 %
ETHANOL PERCENT: NORMAL G/DL
ETHANOL: <10 MG/DL (ref 0–0.08)
GFR AFRICAN AMERICAN: >60
GFR AFRICAN AMERICAN: >60
GFR NON-AFRICAN AMERICAN: >60
GFR NON-AFRICAN AMERICAN: >60
GLOBULIN: 2.1 G/DL (ref 2.3–3.5)
GLUCOSE BLD-MCNC: 125 MG/DL (ref 70–99)
GLUCOSE BLD-MCNC: 86 MG/DL (ref 70–99)
GLUCOSE URINE: NEGATIVE MG/DL
HCG(URINE) PREGNANCY TEST: NEGATIVE
HCT VFR BLD CALC: 48.3 % (ref 37–47)
HDLC SERPL-MCNC: 37 MG/DL (ref 40–59)
HEMOGLOBIN: 16.7 G/DL (ref 12–16)
KETONES, URINE: NEGATIVE MG/DL
LDL CHOLESTEROL CALCULATED: 87 MG/DL (ref 0–129)
LEUKOCYTE ESTERASE, URINE: NEGATIVE
LYMPHOCYTES ABSOLUTE: 2.9 K/UL (ref 1–4.8)
LYMPHOCYTES RELATIVE PERCENT: 21.5 %
Lab: NORMAL
MCH RBC QN AUTO: 29.1 PG (ref 27–31.3)
MCHC RBC AUTO-ENTMCNC: 34.5 % (ref 33–37)
MCV RBC AUTO: 84.3 FL (ref 82–100)
METHADONE SCREEN, URINE: NORMAL
MONOCYTES ABSOLUTE: 0.8 K/UL (ref 0.2–0.8)
MONOCYTES RELATIVE PERCENT: 5.8 %
NEUTROPHILS ABSOLUTE: 9.6 K/UL (ref 1.4–6.5)
NEUTROPHILS RELATIVE PERCENT: 71.6 %
NITRITE, URINE: NEGATIVE
OPIATE SCREEN URINE: NORMAL
OXYCODONE URINE: NORMAL
PDW BLD-RTO: 14.4 % (ref 11.5–14.5)
PH UA: 5.5 (ref 5–9)
PHENCYCLIDINE SCREEN URINE: NORMAL
PLATELET # BLD: 176 K/UL (ref 130–400)
POTASSIUM SERPL-SCNC: 3.6 MEQ/L (ref 3.4–4.9)
POTASSIUM SERPL-SCNC: 3.8 MEQ/L (ref 3.4–4.9)
PROPOXYPHENE SCREEN: NORMAL
PROTEIN UA: NEGATIVE MG/DL
RBC # BLD: 5.73 M/UL (ref 4.2–5.4)
SALICYLATE, SERUM: <0.3 MG/DL (ref 15–30)
SARS-COV-2, NAAT: NOT DETECTED
SODIUM BLD-SCNC: 125 MEQ/L (ref 135–144)
SODIUM BLD-SCNC: 134 MEQ/L (ref 135–144)
SPECIFIC GRAVITY UA: 1.01 (ref 1–1.03)
TOTAL CK: 115 U/L (ref 0–170)
TOTAL PROTEIN: 5.9 G/DL (ref 6.3–8)
TRIGL SERPL-MCNC: 189 MG/DL (ref 0–150)
TSH SERPL DL<=0.05 MIU/L-ACNC: 1.4 UIU/ML (ref 0.44–3.86)
URINE REFLEX TO CULTURE: ABNORMAL
UROBILINOGEN, URINE: 0.2 E.U./DL
WBC # BLD: 13.4 K/UL (ref 4.8–10.8)

## 2021-09-28 PROCEDURE — 2580000003 HC RX 258: Performed by: PHYSICIAN ASSISTANT

## 2021-09-28 PROCEDURE — 6370000000 HC RX 637 (ALT 250 FOR IP): Performed by: PHYSICIAN ASSISTANT

## 2021-09-28 PROCEDURE — 99285 EMERGENCY DEPT VISIT HI MDM: CPT

## 2021-09-28 PROCEDURE — 87635 SARS-COV-2 COVID-19 AMP PRB: CPT

## 2021-09-28 PROCEDURE — 82550 ASSAY OF CK (CPK): CPT

## 2021-09-28 PROCEDURE — 93005 ELECTROCARDIOGRAM TRACING: CPT | Performed by: PHYSICIAN ASSISTANT

## 2021-09-28 PROCEDURE — 80143 DRUG ASSAY ACETAMINOPHEN: CPT

## 2021-09-28 PROCEDURE — 80053 COMPREHEN METABOLIC PANEL: CPT

## 2021-09-28 PROCEDURE — 82077 ASSAY SPEC XCP UR&BREATH IA: CPT

## 2021-09-28 PROCEDURE — 81003 URINALYSIS AUTO W/O SCOPE: CPT

## 2021-09-28 PROCEDURE — 36415 COLL VENOUS BLD VENIPUNCTURE: CPT

## 2021-09-28 PROCEDURE — 84703 CHORIONIC GONADOTROPIN ASSAY: CPT

## 2021-09-28 PROCEDURE — 80307 DRUG TEST PRSMV CHEM ANLYZR: CPT

## 2021-09-28 PROCEDURE — 84443 ASSAY THYROID STIM HORMONE: CPT

## 2021-09-28 PROCEDURE — 80179 DRUG ASSAY SALICYLATE: CPT

## 2021-09-28 PROCEDURE — 85025 COMPLETE CBC W/AUTO DIFF WBC: CPT

## 2021-09-28 PROCEDURE — 82553 CREATINE MB FRACTION: CPT

## 2021-09-28 PROCEDURE — 80061 LIPID PANEL: CPT

## 2021-09-28 RX ORDER — NICOTINE 21 MG/24HR
1 PATCH, TRANSDERMAL 24 HOURS TRANSDERMAL ONCE
Status: DISCONTINUED | OUTPATIENT
Start: 2021-09-28 | End: 2021-09-29 | Stop reason: HOSPADM

## 2021-09-28 RX ORDER — AMOXICILLIN 500 MG
1 CAPSULE ORAL DAILY
COMMUNITY

## 2021-09-28 RX ORDER — OLANZAPINE 7.5 MG/1
7.5 TABLET ORAL NIGHTLY
Status: ON HOLD | COMMUNITY
End: 2022-02-16 | Stop reason: HOSPADM

## 2021-09-28 RX ORDER — PRAZOSIN HYDROCHLORIDE 2 MG/1
2 CAPSULE ORAL NIGHTLY
Status: ON HOLD | COMMUNITY
End: 2022-02-16 | Stop reason: HOSPADM

## 2021-09-28 RX ORDER — 0.9 % SODIUM CHLORIDE 0.9 %
1000 INTRAVENOUS SOLUTION INTRAVENOUS ONCE
Status: COMPLETED | OUTPATIENT
Start: 2021-09-28 | End: 2021-09-29

## 2021-09-28 RX ORDER — 0.9 % SODIUM CHLORIDE 0.9 %
1000 INTRAVENOUS SOLUTION INTRAVENOUS ONCE
Status: DISCONTINUED | OUTPATIENT
Start: 2021-09-28 | End: 2021-09-28

## 2021-09-28 RX ORDER — CLONAZEPAM 1 MG/1
1 TABLET ORAL ONCE
Status: COMPLETED | OUTPATIENT
Start: 2021-09-28 | End: 2021-09-28

## 2021-09-28 RX ADMIN — CLONAZEPAM 1 MG: 1 TABLET ORAL at 18:03

## 2021-09-28 RX ADMIN — SODIUM CHLORIDE 1000 ML: 9 INJECTION, SOLUTION INTRAVENOUS at 18:19

## 2021-09-28 ASSESSMENT — ENCOUNTER SYMPTOMS
SHORTNESS OF BREATH: 0
PHOTOPHOBIA: 0
VOICE CHANGE: 0
ABDOMINAL DISTENTION: 0
VOMITING: 0
APNEA: 0
NAUSEA: 0
EYE DISCHARGE: 0
COUGH: 0
ANAL BLEEDING: 0

## 2021-09-28 ASSESSMENT — PATIENT HEALTH QUESTIONNAIRE - PHQ9: SUM OF ALL RESPONSES TO PHQ QUESTIONS 1-9: 27
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
## 2021-09-29 LAB
EKG ATRIAL RATE: 86 BPM
EKG P AXIS: 52 DEGREES
EKG P-R INTERVAL: 164 MS
EKG Q-T INTERVAL: 386 MS
EKG QRS DURATION: 82 MS
EKG QTC CALCULATION (BAZETT): 461 MS
EKG R AXIS: 15 DEGREES
EKG T AXIS: 44 DEGREES
EKG VENTRICULAR RATE: 86 BPM

## 2021-09-29 PROCEDURE — 93010 ELECTROCARDIOGRAM REPORT: CPT | Performed by: INTERNAL MEDICINE
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
## 2021-09-30 LAB — HBA1C MFR BLD: 5.7 % (ref 4.8–5.9)

## 2021-10-12 RX ORDER — DULAGLUTIDE 1.5 MG/.5ML
INJECTION, SOLUTION SUBCUTANEOUS
Qty: 4 PEN | Refills: 0 | Status: ON HOLD | OUTPATIENT
Start: 2021-10-12 | End: 2022-02-16 | Stop reason: HOSPADM

## 2022-02-10 ENCOUNTER — APPOINTMENT (OUTPATIENT)
Dept: GENERAL RADIOLOGY | Age: 42
DRG: 817 | End: 2022-02-10
Payer: MEDICAID

## 2022-02-10 ENCOUNTER — HOSPITAL ENCOUNTER (INPATIENT)
Age: 42
LOS: 1 days | Discharge: PSYCHIATRIC OR SUBSTANCE USE FACILITY | DRG: 817 | End: 2022-02-11
Attending: INTERNAL MEDICINE | Admitting: INTERNAL MEDICINE
Payer: MEDICAID

## 2022-02-10 ENCOUNTER — APPOINTMENT (OUTPATIENT)
Dept: CT IMAGING | Age: 42
DRG: 817 | End: 2022-02-10
Payer: MEDICAID

## 2022-02-10 DIAGNOSIS — F19.10 POLYSUBSTANCE ABUSE (HCC): Primary | ICD-10-CM

## 2022-02-10 PROBLEM — T14.91XA SUICIDE ATTEMPT (HCC): Status: ACTIVE | Noted: 2022-02-10

## 2022-02-10 LAB
ACETAMINOPHEN LEVEL: <5 UG/ML (ref 10–30)
ALBUMIN SERPL-MCNC: 4.6 G/DL (ref 3.5–4.6)
ALP BLD-CCNC: 74 U/L (ref 40–130)
ALT SERPL-CCNC: 12 U/L (ref 0–33)
AMPHETAMINE SCREEN, URINE: ABNORMAL
ANION GAP SERPL CALCULATED.3IONS-SCNC: 17 MEQ/L (ref 9–15)
AST SERPL-CCNC: 11 U/L (ref 0–35)
BARBITURATE SCREEN URINE: ABNORMAL
BASE EXCESS ARTERIAL: -8 (ref -3–3)
BASOPHILS ABSOLUTE: 0 K/UL (ref 0–0.2)
BASOPHILS RELATIVE PERCENT: 0.5 %
BENZODIAZEPINE SCREEN, URINE: ABNORMAL
BILIRUB SERPL-MCNC: <0.2 MG/DL (ref 0.2–0.7)
BILIRUBIN URINE: NEGATIVE
BLOOD, URINE: NEGATIVE
BUN BLDV-MCNC: 14 MG/DL (ref 6–20)
CALCIUM IONIZED: 1.22 MMOL/L (ref 1.12–1.32)
CALCIUM SERPL-MCNC: 9 MG/DL (ref 8.5–9.9)
CANNABINOID SCREEN URINE: ABNORMAL
CHLORIDE BLD-SCNC: 106 MEQ/L (ref 95–107)
CHOLESTEROL, TOTAL: 214 MG/DL (ref 0–199)
CLARITY: CLEAR
CO2: 20 MEQ/L (ref 20–31)
COCAINE METABOLITE SCREEN URINE: ABNORMAL
COLOR: YELLOW
CREAT SERPL-MCNC: 0.81 MG/DL (ref 0.5–0.9)
EOSINOPHILS ABSOLUTE: 0.1 K/UL (ref 0–0.7)
EOSINOPHILS RELATIVE PERCENT: 1.8 %
ETHANOL PERCENT: 0.14 G/DL
ETHANOL: 155 MG/DL (ref 0–0.08)
GFR AFRICAN AMERICAN: >60
GFR AFRICAN AMERICAN: >60
GFR NON-AFRICAN AMERICAN: >60
GFR NON-AFRICAN AMERICAN: >60
GLOBULIN: 2.8 G/DL (ref 2.3–3.5)
GLUCOSE BLD-MCNC: 116 MG/DL (ref 70–99)
GLUCOSE BLD-MCNC: 65 MG/DL (ref 70–99)
GLUCOSE BLD-MCNC: 75 MG/DL (ref 70–99)
GLUCOSE URINE: NEGATIVE MG/DL
HCG(URINE) PREGNANCY TEST: NEGATIVE
HCO3 ARTERIAL: 19.1 MMOL/L (ref 21–29)
HCT VFR BLD CALC: 41.1 % (ref 37–47)
HCT VFR BLD CALC: 46.9 % (ref 37–47)
HDLC SERPL-MCNC: 43 MG/DL (ref 40–59)
HEMOGLOBIN: 13.8 G/DL (ref 12–16)
HEMOGLOBIN: 15.3 GM/DL (ref 12–16)
HEMOGLOBIN: 15.6 G/DL (ref 12–16)
KETONES, URINE: NEGATIVE MG/DL
LACTATE: 2.34 MMOL/L (ref 0.4–2)
LDL CHOLESTEROL CALCULATED: 143 MG/DL (ref 0–129)
LEUKOCYTE ESTERASE, URINE: NEGATIVE
LYMPHOCYTES ABSOLUTE: 3.2 K/UL (ref 1–4.8)
LYMPHOCYTES RELATIVE PERCENT: 38.6 %
Lab: ABNORMAL
MAGNESIUM: 2.3 MG/DL (ref 1.7–2.4)
MCH RBC QN AUTO: 29 PG (ref 27–31.3)
MCH RBC QN AUTO: 29.4 PG (ref 27–31.3)
MCHC RBC AUTO-ENTMCNC: 33.2 % (ref 33–37)
MCHC RBC AUTO-ENTMCNC: 33.5 % (ref 33–37)
MCV RBC AUTO: 87.4 FL (ref 82–100)
MCV RBC AUTO: 87.8 FL (ref 82–100)
METHADONE SCREEN, URINE: POSITIVE
MONOCYTES ABSOLUTE: 0.4 K/UL (ref 0.2–0.8)
MONOCYTES RELATIVE PERCENT: 5.1 %
NEUTROPHILS ABSOLUTE: 4.4 K/UL (ref 1.4–6.5)
NEUTROPHILS RELATIVE PERCENT: 54 %
NITRITE, URINE: NEGATIVE
O2 SAT, ARTERIAL: 93 % (ref 93–100)
OPIATE SCREEN URINE: ABNORMAL
OXYCODONE URINE: ABNORMAL
PCO2 ARTERIAL: 40 MM HG (ref 35–45)
PDW BLD-RTO: 13.4 % (ref 11.5–14.5)
PDW BLD-RTO: 13.9 % (ref 11.5–14.5)
PERFORMED ON: ABNORMAL
PERFORMED ON: ABNORMAL
PH ARTERIAL: 7.29 (ref 7.35–7.45)
PH UA: 5.5 (ref 5–9)
PHENCYCLIDINE SCREEN URINE: ABNORMAL
PLATELET # BLD: 186 K/UL (ref 130–400)
PLATELET # BLD: 221 K/UL (ref 130–400)
PO2 ARTERIAL: 73 MM HG (ref 75–108)
POC CHLORIDE: 115 MEQ/L (ref 99–110)
POC CREATININE: 0.8 MG/DL (ref 0.6–1.2)
POC HEMATOCRIT: 45 % (ref 36–48)
POC POTASSIUM: 2.9 MEQ/L (ref 3.5–5.1)
POC SAMPLE TYPE: ABNORMAL
POC SODIUM: 147 MEQ/L (ref 136–145)
POTASSIUM SERPL-SCNC: 3.3 MEQ/L (ref 3.4–4.9)
PROPOXYPHENE SCREEN: ABNORMAL
PROTEIN UA: NEGATIVE MG/DL
RBC # BLD: 4.69 M/UL (ref 4.2–5.4)
RBC # BLD: 5.37 M/UL (ref 4.2–5.4)
SALICYLATE, SERUM: <0.3 MG/DL (ref 15–30)
SARS-COV-2, NAAT: NOT DETECTED
SODIUM BLD-SCNC: 143 MEQ/L (ref 135–144)
SPECIFIC GRAVITY UA: 1 (ref 1–1.03)
TCO2 ARTERIAL: 20 MMOL/L (ref 21–32)
TOTAL CK: 29 U/L (ref 0–170)
TOTAL PROTEIN: 7.4 G/DL (ref 6.3–8)
TRIGL SERPL-MCNC: 142 MG/DL (ref 0–150)
TROPONIN: <0.01 NG/ML (ref 0–0.01)
TSH SERPL DL<=0.05 MIU/L-ACNC: 4.14 UIU/ML (ref 0.44–3.86)
URINE REFLEX TO CULTURE: NORMAL
UROBILINOGEN, URINE: 0.2 E.U./DL
WBC # BLD: 8.2 K/UL (ref 4.8–10.8)
WBC # BLD: 9.8 K/UL (ref 4.8–10.8)

## 2022-02-10 PROCEDURE — 82330 ASSAY OF CALCIUM: CPT

## 2022-02-10 PROCEDURE — 84443 ASSAY THYROID STIM HORMONE: CPT

## 2022-02-10 PROCEDURE — 80307 DRUG TEST PRSMV CHEM ANLYZR: CPT

## 2022-02-10 PROCEDURE — 84132 ASSAY OF SERUM POTASSIUM: CPT

## 2022-02-10 PROCEDURE — 82435 ASSAY OF BLOOD CHLORIDE: CPT

## 2022-02-10 PROCEDURE — 80053 COMPREHEN METABOLIC PANEL: CPT

## 2022-02-10 PROCEDURE — 81003 URINALYSIS AUTO W/O SCOPE: CPT

## 2022-02-10 PROCEDURE — 82948 REAGENT STRIP/BLOOD GLUCOSE: CPT

## 2022-02-10 PROCEDURE — 36600 WITHDRAWAL OF ARTERIAL BLOOD: CPT

## 2022-02-10 PROCEDURE — 80143 DRUG ASSAY ACETAMINOPHEN: CPT

## 2022-02-10 PROCEDURE — 71045 X-RAY EXAM CHEST 1 VIEW: CPT

## 2022-02-10 PROCEDURE — 80179 DRUG ASSAY SALICYLATE: CPT

## 2022-02-10 PROCEDURE — 82077 ASSAY SPEC XCP UR&BREATH IA: CPT

## 2022-02-10 PROCEDURE — 93005 ELECTROCARDIOGRAM TRACING: CPT | Performed by: NURSE PRACTITIONER

## 2022-02-10 PROCEDURE — 82803 BLOOD GASES ANY COMBINATION: CPT

## 2022-02-10 PROCEDURE — 36415 COLL VENOUS BLD VENIPUNCTURE: CPT

## 2022-02-10 PROCEDURE — 84484 ASSAY OF TROPONIN QUANT: CPT

## 2022-02-10 PROCEDURE — 84295 ASSAY OF SERUM SODIUM: CPT

## 2022-02-10 PROCEDURE — 85014 HEMATOCRIT: CPT

## 2022-02-10 PROCEDURE — 2580000003 HC RX 258: Performed by: PHYSICIAN ASSISTANT

## 2022-02-10 PROCEDURE — 70450 CT HEAD/BRAIN W/O DYE: CPT

## 2022-02-10 PROCEDURE — 99285 EMERGENCY DEPT VISIT HI MDM: CPT

## 2022-02-10 PROCEDURE — 83605 ASSAY OF LACTIC ACID: CPT

## 2022-02-10 PROCEDURE — 85027 COMPLETE CBC AUTOMATED: CPT

## 2022-02-10 PROCEDURE — 84703 CHORIONIC GONADOTROPIN ASSAY: CPT

## 2022-02-10 PROCEDURE — 93005 ELECTROCARDIOGRAM TRACING: CPT | Performed by: PHYSICIAN ASSISTANT

## 2022-02-10 PROCEDURE — 51701 INSERT BLADDER CATHETER: CPT

## 2022-02-10 PROCEDURE — 80061 LIPID PANEL: CPT

## 2022-02-10 PROCEDURE — 82565 ASSAY OF CREATININE: CPT

## 2022-02-10 PROCEDURE — 87635 SARS-COV-2 COVID-19 AMP PRB: CPT

## 2022-02-10 PROCEDURE — 83735 ASSAY OF MAGNESIUM: CPT

## 2022-02-10 PROCEDURE — 85025 COMPLETE CBC W/AUTO DIFF WBC: CPT

## 2022-02-10 PROCEDURE — 1210000000 HC MED SURG R&B

## 2022-02-10 PROCEDURE — 82550 ASSAY OF CK (CPK): CPT

## 2022-02-10 RX ORDER — ACETAMINOPHEN 325 MG/1
650 TABLET ORAL EVERY 6 HOURS PRN
Status: DISCONTINUED | OUTPATIENT
Start: 2022-02-10 | End: 2022-02-11 | Stop reason: HOSPADM

## 2022-02-10 RX ORDER — 0.9 % SODIUM CHLORIDE 0.9 %
1000 INTRAVENOUS SOLUTION INTRAVENOUS ONCE
Status: COMPLETED | OUTPATIENT
Start: 2022-02-10 | End: 2022-02-10

## 2022-02-10 RX ORDER — DEXTROSE MONOHYDRATE 25 G/50ML
25 INJECTION, SOLUTION INTRAVENOUS ONCE
Status: DISCONTINUED | OUTPATIENT
Start: 2022-02-10 | End: 2022-02-10 | Stop reason: RX

## 2022-02-10 RX ORDER — POTASSIUM CHLORIDE 7.45 MG/ML
10 INJECTION INTRAVENOUS
Status: DISCONTINUED | OUTPATIENT
Start: 2022-02-11 | End: 2022-02-11

## 2022-02-10 RX ORDER — SODIUM CHLORIDE 9 MG/ML
25 INJECTION, SOLUTION INTRAVENOUS PRN
Status: DISCONTINUED | OUTPATIENT
Start: 2022-02-10 | End: 2022-02-11 | Stop reason: HOSPADM

## 2022-02-10 RX ORDER — SODIUM CHLORIDE 0.9 % (FLUSH) 0.9 %
5-40 SYRINGE (ML) INJECTION PRN
Status: DISCONTINUED | OUTPATIENT
Start: 2022-02-10 | End: 2022-02-11 | Stop reason: HOSPADM

## 2022-02-10 RX ORDER — ACETAMINOPHEN 650 MG/1
650 SUPPOSITORY RECTAL EVERY 6 HOURS PRN
Status: DISCONTINUED | OUTPATIENT
Start: 2022-02-10 | End: 2022-02-11 | Stop reason: HOSPADM

## 2022-02-10 RX ORDER — SODIUM CHLORIDE 0.9 % (FLUSH) 0.9 %
5-40 SYRINGE (ML) INJECTION EVERY 12 HOURS SCHEDULED
Status: DISCONTINUED | OUTPATIENT
Start: 2022-02-10 | End: 2022-02-11 | Stop reason: HOSPADM

## 2022-02-10 RX ORDER — DEXTROSE MONOHYDRATE 25 G/50ML
12.5 INJECTION, SOLUTION INTRAVENOUS PRN
Status: DISCONTINUED | OUTPATIENT
Start: 2022-02-10 | End: 2022-02-10 | Stop reason: RX

## 2022-02-10 RX ORDER — SODIUM CHLORIDE 9 MG/ML
INJECTION, SOLUTION INTRAVENOUS CONTINUOUS
Status: DISCONTINUED | OUTPATIENT
Start: 2022-02-10 | End: 2022-02-11 | Stop reason: HOSPADM

## 2022-02-10 RX ORDER — POLYETHYLENE GLYCOL 3350 17 G/17G
17 POWDER, FOR SOLUTION ORAL DAILY PRN
Status: DISCONTINUED | OUTPATIENT
Start: 2022-02-10 | End: 2022-02-11 | Stop reason: HOSPADM

## 2022-02-10 RX ORDER — ONDANSETRON 2 MG/ML
4 INJECTION INTRAMUSCULAR; INTRAVENOUS EVERY 6 HOURS PRN
Status: DISCONTINUED | OUTPATIENT
Start: 2022-02-10 | End: 2022-02-11 | Stop reason: HOSPADM

## 2022-02-10 RX ORDER — DEXTROSE MONOHYDRATE 50 MG/ML
100 INJECTION, SOLUTION INTRAVENOUS PRN
Status: DISCONTINUED | OUTPATIENT
Start: 2022-02-10 | End: 2022-02-11 | Stop reason: HOSPADM

## 2022-02-10 RX ORDER — NICOTINE POLACRILEX 4 MG
15 LOZENGE BUCCAL PRN
Status: DISCONTINUED | OUTPATIENT
Start: 2022-02-10 | End: 2022-02-11 | Stop reason: HOSPADM

## 2022-02-10 RX ORDER — ONDANSETRON 4 MG/1
4 TABLET, ORALLY DISINTEGRATING ORAL EVERY 8 HOURS PRN
Status: DISCONTINUED | OUTPATIENT
Start: 2022-02-10 | End: 2022-02-11 | Stop reason: HOSPADM

## 2022-02-10 RX ADMIN — DEXTROSE MONOHYDRATE 250 ML: 100 INJECTION, SOLUTION INTRAVENOUS at 22:45

## 2022-02-10 RX ADMIN — SODIUM CHLORIDE 1000 ML: 9 INJECTION, SOLUTION INTRAVENOUS at 20:24

## 2022-02-10 NOTE — ED NOTES
Bed: 19  Expected date: 2/10/22  Expected time:   Means of arrival:   Comments:  72year old male from [facility] with changed to ekg today. Alert oriented has nielsen catheter paraplegic    Above pt placed in another room. 38 y/o F placed.       April Mari Bob RN  02/10/22 [address] [place] [address] Road, RN  02/10/22 2013

## 2022-02-10 NOTE — ED TRIAGE NOTES
Pt to the ED via [address] from home. Pt was messaging  stating that she was going to kill herself. Pt found after multiple attempts of PD to access her house on the couch slumped over but breathing with bottle of liquor and bottle of Clonopin. Pt given 12 mg Narcan as an attempt to revive her but was unsuccessful. Pt still breathing spontaneously with nasal trumpet in place with 97% SpO2. Pt does respond to pain by withdrawal but otherwise unresponsive.

## 2022-02-11 ENCOUNTER — HOSPITAL ENCOUNTER (INPATIENT)
Age: 42
LOS: 5 days | Discharge: HOME | DRG: 753 | End: 2022-02-16
Attending: PSYCHIATRY & NEUROLOGY | Admitting: PSYCHIATRY & NEUROLOGY
Payer: MEDICAID

## 2022-02-11 VITALS
RESPIRATION RATE: 16 BRPM | SYSTOLIC BLOOD PRESSURE: 93 MMHG | HEART RATE: 84 BPM | DIASTOLIC BLOOD PRESSURE: 57 MMHG | TEMPERATURE: 97.9 F | OXYGEN SATURATION: 97 %

## 2022-02-11 PROBLEM — F31.9 BIPOLAR DEPRESSION (HCC): Status: ACTIVE | Noted: 2022-02-11

## 2022-02-11 LAB
ALBUMIN SERPL-MCNC: 3.6 G/DL (ref 3.5–4.6)
ALP BLD-CCNC: 59 U/L (ref 40–130)
ALT SERPL-CCNC: 10 U/L (ref 0–33)
ANION GAP SERPL CALCULATED.3IONS-SCNC: 10 MEQ/L (ref 9–15)
ANION GAP SERPL CALCULATED.3IONS-SCNC: 11 MEQ/L (ref 9–15)
AST SERPL-CCNC: 11 U/L (ref 0–35)
BASE EXCESS ARTERIAL: -4 (ref -3–3)
BASOPHILS ABSOLUTE: 0.1 K/UL (ref 0–0.2)
BASOPHILS RELATIVE PERCENT: 0.7 %
BILIRUB SERPL-MCNC: 0.4 MG/DL (ref 0.2–0.7)
BUN BLDV-MCNC: 13 MG/DL (ref 6–20)
BUN BLDV-MCNC: 14 MG/DL (ref 6–20)
CALCIUM IONIZED: 1.19 MMOL/L (ref 1.12–1.32)
CALCIUM SERPL-MCNC: 8.1 MG/DL (ref 8.5–9.9)
CALCIUM SERPL-MCNC: 8.3 MG/DL (ref 8.5–9.9)
CHLORIDE BLD-SCNC: 110 MEQ/L (ref 95–107)
CHLORIDE BLD-SCNC: 112 MEQ/L (ref 95–107)
CHOLESTEROL, TOTAL: 143 MG/DL (ref 0–199)
CO2: 20 MEQ/L (ref 20–31)
CO2: 22 MEQ/L (ref 20–31)
CREAT SERPL-MCNC: 0.77 MG/DL (ref 0.5–0.9)
CREAT SERPL-MCNC: 0.83 MG/DL (ref 0.5–0.9)
EKG ATRIAL RATE: 79 BPM
EKG ATRIAL RATE: 82 BPM
EKG P AXIS: 54 DEGREES
EKG P AXIS: 62 DEGREES
EKG P-R INTERVAL: 176 MS
EKG P-R INTERVAL: 176 MS
EKG Q-T INTERVAL: 402 MS
EKG Q-T INTERVAL: 422 MS
EKG QRS DURATION: 90 MS
EKG QRS DURATION: 98 MS
EKG QTC CALCULATION (BAZETT): 469 MS
EKG QTC CALCULATION (BAZETT): 483 MS
EKG R AXIS: 40 DEGREES
EKG R AXIS: 73 DEGREES
EKG T AXIS: 22 DEGREES
EKG T AXIS: 57 DEGREES
EKG VENTRICULAR RATE: 79 BPM
EKG VENTRICULAR RATE: 82 BPM
EOSINOPHILS ABSOLUTE: 0.1 K/UL (ref 0–0.7)
EOSINOPHILS RELATIVE PERCENT: 1.5 %
GFR AFRICAN AMERICAN: >60
GFR NON-AFRICAN AMERICAN: >60
GLOBULIN: 2 G/DL (ref 2.3–3.5)
GLUCOSE BLD-MCNC: 110 MG/DL (ref 70–99)
GLUCOSE BLD-MCNC: 116 MG/DL (ref 70–99)
GLUCOSE BLD-MCNC: 151 MG/DL (ref 70–99)
GLUCOSE BLD-MCNC: 170 MG/DL (ref 70–99)
GLUCOSE BLD-MCNC: 85 MG/DL (ref 70–99)
GLUCOSE BLD-MCNC: 89 MG/DL (ref 70–99)
HCO3 ARTERIAL: 21 MMOL/L (ref 21–29)
HCT VFR BLD CALC: 39.7 % (ref 37–47)
HDLC SERPL-MCNC: 36 MG/DL (ref 40–59)
HEMOGLOBIN: 12 GM/DL (ref 12–16)
HEMOGLOBIN: 13.4 G/DL (ref 12–16)
LACTATE: 0.88 MMOL/L (ref 0.4–2)
LACTIC ACID: 2.5 MMOL/L (ref 0.5–2.2)
LDL CHOLESTEROL CALCULATED: 87 MG/DL (ref 0–129)
LYMPHOCYTES ABSOLUTE: 2.5 K/UL (ref 1–4.8)
LYMPHOCYTES RELATIVE PERCENT: 29.6 %
MAGNESIUM: 1.8 MG/DL (ref 1.7–2.4)
MCH RBC QN AUTO: 29.5 PG (ref 27–31.3)
MCHC RBC AUTO-ENTMCNC: 33.7 % (ref 33–37)
MCV RBC AUTO: 87.6 FL (ref 82–100)
MONOCYTES ABSOLUTE: 0.6 K/UL (ref 0.2–0.8)
MONOCYTES RELATIVE PERCENT: 7.6 %
NEUTROPHILS ABSOLUTE: 5.2 K/UL (ref 1.4–6.5)
NEUTROPHILS RELATIVE PERCENT: 60.6 %
O2 SAT, ARTERIAL: 94 % (ref 93–100)
PCO2 ARTERIAL: 35 MM HG (ref 35–45)
PDW BLD-RTO: 13.6 % (ref 11.5–14.5)
PERFORMED ON: ABNORMAL
PERFORMED ON: NORMAL
PH ARTERIAL: 7.39 (ref 7.35–7.45)
PLATELET # BLD: 181 K/UL (ref 130–400)
PO2 ARTERIAL: 70 MM HG (ref 75–108)
POC CHLORIDE: 107 MEQ/L (ref 99–110)
POC CREATININE: 0.8 MG/DL (ref 0.6–1.2)
POC FIO2: 21
POC HEMATOCRIT: 35 % (ref 36–48)
POC POTASSIUM: 3.2 MEQ/L (ref 3.5–5.1)
POC SAMPLE TYPE: ABNORMAL
POC SODIUM: 140 MEQ/L (ref 136–145)
POTASSIUM SERPL-SCNC: 3.1 MEQ/L (ref 3.4–4.9)
POTASSIUM SERPL-SCNC: 3.2 MEQ/L (ref 3.4–4.9)
RBC # BLD: 4.53 M/UL (ref 4.2–5.4)
SODIUM BLD-SCNC: 142 MEQ/L (ref 135–144)
SODIUM BLD-SCNC: 143 MEQ/L (ref 135–144)
TCO2 ARTERIAL: 22 MMOL/L (ref 21–32)
TOTAL PROTEIN: 5.6 G/DL (ref 6.3–8)
TRIGL SERPL-MCNC: 98 MG/DL (ref 0–150)
WBC # BLD: 8.5 K/UL (ref 4.8–10.8)

## 2022-02-11 PROCEDURE — 99222 1ST HOSP IP/OBS MODERATE 55: CPT | Performed by: PSYCHIATRY & NEUROLOGY

## 2022-02-11 PROCEDURE — 83735 ASSAY OF MAGNESIUM: CPT

## 2022-02-11 PROCEDURE — 82803 BLOOD GASES ANY COMBINATION: CPT

## 2022-02-11 PROCEDURE — 93010 ELECTROCARDIOGRAM REPORT: CPT | Performed by: INTERNAL MEDICINE

## 2022-02-11 PROCEDURE — 2580000003 HC RX 258: Performed by: NURSE PRACTITIONER

## 2022-02-11 PROCEDURE — 80061 LIPID PANEL: CPT

## 2022-02-11 PROCEDURE — 84295 ASSAY OF SERUM SODIUM: CPT

## 2022-02-11 PROCEDURE — 6370000000 HC RX 637 (ALT 250 FOR IP): Performed by: INTERNAL MEDICINE

## 2022-02-11 PROCEDURE — 82565 ASSAY OF CREATININE: CPT

## 2022-02-11 PROCEDURE — 36600 WITHDRAWAL OF ARTERIAL BLOOD: CPT

## 2022-02-11 PROCEDURE — 82948 REAGENT STRIP/BLOOD GLUCOSE: CPT

## 2022-02-11 PROCEDURE — 1240000000 HC EMOTIONAL WELLNESS R&B

## 2022-02-11 PROCEDURE — 84132 ASSAY OF SERUM POTASSIUM: CPT

## 2022-02-11 PROCEDURE — 80053 COMPREHEN METABOLIC PANEL: CPT

## 2022-02-11 PROCEDURE — 85014 HEMATOCRIT: CPT

## 2022-02-11 PROCEDURE — 36415 COLL VENOUS BLD VENIPUNCTURE: CPT

## 2022-02-11 PROCEDURE — 85025 COMPLETE CBC W/AUTO DIFF WBC: CPT

## 2022-02-11 PROCEDURE — 82330 ASSAY OF CALCIUM: CPT

## 2022-02-11 PROCEDURE — 83605 ASSAY OF LACTIC ACID: CPT

## 2022-02-11 PROCEDURE — 82435 ASSAY OF BLOOD CHLORIDE: CPT

## 2022-02-11 PROCEDURE — 6360000002 HC RX W HCPCS: Performed by: NURSE PRACTITIONER

## 2022-02-11 RX ORDER — HYDROXYZINE PAMOATE 50 MG/1
50 CAPSULE ORAL EVERY 6 HOURS PRN
Status: DISCONTINUED | OUTPATIENT
Start: 2022-02-11 | End: 2022-02-16 | Stop reason: HOSPADM

## 2022-02-11 RX ORDER — ONDANSETRON 4 MG/1
4 TABLET, ORALLY DISINTEGRATING ORAL EVERY 8 HOURS PRN
Status: DISCONTINUED | OUTPATIENT
Start: 2022-02-11 | End: 2022-02-16 | Stop reason: HOSPADM

## 2022-02-11 RX ORDER — HALOPERIDOL 5 MG/ML
5 INJECTION INTRAMUSCULAR EVERY 6 HOURS PRN
Status: DISCONTINUED | OUTPATIENT
Start: 2022-02-11 | End: 2022-02-16 | Stop reason: HOSPADM

## 2022-02-11 RX ORDER — ACETAMINOPHEN 325 MG/1
650 TABLET ORAL EVERY 6 HOURS PRN
Status: CANCELLED | OUTPATIENT
Start: 2022-02-11

## 2022-02-11 RX ORDER — HYDROXYZINE HYDROCHLORIDE 50 MG/ML
50 INJECTION, SOLUTION INTRAMUSCULAR EVERY 6 HOURS PRN
Status: DISCONTINUED | OUTPATIENT
Start: 2022-02-11 | End: 2022-02-16 | Stop reason: HOSPADM

## 2022-02-11 RX ORDER — SODIUM CHLORIDE 9 MG/ML
INJECTION, SOLUTION INTRAVENOUS CONTINUOUS
Status: DISCONTINUED | OUTPATIENT
Start: 2022-02-11 | End: 2022-02-11

## 2022-02-11 RX ORDER — DEXTROSE MONOHYDRATE 50 MG/ML
100 INJECTION, SOLUTION INTRAVENOUS PRN
Status: DISCONTINUED | OUTPATIENT
Start: 2022-02-11 | End: 2022-02-13

## 2022-02-11 RX ORDER — POTASSIUM CHLORIDE 7.45 MG/ML
10 INJECTION INTRAVENOUS PRN
Status: DISCONTINUED | OUTPATIENT
Start: 2022-02-11 | End: 2022-02-11 | Stop reason: HOSPADM

## 2022-02-11 RX ORDER — ACETAMINOPHEN 650 MG/1
650 SUPPOSITORY RECTAL EVERY 6 HOURS PRN
Status: CANCELLED | OUTPATIENT
Start: 2022-02-11

## 2022-02-11 RX ORDER — SODIUM CHLORIDE 0.9 % (FLUSH) 0.9 %
5-40 SYRINGE (ML) INJECTION EVERY 12 HOURS SCHEDULED
Status: DISCONTINUED | OUTPATIENT
Start: 2022-02-11 | End: 2022-02-11

## 2022-02-11 RX ORDER — ONDANSETRON 4 MG/1
4 TABLET, ORALLY DISINTEGRATING ORAL EVERY 8 HOURS PRN
Status: CANCELLED | OUTPATIENT
Start: 2022-02-11

## 2022-02-11 RX ORDER — HALOPERIDOL 5 MG
5 TABLET ORAL EVERY 6 HOURS PRN
Status: CANCELLED | OUTPATIENT
Start: 2022-02-11

## 2022-02-11 RX ORDER — HYDROXYZINE PAMOATE 25 MG/1
50 CAPSULE ORAL EVERY 6 HOURS PRN
Status: CANCELLED | OUTPATIENT
Start: 2022-02-11

## 2022-02-11 RX ORDER — ACETAMINOPHEN 325 MG/1
650 TABLET ORAL EVERY 6 HOURS PRN
Status: DISCONTINUED | OUTPATIENT
Start: 2022-02-11 | End: 2022-02-16 | Stop reason: HOSPADM

## 2022-02-11 RX ORDER — POLYETHYLENE GLYCOL 3350 17 G/17G
17 POWDER, FOR SOLUTION ORAL DAILY PRN
Status: DISCONTINUED | OUTPATIENT
Start: 2022-02-11 | End: 2022-02-16 | Stop reason: HOSPADM

## 2022-02-11 RX ORDER — NICOTINE POLACRILEX 4 MG
15 LOZENGE BUCCAL PRN
Status: DISCONTINUED | OUTPATIENT
Start: 2022-02-11 | End: 2022-02-13

## 2022-02-11 RX ORDER — POTASSIUM CHLORIDE 20 MEQ/1
40 TABLET, EXTENDED RELEASE ORAL PRN
Status: DISCONTINUED | OUTPATIENT
Start: 2022-02-11 | End: 2022-02-13

## 2022-02-11 RX ORDER — POTASSIUM CHLORIDE 7.45 MG/ML
10 INJECTION INTRAVENOUS PRN
Status: DISCONTINUED | OUTPATIENT
Start: 2022-02-11 | End: 2022-02-13

## 2022-02-11 RX ORDER — HYDROXYZINE HYDROCHLORIDE 50 MG/ML
50 INJECTION, SOLUTION INTRAMUSCULAR EVERY 6 HOURS PRN
Status: CANCELLED | OUTPATIENT
Start: 2022-02-11

## 2022-02-11 RX ORDER — SODIUM CHLORIDE 9 MG/ML
25 INJECTION, SOLUTION INTRAVENOUS PRN
Status: CANCELLED | OUTPATIENT
Start: 2022-02-11

## 2022-02-11 RX ORDER — SODIUM CHLORIDE 0.9 % (FLUSH) 0.9 %
5-40 SYRINGE (ML) INJECTION EVERY 12 HOURS SCHEDULED
Status: CANCELLED | OUTPATIENT
Start: 2022-02-11

## 2022-02-11 RX ORDER — MAGNESIUM HYDROXIDE/ALUMINUM HYDROXICE/SIMETHICONE 120; 1200; 1200 MG/30ML; MG/30ML; MG/30ML
30 SUSPENSION ORAL PRN
Status: CANCELLED | OUTPATIENT
Start: 2022-02-11

## 2022-02-11 RX ORDER — ONDANSETRON 2 MG/ML
4 INJECTION INTRAMUSCULAR; INTRAVENOUS EVERY 6 HOURS PRN
Status: CANCELLED | OUTPATIENT
Start: 2022-02-11

## 2022-02-11 RX ORDER — POTASSIUM CHLORIDE 20 MEQ/1
40 TABLET, EXTENDED RELEASE ORAL PRN
Status: CANCELLED | OUTPATIENT
Start: 2022-02-11

## 2022-02-11 RX ORDER — POLYETHYLENE GLYCOL 3350 17 G/17G
17 POWDER, FOR SOLUTION ORAL DAILY PRN
Status: CANCELLED | OUTPATIENT
Start: 2022-02-11

## 2022-02-11 RX ORDER — ACETAMINOPHEN 650 MG/1
650 SUPPOSITORY RECTAL EVERY 6 HOURS PRN
Status: DISCONTINUED | OUTPATIENT
Start: 2022-02-11 | End: 2022-02-16 | Stop reason: HOSPADM

## 2022-02-11 RX ORDER — HALOPERIDOL 5 MG/ML
5 INJECTION INTRAMUSCULAR EVERY 6 HOURS PRN
Status: CANCELLED | OUTPATIENT
Start: 2022-02-11

## 2022-02-11 RX ORDER — SODIUM CHLORIDE 9 MG/ML
25 INJECTION, SOLUTION INTRAVENOUS PRN
Status: DISCONTINUED | OUTPATIENT
Start: 2022-02-11 | End: 2022-02-11

## 2022-02-11 RX ORDER — POTASSIUM CHLORIDE 7.45 MG/ML
10 INJECTION INTRAVENOUS PRN
Status: CANCELLED | OUTPATIENT
Start: 2022-02-11

## 2022-02-11 RX ORDER — SODIUM CHLORIDE 0.9 % (FLUSH) 0.9 %
5-40 SYRINGE (ML) INJECTION PRN
Status: DISCONTINUED | OUTPATIENT
Start: 2022-02-11 | End: 2022-02-11

## 2022-02-11 RX ORDER — ONDANSETRON 2 MG/ML
4 INJECTION INTRAMUSCULAR; INTRAVENOUS EVERY 6 HOURS PRN
Status: DISCONTINUED | OUTPATIENT
Start: 2022-02-11 | End: 2022-02-13

## 2022-02-11 RX ORDER — NICOTINE POLACRILEX 4 MG
15 LOZENGE BUCCAL PRN
Status: CANCELLED | OUTPATIENT
Start: 2022-02-11

## 2022-02-11 RX ORDER — SODIUM CHLORIDE 0.9 % (FLUSH) 0.9 %
5-40 SYRINGE (ML) INJECTION PRN
Status: CANCELLED | OUTPATIENT
Start: 2022-02-11

## 2022-02-11 RX ORDER — BENZTROPINE MESYLATE 1 MG/ML
2 INJECTION INTRAMUSCULAR; INTRAVENOUS 2 TIMES DAILY PRN
Status: CANCELLED | OUTPATIENT
Start: 2022-02-11

## 2022-02-11 RX ORDER — MAGNESIUM HYDROXIDE/ALUMINUM HYDROXICE/SIMETHICONE 120; 1200; 1200 MG/30ML; MG/30ML; MG/30ML
30 SUSPENSION ORAL PRN
Status: DISCONTINUED | OUTPATIENT
Start: 2022-02-11 | End: 2022-02-16 | Stop reason: HOSPADM

## 2022-02-11 RX ORDER — SODIUM CHLORIDE 9 MG/ML
INJECTION, SOLUTION INTRAVENOUS CONTINUOUS
Status: CANCELLED | OUTPATIENT
Start: 2022-02-11

## 2022-02-11 RX ORDER — DEXTROSE MONOHYDRATE 50 MG/ML
100 INJECTION, SOLUTION INTRAVENOUS PRN
Status: CANCELLED | OUTPATIENT
Start: 2022-02-11

## 2022-02-11 RX ORDER — HALOPERIDOL 5 MG
5 TABLET ORAL EVERY 6 HOURS PRN
Status: DISCONTINUED | OUTPATIENT
Start: 2022-02-11 | End: 2022-02-16 | Stop reason: HOSPADM

## 2022-02-11 RX ORDER — POTASSIUM CHLORIDE 20 MEQ/1
40 TABLET, EXTENDED RELEASE ORAL PRN
Status: DISCONTINUED | OUTPATIENT
Start: 2022-02-11 | End: 2022-02-11 | Stop reason: HOSPADM

## 2022-02-11 RX ORDER — BENZTROPINE MESYLATE 1 MG/ML
2 INJECTION INTRAMUSCULAR; INTRAVENOUS 2 TIMES DAILY PRN
Status: DISCONTINUED | OUTPATIENT
Start: 2022-02-11 | End: 2022-02-16 | Stop reason: HOSPADM

## 2022-02-11 RX ADMIN — POTASSIUM CHLORIDE 40 MEQ: 1500 TABLET, EXTENDED RELEASE ORAL at 13:58

## 2022-02-11 RX ADMIN — POTASSIUM CHLORIDE 10 MEQ: 7.46 INJECTION, SOLUTION INTRAVENOUS at 02:17

## 2022-02-11 RX ADMIN — NICOTINE POLACRILEX 2 MG: 2 GUM, CHEWING BUCCAL at 13:58

## 2022-02-11 RX ADMIN — NICOTINE POLACRILEX 2 MG: 2 GUM, CHEWING BUCCAL at 18:01

## 2022-02-11 RX ADMIN — SODIUM CHLORIDE: 9 INJECTION, SOLUTION INTRAVENOUS at 01:36

## 2022-02-11 ASSESSMENT — SLEEP AND FATIGUE QUESTIONNAIRES
DO YOU USE A SLEEP AID: YES
DIFFICULTY STAYING ASLEEP: NO
DIFFICULTY FALLING ASLEEP: NO
RESTFUL SLEEP: NO
DO YOU HAVE DIFFICULTY SLEEPING: COMMENT
DIFFICULTY ARISING: NO
SLEEP PATTERN: DIFFICULTY FALLING ASLEEP;RESTLESSNESS
AVERAGE NUMBER OF SLEEP HOURS: 7

## 2022-02-11 ASSESSMENT — PATIENT HEALTH QUESTIONNAIRE - PHQ9: SUM OF ALL RESPONSES TO PHQ QUESTIONS 1-9: 9

## 2022-02-11 NOTE — ED PROVIDER NOTES
[address] [facility] ED  eMERGENCY dEPARTMENT eNCOUnter      Pt Name: Jerrod Merida  MRN: [id number]  [place] 1980  Date of evaluation: 2/10/2022  Provider: Ari Gee PA-C    CHIEF COMPLAINT       Chief Complaint   Patient presents with    Suicide Attempt    Drug Overdose     benzodiazapines         HISTORY OF PRESENT ILLNESS   (Location/Symptom, Timing/Onset,Context/Setting, Quality, Duration, Modifying Factors, Severity)  Note limiting factors. Jerrod Merida is a 39 y.o. female who presents to the emergency department patient brought for overdose empty bottle of clonazepam was found at scene by EMS patient's been making comments of harming herself throughout the day per EMS report welfare check this morning by police patient was conversant patient responds to pain nasal trumpet in place EMS notes alcohol as well they note patient was found lying recumbent on couch not on floor multiple pill bottles spread around patient. Last known well time was approximately 320 per family that arrives later he also notes that they opened up the door to allow EMS and PD in room 4:20 PM.  Family provides photos multiple pill bottles in room with patient 2/3 of a bottle of tequila has been consumed by patient they took pictures about noted that she did not have it this morning a state that her  has been in a nursing home and patient was upset about this. They note that she does have a history of behavioral health issues. Symptoms are moderate to severe in severity nothing in proves or worsens her symptoms. Was noted to have low glucose in route given glucose. HPI    NursingNotes were reviewed. REVIEW OF SYSTEMS    (2-9 systems for level 4, 10 or more for level 5)     Review of Systems   Unable to perform ROS: Patient unresponsive   Psychiatric/Behavioral: Positive for suicidal ideas. Except as noted above the remainder of the review of systems was reviewed and negative.        PAST MEDICAL HISTORY     Past Medical History:   Diagnosis Date    ADD (attention deficit disorder) 2021    [facility]     Depression     DM (diabetes mellitus) ([facility]     History of drug abuse ([facility]     positive drug screens 9/22/14 and 5/18/14    Hyperlipidemia     Osteoarthritis          SURGICALHISTORY       Past Surgical History:   Procedure Laterality Date    CHOLECYSTECTOMY, LAPAROSCOPIC N/A 2/28/2017    CHOLECYSTECTOMY LAPAROSCOPIC WITH GRAMS POSS OPEN , RECENT LABS  performed by Mary Hancock MD at [facility] SCRN NOT  [facility] IND N/A 11/27/2018    COLONOSCOPY ROOM 384 performed by Milton Alston MD at [facility] 6/16/2021    I&D LEFT  PERIRECTAL  ABSCESS performed by Theresa Vyas MD at [address] [place] [address]       Previous Medications    ALBUTEROL SULFATE  (90 BASE) MCG/ACT INHALER    INHALE 1 TO 2 PUFFS BY MOUTH EVERY 4 TO 6 HOURS AS NEEDED    BLOOD GLUCOSE MONITOR KIT AND SUPPLIES    1 kit by Other route 4 times daily (before meals and nightly) Pt test 4x daily Dx E11.65. May substitute for generic or insurance covered product    BLOOD GLUCOSE MONITOR KIT AND SUPPLIES    Dispense sufficient amount for indicated testing frequency plus additional to accommodate PRN testing needs. Dispense all needed supplies to include: monitor, strips, lancing device, lancets, control solutions, alcohol swabs. BLOOD GLUCOSE MONITOR STRIPS    1 strip by Other route 4 times daily (before meals and nightly) Pt test 4x daily Dx E11.65. May substitute for generic or insurance covered product    BLOOD GLUCOSE TEST STRIPS (FREESTYLE LITE) STRIP    1 each by In Vitro route 4 times daily As needed. BREXPIPRAZOLE (REXULTI) 2 MG TABS TABLET    Take 1 tablet by mouth daily    CLONAZEPAM (KLONOPIN) 1 MG TABLET    Take 1 mg by mouth 2 times daily as needed.     CONTINUOUS BLOOD GLUC  (FREESTYLE [place] 14 DAY READER) KATARINA    1 Device by Does not apply route 4 times daily (before meals and nightly)    CONTINUOUS BLOOD GLUC SENSOR (FREESTYLE [place] 14 DAY SENSOR) MISC    2 Devices by Does not apply route every 14 days    CONTINUOUS BLOOD GLUC SENSOR (FREESTYLE [place] 14 DAY SENSOR) MISC    Every 2 weeks    D3-1000 25 MCG (1000 UT) TABS TABLET    Take 3 tablets by mouth daily     FENOFIBRATE (TRICOR) 54 MG TABLET    Take 1 tablet by mouth daily [facility]: Please dispense generic fenofibrate unless prescriber denote    FLUCONAZOLE (DIFLUCAN) 150 MG TABLET    TAKE 1 TABLET BY MOUTH ONCE FOR 1 DOSE AND REPEAT IN 1 WEEK    FLUVOXAMINE (LUVOX) 50 MG TABLET    Take 50 mg by mouth nightly    FREESTYLE LANCETS MISC    1 each by Does not apply route 4 times daily (before meals and nightly)    GLYCOPYRROLATE (ROBINUL) 1 MG TABLET    2 po tid    GNP MELATONIN 3 MG TABS TABLET    TAKE TWO TABLETS BY MOUTH EVERY NIGHT AT BEDTIME FOR SLEEP    INSULIN PEN NEEDLE (NOVOFINE) 32G X 6 MM MISC    1 Device by Does not apply route 4 times daily (before meals and nightly)    LANCETS MISC    Patient test 3x daily E65.11    OLANZAPINE (ZYPREXA) 7.5 MG TABLET    Take 7.5 mg by mouth nightly    OMEGA-3 ACID ETHYL ESTERS (LOVAZA) 1 G CAPSULE    Take 2 capsules by mouth 2 times daily    OMEGA-3 FATTY ACIDS (FISH OIL) 1200 MG CAPS    Take 1 capsule by mouth daily    OXCARBAZEPINE (TRILEPTAL) 300 MG TABLET    TAKE ONE TABLET BY MOUTH THREE TIMES DAILY    PRAZOSIN (MINIPRESS) 2 MG CAPSULE    Take 2 mg by mouth nightly    SIMVASTATIN (ZOCOR) 20 MG TABLET    Take 1 tablet by mouth every evening    TRULICITY 1.5 GJ/2.6DD SOPN    ADMINISTER 1.5 MG/0.5ML UNDER THE SKIN 1 TIME A WEEK            Latuda [lurasidone hcl], Lurasidone, Adhesive tape, Adipex-p [phentermine], and Metformin and related    FAMILY HISTORY       Family History   Problem Relation Age of Onset    Other Mother         CHF    Cirrhosis Brother     Other Maternal Grandmother [facility]          SOCIAL HISTORY       Social History     Socioeconomic History    Marital status: Single     Spouse name: None    Number of children: None    Years of education: None    Highest education level: None   Occupational History    None   Tobacco Use    Smoking status: Current Every Day Smoker     Packs/day: 1.00     Years: 24.00     Pack years: 24.00     Types: Cigarettes    Smokeless tobacco: Never Used   Vaping Use    Vaping Use: Some days    Substances: Nicotine, Flavoring    Devices: Disposable   Substance and Sexual Activity    Alcohol use: Not Currently     Comment: working on sobriety    Drug use: Not Currently    Sexual activity: Yes     Partners: Male   Other Topics Concern    None   Social History Narrative    None     Social Determinants of Health     Financial Resource Strain:     Difficulty of Paying Living Expenses: Not on file   Food Insecurity:     Worried About Running Out of Food in the Last Year: Not on file    Melia of Food in the Last Year: Not on file   Transportation Needs:     Lack of Transportation (Medical): Not on file    Lack of Transportation (Non-Medical):  Not on file   Physical Activity:     Days of Exercise per Week: Not on file    Minutes of Exercise per Session: Not on file   Stress:     Feeling of Stress : Not on file   Social Connections:     Frequency of Communication with Friends and Family: Not on file    Frequency of Social Gatherings with Friends and Family: Not on file    Attends Cheondoism Services: Not on file    Active Member of Clubs or Organizations: Not on file    Attends Club or Organization Meetings: Not on file    Marital Status: Not on file   Intimate Partner Violence:     Fear of Current or Ex-Partner: Not on file    Emotionally Abused: Not on file    Physically Abused: Not on file    Sexually Abused: Not on file   Housing Stability:     Unable to Pay for Housing in the Last Year: Not on file    Number of [place] in the Last Year: Not on file    Unstable Housing in the Last Year: Not on file       SCREENINGS   Rodolfo Coma Scale  Eye Opening: To pain  Best Verbal Response: None  Best Motor Response: Flexion to pain  Rodolfo Coma Scale Score: 6  Ebola Virus Disease (EVD) Screening   Temp: 97.4 °F (36.3 °C)  CIWA Assessment  BP: 94/64  Pulse: 72    PHYSICAL EXAM    (up to 7 for level 4, 8 or more for level 5)     ED Triage Vitals [02/10/22 1822]   BP Temp Temp Source Pulse Resp SpO2 Height Weight   106/82 97.4 °F (36.3 °C) Oral 75 21 99 % -- --       Physical Exam  Vitals and nursing note reviewed. Constitutional:       General: She is not in acute distress. Appearance: She is well-developed. HENT:      Head: Normocephalic and atraumatic. Right Ear: External ear normal.      Left Ear: External ear normal.      Nose: Nose normal.      Mouth/Throat:      Mouth: Mucous membranes are moist.   Eyes:      General:         Right eye: No discharge. Left eye: No discharge. Pupils: Pupils are equal, round, and reactive to light. Cardiovascular:      Rate and Rhythm: Normal rate and regular rhythm. Pulses: Normal pulses. Heart sounds: Normal heart sounds. Pulmonary:      Effort: Pulmonary effort is normal. No respiratory distress. Breath sounds: Normal breath sounds. No stridor. Abdominal:      General: Bowel sounds are normal. There is no distension. Palpations: Abdomen is soft. Musculoskeletal:         General: No deformity. Cervical back: Neck supple. Skin:     General: Skin is warm. Findings: No erythema. Neurological:      Mental Status: She is alert. Comments: Patient responds to pain per Dr. Deangelo Early     EKG: All EKG's are interpreted by the Emergency Department Physician who either signs or Co-signsthis chart in the absence of a cardiologist.    EKG normal sinus rhythm rate 82  ms QRs 90 ms QT 4 2 ms PRT axes positive.     RADIOLOGY: Non-plain filmimages such as CT, Ultrasound and MRI are read by the radiologist. Plain radiographic images are visualized and preliminarily interpreted by the emergency physician with the below findings:         Interpretation per the Radiologist below, if available at the time ofthis note:    CT Head WO Contrast   Final Result      There is no acute intracranial process. XR CHEST PORTABLE   Final Result      There is atelectasis versus infiltrate in the left lung base.                   ED BEDSIDE ULTRASOUND:   Performed by ED Physician - none    LABS:  Labs Reviewed   COMPREHENSIVE METABOLIC PANEL - Abnormal; Notable for the following components:       Result Value    Potassium 3.3 (*)     Anion Gap 17 (*)     Glucose 116 (*)     All other components within normal limits   URINE DRUG SCREEN - Abnormal; Notable for the following components:    Methadone Screen, Urine POSITIVE (*)     All other components within normal limits   ACETAMINOPHEN LEVEL - Abnormal; Notable for the following components:    Acetaminophen Level <5 (*)     All other components within normal limits   SALICYLATE LEVEL - Abnormal; Notable for the following components:    Salicylate, Serum <7.4 (*)     All other components within normal limits   TSH WITHOUT REFLEX - Abnormal; Notable for the following components:    TSH 4.140 (*)     All other components within normal limits   LIPID PANEL - Abnormal; Notable for the following components:    Cholesterol, Total 214 (*)     LDL Calculated 143 (*)     All other components within normal limits   POCT GLUCOSE - Abnormal; Notable for the following components:    POC Glucose 65 (*)     All other components within normal limits   POCT ARTERIAL - Abnormal; Notable for the following components:    POC Sodium 147 (*)     POC Potassium 2.9 (*)     POC Chloride 115 (*)     pH, Arterial 7.286 (*)     pO2, Arterial 73 (*)     HCO3, Arterial 19.1 (*)     Base Excess, Arterial -8 (*)     O2 Sat, Arterial 93 (*)     TCO2, Arterial 20 (*)     Lactate 2.34 (*)     All other components within normal limits   COVID-19, RAPID   ETHANOL   CBC WITH AUTO DIFFERENTIAL   MAGNESIUM   TROPONIN   URINE RT REFLEX TO CULTURE   CK   PREGNANCY, URINE   LIPID PANEL       All other labs were within normal range or not returned as of this dictation. EMERGENCY DEPARTMENT COURSE and DIFFERENTIAL DIAGNOSIS/MDM:   Vitals:    Vitals:    02/10/22 2000 02/10/22 2030 02/10/22 2048 02/10/22 2102   BP: 92/63 94/64     Pulse: 73 82 83 72   Resp: 15 16 15 13   Temp:       TempSrc:       SpO2: 97% 100% 100% 96%            MDM  Number of Diagnoses or Management Options     Amount and/or Complexity of Data Reviewed  Clinical lab tests: reviewed and ordered  Tests in the radiology section of CPT®: reviewed and ordered        CRITICAL CARE TIME   Total Critical Care time was 61  minutes, excluding separately reportableprocedures. There was a high probability of clinicallysignificant/life threatening deterioration in the patient's condition which required my urgent intervention. CONSULTS:  None    PROCEDURES:  Unless otherwise noted below, none     Procedures    FINAL IMPRESSION      1. Polysubstance abuse [facility])          DISPOSITION/PLAN   DISPOSITION Decision To Admit 02/10/2022 07:56:28 PM      PATIENT REFERRED TO:  No follow-up provider specified.     DISCHARGE MEDICATIONS:  New Prescriptions    No medications on file          (Please note that portions of this note were completed with a voice recognition program.  Efforts were made to edit the dictations but occasionally words are mis-transcribed.)    Saúl Toussaint PA-C (electronically signed)  Attending Emergency Physician       Saúl Toussaint PA-C  02/17/22 [id number]

## 2022-02-11 NOTE — H&P
[address] MEDICINE    HISTORY AND PHYSICAL EXAM    PATIENT NAME:  Anand Rawls    MRN:  [id number]  SERVICE DATE:  2/10/2022   SERVICE TIME:  11:21 PM    Primary Care Physician: Guy Mina DO         SUBJECTIVE  CHIEF COMPLAINT: Reported suicide attempt    HPI: Patient being admitted for suicide attempt. Patient is a 51-year-old  female with a history of depression. Patient had made claims of social media of ending her marriage and her life. This led to police making well check on the patient around 10 in the morning. Patient was found to be fine by their assessment at that time. Patient was later heard from around 3:20pm by her  over the phone.  is currently in a SNF unit and is being placed in a local nursing home.  called his mother who checked on patient and found her unresponsive with diminished respirations. Patient's mother-in-law called EMS. Patient was found to have, at her side, a 1 L bottle of alcohol 40 to prove that was about 75% empty. Patient also had multiple pill bottles surrounding her. At this point is unclear if patient took any of the pills. Patient did the a last will and testament that was handwritten by her at her side. Patient's mother-in-law brought all the pill bottles that were surrounding patient to the ED. These pill bottles included Klonopin, Wellbutrin, Topamax, Luvox, melatonin, paroxetine, prazosin, glycopyrrolate, many of the bottles were full and some of the older bottles were empty. It seemed apparent that patient had not been taking her prescriptions as directed over the past 6 months. Also, at the patient's side were her 's medications which included one old bottle of tramadol which was empty and one full bottle of tramadol which was filled in January, a bottle of Norco which was empty and filled in December, and Lamictal with multiple bottles some were full and somewhat empty.   The medications that had been prescribed the past several months were empty while most of the newer ones appeared to be full. Patient currently awakes to verbal and painful stimuli. Patient maintains her own airway. Patient obeys simple commands such as opening her eyes and moving her extremities. Patient asks for her IV to be taken out.     PAST MEDICAL HISTORY:    Past Medical History:   Diagnosis Date    ADD (attention deficit disorder) 2021    [facility]     Depression     DM (diabetes mellitus) ([facility]     [facility] of [facility]     positive drug screens 9/22/14 and 5/18/14    Hyperlipidemia     Osteoarthritis      PAST SURGICAL HISTORY:    Past Surgical History:   Procedure Laterality Date    CHOLECYSTECTOMY, LAPAROSCOPIC N/A 2/28/2017    CHOLECYSTECTOMY LAPAROSCOPIC WITH GRAMS POSS OPEN , RECENT LABS  performed by Sunshine Spencer MD at [address] [facility] NOT  [facility] N/A 11/27/2018    COLONOSCOPY ROOM 384 performed by Kenya Moore MD at [address] [facility] N/A 6/16/2021    I&D LEFT  PERIRECTAL  ABSCESS performed by Cindy Gresham MD at [facility] [address]:    Family History   Problem Relation Age of Onset    Other Mother         CHF    Cirrhosis Brother     Other Maternal Grandmother         CHF     SOCIAL HISTORY:    Social History     Socioeconomic History    Marital status: Single     Spouse name: Not on file    Number of children: Not on file    Years of education: Not on file    Highest education level: Not on file   Occupational History    Not on file   Tobacco Use    Smoking status: Current Every Day Smoker     Packs/day: 1.00     Years: 24.00     Pack years: 24.00     Types: Cigarettes    Smokeless tobacco: Never Used   Vaping Use    Vaping Use: Some days    Substances: Nicotine, Flavoring    Devices: Disposable   Substance and Sexual Activity    Alcohol use: Not Currently     Comment: working on sobriety   Petty Media use: Not Currently    Sexual activity: Yes     Partners: Male   Other Topics Concern    Not on file   Social History Narrative    Not on file     Social Determinants of Health     Financial Resource Strain:     Difficulty of Paying Living Expenses: Not on file   Food Insecurity:     Worried About Running Out of Food in the Last Year: Not on file    Melia of Food in the Last Year: Not on file   Transportation Needs:     Lack of Transportation (Medical): Not on file    Lack of Transportation (Non-Medical): Not on file   Physical Activity:     Days of Exercise per Week: Not on file    Minutes of Exercise per Session: Not on file   Stress:     Feeling of Stress : Not on file   Social Connections:     Frequency of Communication with Friends and Family: Not on file    Frequency of Social Gatherings with Friends and Family: Not on file    Attends [employer] Services: Not on file    Active Member of [address] [place] [address] [employer] or Organizations: Not on file    Attends Club or Organization Meetings: Not on file    Marital Status: Not on file   Intimate Partner Violence:     Fear of Current or Ex-Partner: Not on file    Emotionally Abused: Not on file    Physically Abused: Not on file    Sexually Abused: Not on file   Housing Stability:     Unable to Pay for Housing in the Last Year: Not on file    Number of [place] in the Last Year: Not on file    Unstable Housing in the Last Year: Not on file     MEDICATIONS:   Prior to Admission medications    Medication Sig Start Date End Date Taking?  Authorizing Provider   TRULICITY 1.5 LO/9.9ZN SOPN ADMINISTER 1.5 MG/0.5ML UNDER THE SKIN 1 TIME A WEEK 10/12/21  Yes Lesa Amaya MD   OLANZapine (ZYPREXA) 7.5 MG tablet Take 7.5 mg by mouth nightly   Yes Historical Provider, MD   prazosin (MINIPRESS) 2 MG capsule Take 2 mg by mouth nightly   Yes Historical Provider, MD   Omega-3 Fatty Acids (FISH OIL) 1200 MG CAPS Take 1 capsule by mouth daily   Yes Historical Provider, MD albuterol sulfate  (90 Base) MCG/ACT inhaler INHALE 1 TO 2 PUFFS BY MOUTH EVERY 4 TO 6 HOURS AS NEEDED 3/9/21  Yes Historical Provider, MD   D3-1000 25 MCG (1000 UT) TABS tablet Take 3 tablets by mouth daily  6/1/21  Yes Historical Provider, MD   fluconazole (DIFLUCAN) 150 MG tablet TAKE 1 TABLET BY MOUTH ONCE FOR 1 DOSE AND REPEAT IN 1 WEEK 6/13/21  Yes Historical Provider, MD   GNP MELATONIN 3 MG TABS tablet TAKE TWO TABLETS BY MOUTH EVERY NIGHT AT BEDTIME FOR SLEEP 6/1/21  Yes Historical Provider, MD   OXcarbazepine (TRILEPTAL) 300 MG tablet TAKE ONE TABLET BY MOUTH THREE TIMES DAILY 6/1/21  Yes Historical Provider, MD   clonazePAM (KLONOPIN) 1 MG tablet Take 1 mg by mouth 2 times daily as needed. Yes Historical Provider, MD   fluvoxaMINE (LUVOX) 50 MG tablet Take 50 mg by mouth nightly   Yes Historical Provider, MD   blood glucose monitor kit and supplies Dispense sufficient amount for indicated testing frequency plus additional to accommodate PRN testing needs. Dispense all needed supplies to include: monitor, strips, lancing device, lancets, control solutions, alcohol swabs. 2/1/21  Yes Maria D Neff MD   Lancets [facility] Patient test 3x daily E65.11 2/1/21  Yes Maria D Neff MD   glycopyrrolate (ROBINUL) 1 MG tablet 2 po tid  Patient taking differently: Take 1 mg by mouth daily Pt taking differently than prescribed c/o constipation 2/1/21  Yes Maria D Neff MD   blood glucose test strips (FREESTYLE LITE) strip 1 each by In Vitro route 4 times daily As needed.  1/4/21  Yes Maria D Neff MD   Continuous Blood Gluc  (FREESTYLE [place] 14 DAY READER) KATARINA 1 Device by Does not apply route 4 times daily (before meals and nightly) 1/4/21  Yes Maria D Neff MD   Continuous Blood Gluc Sensor (FREESTYLE [place] 14 DAY SENSOR) [facility] Every 2 weeks 1/4/21  Yes Maria D Neff MD   brexpiprazole (REXULTI) 2 MG TABS tablet Take 1 tablet by mouth daily 12/22/20  Yes Elvia Saravia MD   fenofibrate (TRICOR) 54 MG tablet Take 1 tablet by mouth daily s [facility] pharmacy: Please dispense generic fenofibrate unless prescriber denote 12/22/20  Yes NAOMI Lemon NP   omega-3 acid ethyl esters (LOVAZA) 1 g capsule Take 2 capsules by mouth 2 times daily 12/21/20  Yes NAOMI Lemon NP   Insulin Pen Needle (NOVOFINE) 32G X 6 MM MISC 1 Device by Does not apply route 4 times daily (before meals and nightly) 11/10/20  Yes FRANCO Obregon   blood glucose monitor kit and supplies 1 kit by Other route 4 times daily (before meals and nightly) Pt test 4x daily Dx E11.65. May substitute for generic or insurance covered product 11/10/20  Yes FRANCO Obregon   blood glucose monitor strips 1 strip by Other route 4 times daily (before meals and nightly) Pt test 4x daily Dx E11.65. May substitute for generic or insurance covered product 11/10/20  Yes FRANCO Obregon   FreeStyle Lancets MISC 1 each by Does not apply route 4 times daily (before meals and nightly) 11/10/20  Yes FRANCO Obregon   Continuous Blood Gluc Sensor (FREESTYLE [place] 14 DAY SENSOR) MISC 2 Devices by Does not apply route every 14 days 11/10/20  Yes FRANCO Obregon   simvastatin (ZOCOR) 20 MG tablet Take 1 tablet by mouth every evening 11/12/18  Yes Katelyn Akers DO       ALLERGIES: [address] [lurasidone hcl], Lurasidone, Adhesive tape, Adipex-p [phentermine], and Metformin and related    REVIEW OF SYSTEM:   Review of Systems   Unable to perform ROS: Acuity of condition         OBJECTIVE  PHYSICAL EXAM: BP 86/60   Pulse 72   Temp 97.4 °F (36.3 °C) (Oral)   Resp 17   SpO2 97%     Physical Exam  Vitals and nursing note reviewed. Constitutional:       General: She is not in acute distress. Appearance: She is well-developed. She is not ill-appearing or toxic-appearing. HENT:      Head: Normocephalic and atraumatic.       Nose: Nose normal.      Mouth/Throat:      Mouth: Mucous membranes are moist.   Eyes:      Pupils: Pupils are equal, round, and reactive to light. Cardiovascular:      Rate and Rhythm: Normal rate and regular rhythm. Pulses: Normal pulses. Heart sounds: Normal heart sounds. Pulmonary:      Effort: Pulmonary effort is normal. No respiratory distress. Breath sounds: Normal breath sounds. No wheezing, rhonchi or rales. Abdominal:      General: Bowel sounds are normal. There is no distension. Palpations: Abdomen is soft. There is no mass. Tenderness: There is no abdominal tenderness. There is no guarding or rebound. Hernia: No hernia is present. Musculoskeletal:         General: Normal range of motion. Cervical back: Normal range of motion. Right lower leg: No edema. Left lower leg: No edema. Skin:     General: Skin is warm and dry. Capillary Refill: Capillary refill takes less than 2 seconds. Neurological:      Mental Status: She is alert and oriented to person, place, and time. GCS: GCS eye subscore is 3. GCS verbal subscore is 4. GCS motor subscore is 4. Cranial Nerves: No facial asymmetry. Sensory: No sensory deficit. Motor: No tremor. Psychiatric:         Mood and Affect: Mood normal.         DATA:     Diagnostic tests reviewed for today's visit:    Most recent labs and imaging results reviewed.      LABS:    Recent Results (from the past 24 hour(s))   EKG 12 Lead - Chest Pain    Collection Time: 02/10/22  6:27 PM   Result Value Ref Range    Ventricular Rate 82 BPM    Atrial Rate 82 BPM    P-R Interval 176 ms    QRS Duration 90 ms    Q-T Interval 402 ms    QTc Calculation (Bazett) 469 ms    P Axis 54 degrees    R Axis 40 degrees    T Axis 57 degrees   Comprehensive Metabolic Panel    Collection Time: 02/10/22  6:30 PM   Result Value Ref Range    Sodium 143 135 - 144 mEq/L    Potassium 3.3 (L) 3.4 - 4.9 mEq/L    Chloride 106 95 - 107 mEq/L    CO2 20 20 - 31 mEq/L    Anion Gap 17 (H) 9 - 15 mEq/L    Glucose 116 (H) 70 - 99 mg/dL    BUN 14 6 - 20 mg/dL CREATININE 0.81 0.50 - 0.90 mg/dL    GFR Non-African American >60.0 >60    GFR  >60.0 >60    Calcium 9.0 8.5 - 9.9 mg/dL    Total Protein 7.4 6.3 - 8.0 g/dL    Albumin 4.6 3.5 - 4.6 g/dL    Total Bilirubin <0.2 0.2 - 0.7 mg/dL    Alkaline Phosphatase 74 40 - 130 U/L    ALT 12 0 - 33 U/L    AST 11 0 - 35 U/L    Globulin 2.8 2.3 - 3.5 g/dL   Urine Drug Screen    Collection Time: 02/10/22  6:30 PM   Result Value Ref Range    Amphetamine Screen, Urine Neg Negative <1000 ng/mL    Barbiturate Screen, Ur Neg Negative < 200 ng/mL    Benzodiazepine Screen, Urine Neg Negative < 200 ng/mL    Cannabinoid Scrn, Ur Neg Negative < 50 ng/mL    Cocaine Metabolite Screen, Urine Neg Negative < 300 ng/mL    Opiate Scrn, Ur Neg Negative < 300 ng/mL    PCP Screen, Urine Neg Negative < 25 ng/mL    Methadone Screen, Urine POSITIVE (A) Negative <300 ng/mL    Propoxyphene Scrn, Ur Neg Negative <300 ng/mL    Oxycodone Urine Neg Negative <100 ng/mL    Drug Screen Comment: see below    Ethanol    Collection Time: 02/10/22  6:30 PM   Result Value Ref Range    Ethanol Lvl 155 mg/dL    Ethanol percent 0.136 G/dL   CBC Auto Differential    Collection Time: 02/10/22  6:30 PM   Result Value Ref Range    WBC 8.2 4.8 - 10.8 K/uL    RBC 5.37 4.20 - 5.40 M/uL    Hemoglobin 15.6 12.0 - 16.0 g/dL    Hematocrit 46.9 37.0 - 47.0 %    MCV 87.4 82.0 - 100.0 fL    MCH 29.0 27.0 - 31.3 pg    MCHC 33.2 33.0 - 37.0 %    RDW 13.4 11.5 - 14.5 %    Platelets 768 832 - 113 K/uL    Neutrophils % 54.0 %    Lymphocytes % 38.6 %    Monocytes % 5.1 %    Eosinophils % 1.8 %    Basophils % 0.5 %    Neutrophils Absolute 4.4 1.4 - 6.5 K/uL    Lymphocytes Absolute 3.2 1.0 - 4.8 K/uL    Monocytes Absolute 0.4 0.2 - 0.8 K/uL    Eosinophils Absolute 0.1 0.0 - 0.7 K/uL    Basophils Absolute 0.0 0.0 - 0.2 K/uL   Magnesium    Collection Time: 02/10/22  6:30 PM   Result Value Ref Range    Magnesium 2.3 1.7 - 2.4 mg/dL   Troponin    Collection Time: 02/10/22  6:30 PM Result Value Ref Range    Troponin <0.010 0.000 - 0.010 ng/mL   Urine Reflex to Culture    Collection Time: 02/10/22  6:30 PM    Specimen: Urine, clean catch   Result Value Ref Range    Color, UA Yellow Straw/Yellow    Clarity, UA Clear Clear    Glucose, Ur Negative Negative mg/dL    Bilirubin Urine Negative Negative    Ketones, Urine Negative Negative mg/dL    Specific Gravity, UA 1.004 1.005 - 1.030    Blood, Urine Negative Negative    pH, UA 5.5 5.0 - 9.0    Protein, UA Negative Negative mg/dL    Urobilinogen, Urine 0.2 <2.0 E.U./dL    Nitrite, Urine Negative Negative    Leukocyte Esterase, Urine Negative Negative    Urine Reflex to Culture Not Indicated    Acetaminophen Level    Collection Time: 02/10/22  6:30 PM   Result Value Ref Range    Acetaminophen Level <5 (L) 10 - 30 ug/mL   Salicylate    Collection Time: 02/10/22  6:30 PM   Result Value Ref Range    Salicylate, Serum <7.4 (L) 15.0 - 30.0 mg/dL   TSH without Reflex    Collection Time: 02/10/22  6:30 PM   Result Value Ref Range    TSH 4.140 (H) 0.440 - 3.860 uIU/mL   CK    Collection Time: 02/10/22  6:30 PM   Result Value Ref Range    Total CK 29 0 - 170 U/L   Lipid Panel    Collection Time: 02/10/22  6:30 PM   Result Value Ref Range    Cholesterol, Total 214 (H) 0 - 199 mg/dL    Triglycerides 142 0 - 150 mg/dL    HDL 43 40 - 59 mg/dL    LDL Calculated 143 (H) 0 - 129 mg/dL   POCT Glucose    Collection Time: 02/10/22  6:35 PM   Result Value Ref Range    POC Glucose 65 (L) 70 - 99 mg/dl    Performed on ACCU-CHEK    COVID-19, Rapid    Collection Time: 02/10/22  6:51 PM    Specimen: Nasopharyngeal Swab   Result Value Ref Range    SARS-CoV-2, NAAT Not Detected Not Detected   POCT Arterial    Collection Time: 02/10/22  7:04 PM   Result Value Ref Range    POC Sodium 147 (H) 136 - 145 mEq/L    POC Potassium 2.9 (LL) 3.5 - 5.1 mEq/L    POC Chloride 115 (H) 99 - 110 mEq/L    POC Glucose 75 70 - 99 mg/dl    POC Creatinine 0.8 0.6 - 1.2 mg/dL    GFR Non-African American >60 >60    GFR African American >60 >60    Calcium, Ion 1.22 1.12 - 1.32 mmol/L    pH, Arterial 7.286 (L) 7.350 - 7.450    pCO2, Arterial 40 35 - 45 mm Hg    pO2, Arterial 73 (HH) 75 - 108 mm Hg    HCO3, Arterial 19.1 (L) 21.0 - 29.0 mmol/L    Base Excess, Arterial -8 (L) -3 - 3    O2 Sat, Arterial 93 (HH) 93 - 100 %    TCO2, Arterial 20 (L) 21 - 32 mmol/L    Lactate 2.34 (H) 0.40 - 2.00 mmol/L    POC Hematocrit 45 36 - 48 %    Hemoglobin 15.3 12.0 - 16.0 gm/dL    Sample Type ART     Performed on SEE BELOW    Pregnancy, Urine    Collection Time: 02/10/22  7:18 PM   Result Value Ref Range    HCG(Urine) Pregnancy Test Negative Detects HCG level >20 MIU/mL       IMAGING:  CT Head WO Contrast    Result Date: 2/10/2022  EXAMINATION: CT HEAD WO CONTRAST, 2/10/2022 7:05 PM CLINICAL HISTORY:  altered mental status COMPARISON: None TECHNIQUE:  Multiple contiguous axial images of the head were obtained from the skull base through the skull vertex without intravenous contrast. Sagittal and coronal 3D reformats have been produced. All CT scans at this facility use dose modulation, iterative reconstruction, and/or weight based dosing when appropriate to reduce radiation dose to as low as reasonably achievable. BRAIN CT FINDINGS: Gray-white matter differentiation is maintained. No acute hemorrhage, mass, mass effect, or midline shift. There is no evidence of atrophy, ventricular morphology is within normal limits. . The subcortical and periventricular white matter is within normal limits. The basal ganglia are within normal limits. There are no acute changes or space-occupying lesions in the posterior fossa. The visualized portions of the orbits are within normal limits. The globes are intact. The imaged portions of the paranasal sinuses are unremarkable. The calvarium is intact. The patient is intubated. There is no acute intracranial process.      XR CHEST PORTABLE    Result Date: 2/10/2022  Exam: XR CHEST PORTABLE History:  overdose Technique: AP portable view of the chest obtained. Comparison: none Chest x-ray portable Findings: The cardiomediastinal silhouette is within normal limits. There is atelectasis versus infiltrate in the left lung base. . Bones of the thorax appear intact. There is atelectasis versus infiltrate in the left lung base. VTE Prophylaxis: low molecular weight heparin -  start    ASSESSMENT AND PLAN    Principal Problem:  Suicide attempt: Possible multiple drug intake with alcohol intoxication. Patient vocalized on social media end-of-life decision. Patient found intoxicated with alcohol level 0.136. Drug screen positive for methadone. Patient not prescribed methadone. Unknown false positive. Tylenol and salicylate levels negative. Pink slipped by [facility]. We will consult psych. We will keep patient in suicide precautions. We will monitor EKG every 6 hours. We will repeat BMP now. We will monitor CMP and CBC daily    Hypokalemia: Potassium 3.3. We will administer 20 mEq potassium IV. Will monitor CMP daily    Bipolar depression: Patient on home meds to control. We will consult psych. We will resume home meds when verified. DM2: Glucose 116. We will monitor and cover with medium sliding scale insulin    Plan of care discussed with: patient and family    SIGNATURE: NAOMI Simental - CNP  DATE: February 10, 2022  TIME: 11:21 PM     SAÚL Matthews MD - supervising physician

## 2022-02-11 NOTE — PROGRESS NOTES
Pt coming to unit on a pink slip with a diagnosis of bipolar depression. Report received from [facility] from [address] The patient is a 39 y.o. female with significant past psychiatric history of bipolar depression who presents with suicidal attempt with multiple medication. Patient reported that she has been overwhelmed with recent stressors including her  having his leg amputated and he is in nursing home. Patient had an argument with her  over the phone following which she reported taking an overdose. Patient is unclear about the exact medication that she took. But she remembers having some alcohol before she took the medication. She was found unconscious and was given 12 mg of narcan by EMS. ER report indicate that patient was talking about ending her life on Facebook. Sunil Byers Her mother-in-law checked on her and found her unresponsive in her room with multiple medication bottles that are open and a bottle of alcohol. Pt is diabetic, but has not been on medication or insulin since losing weight. Pt monitored overnight on [address] and is now medically clear. VSS. Blood pressure low at times. Covid (-) Pt ambulates independently. Pt alert and oriented. Pt had 40 mEq of potassium chloride at 1358 for potassium level of 3.1. Pt has BMP daily with PRN potassium orders.  Electronically signed by Salvador Lofton RN on 2/11/22 at 5:48 PM EST

## 2022-02-11 NOTE — PROGRESS NOTES
Pt arrived via stretcher from ED. Pt is suicide precautions and came up with 1:1 sitter. Pt calm at this time. Will continue to monitor.     Shasha Terrell

## 2022-02-11 NOTE — PROGRESS NOTES
Department of Internal Medicine  General Internal Medicine  Attending Progress Note      SUBJECTIVE:  Pt seen and examined. Awake and alert. Ambulating in room. Awaiting psych recs. Pt states she has been very depressed because \"everything is falling apart. My marriage is falling apart. My kids refuse to see me. \" States that she took klonopin, wellbutrin and drank 75% of a bottle of tequila. States she never drinks. When asked if she was attempting to commit suicide, states \"if I tell you, then you'll have to admit me to the psych unit\".  States she is still very depressed but has no intention of harming herself or others and is currently not suicidal.     OBJECTIVE      Medications    Current Facility-Administered Medications: insulin lispro (HUMALOG) injection vial 0-12 Units, 0-12 Units, SubCUTAneous, Q6H  glucose (GLUTOSE) 40 % oral gel 15 g, 15 g, Oral, PRN  glucagon (rDNA) injection 1 mg, 1 mg, IntraMUSCular, PRN  dextrose 5 % solution, 100 mL/hr, IntraVENous, PRN  sodium chloride flush 0.9 % injection 5-40 mL, 5-40 mL, IntraVENous, 2 times per day  sodium chloride flush 0.9 % injection 5-40 mL, 5-40 mL, IntraVENous, PRN  0.9 % sodium chloride infusion, 25 mL, IntraVENous, PRN  enoxaparin (LOVENOX) injection 40 mg, 40 mg, SubCUTAneous, Daily  ondansetron (ZOFRAN-ODT) disintegrating tablet 4 mg, 4 mg, Oral, Q8H PRN **OR** ondansetron (ZOFRAN) injection 4 mg, 4 mg, IntraVENous, Q6H PRN  polyethylene glycol (GLYCOLAX) packet 17 g, 17 g, Oral, Daily PRN  acetaminophen (TYLENOL) tablet 650 mg, 650 mg, Oral, Q6H PRN **OR** acetaminophen (TYLENOL) suppository 650 mg, 650 mg, Rectal, Q6H PRN  dextrose bolus (hypoglycemia) 10% 125 mL, 125 mL, IntraVENous, PRN **OR** dextrose bolus (hypoglycemia) 10% 250 mL, 250 mL, IntraVENous, PRN  0.9 % sodium chloride infusion, , IntraVENous, Continuous  Physical    VITALS:  BP (!) 93/57   Pulse 84   Temp 97.9 °F (36.6 °C) (Oral)   Resp 16   SpO2 97%   Constitutional: Awake and alert in no acute distress. Lying in bed comfortably  Head: Normocephalic, atraumatic  Eyes: EOMI, PERRLA  ENT: moist mucous membranes  Neck: neck supple, trachea midline  Lungs: Good inspiratory effort, no wheeze, no rhonchi, no rales  Heart: RRR, normal S1 and S2  GI: Soft, non-distended, non tender, no guarding, no rebound, +BS  MSK: no edema noted  Skin: warm, dry  Psych: depressed affect     Data    CBC:   Lab Results   Component Value Date    WBC 8.5 02/11/2022    RBC 4.53 02/11/2022    HGB 13.4 02/11/2022    HCT 39.7 02/11/2022    MCV 87.6 02/11/2022    MCH 29.5 02/11/2022    MCHC 33.7 02/11/2022    RDW 13.6 02/11/2022     02/11/2022    MPV 10.4 08/11/2015     CMP:    Lab Results   Component Value Date     02/11/2022    K 3.1 02/11/2022    K 4.1 11/10/2020     02/11/2022    CO2 20 02/11/2022    BUN 14 02/11/2022    CREATININE 0.83 02/11/2022    GFRAA >60.0 02/11/2022    LABGLOM >60.0 02/11/2022    GLUCOSE 110 02/11/2022    PROT 5.6 02/11/2022    LABALBU 3.6 02/11/2022    CALCIUM 8.3 02/11/2022    BILITOT 0.4 02/11/2022    ALKPHOS 59 02/11/2022    AST 11 02/11/2022    ALT 10 02/11/2022       ASSESSMENT AND PLAN      # Suicide attempt by overdose  - combined wellbutrin, klonopin and drank 75% of a bottle of tequila in an self described suicide attempt  - denies SI at this time or thoughts of harm to self or others  - will need pink slip  - psych consulted  - labs unremarkable except for mild hypokalemia. Replacement ordered  - suicide precautions    # Bipolar/ADD/depression  - psych consulted to reconcile psych meds    # DM  - ISS changed to with meals    DVT: lovenox    Disposition: pt with suicide attempt by overdose. Likely needs inpatient psych admission. Psych consulted for recs and medication reconciliation. Medically stable. Will discharge once disposition established by psych.       Aden Chapman DO  Internal Medicine   Hospitalist    >35 minutes in total care time

## 2022-02-11 NOTE — DISCHARGE INSTR - COC
Continuity of Care Form    Patient Name: Mary Lehman   :  1980  MRN:  [id number]    Admit date:  2/10/2022  Discharge date:  ***    Code Status Order: Full Code   Advance Directives:      Admitting Physician: Gadiel Cespedes MD  PCP: Sarthak Almaguer DO    Discharging Nurse: [facility] Unit/Room#: N224/N224-01  Discharging Unit Phone Number: ***    Emergency Contact:   No emergency contact information on file. Past Surgical History:  Past Surgical History:   Procedure Laterality Date    CHOLECYSTECTOMY, LAPAROSCOPIC N/A 2017    CHOLECYSTECTOMY LAPAROSCOPIC WITH GRAMS POSS OPEN , RECENT LABS  performed by Dotty Hilliard MD at [facility] [address] IND N/A 2018    COLONOSCOPY ROOM 384 performed by Saulo Dunlap MD at [place] [address] 2021    I&D LEFT  PERIRECTAL  ABSCESS performed by Jero Yadav MD at [address]         Immunization History: There is no immunization history for the selected administration types on file for this patient.     Active Problems:  Patient Active Problem List   Diagnosis Code    Chronic cholecystitis with calculus K80.10    History of drug abuse ([address] F19.11    Spondylosis of lumbar region without myelopathy or radiculopathy M47.816    QI (obstructive sleep apnea) G47.33    Morbid obesity with body mass index (BMI) of 40.0 to 44.9 in adult ([facility]) E66.01, Z68.41    Bipolar disorder, current episode depressed, moderate ([facility]) F31.32    Bipolar 1 disorder, depressed ([address] F31.9    Diarrhea R19.7    Bipolar disorder with depression ([address] F31.9    Closed fracture of middle or proximal phalanx or phalanges of hand CFY2220    Major depression, single episode F32.9    Uncontrolled type 2 diabetes mellitus with hyperglycemia ([address] E11.65    Cellulitis of abdominal wall L03.311    Abscess L02.91    Obesity, morbid (more than 100 lbs over ideal weight or BMI > 40) ([address] E66.01    Major depressive disorder, recurrent ([facility] F33.9    Suicide attempt ([facility] T14.91XA       Isolation/Infection:   Isolation            No Isolation          Patient Infection Status       Infection Onset Added Last Indicated Last Indicated By Review Planned Expiration Resolved Resolved By    None active    Resolved    COVID-19 (Rule Out) 21 COVID-19, Rapid (Ordered)   21 Rule-Out Test Resulted    COVID-19 (Rule Out) 12/15/20 12/15/20 12/15/20 COVID-19 (Ordered)   12/15/20 Rule-Out Test Resulted    COVID-19 (Rule Out) 20 COVID-19 (Ordered)   20 Rule-Out Test Resulted            Nurse Assessment:  Last Vital Signs: BP (!) 93/57   Pulse 84   Temp 97.9 °F (36.6 °C) (Oral)   Resp 16   SpO2 97%     Last documented pain score (0-10 scale):    Last Weight:   Wt Readings from Last 1 Encounters:   21 205 lb (93 kg)     Mental Status:  {IP PT MENTAL STATUS:}    IV Access:  { PRABHA IV ACCESS:353759348}    Nursing Mobility/ADLs:  Walking   {P DME JEQ}  Transfer  {P DME GNUH:050823585}  Bathing  {P DME WKMX:949615304}  Dressing  {P DME PWVR:330012596}  Toileting  {P DME ZPTL:351055263}  Feeding  {[facility] DME KTTY:441456349}  Med Admin  {P DME HFBY:796228416}  Med Delivery   { PRABHA MED Delivery:626354470}    Wound Care Documentation and Therapy:        Elimination:  Continence: Bowel: {YES / YY:79037}  Bladder: {YES / XO:87079}  Urinary Catheter: {Urinary Catheter:899532406}   Colostomy/Ileostomy/Ileal Conduit: {YES / UD:29637}       Date of Last BM: ***    Intake/Output Summary (Last 24 hours) at 2022 1528  Last data filed at 2022 1200  Gross per 24 hour   Intake 840 ml   Output --   Net 840 ml     No intake/output data recorded.     Safety Concerns:     [address] Safety Concerns:575109668}    Impairments/Disabilities:      [address] Impairments/Disabilities:838823400}    Nutrition Therapy:  Current Nutrition Therapy:   [address] [facility]}    Routes of Feeding: {CHP DME Other Feedings:052972894}  Liquids: {Slp liquid thickness:99246}  Daily Fluid Restriction: {CHP DME Yes amt example:439038506}  Last Modified Barium Swallow with Video (Video Swallowing Test): {Done Not Done Critical access hospital:652579103}    Treatments at the Time of Hospital Discharge:   Respiratory Treatments: ***  Oxygen Therapy:  {Therapy; copd oxygen:83323}  Ventilator:    { CC Vent FBCV:168300746}    Rehab Therapies: {THERAPEUTIC INTERVENTION:3020659540}  Weight Bearing Status/Restrictions: { CC Weight Bearin}  Other Medical Equipment (for information only, NOT a DME order):  {EQUIPMENT:241051635}  Other Treatments: ***    Patient's personal belongings (please select all that are sent with patient):  {P DME Belongings:277406680}    RN SIGNATURE:  {Esignature:031533718}    CASE MANAGEMENT/SOCIAL WORK SECTION    Inpatient Status Date: ***    Readmission Risk Assessment Score:  Readmission Risk              Risk of Unplanned Readmission:  23           Discharging to Facility/ Agency   Name:   Address:  Phone:  Fax:    Dialysis Facility (if applicable)   Name:  Address:  Dialysis Schedule:  Phone:  Fax:    / signature: {Esignature:721688561}    PHYSICIAN SECTION    Prognosis: {Prognosis:0878280950}    Condition at Discharge: [facility] Patient Condition:663383001}    Rehab Potential (if transferring to Rehab): {Prognosis:0482755567}    Recommended Labs or Other Treatments After Discharge: ***    Physician Certification: I certify the above information and transfer of Austin Truner  is necessary for the continuing treatment of the diagnosis listed and that she requires {Admit to Appropriate Level of Care:10047} for {GREATER/LESS:958223137} 30 days.      Update Admission H&P: {CHP DME Changes in VKLWY:481434075}    PHYSICIAN SIGNATURE:  Electronically signed by BEATRIZ [facility]DO on 22 at 3:28 PM EST

## 2022-02-11 NOTE — FLOWSHEET NOTE
Report called to [address] [place] [address] provided gown and pants. Okay to leave Jewelry on as patient refuses to remove. IV and telemetry removed. This writer called security to escort patient to [address] [place] [address]. Wheelchair provided and PCA accompanied. Patient calm and cooperative with transport.

## 2022-02-11 NOTE — BH NOTE
S/P Serious overdose  Bipolar depression    Will need IP admit to 3 W when medically stable  Full note to be completed    Electronically signed by Silvana Rebolledo MD on 2/11/2022 at 1:37 PM

## 2022-02-11 NOTE — CONSULTS
[address] [place] [address], Department of Psychiatry  Behavioral Health Consult    REASON FOR CONSULT: SA evaluation    CONSULTING PHYSICIAN:     History obtained from: patient    HISTORY OF PRESENT ILLNESS:      The patient is a 39 y.o. female with significant past psychiatric history of bipolar depression who presents with suicidal attempt with multiple medication. Patient reported that she has been overwhelmed with recent stressors including her  having his leg amputated and he is in nursing home. Patient had an argument with her  over the phone following which she reported taking an overdose. Patient is unclear about the exact medication that she took. But she remember having some alcohol before she took the medication. She was found unconscious. ER report indicate that patient was talking about ending her life on Facebook. Jose A Valdesriley Her mother-in-law checked on her and found her unresponsive in her room with multiple medication bottles that are open and a bottle of alcohol. Patient reported that she follows up with [facility]. She reported feeling depressed with hopeless and worthless feeling. She is minimizing the circumstance of the suicidal attempt and she is asking about discharge from the hospital worrying about her pet animals at home. The patient is currently receiving care for the above psychiatric illness.       Psychiatric Review of Systems       Depression: yes     Jessica or Hypomania:  yes - mood swings     Panic Attacks:  yes      Phobias:  no     Obsessions and Compulsions:  no     PTSD : no     Hallucinations:  no     Delusions:  no      Substance Abuse History:  ETOH: yes  Marijuana: no  Opiates: no  Other Drugs: no      Past Psychiatric History:  History of bipolar depression with suicide attempt in the past    Past Medical History:        Diagnosis Date    ADD (attention deficit disorder) 2021    [facility]     Depression     DM (diabetes mellitus) [facility])     History of [facility]     positive drug screens 9/22/14 and 5/18/14    Hyperlipidemia     Osteoarthritis        Past Surgical History:        Procedure Laterality Date    CHOLECYSTECTOMY, LAPAROSCOPIC N/A 2/28/2017    CHOLECYSTECTOMY LAPAROSCOPIC WITH GRAMS POSS OPEN , RECENT LABS  performed by Deisy Baltazar MD at [facility] [address] IND N/A 11/27/2018    COLONOSCOPY ROOM 384 performed by Danni Fernandez MD at [address] [place] [address] N/A 6/16/2021    I&D LEFT  PERIRECTAL  ABSCESS performed by Yoko Harry MD at [facility]         Medications Prior to Admission:   Medications Prior to Admission: TRULICITY 1.5 MARIANO/5.3UE SOPN, ADMINISTER 1.5 MG/0.5ML UNDER THE SKIN 1 TIME A WEEK  OLANZapine (ZYPREXA) 7.5 MG tablet, Take 7.5 mg by mouth nightly  prazosin (MINIPRESS) 2 MG capsule, Take 2 mg by mouth nightly  Omega-3 Fatty Acids (FISH OIL) 1200 MG CAPS, Take 1 capsule by mouth daily  albuterol sulfate  (90 Base) MCG/ACT inhaler, INHALE 1 TO 2 PUFFS BY MOUTH EVERY 4 TO 6 HOURS AS NEEDED  D3-1000 25 MCG (1000 UT) TABS tablet, Take 3 tablets by mouth daily   fluconazole (DIFLUCAN) 150 MG tablet, TAKE 1 TABLET BY MOUTH ONCE FOR 1 DOSE AND REPEAT IN 1 WEEK  GNP MELATONIN 3 MG TABS tablet, TAKE TWO TABLETS BY MOUTH EVERY NIGHT AT BEDTIME FOR SLEEP  OXcarbazepine (TRILEPTAL) 300 MG tablet, TAKE ONE TABLET BY MOUTH THREE TIMES DAILY  clonazePAM (KLONOPIN) 1 MG tablet, Take 1 mg by mouth 2 times daily as needed. fluvoxaMINE (LUVOX) 50 MG tablet, Take 50 mg by mouth nightly  blood glucose monitor kit and supplies, Dispense sufficient amount for indicated testing frequency plus additional to accommodate PRN testing needs. Dispense all needed supplies to include: monitor, strips, lancing device, lancets, control solutions, alcohol swabs.   Lancets MISC, Patient test 3x daily E65.11  glycopyrrolate (ROBINUL) 1 MG tablet, 2 po tid (Patient taking differently: Take 1 mg by mouth daily Pt taking differently than prescribed c/o constipation)  blood glucose test strips (FREESTYLE LITE) strip, 1 each by In Vitro route 4 times daily As needed. Continuous Blood Gluc  (FREESTYLE [place] 14 DAY READER) KATARINA, 1 Device by Does not apply route 4 times daily (before meals and nightly)  Continuous Blood Gluc Sensor (FREESTYLE [place] 14 DAY SENSOR) MISC, Every 2 weeks  brexpiprazole (REXULTI) 2 MG TABS tablet, Take 1 tablet by mouth daily  fenofibrate (TRICOR) 54 MG tablet, Take 1 tablet by mouth daily s [facility]: Please dispense generic fenofibrate unless prescriber denote  omega-3 acid ethyl esters (LOVAZA) 1 g capsule, Take 2 capsules by mouth 2 times daily  Insulin Pen Needle (NOVOFINE) 32G X 6 MM MISC, 1 Device by Does not apply route 4 times daily (before meals and nightly)  blood glucose monitor kit and supplies, 1 kit by Other route 4 times daily (before meals and nightly) Pt test 4x daily Dx E11.65. May substitute for generic or insurance covered product  blood glucose monitor strips, 1 strip by Other route 4 times daily (before meals and nightly) Pt test 4x daily Dx E11.65.   May substitute for generic or insurance covered product  FreeStyle Lancets MISC, 1 each by Does not apply route 4 times daily (before meals and nightly)  Continuous Blood Gluc Sensor (FREESTYLE [place] 14 DAY SENSOR) MISC, 2 Devices by Does not apply route every 14 days  simvastatin (ZOCOR) 20 MG tablet, Take 1 tablet by mouth every evening    Allergies:  Latuda [lurasidone hcl], Lurasidone, Adhesive tape, Adipex-p [phentermine], and Metformin and related    FAMILY/SOCIAL HISTORY:  Family History   Problem Relation Age of Onset    Other Mother         CHF    Cirrhosis Brother     Other Maternal Grandmother         CHF     Social History     Socioeconomic History    Marital status: Single     Spouse name: Not on file    Number of children: Not on file    Years of education: Not on file    Highest education level: Not on file   Occupational History    Not on file   Tobacco Use    Smoking status: Current Every Day Smoker     Packs/day: 1.00     Years: 24.00     Pack years: 24.00     Types: Cigarettes    Smokeless tobacco: Never Used   Vaping Use    Vaping Use: Some days    Substances: Nicotine, Flavoring    Devices: Disposable   Substance and Sexual Activity    Alcohol use: Not Currently     Comment: working on sobriety    Drug use: Not Currently    Sexual activity: Yes     Partners: Male   Other Topics Concern    Not on file   Social History Narrative    Not on file     Social Determinants of Health     Financial Resource Strain:     Difficulty of Paying Living Expenses: Not on file   Food Insecurity:     Worried About Running Out of Food in the Last Year: Not on file    Melia of Food in the Last Year: Not on file   Transportation Needs:     Lack of Transportation (Medical): Not on file    Lack of Transportation (Non-Medical):  Not on file   Physical Activity:     Days of Exercise per Week: Not on file    Minutes of Exercise per Session: Not on file   Stress:     Feeling of Stress : Not on file   Social Connections:     Frequency of Communication with Friends and Family: Not on file    Frequency of Social Gatherings with Friends and Family: Not on file    Attends Mandaeism Services: Not on file    Active Member of [address] [place] [address] or Organizations: Not on file    Attends Club or Organization Meetings: Not on file    Marital Status: Not on file   Intimate Partner Violence:     Fear of Current or Ex-Partner: Not on file    Emotionally Abused: Not on file    Physically Abused: Not on file    Sexually Abused: Not on file   Housing Stability:     Unable to Pay for Housing in the Last Year: Not on file    Number of [place] in the Last Year: Not on file    Unstable Housing in the Last Year: Not on file       REVIEW OF SYSTEMS    Constitutional: [] fever [] chills  [] weight loss  []weakness [] Other:  Eyes:  [] photophobia  [] discharge [] acuity change   [] Diplopia   [] Other:  HENT:  [] sore throat  [] ear pain [] Tinnitus   [] Other  Respiratory:  [] Cough  [] Shortness of breath   [] Sputum   [] Other:   Cardiac: []Chest pain   []Palpitations []Edema  []PND  [] Other:  GI:  []Abdominal pain   []Nausea  []Vomiting  []Diarrhea  [] Other:  :  [] Dysuria   []Frequency  []Hematuria  []Discharge  [] Other:  Possible Pregnancy: []Yes   []No   LMP:   Musculoskeletal:  []Back pain  []Neck pain  []Recent Injury   Skin:  []Rash  [] Itching  [] Other:  Neurologic:  [] Headache  [] Focal weakness  [] Sensory changes []Other:  Endocrine:  [] Polyuria  [] Polydipsia  [] Hair Loss  [] Other:  Lymphatic:   [] Swollen glands   Psychiatric:  As per HPI      All other systems negative except as marked or mentioned/indicated in the HPI. Clement Fly      PHYSICAL EXAM:  Vitals:  BP (!) 93/57   Pulse 84   Temp 97.9 °F (36.6 °C) (Oral)   Resp 16   SpO2 97%      Neuro Exam:   Muscle Strength & Tone: full ROM  Gait: normal gait   Involuntary Movements: No    Mental Status Examination:    Level of consciousness:  within normal limits   Appearance:  ill-appearing  Behavior/Motor:  psychomotor retardation  Attitude toward examiner:  cooperative  Speech:  slow   Mood: constricted, decreased range, depressed and dysthymic  Affect:  blunted  Thought processes:  goal directed   Thought content:  Suicidal Ideation: Minimizing  Cognition:  oriented to person, place, and time   Concentration poor  Memory intact  Mini Mental Status not completed  Insight poor   Judgement poor   Fund of Knowledge limited      DIAGNOSIS:    Bipolar depression severe  S/p suicidal attempt         RISK ASSESSMENT:        LABS: REVIEWED TODAY:  Recent Labs     02/10/22  1830 02/10/22  1904 02/10/22  2330 02/11/22  0458 02/11/22  1034   WBC 8.2  --  9.8  --  8.5   HGB 15.6   < > 13.8 12.0 13.4     --  186  --  181 < > = values in this interval not displayed. Recent Labs     02/10/22  1830 02/10/22  1904 02/10/22  2330 02/11/22  0458 02/11/22  1034     --  143  --  142   K 3.3*  --  3.2*  --  3.1*     --  110*  --  112*   CO2 20  --  22  --  20   BUN 14  --  13  --  14   CREATININE 0.81   < > 0.77 0.8 0.83   GLUCOSE 116*  --  170*  --  110*    < > = values in this interval not displayed. Recent Labs     02/10/22  1830 02/11/22  1034   BILITOT <0.2 0.4   ALKPHOS 74 59   AST 11 11   ALT 12 10     Lab Results   Component Value Date    LABAMPH Neg 02/10/2022    BARBSCNU Neg 02/10/2022    LABBENZ Neg 02/10/2022    LABMETH POSITIVE 02/10/2022    OPIATESCREENURINE Neg 02/10/2022    PHENCYCLIDINESCREENURINE Neg 02/10/2022    ETOH 155 02/10/2022     Lab Results   Component Value Date    TSH 4.140 02/10/2022     Lab Results   Component Value Date    LITHIUM 0.5 (L) 02/08/2019     No results found for: VALPROATE, CBMZ  Lab Results   Component Value Date    LITHIUM 0.5 02/08/2019       FURTHER LABS ORDERED :      Radiology   CT Head WO Contrast    Result Date: 2/10/2022  EXAMINATION: CT HEAD WO CONTRAST, 2/10/2022 7:05 PM CLINICAL HISTORY:  altered mental status COMPARISON: None TECHNIQUE:  Multiple contiguous axial images of the head were obtained from the skull base through the skull vertex without intravenous contrast. Sagittal and coronal 3D reformats have been produced. All CT scans at this facility use dose modulation, iterative reconstruction, and/or weight based dosing when appropriate to reduce radiation dose to as low as reasonably achievable. BRAIN CT FINDINGS: Gray-white matter differentiation is maintained. No acute hemorrhage, mass, mass effect, or midline shift. There is no evidence of atrophy, ventricular morphology is within normal limits. . The subcortical and periventricular white matter is within normal limits. The basal ganglia are within normal limits.  There are no acute changes or space-occupying lesions in the posterior fossa. The visualized portions of the orbits are within normal limits. The globes are intact. The imaged portions of the paranasal sinuses are unremarkable. The calvarium is intact. The patient is intubated. There is no acute intracranial process. XR CHEST PORTABLE    Result Date: 2/10/2022  Exam: XR CHEST PORTABLE History:  overdose Technique: AP portable view of the chest obtained. Comparison: none Chest x-ray portable Findings: The cardiomediastinal silhouette is within normal limits. There is atelectasis versus infiltrate in the left lung base. . Bones of the thorax appear intact. There is atelectasis versus infiltrate in the left lung base. EKG: TRACING REVIEWED    RECOMMENDATIONS    Risk Management:  suicide risk    Transfer the patient to 1 W when medically stable  Discussed with the treating physician/ team about the patient and treatment plan  Reviewed the chart    Thanks for the consult. Please call me if needed.     Electronically signed by Ciaran Cowan MD on 2/11/2022 at 1:38 PM

## 2022-02-11 NOTE — CARE COORDINATION
[facility] Case Management Initial Discharge Assessment    Met with Patient to discuss discharge plan. PCP: Rashad Holt, DO                   Date of Last Visit: \"long time ago\"    If no PCP, list provided? N/A    Discharge Planning    Living Arrangements: independently at home    Who do you live with?     Who helps you with your care:  self    If lives at home:     Do you have any barriers navigating in your home? no    Patient can perform ADL? Yes    Current Services (outpatient and in home) :  None    Dialysis: No    Is transportation available to get to your appointments? Yes    DME Equipment:  no    Respiratory equipment: None    Respiratory provider:  no     Pharmacy:  yes - [facility]    Consult with Medication Assistance Program?  No      Patient agreeable to [facility]? Declined    Patient agreeable to SNF/Rehab? Declined    Other discharge needs identified? Other Psych IP admission      Initial Discharge Plan? (Note: please see concurrent daily documentation for any updates after initial note). [facility] EVALU[facility] BY PSYCHIATRIST AND MEDICALLY STABLE.     Readmission Risk              Risk of Unplanned Readmission:  20         Electronically signed by Kathline Denver, RN on 2/11/2022 at 11:44 AM

## 2022-02-11 NOTE — PROGRESS NOTES
Pt arrived to unit via wheelchair. Pt ambulated with steady gait to assigned room. Skin assessment and contraband check complete by this nurse and one other female staff member. Skin intact. No contraband found. Pt declines tour of unit, stating she has been here before. Pt given hygiene supplies and water. Pt contracts for safety on unit.  Electronically signed by Refugio Bajwa RN on 2/11/22 at 6:46 PM EST

## 2022-02-12 LAB
ALBUMIN SERPL-MCNC: 3.4 G/DL (ref 3.5–4.6)
ALP BLD-CCNC: 54 U/L (ref 40–130)
ALT SERPL-CCNC: 8 U/L (ref 0–33)
ANION GAP SERPL CALCULATED.3IONS-SCNC: 9 MEQ/L (ref 9–15)
AST SERPL-CCNC: 7 U/L (ref 0–35)
BASOPHILS ABSOLUTE: 0 K/UL (ref 0–0.2)
BASOPHILS RELATIVE PERCENT: 0.6 %
BILIRUB SERPL-MCNC: <0.2 MG/DL (ref 0.2–0.7)
BUN BLDV-MCNC: 15 MG/DL (ref 6–20)
CALCIUM SERPL-MCNC: 8.3 MG/DL (ref 8.5–9.9)
CHLORIDE BLD-SCNC: 111 MEQ/L (ref 95–107)
CO2: 20 MEQ/L (ref 20–31)
CREAT SERPL-MCNC: 0.79 MG/DL (ref 0.5–0.9)
EOSINOPHILS ABSOLUTE: 0.2 K/UL (ref 0–0.7)
EOSINOPHILS RELATIVE PERCENT: 2.6 %
GFR AFRICAN AMERICAN: >60
GFR NON-AFRICAN AMERICAN: >60
GLOBULIN: 2.1 G/DL (ref 2.3–3.5)
GLUCOSE BLD-MCNC: 103 MG/DL (ref 70–99)
GLUCOSE BLD-MCNC: 113 MG/DL (ref 70–99)
GLUCOSE BLD-MCNC: 82 MG/DL (ref 70–99)
GLUCOSE BLD-MCNC: 84 MG/DL (ref 70–99)
GLUCOSE BLD-MCNC: 90 MG/DL (ref 70–99)
HCT VFR BLD CALC: 39 % (ref 37–47)
HEMOGLOBIN: 13.1 G/DL (ref 12–16)
LYMPHOCYTES ABSOLUTE: 2.9 K/UL (ref 1–4.8)
LYMPHOCYTES RELATIVE PERCENT: 39.8 %
MAGNESIUM: 1.9 MG/DL (ref 1.7–2.4)
MCH RBC QN AUTO: 29.5 PG (ref 27–31.3)
MCHC RBC AUTO-ENTMCNC: 33.6 % (ref 33–37)
MCV RBC AUTO: 88 FL (ref 82–100)
MONOCYTES ABSOLUTE: 0.4 K/UL (ref 0.2–0.8)
MONOCYTES RELATIVE PERCENT: 6.2 %
NEUTROPHILS ABSOLUTE: 3.6 K/UL (ref 1.4–6.5)
NEUTROPHILS RELATIVE PERCENT: 50.8 %
PDW BLD-RTO: 13.9 % (ref 11.5–14.5)
PERFORMED ON: ABNORMAL
PERFORMED ON: ABNORMAL
PERFORMED ON: NORMAL
PERFORMED ON: NORMAL
PLATELET # BLD: 175 K/UL (ref 130–400)
POTASSIUM SERPL-SCNC: 3.8 MEQ/L (ref 3.4–4.9)
RBC # BLD: 4.43 M/UL (ref 4.2–5.4)
SODIUM BLD-SCNC: 140 MEQ/L (ref 135–144)
TOTAL PROTEIN: 5.5 G/DL (ref 6.3–8)
WBC # BLD: 7.2 K/UL (ref 4.8–10.8)

## 2022-02-12 PROCEDURE — 6370000000 HC RX 637 (ALT 250 FOR IP): Performed by: INTERNAL MEDICINE

## 2022-02-12 PROCEDURE — 83735 ASSAY OF MAGNESIUM: CPT

## 2022-02-12 PROCEDURE — 1240000000 HC EMOTIONAL WELLNESS R&B

## 2022-02-12 PROCEDURE — 6370000000 HC RX 637 (ALT 250 FOR IP): Performed by: PSYCHIATRY & NEUROLOGY

## 2022-02-12 PROCEDURE — 80053 COMPREHEN METABOLIC PANEL: CPT

## 2022-02-12 PROCEDURE — 85025 COMPLETE CBC W/AUTO DIFF WBC: CPT

## 2022-02-12 PROCEDURE — 93005 ELECTROCARDIOGRAM TRACING: CPT | Performed by: INTERNAL MEDICINE

## 2022-02-12 PROCEDURE — 36415 COLL VENOUS BLD VENIPUNCTURE: CPT

## 2022-02-12 RX ORDER — CLONAZEPAM 0.5 MG/1
0.5 TABLET ORAL 3 TIMES DAILY PRN
Status: DISCONTINUED | OUTPATIENT
Start: 2022-02-12 | End: 2022-02-15

## 2022-02-12 RX ORDER — CLONAZEPAM 0.5 MG/1
0.5 TABLET ORAL 3 TIMES DAILY
Status: DISCONTINUED | OUTPATIENT
Start: 2022-02-12 | End: 2022-02-12

## 2022-02-12 RX ORDER — TOPIRAMATE 100 MG/1
100 TABLET, FILM COATED ORAL 2 TIMES DAILY
Status: DISCONTINUED | OUTPATIENT
Start: 2022-02-12 | End: 2022-02-16 | Stop reason: HOSPADM

## 2022-02-12 RX ORDER — FLUVOXAMINE MALEATE 50 MG/1
50 TABLET, COATED ORAL NIGHTLY
Status: DISCONTINUED | OUTPATIENT
Start: 2022-02-12 | End: 2022-02-16 | Stop reason: HOSPADM

## 2022-02-12 RX ADMIN — TOPIRAMATE 100 MG: 100 TABLET, FILM COATED ORAL at 14:03

## 2022-02-12 RX ADMIN — FLUVOXAMINE MALEATE 50 MG: 50 TABLET, COATED ORAL at 21:34

## 2022-02-12 RX ADMIN — NICOTINE POLACRILEX 2 MG: 2 GUM, CHEWING BUCCAL at 15:40

## 2022-02-12 RX ADMIN — NICOTINE POLACRILEX 2 MG: 2 GUM, CHEWING BUCCAL at 09:27

## 2022-02-12 RX ADMIN — TOPIRAMATE 100 MG: 100 TABLET, FILM COATED ORAL at 21:34

## 2022-02-12 NOTE — DISCHARGE SUMMARY
Physician Discharge Summary     Patient ID:  Rakesh Bro  [id number]  12 y.o.  1980    Admit date: 2/10/2022    Discharge date : 02/11/2022    Admitting Physician: Julio Andres MD     Discharge Physician: Domingo Fowler DO     Admission Diagnoses: Suicide attempt [facility]) Tamera Divine abuse ([facility] [address] [F19.10]    Discharge Diagnoses: Suicide attempt ([facility] [address] [place]  Polysubstance abuse ([facility] [address] [F19.10]    Admission Condition: fair    Discharged Condition: good    Hospital Course: 39 y.o. female with a history of bipolar, diabetes, hyperlipidemia, drug abuse presents with questionable suicidal attempt. Patient was talking about ending her life on Facebook. Her mother-in-law checked on her and found her unresponsive in her room with multiple medication bottles that are open and a bottle of alcohol. Pt was admitted to hospitalist service where she admitted to attempting suicide by ingesting wellbutrin, klonopin and a bottle of tequila. Pt was pink slipped and psych recommended inpatient psych admission. Pt was medically stable and discharged to  in stable and improved condition to continue treatment for depression. Consults: psychiatry    Discharge Exam:  See daily progress note    Labs:   Recent Labs     02/10/22  1830 02/10/22  1904 02/10/22  2330 02/11/22  0458 02/11/22  1034   WBC 8.2  --  9.8  --  8.5   HGB 15.6   < > 13.8 12.0 13.4   HCT 46.9  --  41.1  --  39.7     --  186  --  181    < > = values in this interval not displayed. Recent Labs     02/10/22  1830 02/10/22  1904 02/10/22  2330 02/11/22  0458 02/11/22  1034     --  143  --  142   K 3.3*  --  3.2*  --  3.1*     --  110*  --  112*   CO2 20  --  22  --  20   BUN 14  --  13  --  14   CREATININE 0.81   < > 0.77 0.8 0.83   CALCIUM 9.0  --  8.1*  --  8.3*    < > = values in this interval not displayed.      Recent Labs     02/10/22  1830 02/11/22  1034   AST 11 11   ALT 12 10   BILITOT <0.2 0.4   ALKPHOS 74 59     No results for input(s): INR in the last 72 hours. Recent Labs     02/10/22  1830   CKTOTAL 29   TROPONINI <0.010       Urinalysis:   Lab Results   Component Value Date    NITRU Negative 02/10/2022    WBCUA 0-2 11/23/2018    BACTERIA Rare 11/23/2018    RBCUA 0-2 11/23/2018    BLOODU Negative 02/10/2022    SPECGRAV 1.004 02/10/2022    GLUCOSEU Negative 02/10/2022       Radiology:   Most recent    Chest CT      WITH CONTRAST:No results found for this or any previous visit. WITHOUT CONTRAST: No results found for this or any previous visit. CXR      2-view: No results found for this or any previous visit. Portable: Results for orders placed during the hospital encounter of 02/10/22    XR CHEST PORTABLE    Narrative  Exam: XR CHEST PORTABLE    History:  overdose    Technique: AP portable view of the chest obtained. Comparison: none    Chest x-ray portable    Findings: The cardiomediastinal silhouette is within normal limits. There is atelectasis versus infiltrate in the left lung base. .    Bones of the thorax appear intact. Impression  There is atelectasis versus infiltrate in the left lung base. Echo No results found for this or any previous visit. Disposition: Psych    In process/preliminary results:  Outstanding Order Results     No orders found from 1/12/2022 to 2/11/2022. Patient Instructions:   Discharge Medication List as of 2/11/2022  6:34 PM      CONTINUE these medications which have NOT CHANGED    Details   TRULICITY 1.5 MY/6.7FJ SOPN ADMINISTER 1.5 MG/0.5ML UNDER THE SKIN 1 TIME A WEEK, Disp-4 pen, R-0ZERO refills remain on this prescription.  Your patient is requesting advance approval of refills for this medication to PREVENT ANY MISSED DOSESNormal      OLANZapine (ZYPREXA) 7.5 MG tablet Take 7.5 mg by mouth nightlyHistorical Med      prazosin (MINIPRESS) 2 MG capsule Take 2 mg by mouth nightlyHistorical Med      Omega-3 Fatty Acids (FISH OIL) 1200 MG CAPS Take 1 capsule by mouth dailyHistorical Med      albuterol sulfate  (90 Base) MCG/ACT inhaler INHALE 1 TO 2 PUFFS BY MOUTH EVERY 4 TO 6 HOURS AS NEEDEDHistorical Med      D3-1000 25 MCG (1000 UT) TABS tablet Take 3 tablets by mouth daily , DAWHistorical Med      fluconazole (DIFLUCAN) 150 MG tablet TAKE 1 TABLET BY MOUTH ONCE FOR 1 DOSE AND REPEAT IN 1 WEEKHistorical Med      GNP MELATONIN 3 MG TABS tablet TAKE TWO TABLETS BY MOUTH EVERY NIGHT AT BEDTIME FOR SLEEP, DAWHistorical Med      OXcarbazepine (TRILEPTAL) 300 MG tablet TAKE ONE TABLET BY MOUTH THREE TIMES DAILYHistorical Med      clonazePAM (KLONOPIN) 1 MG tablet Take 1 mg by mouth 2 times daily as needed. Historical Med      fluvoxaMINE (LUVOX) 50 MG tablet Take 50 mg by mouth nightlyHistorical Med      !! blood glucose monitor kit and supplies Dispense sufficient amount for indicated testing frequency plus additional to accommodate PRN testing needs. Dispense all needed supplies to include: monitor, strips, lancing device, lancets, control solutions, alcohol swabs., Disp-1 kit, R-0, Normal      !! Lancets MISC Disp-100 each, R-3, NormalPatient test 3x daily E65.11      glycopyrrolate (ROBINUL) 1 MG tablet 2 po tid, Disp-180 tablet, R-3Normal      !! blood glucose test strips (FREESTYLE LITE) strip 1 each by In Vitro route 4 times daily As needed. , Disp-200 each, R-3Normal      Continuous Blood Gluc  (FREESTYLE [place] 14 DAY READER) KATARINA 1 Device by Does not apply route 4 times daily (before meals and nightly), Disp-1 Device, R-0Normal      !!  Continuous Blood Gluc Sensor (FREESTYLE [place] 14 DAY SENSOR) MISC Every 2 weeks, Disp-2 each, R-03Normal      brexpiprazole (REXULTI) 2 MG TABS tablet Take 1 tablet by mouth daily, Disp-15 tablet, R-2Normal      fenofibrate (TRICOR) 54 MG tablet Take 1 tablet by mouth daily s [facility] pharmacy: Please dispense generic fenofibrate unless prescriber denote, Disp-30 tablet, R-3Normal      omega-3 acid ethyl esters (LOVAZA) 1 g capsule Take 2 capsules by mouth 2 times daily, Disp-60 capsule, R-3Normal      Insulin Pen Needle (NOVOFINE) 32G X 6 MM MISC 4 TIMES DAILY BEFORE MEALS & NIGHTLY Starting Tue 11/10/2020, Disp-300 each,R-3, Normal      !! blood glucose monitor kit and supplies 1 kit by Other route 4 times daily (before meals and nightly) Pt test 4x daily Dx E11.65. May substitute for generic or insurance covered product, Other, 4 TIMES DAILY BEFORE MEALS & NIGHTLY Starting Tue 11/10/2020, Disp-1 kit,R-0, Normal      !! blood glucose monitor strips 1 strip by Other route 4 times daily (before meals and nightly) Pt test 4x daily Dx E11.65. May substitute for generic or insurance covered product, Other, 4 TIMES DAILY BEFORE MEALS & NIGHTLY Starting Tue 11/10/2020, Disp-150 strip,R-3, Normal      !! FreeStyle Lancets MISC 4 TIMES DAILY BEFORE MEALS & NIGHTLY Starting Tue 11/10/2020, Disp-300 each,R-3, Normal      !! Continuous Blood Gluc Sensor (FREESTYLE [place] 14 DAY SENSOR) MISC 2 Devices by Does not apply route every 14 days, Disp-2 each,R-3Normal      simvastatin (ZOCOR) 20 MG tablet Take 1 tablet by mouth every evening, Disp-30 tablet, R-3Normal       !! - Potential duplicate medications found. Please discuss with provider. Activity: activity as tolerated  Diet: regular diet  Wound Care: none needed    Follow-up with Raza Mark DO  in 2 weeks.     DC time 35 minutes    Signed:  Electronically signed by Ronda Mckinney DO on 2/12/2022 at 7:08 AM

## 2022-02-12 NOTE — FLOWSHEET NOTE
Admission assessment complete. Patient is alert and orient x 4, with flat, blunt affect. Patient appears angry and states, \"I have been compliant with my medications and they do help but I have been under a mountain of stress and I just realized my  does not love me and my marriage is over, and it all just came crashing down. \" Patient states her depression is only a 3 out of 10 with 10 being the worst because \"I have to go home, there is no one there to feed my cat. \"  Patient admits that her anxiety is a 7 out of 10 with 10 being the worst but states \"Its because I have not been given any of my medications. \"  Patient denies any history of self harm, she denies any violence but does admit to a previous suicide attempt by overdose prior to this one. Patient states she has 4 kids and 2 grandkids and that is what she wants in life but her spouse \"just wants friends and to party. \"  Patient states I have to buy my  weed because if I do not, he provides sexual favors for [employer]uch[employer] to get money to buy it. \"  Patient states she has been so overwelmed due to her spouse being in nursing home and his adopted mother and now his biological mother \"coming out of the woodwork. \"  Patient also stated \"my  was contacting [employer] with an account that he had set up using my son's picture's because Chucho Smart is a pedophile and likes kids not grown adults. \" Patient was given a snack to eat before returning to bed. She denies SI/HI and hallucinations at this time.
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
## 2022-02-12 NOTE — GROUP NOTE
Group Therapy Note    Date: 2/12/2022    Group Start Time: 1630  Group End Time: 1710  Group Topic: Psychoeducation    [facility]    MIMI Chavarria LSW        Group Therapy Note    Attendees: 14         Patient's Goal:  To participate in a Psychoeducational group therapy    Notes:  Patient participated in stress management techniques    Status After Intervention:  Improved    Participation Level: Interactive    Participation Quality: Sharing      Speech:  normal      Thought Process/Content: Logical      Affective Functioning: Congruent      Mood: calm      Level of consciousness:  Alert      Response to Learning: Able to verbalize current knowledge/experience      Endings: None Reported    Modes of Intervention: Education      Discipline Responsible: /Counselor      Signature:  MIMI Chavarria LSW

## 2022-02-12 NOTE — PROGRESS NOTES
Morning Community Meeting Topics    Aly Cespedes attended the morning community meeting on 2/12/22. Topics discussed today     [x] Introduction   Day of the week and date   Mask distribution   Current mask requirements  [x]Teams   Explanation of  Green and Blue team criteria   Nurses assigned to each team for today   Explanation about green and blue paper  o Date  o Patient's Name  o Patient's Nurse  o Goals  [x] Visitation   Announce the visiting hours for the day   Announce which team is allowed to have visitors for the day   Review any updated Covid 19 requirements for visitors during visitation  o Vaccine Card or negative Covid test within 48 hours of visit  o State Identification   Patients are reminded to alert the  at least 1 hour before visitation   [x] Unit Orientation   Coffee use   Phone location and etiquette   Shower locations  [employer] and dryer location and process   Common area expectations   Staff rounds expectation  [x] Meals    Educate patient to the menu  o The patient is encouraged to fill out the menu to get preferences at mealtime  o The patient is educated that if they do not fill out the menu, they will get the standard tray  o The coffee pot is decaf, patient encouraged to order regular coffee from menu.    Educate patient to the meal process   Patient encouraged to eat snacks provided twice daily  o Snacks may stay in patient room     [x] Discharge Process   Discharge expectations   Fill out the survey after discharge   [x] Hygiene   Daily showers encouraged  o Showers availability discussed    Daily dressing encouraged  o Discussed wearing street clothing   Education provided on where to place linens and clothing  o Linens in the hamper  o personal clothing does not go into the linen hamper  [x] Group    Patient encouraged to attend group provided   Time of Group Meetings discussed   Gentle reminder that attendance is a Physician order  [x] Movement   Chair exercises completed   Stretching completed  Notes: GOAL : \" to have a good day\" Electronically signed by Georgi Zurita on 2/12/2022 at 11:36 AM

## 2022-02-12 NOTE — GROUP NOTE
Group Therapy Note    Date: 2/12/2022    Group Start Time: 1300  Group End Time: 1101  Group Topic: Healthy Living/Wellness    [facility]    Irene Sam RN        Group Therapy Note    Attendees: 12/19         Patient's Goal:  Learning about self care, how to plan to incorporate it into your daily life. Status After Intervention:  Unchanged    Participation Level:  Active Listener    Participation Quality: Appropriate      Speech:  normal      Thought Process/Content: Logical      Affective Functioning: Exaggerated      Mood: anxious      Level of consciousness:  Oriented x4      Response to Learning: Able to verbalize current knowledge/experience      Endings: None Reported    Modes of Intervention: Education, Support and Socialization      Discipline Responsible: Registered Nurse      Signature:  Irene Sam RN

## 2022-02-12 NOTE — FLOWSHEET NOTE
Patient had a bag full of medication bottles with belongings, they were walked down to pharmacy to be held until discharge. Some of them have her husbands name on them Erick Rose).

## 2022-02-12 NOTE — PROGRESS NOTES
Patient did not attend group despite staff encouragement.   Electronically signed by Harinder Love on 2/11/2022 at 8:55 PM

## 2022-02-12 NOTE — CARE COORDINATION
Pt denies all. 3/10 depression and 7/10 anxiety. Pt reports good sleep and appetite. Pt laughs inappropriately and has an exaggerated, elevated affect. Pt was preoccupied with getting her clothes washed and asked multiple staff members to help her within minutes. Pt told this nurse that she just \"fucked up\" in regards to her admission.

## 2022-02-12 NOTE — PROGRESS NOTES
Patient did not attend group despite staff encouragement.   Electronically signed by Angelica Chacon on 2/11/2022 at 9:34 PM

## 2022-02-12 NOTE — CARE COORDINATION
Brief Intervention and Referral to Treatment Summary    Patient was provided PHQ-9, AUDIT and DAST Screening:      PHQ-9 Score: 9  AUDIT Score:  1  DAST Score:   0  *tox positive for methadone    Patients substance use is considered     Low Risk/Healthy x  Moderate Risk  Harmful  Dependent    Patients depression is considered:     Minimal  Mild   Moderate x  Moderately Severe  Severe    Brief Education Was Provided N/A    Patient was receptive  Patient was not receptive      Brief Intervention Is Provided (Only for AUDIT or DAST) N/A    Patient reports readiness to decrease and/or stop use and a plan was discussed   Patient denies readiness to decrease and/or stop use and a plan was not discussed      Recommendations/Referrals for Brief and/or Specialized Treatment Provided to Patient   Patient denied drug and alcohol use. As a result, no recommendations or referrals were provided to the patient at this time.    *tox positive for methadone

## 2022-02-12 NOTE — GROUP NOTE
Group Therapy Note    Date: 2/12/2022    Group Start Time: 1100  Group End Time: 1200  Group Topic: Psychoeducation    [facility]    Candance Prime, MSW, LSW        Group Therapy Note    Attendees: 10         Patient's Goal:  To participate in Psychoeducational group therapy    Notes:  Patient participated in utilizing [employer]    Status After Intervention:  Improved    Participation Level: Interactive    Participation Quality: Attentive      Speech:  normal      Thought Process/Content: Logical      Affective Functioning: Congruent      Mood: calm      Level of consciousness:  Alert      Response to Learning: Able to verbalize current knowledge/experience      Endings: None Reported    Modes of Intervention: Education      Discipline Responsible: /Counselor      Signature:  Candance Prime, MSW, LSW

## 2022-02-12 NOTE — GROUP NOTE
Group Therapy Note    Date: 2/12/2022    Group Start Time: 1000  Group End Time: 1100  Group Topic: Psychoeducation    [facility]    Yadi Mood, CTRS        Group Therapy Note    Attendees: 14           Patient's Goal:  \"to have a good day\"    Notes:  Pt. attended the 1000 skill group. Began a project and was called out to talk to . Helpful with clean up. Status After Intervention:  Unchanged    Participation Level:  Active Listener and Interactive    Participation Quality: Appropriate and Attentive      Speech:  normal      Thought Process/Content: Logical      Affective Functioning: Congruent      Mood: calm      Level of consciousness:  Alert, Oriented x4 and Attentive      Response to Learning: Progressing to goal      Endings: None Reported    Modes of Intervention: Education, Support, Socialization and Activity      Discipline Responsible: Psychoeducational Specialist      Signature:  Tova Yu

## 2022-02-12 NOTE — FLOWSHEET NOTE
Attempted to do patients admission and patient had eyes closed and was snoring. After stating her name 3 times patient still was not alert enough to answer questions.
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
## 2022-02-12 NOTE — CONSULTS
[facility] MEDICINE    HISTORY AND PHYSICAL EXAM    PATIENT NAME:  Yenny Thomas    MRN:  [id number]  SERVICE DATE:  2/12/2022   SERVICE TIME:  12:53 PM    Primary Care Physician: Rashad Holt DO         SUBJECTIVE  CHIEF COMPLAINT:  Medically appropriate for inpatient psychiatry admission. Consult for medical H/P encounter. HPI:  This is a 39 y.o. female with PMHx of bipolar depression, DMII, HLD and remote drug abuse who presented to emergency room with increased depression and possible suicide attempt. Mother in law found patient unresponsive with multiple bottles of medication and open alcohol container. Patient was awake in ER and denied overdose but did report suicidal ideations. Patient was admitted to medical for observation. Patient remained medically stable overnight and was cleared from medical floor for psychiatric care. Patient Seen, Chart, Labs, Radiologystudies, and Consults reviewed. Patient denies headache, chest pain, shortness of breath, N/V/D/C, fever/chills.        PAST MEDICAL HISTORY:    Past Medical History:   Diagnosis Date    ADD (attention deficit disorder) 2021    Bipolar disorder ([facility]     Depression     DM (diabetes mellitus) ([facility]     History of drug abuse ([facility]     positive drug screens 9/22/14 and 5/18/14    Hyperlipidemia     Osteoarthritis      PAST SURGICAL HISTORY:    Past Surgical History:   Procedure Laterality Date    CHOLECYSTECTOMY, LAPAROSCOPIC N/A 2/28/2017    CHOLECYSTECTOMY LAPAROSCOPIC WITH GRAMS POSS OPEN , RECENT LABS  performed by Brook Cueto MD at [address] [place] [address] NOT  [address] [facility] N/A 11/27/2018    COLONOSCOPY ROOM 384 performed by Sera Farmer MD at [facility] N/A 6/16/2021    I&D LEFT  PERIRECTAL  ABSCESS performed by Himanshu Collins MD at [address] [place] [address] Drive:    Family History   Problem Relation Age of Onset    Other Mother         CHF    Cirrhosis Brother Other Maternal Grandmother         CHF     SOCIAL HISTORY:    Social History     Socioeconomic History    Marital status: Single     Spouse name: Not on file    Number of children: Not on file    Years of education: Not on file    Highest education level: Not on file   Occupational History    Not on file   Tobacco Use    Smoking status: Current Every Day Smoker     Packs/day: 1.00     Years: 24.00     Pack years: 24.00     Types: Cigarettes    Smokeless tobacco: Never Used   Vaping Use    Vaping Use: Some days    Substances: Nicotine, Flavoring    Devices: Disposable   Substance and Sexual Activity    Alcohol use: Not Currently     Comment: working on sobriety    Drug use: Not Currently    Sexual activity: Yes     Partners: Male   Other Topics Concern    Not on file   Social History Narrative    Not on file     Social Determinants of Health     Financial Resource Strain:     Difficulty of Paying Living Expenses: Not on file   Food Insecurity:     Worried About Running Out of Food in the Last Year: Not on file    Melia of Food in the Last Year: Not on file   Transportation Needs:     Lack of Transportation (Medical): Not on file    Lack of Transportation (Non-Medical):  Not on file   Physical Activity:     Days of Exercise per Week: Not on file    Minutes of Exercise per Session: Not on file   Stress:     Feeling of Stress : Not on file   Social Connections:     Frequency of Communication with Friends and Family: Not on file    Frequency of Social Gatherings with Friends and Family: Not on file    Attends Rastafari Services: Not on file    Active Member of Clubs or Organizations: Not on file    Attends Club or Organization Meetings: Not on file    Marital Status: Not on file   Intimate Partner Violence:     Fear of Current or Ex-Partner: Not on file    Emotionally Abused: Not on file    Physically Abused: Not on file    Sexually Abused: Not on file   Housing Stability:     Unable to Pay for Housing in the Last Year: Not on file    Number of Places Lived in the Last Year: Not on file    Unstable Housing in the Last Year: Not on file     MEDICATIONS:    Current Facility-Administered Medications   Medication Dose Route Frequency Provider Last Rate Last Admin    fluvoxaMINE (LUVOX) tablet 50 mg  50 mg Oral Nightly Chip Neal MD        topiramate (TOPAMAX) tablet 100 mg  100 mg Oral BID Chip Neal MD        clonazePAM Buhler Fought) tablet 0.5 mg  0.5 mg Oral TID PRN Chip Neal MD        aluminum & magnesium hydroxide-simethicone (MAALOX) 200-200-20 MG/5ML suspension 30 mL  30 mL Oral PRN Kerri Moran MD        benztropine mesylate (COGENTIN) injection 2 mg  2 mg IntraMUSCular BID PRN Kerri Moran MD        haloperidol (HALDOL) tablet 5 mg  5 mg Oral Q6H PRN Kerri Moran MD        Or    haloperidol lactate (HALDOL) injection 5 mg  5 mg IntraMUSCular Q6H PRN Kerri Moran MD        magnesium hydroxide (MILK OF MAGNESIA) 400 MG/5ML suspension 30 mL  30 mL Oral Daily PRN Kerri Moran MD        hydrOXYzine (VISTARIL) capsule 50 mg  50 mg Oral Q6H PRN Kerri Moran MD        Or    hydrOXYzine (VISTARIL) injection 50 mg  50 mg IntraMUSCular Q6H PRN Kerri Moran MD        acetaminophen (TYLENOL) tablet 650 mg  650 mg Oral Q6H PRN Niels Ronz, DO        Or    acetaminophen (TYLENOL) suppository 650 mg  650 mg Rectal Q6H PRN Niels Ye, DO        ondansetron (ZOFRAN-ODT) disintegrating tablet 4 mg  4 mg Oral Q8H PRN Niels Ronz, DO        Or    ondansetron (ZOFRAN) injection 4 mg  4 mg IntraVENous Q6H PRN Niels Ephraim, DO        polyethylene glycol (GLYCOLAX) packet 17 g  17 g Oral Daily PRN Niels Ephraim, DO        potassium chloride (KLOR-CON M) extended release tablet 40 mEq  40 mEq Oral PRN Niels Ephraim, DO        Or    potassium bicarb-citric acid (EFFER-K) effervescent tablet 40 mEq  40 mEq Oral PRN Niels Ephraim, DO        Or    potassium chloride 10 mEq/100 mL IVPB (Peripheral Line)  10 mEq IntraVENous PRN Altamese Leak, DO        dextrose 5 % solution  100 mL/hr IntraVENous PRN Altamese Leak, DO        dextrose bolus (hypoglycemia) 10% 125 mL  125 mL IntraVENous PRN Altamese Leak, DO        Or    dextrose bolus (hypoglycemia) 10% 250 mL  250 mL IntraVENous PRN Altamese Leak, DO        glucagon (rDNA) injection 1 mg  1 mg IntraMUSCular PRN Altamese Leak, DO        glucose (GLUTOSE) 40 % oral gel 15 g  15 g Oral PRN Altamese Leak, DO        insulin lispro (HUMALOG) injection vial 0-6 Units  0-6 Units SubCUTAneous TID [facility], DO        insulin lispro (HUMALOG) injection vial 0-3 Units  0-3 Units SubCUTAneous Nightly Altamese Leak, DO        nicotine polacrilex (NICORETTE) gum 2 mg  2 mg Oral Q2H PRN Altamese Leak, DO   2 mg at 02/12/22 0699       ALLERGIES: Debi Robinson [lurasidone hcl], Lurasidone, Adipex-p [phentermine], Metformin and related, and Adhesive tape    REVIEW OF SYSTEM:   ROS as noted in HPI, 12 point ROS reviewed and otherwise negative. OBJECTIVE  PHYSICAL EXAM: /77   Pulse 81   Temp 98.1 °F (36.7 °C)   Resp 16   SpO2 100%   CONSTITUTIONAL:  awake, alert, cooperative, no apparent distress, and appears stated age  EYES:  Lids and lashes normal, pupils equal, round and reactive to light, extra ocular muscles intact, sclera clear, conjunctiva normal  ENT:  Normocephalic, without obvious abnormality, atraumatic, sinuses nontender on palpation, external ears without lesions, oral pharynx with moist mucus membranes, tonsils without erythema or exudates, gums normal and good dentition.   NECK:  Supple, symmetrical, trachea midline, no adenopathy, thyroid symmetric, not enlarged and no tenderness, skin normal  LUNGS:  No increased work of breathing, good air exchange, clear to auscultation bilaterally, no crackles or wheezing  CARDIOVASCULAR:  Normal apical impulse, regular rate and rhythm, normal S1 and S2, no S3 or S4, and no murmur noted  ABDOMEN:  No scars, normal bowel sounds, soft, non-distended, non-tender, no masses palpated, no hepatosplenomegally  MUSCULOSKELETAL:  There is no redness, warmth, or swelling of the joints. Full range of motion noted. Motor strength is 5 out of 5 all extremities bilaterally. Tone is normal.  NEUROLOGIC:  Awake, alert, oriented to name, place and time. Cranial nerves II-XII are grossly intact. Motor is 5 out of 5 bilaterally. Sensory is intact.  gait is normal.  SKIN:  no bruising or bleeding, normal skin color, texture, turgor, no redness, warmth, or swelling and no jaundice    DATA:     Diagnostic tests reviewed for today's visit:    Most recent labs and imaging results reviewed. VTE Prophylaxis:     ASSESSMENT AND PLAN  Active Problems:    Major depressive disorder, recurrent (HCC)  Plan: Patient admitted to behavorial health for evaluation and treatment     DMII:  A1c 5.7,  ISS, hypoglycemia protocol POCT Glucose TIDAC & QHS    Hyperlipidemia: Continue atorvastatin add TriCor and omega fatty acids. This is only a history and physical examination and not medical management. The patient is to contact and follow up with their primary care physician and go over any abnormal labs, imaging, findings, medical concerns, or conditions that we have and have not addressed during this encounter. Plan of care discussed with: patient    SIGNATURE: NAOMI Mcguire CNP  DATE: February 12, 2022  TIME: 12:53 PM     I personally obtained the key and critical portions of the history and physical exam and made additions where appropriate in the documentation.  I reviewed the mid level documentation and agree with assessment and plan that we come up with together    Siria Domingo DO  Internal Medicine

## 2022-02-12 NOTE — H&P
Department of Psychiatry    History and Physical - Adult         Behavioral Services  Medicare Certification Upon Admission    I certify that this patient's inpatient psychiatric hospital admission is medically necessary for:    [x] (1) Treatment which could reasonably be expected to improve this patient's condition,       [x] (2) Or for diagnostic study;     AND     [x](2) The inpatient psychiatric services are provided while the individual is under the care of a physician and are included in the individualized plan of care. Estimated length of stay/service 5-7 days, depending on stability    Plan for post-hospital care follow up with outpatient provider    Electronically signed by Claus Lund MD on 2/12/2022 at 7:12 PM        CHIEF COMPLAINT:  Depressed mood, suicidal attempt    History obtained from:  patient, electronic medical record    Patient was seen after discussing with the treatment team and reviewing the chart    CIRCUMSTANCES OF ADMISSION:   Ms. Edwin García is a 39 y.o. female with a history of Bipolar Disorder, who was admitted to the hospital after a suicide attempt by overdose with multiple medications. She was seen as a consult by Dr. Philip Rizzo, and after medical clearance she was transferred to . HISTORY OF PRESENT ILLNESS:      The patient is a 39 y.o. female with significant past history of Bipolar Disorder, who was admitted for a suicide attempt by overdose with multiple medications. She was initially admitted to a medical floor and when medically stable she was transferred to . When seen today, the patient said she had been having a lot of stress at home; she had been having problems in marriage, and doing her best to deal with her situation. She said she felt her marriage was \"pretty much over\", and that \"the reality of that is hard\". She said her  was a drug addict, and an alcoholic, and \"up until I took pills I was completely sober\".  She said she \"slipped\" in September, when she used marijuana and Daiquiry, and then went to [facility] on her own because she wanted to prevent herself from a full relapse. She said she had a 'normal marriage\", until she found out from his phone that her , who is a drug addict, had gotten in a relationship with an older male who, she said, was a pedophile. She said she had found out that her  had been impersonating her underage son, sending the other man pictures of her son's and pictures of men's genitals and had been getting money for drugs from him. She also said she had confronted him in a text message and he went to his biological mother (who had only appeared in his life a few years ago) and had told her everything. This had all happened until around Thanksgiving and then he had deleted the tiara he had used. She said she then found out that he had gotten on [employer] and had been in contact with \"pornographic models\". She said she had finally realized that her marriage was not what she had thought it was, and she had to re-start her whole life. She said she was worried about her house who she didn't even have a key to, her belongings in the house and was worried about her cat who was alone in the house. She said the only one who had access to the house was her  who is currently in a nursing home post surgery (he is in [place] and will not answer her calls). The suicide attempt, she said, was because of the pain of realizing her marriage was over. She said that, \"when I bought the liquor I had every intention not to wake up\". She said she now realizes though that she 'has to start all over again' and live because she has 4 children and one grandchild. She said she \"has to focus an work with her , and housing authority to get new housing and get out of this situation and relationship\".  She said she does not want to die anymore; she wants to live- \"he doesn't get to have this power over me\". She said she has no contact with him at this time. She admitted to have taken an overdose of alcohol, with \"some tramadol from him\", clonazepam and wellbutrin. She said her sleep was \"real good\" lately, but before it was at most 6 hours/night. She said her appetite was 'good\". She said she had been thinking of suicide for 'quite a bit' prior to this, but had not told her doctor about it though. She denied homicidal ideation. She denied hallucinations, paranoia or other delusions. Stressors: as above, relationship problems    The patient is currently receiving care for the above psychiatric illness. Medications Prior to Admission:   Medications Prior to Admission: TRULICITY 1.5 AI/1.5RM SOPN, ADMINISTER 1.5 MG/0.5ML UNDER THE SKIN 1 TIME A WEEK  OLANZapine (ZYPREXA) 7.5 MG tablet, Take 7.5 mg by mouth nightly  prazosin (MINIPRESS) 2 MG capsule, Take 2 mg by mouth nightly  Omega-3 Fatty Acids (FISH OIL) 1200 MG CAPS, Take 1 capsule by mouth daily  albuterol sulfate  (90 Base) MCG/ACT inhaler, Indications: not taking   D3-1000 25 MCG (1000 UT) TABS tablet, Take 3 tablets by mouth daily   fluconazole (DIFLUCAN) 150 MG tablet, TAKE 1 TABLET BY MOUTH ONCE FOR 1 DOSE AND REPEAT IN 1 WEEK  GNP MELATONIN 3 MG TABS tablet, TAKE TWO TABLETS BY MOUTH EVERY NIGHT AT BEDTIME FOR SLEEP  OXcarbazepine (TRILEPTAL) 300 MG tablet, TAKE ONE TABLET BY MOUTH THREE TIMES DAILY  clonazePAM (KLONOPIN) 1 MG tablet, Take 1 mg by mouth 2 times daily as needed. fluvoxaMINE (LUVOX) 50 MG tablet, Take 50 mg by mouth nightly  blood glucose monitor kit and supplies, Dispense sufficient amount for indicated testing frequency plus additional to accommodate PRN testing needs. Dispense all needed supplies to include: monitor, strips, lancing device, lancets, control solutions, alcohol swabs.   Lancets MISC, Patient test 3x daily E65.11  glycopyrrolate (ROBINUL) 1 MG tablet, 2 po tid (Patient taking differently: Take 1 mg by mouth daily Pt taking differently than prescribed c/o constipation)  blood glucose test strips (FREESTYLE LITE) strip, 1 each by In Vitro route 4 times daily As needed. Continuous Blood Gluc  (FREESTYLE [place] 14 DAY READER) KATARINA, 1 Device by Does not apply route 4 times daily (before meals and nightly)  Continuous Blood Gluc Sensor (FREESTYLE [place] 14 DAY SENSOR) MISC, Every 2 weeks  brexpiprazole (REXULTI) 2 MG TABS tablet, Take 1 tablet by mouth daily  fenofibrate (TRICOR) 54 MG tablet, Take 1 tablet by mouth daily s DAWto pharmacy: Please dispense generic fenofibrate unless prescriber denote  omega-3 acid ethyl esters (LOVAZA) 1 g capsule, Take 2 capsules by mouth 2 times daily  Insulin Pen Needle (NOVOFINE) 32G X 6 MM MISC, 1 Device by Does not apply route 4 times daily (before meals and nightly)  blood glucose monitor kit and supplies, 1 kit by Other route 4 times daily (before meals and nightly) Pt test 4x daily Dx E11.65. May substitute for generic or insurance covered product  blood glucose monitor strips, 1 strip by Other route 4 times daily (before meals and nightly) Pt test 4x daily Dx E11.65.   May substitute for generic or insurance covered product  FreeStyle Lancets MISC, 1 each by Does not apply route 4 times daily (before meals and nightly)  Continuous Blood Gluc Sensor (FREESTYLE [place] 14 DAY SENSOR) MISC, 2 Devices by Does not apply route every 14 days  simvastatin (ZOCOR) 20 MG tablet, Take 1 tablet by mouth every evening    Compliance: yes    Psychiatric Review of Systems       Depression: yes     Jessica or Hypomania:  no     Panic Attacks:  no     Phobias:  no     Obsessions and Compulsions:  no     PTSD : no     Hallucinations:  no     Delusions:  no    Substance Abuse History:  ETOH: denies drinking until \"that day\"   Marijuana: denies   Opiates: denies  Other Drugs: denies (although her tox screen was positive for methadone)      Past Psychiatric History:  Prior Diagnosis:  Bipolar Disorder  Psychiatrist: yes  Therapist: yes  Hospitalization: yes  Hx of Suicidal Attempts: yes  Hx of violence:  no  ECT: no  Previous discontinued Psychiatric Med Trials: n/a    Past Medical History:        Diagnosis Date    ADD (attention deficit disorder) 2021    [facility]ipolar disorder ([facility]     Depression     DM (diabetes mellitus) ([facility]     History of drug abuse ([facility]     positive drug screens 9/22/14 and 5/18/14    Hyperlipidemia     Osteoarthritis        Past Surgical History:        Procedure Laterality Date    CHOLECYSTECTOMY, LAPAROSCOPIC N/A 2/28/2017    CHOLECYSTECTOMY LAPAROSCOPIC WITH GRAMS POSS OPEN , RECENT LABS  performed by Marie Roberts MD at [facility] [address] IND N/A 11/27/2018    COLONOSCOPY ROOM 384 performed by Kalina Owen MD at [facility] [place] N/A 6/16/2021    I&D LEFT  PERIRECTAL  ABSCESS performed by Eduin Salazar MD at [facility] [place]         Allergies:   Epimenio Sheets hcl], Lurasidone, Adipex-p [phentermine], Metformin and related, and Adhesive tape    Family History  Family History   Problem Relation Age of Onset    Other Mother         CHF    Cirrhosis Brother     Other Maternal Grandmother         CHF         Social History:  Born and Raised: Born in [place], raised in [place] and [place]. Indira  Describes Childhood:   troubled; she had her first child at 14 yo  Education: dropped out of school in 10th grade; has no GED  Employment: unemployed  Relationships:   Children: 4 children; they were taken away from her when she was 30  Current Support: limited    Access to weapons?:  No      EXAMINATION:    REVIEW OF SYSTEMS:    ROS:  [x] All negative/unchanged except if checked.  Explain positive(checked items) below:  [] Constitutional  [] Eyes  [] Ear/Nose/Mouth/Throat  [] Respiratory  [] CV  [] GI  []   [] Musculoskeletal  [] Skin/Breast  [] Neurological  [] Endocrine  [] Heme/Lymph  [] Allergic/Immunologic    Explanation:     Vitals:  /77   Pulse 81   Temp 98.1 °F (36.7 °C)   Resp 16   SpO2 100%      Neurologic Exam:   Muscle Strength & Tone: full ROM  Gait: normal gait   Involuntary Movements: No    Mental Status Examination:    Level of consciousness:  within normal limits   Appearance:  ill-appearing, hospital attire, fair grooming and fair hygiene  Behavior/Motor:  psychomotor retardation  Attitude toward examiner:  cooperative and fair eye contact  Speech:  spontaneous, normal rate, normal volume and well articulated   Mood: anxious, constricted, decreased range and depressed  Affect:  mood congruent and blunted  Thought processes:  linear, goal directed, coherent and slow   Thought content:  Homocidal ideation denies  Suicidal Ideation:  denies suicidal ideation  Delusions:  no evidence of delusions  Perceptual Disturbance:  denies any perceptual disturbance  Cognition:  oriented to person, place, and time   Concentration distractible  Memory intact  Insight poor   Judgement poor   Fund of Knowledge limited        DIAGNOSIS:    Bipolar Disorder, depressed, severe without psychotic features. S/p Suicide attempt        RISK ASSESSMENT:    SUICIDE RISK ASSESSMENT: high  HOMICIDE: no  AGITATION/VIOLENCE: no  ELOPEMENT: no    LABS: REVIEWED TODAY:  Recent Labs     02/10/22  2330 02/10/22  2330 02/11/22 0458 02/11/22  1034 02/12/22  0727   WBC 9.8  --   --  8.5 7.2   HGB 13.8   < > 12.0 13.4 13.1     --   --  181 175    < > = values in this interval not displayed. Recent Labs     02/10/22  1904 02/10/22  2330 02/11/22 0458 02/11/22  1034 02/12/22  0728   NA  --  143  --  142 140   K  --  3.2*  --  3.1* 3.8   CL  --  110*  --  112* 111*   CO2  --  22  --  20 20   BUN  --  13  --  14 15   CREATININE   < > 0.77 0.8 0.83 0.79   GLUCOSE  --  170*  --  110* 90    < > = values in this interval not displayed.      Recent Labs     02/10/22  1830 02/11/22  1034 02/12/22  2940 BILITOT <0.2 0.4 <0.2   ALKPHOS 74 59 54   AST 11 11 7   ALT 12 10 8     Lab Results   Component Value Date    LABAMPH Neg 02/10/2022    BARBSCNU Neg 02/10/2022    LABBENZ Neg 02/10/2022    LABMETH POSITIVE 02/10/2022    OPIATESCREENURINE Neg 02/10/2022    PHENCYCLIDINESCREENURINE Neg 02/10/2022    ETOH 155 02/10/2022     Lab Results   Component Value Date    TSH 4.140 02/10/2022     Lab Results   Component Value Date    LITHIUM 0.5 (L) 02/08/2019     No results found for: VALPROATE, CBMZ  Lab Results   Component Value Date    LITHIUM 0.5 02/08/2019       FURTHER LABS ORDERED :      Radiology   CT Head WO Contrast    Result Date: 2/10/2022  EXAMINATION: CT HEAD WO CONTRAST, 2/10/2022 7:05 PM CLINICAL HISTORY:  altered mental status COMPARISON: None TECHNIQUE:  Multiple contiguous axial images of the head were obtained from the skull base through the skull vertex without intravenous contrast. Sagittal and coronal 3D reformats have been produced. All CT scans at this facility use dose modulation, iterative reconstruction, and/or weight based dosing when appropriate to reduce radiation dose to as low as reasonably achievable. BRAIN CT FINDINGS: Gray-white matter differentiation is maintained. No acute hemorrhage, mass, mass effect, or midline shift. There is no evidence of atrophy, ventricular morphology is within normal limits. . The subcortical and periventricular white matter is within normal limits. The basal ganglia are within normal limits. There are no acute changes or space-occupying lesions in the posterior fossa. The visualized portions of the orbits are within normal limits. The globes are intact. The imaged portions of the paranasal sinuses are unremarkable. The calvarium is intact. The patient is intubated. There is no acute intracranial process. XR CHEST PORTABLE    Result Date: 2/10/2022  Exam: XR CHEST PORTABLE History:  overdose Technique: AP portable view of the chest obtained.  Comparison: none Chest x-ray portable Findings: The cardiomediastinal silhouette is within normal limits. There is atelectasis versus infiltrate in the left lung base. . Bones of the thorax appear intact. There is atelectasis versus infiltrate in the left lung base. EKG: TRACING REVIEWED    TREATMENT PLAN:    Risk Management:  close watch and suicide risk    Collateral Information:  Will obtain collateral information from the family or friends. Will obtain medical records as appropriate from out patient providers  Will consult the hospitalist for a physical exam to rule out any co-morbid physical condition.     Home medication Reconciled       New Medications started during this admission :    Current Facility-Administered Medications   Medication Dose Route Frequency Provider Last Rate Last Admin    fluvoxaMINE (LUVOX) tablet 50 mg  50 mg Oral Nightly Itzel Pickering MD        topiramate (TOPAMAX) tablet 100 mg  100 mg Oral BID Itzel Pickering MD   100 mg at 02/12/22 1403    clonazePAM (KLONOPIN) tablet 0.5 mg  0.5 mg Oral TID PRN Itzel Pickering MD        aluminum & magnesium hydroxide-simethicone (MAALOX) 200-200-20 MG/5ML suspension 30 mL  30 mL Oral PRN Yazan Gonzalez MD        benztropine mesylate (COGENTIN) injection 2 mg  2 mg IntraMUSCular BID PRN Yazan Gonzalez MD        haloperidol (HALDOL) tablet 5 mg  5 mg Oral Q6H PRN Yazan Gonzalez MD        Or    haloperidol lactate (HALDOL) injection 5 mg  5 mg IntraMUSCular Q6H PRN Yazan Gonzalez MD        magnesium hydroxide (MILK OF MAGNESIA) 400 MG/5ML suspension 30 mL  30 mL Oral Daily PRN Yazan Gonzalez MD        hydrOXYzine (VISTARIL) capsule 50 mg  50 mg Oral Q6H PRN Yazan Gonzalez MD        Or    hydrOXYzine (VISTARIL) injection 50 mg  50 mg IntraMUSCular Q6H PRN Yazan Gonzalez MD        acetaminophen (TYLENOL) tablet 650 mg  650 mg Oral Q6H PRN Ramirez Ernandez DO        Or    acetaminophen (TYLENOL) suppository 650 mg  650 mg Rectal Q6H PRN Daquan Hines, DO        ondansetron (ZOFRAN-ODT) disintegrating tablet 4 mg  4 mg Oral Q8H PRN Daquan Hines, DO        Or    [facility]dansetron [facility] injection 4 mg  4 mg IntraVENous Q6H PRN Daquan Hines, DO        polyethylene glycol (GLYCOLAX) packet 17 g  17 g Oral Daily PRN Daquan Hines, DO        potassium chloride (KLOR-CON M) extended release tablet 40 mEq  40 mEq Oral PRN Daquan Hnies, DO        Or    potassium bicarb-citric acid (EFFER-K) effervescent tablet 40 mEq  40 mEq Oral PRN Daquan Hines, DO        Or    potassium chloride 10 mEq/100 mL IVPB (Peripheral Line)  10 mEq IntraVENous PRN Daquan Hines, DO        dextrose 5 % solution  100 mL/hr IntraVENous PRN Daquan Hines, DO        dextrose bolus (hypoglycemia) 10% 125 mL  125 mL IntraVENous PRN Daquan Hines, DO        Or    dextrose bolus (hypoglycemia) 10% 250 mL  250 mL IntraVENous PRN Daquan Hines, DO        glucagon (rDNA) injection 1 mg  1 mg IntraMUSCular PRN Daquan Hines, DO        glucose (GLUTOSE) 40 % oral gel 15 g  15 g Oral PRN Daquan Hines, DO        insulin lispro (HUMALOG) injection vial 0-6 Units  0-6 Units SubCUTAneous TID WC Daquan Hines,         insulin lispro (HUMALOG) injection vial 0-3 Units  0-3 Units SubCUTAneous Nightly Daquan Hines,         nicotine polacrilex (NICORETTE) gum 2 mg  2 mg Oral Q2H PRN Daquan Hines, DO   2 mg at 02/12/22 1540     Discussed with the patient risk, benefit, alternative and common side effects for the  proposed medication treatment. Patient is consenting to the treatment.     Psychotherapy:   Encourage participation in milieu and group therapy  Individual therapy as needed      Electronically signed by Michelle Ludwig MD on 2/12/2022 at 4:09 PM

## 2022-02-13 LAB
ALBUMIN SERPL-MCNC: 4.5 G/DL (ref 3.5–4.6)
ALP BLD-CCNC: 68 U/L (ref 40–130)
ALT SERPL-CCNC: 10 U/L (ref 0–33)
ANION GAP SERPL CALCULATED.3IONS-SCNC: 12 MEQ/L (ref 9–15)
AST SERPL-CCNC: 10 U/L (ref 0–35)
BASOPHILS ABSOLUTE: 0.1 K/UL (ref 0–0.2)
BASOPHILS RELATIVE PERCENT: 0.7 %
BILIRUB SERPL-MCNC: 0.3 MG/DL (ref 0.2–0.7)
BUN BLDV-MCNC: 15 MG/DL (ref 6–20)
CALCIUM SERPL-MCNC: 9.7 MG/DL (ref 8.5–9.9)
CHLORIDE BLD-SCNC: 108 MEQ/L (ref 95–107)
CO2: 23 MEQ/L (ref 20–31)
CREAT SERPL-MCNC: 0.78 MG/DL (ref 0.5–0.9)
EOSINOPHILS ABSOLUTE: 0.1 K/UL (ref 0–0.7)
EOSINOPHILS RELATIVE PERCENT: 1.4 %
GFR AFRICAN AMERICAN: >60
GFR NON-AFRICAN AMERICAN: >60
GLOBULIN: 2.8 G/DL (ref 2.3–3.5)
GLUCOSE BLD-MCNC: 88 MG/DL (ref 70–99)
GLUCOSE BLD-MCNC: 93 MG/DL (ref 70–99)
GLUCOSE BLD-MCNC: 99 MG/DL (ref 70–99)
HCT VFR BLD CALC: 42.8 % (ref 37–47)
HEMOGLOBIN: 14.3 G/DL (ref 12–16)
LYMPHOCYTES ABSOLUTE: 1.9 K/UL (ref 1–4.8)
LYMPHOCYTES RELATIVE PERCENT: 19.2 %
MAGNESIUM: 1.9 MG/DL (ref 1.7–2.4)
MCH RBC QN AUTO: 29.5 PG (ref 27–31.3)
MCHC RBC AUTO-ENTMCNC: 33.3 % (ref 33–37)
MCV RBC AUTO: 88.6 FL (ref 82–100)
MONOCYTES ABSOLUTE: 0.5 K/UL (ref 0.2–0.8)
MONOCYTES RELATIVE PERCENT: 5.3 %
NEUTROPHILS ABSOLUTE: 7.2 K/UL (ref 1.4–6.5)
NEUTROPHILS RELATIVE PERCENT: 73.4 %
PDW BLD-RTO: 13.6 % (ref 11.5–14.5)
PERFORMED ON: NORMAL
PERFORMED ON: NORMAL
PLATELET # BLD: 207 K/UL (ref 130–400)
POTASSIUM SERPL-SCNC: 4.2 MEQ/L (ref 3.4–4.9)
RBC # BLD: 4.83 M/UL (ref 4.2–5.4)
SODIUM BLD-SCNC: 143 MEQ/L (ref 135–144)
TOTAL PROTEIN: 7.3 G/DL (ref 6.3–8)
WBC # BLD: 9.9 K/UL (ref 4.8–10.8)

## 2022-02-13 PROCEDURE — 6370000000 HC RX 637 (ALT 250 FOR IP): Performed by: INTERNAL MEDICINE

## 2022-02-13 PROCEDURE — 1240000000 HC EMOTIONAL WELLNESS R&B

## 2022-02-13 PROCEDURE — 36415 COLL VENOUS BLD VENIPUNCTURE: CPT

## 2022-02-13 PROCEDURE — 85025 COMPLETE CBC W/AUTO DIFF WBC: CPT

## 2022-02-13 PROCEDURE — 6370000000 HC RX 637 (ALT 250 FOR IP): Performed by: PSYCHIATRY & NEUROLOGY

## 2022-02-13 PROCEDURE — 83735 ASSAY OF MAGNESIUM: CPT

## 2022-02-13 PROCEDURE — 80053 COMPREHEN METABOLIC PANEL: CPT

## 2022-02-13 RX ADMIN — FLUVOXAMINE MALEATE 50 MG: 50 TABLET, COATED ORAL at 21:12

## 2022-02-13 RX ADMIN — TOPIRAMATE 100 MG: 100 TABLET, FILM COATED ORAL at 21:12

## 2022-02-13 RX ADMIN — TOPIRAMATE 100 MG: 100 TABLET, FILM COATED ORAL at 08:44

## 2022-02-13 RX ADMIN — NICOTINE POLACRILEX 2 MG: 2 GUM, CHEWING BUCCAL at 07:40

## 2022-02-13 RX ADMIN — NICOTINE POLACRILEX 2 MG: 2 GUM, CHEWING BUCCAL at 13:13

## 2022-02-13 RX ADMIN — NICOTINE POLACRILEX 2 MG: 2 GUM, CHEWING BUCCAL at 17:56

## 2022-02-13 NOTE — PROGRESS NOTES
Morning Community Meeting Topics    Brooklyn Bali attended the morning community meeting on 2/13/22. Topics discussed today     [x] Introduction   Day of the week and date   Mask distribution   Current mask requirements  [x]Teams   Explanation of  Green and Blue team criteria   Nurses assigned to each team for today   Explanation about green and blue paper  o Date  o Patient's Name  o Patient's Nurse  o Goals  [x] Visitation   Announce the visiting hours for the day   Announce which team is allowed to have visitors for the day   Review any updated Covid 19 requirements for visitors during visitation  o Vaccine Card or negative Covid test within 48 hours of visit  o State Identification   Patients are reminded to alert the  at least 1 hour before visitation   [x] Unit Orientation   Coffee use   Phone location and etiquette   Shower locations  [employer] and dryer location and process   Common area expectations   Staff rounds expectation  [x] Meals    Educate patient to the menu  o The patient is encouraged to fill out the menu to get preferences at mealtime  o The patient is educated that if they do not fill out the menu, they will get the standard tray  o The coffee pot is decaf, patient encouraged to order regular coffee from menu.    Educate patient to the meal process   Patient encouraged to eat snacks provided twice daily  o Snacks may stay in patient room     [x] Discharge Process   Discharge expectations   Fill out the survey after discharge   [x] Hygiene   Daily showers encouraged  o Showers availability discussed    Daily dressing encouraged  o Discussed wearing street clothing   Education provided on where to place linens and clothing  o Linens in the hamper  o personal clothing does not go into the linen hamper  [x] Group    Patient encouraged to attend group provided   Time of Group Meetings discussed   Gentle reminder that attendance is a Physician order  [x] Movement   Chair exercises completed   Stretching completed  Notes: GOAL : \" to have a positive day\" Electronically signed by Bernardo Proctor on 2/13/2022 at 9:58 AM

## 2022-02-13 NOTE — GROUP NOTE
Group Therapy Note    Date: 2/12/2022    Group Start Time: 1900  Group End Time: 2000  Group Topic: Recreational    [facility]    MIMI Cortes LSW        Group Therapy Note    Attendees: 13         Patient's Goal:  To participate in a recreational group activity    Notes:  Patient participated in Wii bowling game    Status After Intervention:  Improved    Participation Level: Interactive    Participation Quality: Appropriate      Speech:  normal      Thought Process/Content: Logical      Affective Functioning: Congruent      Mood: calm      Level of consciousness:  Alert      Response to Learning: Able to verbalize current knowledge/experience      Endings: None Reported    Modes of Intervention: Education      Discipline Responsible: /Counselor      Signature:  MIMI Cortes LSW

## 2022-02-13 NOTE — GROUP NOTE
Group Therapy Note    Date: 2/13/2022    Group Start Time: 1000  Group End Time: 1100  Group Topic: Psychoeducation    [facility]    Brigitte Vega, CTRS        Group Therapy Note    Attendees: 14         Patient's Goal:  \"to have a positive day\"    Notes:  Pt. attended the 1000 skill group. Pt. was very talkative with other pts. Worked on project with interst. Helpful with clean up. Status After Intervention:  Improved    Participation Level:  Active Listener and Interactive    Participation Quality: Appropriate, Attentive and Sharing      Speech:  normal      Thought Process/Content: Logical      Affective Functioning: Congruent      Mood: elevated and calm      Level of consciousness:  Alert, Oriented x4 and Attentive      Response to Learning: Able to change behavior and Progressing to goal      Endings: None Reported    Modes of Intervention: Education, Support, Socialization and Activity      Discipline Responsible: Psychoeducational Specialist      Signature:  George Doherty

## 2022-02-13 NOTE — PROGRESS NOTES
Patient did not attend group despite staff encouragement. .Electronically signed by Laura Gutiérrez on 2/13/2022 at 3:09 PM

## 2022-02-13 NOTE — GROUP NOTE
Group Therapy Note    Date: 2/13/2022    Group Start Time: 6235  Group End Time: 9027  Group Topic: Group Therapy    [facility]    CESARIO Cole        Group Therapy Note    Attendees: 13         Patient's Goal:  To participate in a goal oriented group. Notes:  Patient stated her goal is to get serious about her life and make better decisions that will benefit her in the long run. Status After Intervention:  Unchanged    Participation Level: Active Listener    Participation Quality: Appropriate      Speech:  normal      Thought Process/Content: Logical      Affective Functioning: Congruent      Mood: elevated      Level of consciousness:  Alert      Response to Learning: Able to verbalize current knowledge/experience      Endings: None Reported    Modes of Intervention: Education      Discipline Responsible: /Counselor      Signature:   CESARIO Cole

## 2022-02-13 NOTE — PROGRESS NOTES
BEHAVIORAL HEALTH FOLLOW-UP NOTE     2/13/2022     Patient was seen and examined in person, Chart reviewed   Patient's case discussed with staff/team    Chief Complaint: depressed mood, suicide attempt    Interim History:     Patient said she was feeling \"good\". She said her blood sugars were decreased and she did not want to take insulin since her diabetes had been related to her weight but since she lost weight, her blood sugars had improved and \"I don't have diabetes anymore\" . She said she slept well, and her appetite was \"OK\". She said her depression was Stephie Paulina lot better\". She said she was \"looking at things in a different light\". She denied suicidal or homicidal ideation. She denied hallucinations, paranoia or other delusions. She said she was planning on a positive day today.      Appetite:   [x] Normal/Unchanged  [] Increased  [] Decreased      Sleep:       [x] Normal/Unchanged  [] Fair       [] Poor              Energy:    [x] Normal/Unchanged  [] Increased  [] Decreased        SI [] Present  [x] Absent    HI  []Present  [x] Absent     Aggression:  [] yes  [x] no    Patient is [] able  [x] unable to CONTRACT FOR SAFETY     PAST MEDICAL/PSYCHIATRIC HISTORY:   Past Medical History:   Diagnosis Date    ADD (attention deficit disorder) 2021    [facility]     Depression     DM [facility]diabetes mellitus) ([facility]     History of drug abuse ([facility]     positive drug screens 9/22/14 and 5/18/14    Hyperlipidemia     Osteoarthritis        FAMILY/SOCIAL HISTORY:  Family History   Problem Relation Age of Onset    Other Mother         CHF    Cirrhosis Brother     Other Maternal Grandmother         CHF     Social History     Socioeconomic History    Marital status: Single     Spouse name: Not on file    Number of children: Not on file    Years of education: Not on file    Highest education level: Not on file   Occupational History    Not on file   Tobacco Use    Smoking status: Current Every Day Smoker Packs/day: 1.00     Years: 24.00     Pack years: 24.00     Types: Cigarettes    Smokeless tobacco: Never Used   Vaping Use    Vaping Use: Some days    Substances: Nicotine, Flavoring    Devices: Disposable   Substance and Sexual Activity    Alcohol use: Not Currently     Comment: working on sobriety    Drug use: Not Currently    Sexual activity: Yes     Partners: Male   Other Topics Concern    Not on file   Social History Narrative    Not on file     Social Determinants of Health     Financial Resource Strain:     Difficulty of Paying Living Expenses: Not on file   Food Insecurity:     Worried About Running Out of Food in the Last Year: Not on file    Melia of Food in the Last Year: Not on file   Transportation Needs:     Lack of Transportation (Medical): Not on file    Lack of Transportation (Non-Medical): Not on file   Physical Activity:     Days of Exercise per Week: Not on file    Minutes of Exercise per Session: Not on file   Stress:     Feeling of Stress : Not on file   Social Connections:     Frequency of Communication with Friends and Family: Not on file    Frequency of Social Gatherings with Friends and Family: Not on file    Attends Taoist Services: Not on file    Active Member of [address] [place] [address] [employer] or Organizations: Not on file    Attends Club or Organization Meetings: Not on file    Marital Status: Not on file   Intimate Partner Violence:     Fear of Current or Ex-Partner: Not on file    Emotionally Abused: Not on file    Physically Abused: Not on file    Sexually Abused: Not on file   Housing Stability:     Unable to Pay for Housing in the Last Year: Not on file    Number of [place] in the Last Year: Not on file    Unstable Housing in the Last Year: Not on file           ROS:  [x] All negative/unchanged except if checked.  Explain positive(checked items) below:  [] Constitutional  [] Eyes  [] Ear/Nose/Mouth/Throat  [] Respiratory  [] CV  [] GI  []   [] Musculoskeletal  [] Skin/Breast  [] Neurological  [] Endocrine  [] Heme/Lymph  [] Allergic/Immunologic    Explanation:     MEDICATIONS:    Current Facility-Administered Medications:     fluvoxaMINE (LUVOX) tablet 50 mg, 50 mg, Oral, Nightly, Hina Resendez MD, 50 mg at 02/12/22 2134    topiramate (TOPAMAX) tablet 100 mg, 100 mg, Oral, BID, Hina Resendez MD, 100 mg at 02/13/22 0844    clonazePAM (KLONOPIN) tablet 0.5 mg, 0.5 mg, Oral, TID PRN, Hina Resendez MD    aluminum & magnesium hydroxide-simethicone (MAALOX) 200-200-20 MG/5ML suspension 30 mL, 30 mL, Oral, PRN, Elvia Day, MD    benztropine mesylate (COGENTIN) injection 2 mg, 2 mg, IntraMUSCular, BID PRN, Elvia Day, MD    haloperidol (HALDOL) tablet 5 mg, 5 mg, Oral, Q6H PRN **OR** haloperidol lactate (HALDOL) injection 5 mg, 5 mg, IntraMUSCular, Q6H PRN, Elvia Day, MD    magnesium hydroxide (MILK OF MAGNESIA) 400 MG/5ML suspension 30 mL, 30 mL, Oral, Daily PRN, Elvia Day, MD    hydrOXYzine (VISTARIL) capsule 50 mg, 50 mg, Oral, Q6H PRN **OR** hydrOXYzine (VISTARIL) injection 50 mg, 50 mg, IntraMUSCular, Q6H PRN, Elvia Day, MD    acetaminophen (TYLENOL) tablet 650 mg, 650 mg, Oral, Q6H PRN **OR** acetaminophen (TYLENOL) suppository 650 mg, 650 mg, Rectal, Q6H PRN, Johana Flood, DO    ondansetron (ZOFRAN-ODT) disintegrating tablet 4 mg, 4 mg, Oral, Q8H PRN **OR** [DISCONTINUED] ondansetron (ZOFRAN) injection 4 mg, 4 mg, IntraVENous, Q6H PRN, Johana Flood, DO    polyethylene glycol (GLYCOLAX) packet 17 g, 17 g, Oral, Daily PRN, Johana Flood, DO    nicotine polacrilex (NICORETTE) gum 2 mg, 2 mg, Oral, Q2H PRN, Johana Flood, DO, 2 mg at 02/13/22 1313      Examination:  /72   Pulse 74   Temp 98.1 °F (36.7 °C)   Resp 18   SpO2 98%   Gait - steady  Medication side effects(SE): denies    Mental Status Examination:    Level of consciousness:  within normal limits   Appearance:  fair grooming and good hygiene  Behavior/Motor:  no abnormalities noted  Attitude toward examiner:  cooperative and good eye contact  Speech:  spontaneous, normal rate, normal volume and well articulated   Mood: depression stated to be much improved  Affect:  mood congruent  Thought processes:  linear, goal directed and coherent   Thought content:  Homocidal ideation denies  Suicidal Ideation:  denies suicidal ideation  Delusions:  no evidence of delusions  Perceptual Disturbance:  denies any perceptual disturbance  Cognition:  oriented to person, place, and time   Concentration distractible  Insight fair   Judgement fair     ASSESSMENT:   Patient symptoms are:  [x] Well controlled  [] Improving  [] Worsening  [] No change      Diagnosis:   Bipolar Disorder, depressed, severe without psychotic features. S/p Suicide attempt    LABS:    Recent Labs     02/11/22  1034 02/12/22  0727 02/13/22  0935   WBC 8.5 7.2 9.9   HGB 13.4 13.1 14.3    175 207     Recent Labs     02/11/22  1034 02/12/22  0728 02/13/22  0935    140 143   K 3.1* 3.8 4.2   * 111* 108*   CO2 20 20 23   BUN 14 15 15   CREATININE 0.83 0.79 0.78   GLUCOSE 110* 90 93     Recent Labs     02/11/22  1034 02/12/22  0728 02/13/22  0935   BILITOT 0.4 <0.2 0.3   ALKPHOS 59 54 68   AST 11 7 10   ALT 10 8 10     Lab Results   Component Value Date    LABAMPH Neg 02/10/2022    BARBSCNU Neg 02/10/2022    LABBENZ Neg 02/10/2022    LABMETH POSITIVE 02/10/2022    OPIATESCREENURINE Neg 02/10/2022    PHENCYCLIDINESCREENURINE Neg 02/10/2022    ETOH 155 02/10/2022     Lab Results   Component Value Date    TSH 4.140 02/10/2022     Lab Results   Component Value Date    LITHIUM 0.5 (L) 02/08/2019     No results found for: VALPROATE, CBMZ    RISK ASSESSMENT: high risk of suicide    Treatment Plan:  Reviewed current Medications with the patient. Will continue current medications. Discussed with the hospitalist about adjusting medications for diabetes.   Risks, benefits, side effects, drug-to-drug interactions and alternatives to treatment were discussed. Collateral information:    CD evaluation   Encourage patient to attend group and other milieu activities.   Discharge planning discussed with the patient and treatment team.    PSYCHOTHERAPY/COUNSELING:  [x] Therapeutic interview  [x] Supportive  [] CBT  [] Ongoing  [] Other    [x] Patient continues to need, on a daily basis, active treatment furnished directly by or requiring the supervision of inpatient psychiatric personnel      Anticipated Length of stay:             Electronically signed by Wade Cruz MD on 2/13/2022 at 2:53 PM

## 2022-02-13 NOTE — GROUP NOTE
Group Therapy Note    Date: 2/13/2022    Group Start Time: 1430  Group End Time: 1500  Group Topic: Recreational    [facility]    Kathrin Mccord        Group Therapy Note    Attendees: 6         Patient's Goal:  To attend group    Notes:  Pt was attentive     Status After Intervention:  Unchanged    Participation Level: Interactive    Participation Quality: Attentive      Speech:  normal      Thought Process/Content: Logical      Affective Functioning: Congruent      Mood: calm    Level of consciousness:  Alert      Response to Learning: Able to verbalize current knowledge/experience      Endings: None Reported    Modes of Intervention: Activity      Discipline Responsible: PCA      Signature:   Brooke Mckeon

## 2022-02-14 LAB
EKG ATRIAL RATE: 53 BPM
EKG ATRIAL RATE: 65 BPM
EKG P AXIS: -2 DEGREES
EKG P AXIS: 0 DEGREES
EKG P-R INTERVAL: 154 MS
EKG P-R INTERVAL: 172 MS
EKG Q-T INTERVAL: 412 MS
EKG Q-T INTERVAL: 460 MS
EKG QRS DURATION: 76 MS
EKG QRS DURATION: 88 MS
EKG QTC CALCULATION (BAZETT): 428 MS
EKG QTC CALCULATION (BAZETT): 431 MS
EKG R AXIS: 0 DEGREES
EKG R AXIS: 5 DEGREES
EKG T AXIS: 14 DEGREES
EKG T AXIS: 9 DEGREES
EKG VENTRICULAR RATE: 53 BPM
EKG VENTRICULAR RATE: 65 BPM

## 2022-02-14 PROCEDURE — 6370000000 HC RX 637 (ALT 250 FOR IP): Performed by: PSYCHIATRY & NEUROLOGY

## 2022-02-14 PROCEDURE — 99232 SBSQ HOSP IP/OBS MODERATE 35: CPT | Performed by: PSYCHIATRY & NEUROLOGY

## 2022-02-14 PROCEDURE — 1240000000 HC EMOTIONAL WELLNESS R&B

## 2022-02-14 PROCEDURE — 6370000000 HC RX 637 (ALT 250 FOR IP): Performed by: INTERNAL MEDICINE

## 2022-02-14 RX ORDER — BUPROPION HYDROCHLORIDE 75 MG/1
75 TABLET ORAL NIGHTLY
Status: DISCONTINUED | OUTPATIENT
Start: 2022-02-14 | End: 2022-02-16 | Stop reason: HOSPADM

## 2022-02-14 RX ADMIN — FLUVOXAMINE MALEATE 50 MG: 50 TABLET, COATED ORAL at 20:26

## 2022-02-14 RX ADMIN — TOPIRAMATE 100 MG: 100 TABLET, FILM COATED ORAL at 08:34

## 2022-02-14 RX ADMIN — BUPROPION HYDROCHLORIDE 75 MG: 75 TABLET, FILM COATED ORAL at 20:26

## 2022-02-14 RX ADMIN — CLONAZEPAM 0.5 MG: 0.5 TABLET ORAL at 16:26

## 2022-02-14 RX ADMIN — NICOTINE POLACRILEX 2 MG: 2 GUM, CHEWING BUCCAL at 06:08

## 2022-02-14 RX ADMIN — TOPIRAMATE 100 MG: 100 TABLET, FILM COATED ORAL at 20:26

## 2022-02-14 RX ADMIN — NICOTINE POLACRILEX 2 MG: 2 GUM, CHEWING BUCCAL at 16:28

## 2022-02-14 RX ADMIN — NICOTINE POLACRILEX 2 MG: 2 GUM, CHEWING BUCCAL at 08:34

## 2022-02-14 NOTE — PROGRESS NOTES
Pt reports depression is #6/10, anxiety is #8/10. Pt denies SI/HI/AVH. Pt states she attends all groups. Pt reports she attends all groups, appetite is good, and sleeping 7 hrs. Pt is out on unit most afternoons & is social with peers.

## 2022-02-14 NOTE — PROGRESS NOTES
Morning Community Meeting Topics    Kaitlyn Swanson attended the morning community meeting on 2/14/22. Topics discussed today     [x] Introduction   Day of the week and date   Mask distribution   Current mask requirements  [x]Teams   Explanation of  Green and Blue team criteria   Nurses assigned to each team for today   Explanation about green and blue paper  o Date  o Patient's Name  o Patient's Nurse  o Goals  [x] Visitation   Announce the visiting hours for the day   Announce which team is allowed to have visitors for the day   Review any updated Covid 19 requirements for visitors during visitation  o Vaccine Card or negative Covid test within 48 hours of visit  o State Identification   Patients are reminded to alert the  at least 1 hour before visitation   [x] Unit Orientation   Coffee use   Phone location and etiquette   Shower locations  [employer] and dryer location and process   Common area expectations   Staff rounds expectation  [x] Meals    Educate patient to the menu  o The patient is encouraged to fill out the menu to get preferences at mealtime  o The patient is educated that if they do not fill out the menu, they will get the standard tray  o The coffee pot is decaf, patient encouraged to order regular coffee from menu.    Educate patient to the meal process   Patient encouraged to eat snacks provided twice daily  o Snacks may stay in patient room     [x] Discharge Process   Discharge expectations   Fill out the survey after discharge   [x] Hygiene   Daily showers encouraged  o Showers availability discussed    Daily dressing encouraged  o Discussed wearing street clothing   Education provided on where to place linens and clothing  o Linens in the hamper  o personal clothing does not go into the linen hamper  [x] Group    Patient encouraged to attend group provided   Time of Group Meetings discussed   Gentle reminder that attendance is a Physician order  [x] Movement   Chair exercises completed   Stretching completed  Notes:Goal - \"be positive and attend all the groups\" Electronically signed by CHIRAG Tovar on 2/14/2022 at 9:57 AM

## 2022-02-14 NOTE — PROGRESS NOTES
Pt out on unit, attends groups and is social with select peers. Pt reports anxiety and depression both 6/10. Pt denies any SI, HI, AVH. Pt talked about events that led to hospital admission and that she feels guilty about it. Pt reports she bought a bottle of alcohol, drank it, took her medications and had to have CPR to be \"brought back to life. \" Per pt her  has stopped talking to her since the overdose which is upsetting to her. Pt tearful, states \"I regret it, I should of called for help. \" Pt reports some chest soreness due to the compressions she received. Pt reports she sees a Psychiatrist at the [facility] and that she has a  there. This nurse called center to request medication list, was forwarded to medical records and left a message a return call. Pt reports biggest stressors are her  being in a nursing home and if Dr. Tejas Haas will allow her to discharge home as she lives alone.

## 2022-02-14 NOTE — GROUP NOTE
Group Therapy Note    Date: 2/13/2022    Group Start Time: 1930  Group End Time: 2030  Group Topic: Recreational    [facility]    Candance Prime, MSW, LSW        Group Therapy Note    Attendees: 22         Patient's Goal:  To participate in a recreational activity    Notes:  Patient watched the Super Bowl and interacted with other group members.     Status After Intervention:  Improved    Participation Level: Interactive    Participation Quality: Appropriate      Speech:  normal      Thought Process/Content: Logical      Affective Functioning: Congruent      Mood: calm      Level of consciousness:  Alert      Response to Learning: Able to verbalize current knowledge/experience      Endings: None Reported    Modes of Intervention: Education      Discipline Responsible: /Counselor      Signature:  Candance Prime, MSW, LSW

## 2022-02-14 NOTE — PROGRESS NOTES
BEHAVIORAL HEALTH FOLLOW-UP NOTE     2/14/2022     Patient was seen and examined in person, Chart reviewed   Patient's case discussed with staff/team    Chief Complaint: depressed mood, suicide attempt    Interim History:     Patient report feeling better  [place] overwhelmed for her to take overdose  Denies having any suicidal thoughts currently  Has been compliant with medication  No AH or VH  Feeling anxious   in NH and he is worried about her pets at home     Appetite:   [x] Normal/Unchanged  [] Increased  [] Decreased      Sleep:       [x] Normal/Unchanged  [] Fair       [] Poor              Energy:    [x] Normal/Unchanged  [] Increased  [] Decreased        SI [] Present  [x] Absent    HI  []Present  [x] Absent     Aggression:  [] yes  [x] no    Patient is [] able  [x] unable to CONTRACT FOR SAFETY     PAST MEDICAL/PSYCHIATRIC HISTORY:   Past Medical History:   Diagnosis Date    ADD (attention deficit disorder) 2021    [facility]     Depression     DM [facility]diabetes mellitus) ([facility]     History of drug abuse ([facility]     positive drug screens 9/22/14 and 5/18/14    Hyperlipidemia     Osteoarthritis        FAMILY/SOCIAL HISTORY:  Family History   Problem Relation Age of Onset    Other Mother         CHF    Cirrhosis Brother     Other Maternal Grandmother         CHF     Social History     Socioeconomic History    Marital status: Single     Spouse name: Not on file    Number of children: Not on file    Years of education: Not on file    Highest education level: Not on file   Occupational History    Not on file   Tobacco Use    Smoking status: Current Every Day Smoker     Packs/day: 1.00     Years: 24.00     Pack years: 24.00     Types: Cigarettes    Smokeless tobacco: Never Used   Vaping Use    Vaping Use: Some days    Substances: Nicotine, Flavoring    Devices: Disposable   Substance and Sexual Activity    Alcohol use: Not Currently     Comment: working on sobriety    Drug use: Not Currently    Sexual activity: Yes     Partners: Male   Other Topics Concern    Not on file   Social History Narrative    Not on file     Social Determinants of Health     Financial Resource Strain:     Difficulty of Paying Living Expenses: Not on file   Food Insecurity:     Worried About Running Out of Food in the Last Year: Not on file    Melia of Food in the Last Year: Not on file   Transportation Needs:     Lack of Transportation (Medical): Not on file    Lack of Transportation (Non-Medical): Not on file   Physical Activity:     Days of Exercise per Week: Not on file    Minutes of Exercise per Session: Not on file   Stress:     Feeling of Stress : Not on file   Social Connections:     Frequency of Communication with Friends and Family: Not on file    Frequency of Social Gatherings with Friends and Family: Not on file    Attends Latter-day Services: Not on file    Active Member of [address] [place] [address] [employer] or Organizations: Not on file    Attends Club or Organization Meetings: Not on file    Marital Status: Not on file   Intimate Partner Violence:     Fear of Current or Ex-Partner: Not on file    Emotionally Abused: Not on file    Physically Abused: Not on file    Sexually Abused: Not on file   Housing Stability:     Unable to Pay for Housing in the Last Year: Not on file    Number of [place] in the Last Year: Not on file    Unstable Housing in the Last Year: Not on file           ROS:  [x] All negative/unchanged except if checked.  Explain positive(checked items) below:  [] Constitutional  [] Eyes  [] Ear/Nose/Mouth/Throat  [] Respiratory  [] CV  [] GI  []   [] Musculoskeletal  [] Skin/Breast  [] Neurological  [] Endocrine  [] Heme/Lymph  [] Allergic/Immunologic    Explanation:     MEDICATIONS:    Current Facility-Administered Medications:     fluvoxaMINE (LUVOX) tablet 50 mg, 50 mg, Oral, Nightly, Saige Mancera MD, 50 mg at 02/13/22 2112    topiramate (TOPAMAX) tablet 100 mg, 100 mg, Oral, BID, Dahiana Gregg MD, 100 mg at 02/14/22 1067    clonazePAM (KLONOPIN) tablet 0.5 mg, 0.5 mg, Oral, TID PRN, Dahiana Gregg MD    aluminum & magnesium hydroxide-simethicone (MAALOX) 200-200-20 MG/5ML suspension 30 mL, 30 mL, Oral, PRN, Porfirio Carroll MD    benztropine mesylate (COGENTIN) injection 2 mg, 2 mg, IntraMUSCular, BID PRN, Porfirio Carroll MD    haloperidol (HALDOL) tablet 5 mg, 5 mg, Oral, Q6H PRN **OR** haloperidol lactate (HALDOL) injection 5 mg, 5 mg, IntraMUSCular, Q6H PRN, Porfirio Carroll MD    magnesium hydroxide (MILK OF MAGNESIA) 400 MG/5ML suspension 30 mL, 30 mL, Oral, Daily PRN, Porfirio Carroll MD    hydrOXYzine (VISTARIL) capsule 50 mg, 50 mg, Oral, Q6H PRN **OR** hydrOXYzine (VISTARIL) injection 50 mg, 50 mg, IntraMUSCular, Q6H PRN, Porfirio Carroll MD    acetaminophen (TYLENOL) tablet 650 mg, 650 mg, Oral, Q6H PRN **OR** acetaminophen (TYLENOL) suppository 650 mg, 650 mg, Rectal, Q6H PRN, Aston Almeida DO    ondansetron (ZOFRAN-ODT) disintegrating tablet 4 mg, 4 mg, Oral, Q8H PRN **OR** [DISCONTINUED] ondansetron (ZOFRAN) injection 4 mg, 4 mg, IntraVENous, Q6H PRN, Aston Almeida DO    polyethylene glycol (GLYCOLAX) packet 17 g, 17 g, Oral, Daily PRN, Aston Almeida DO    nicotine polacrilex (NICORETTE) gum 2 mg, 2 mg, Oral, Q2H PRN, Aston Almeida DO, 2 mg at 02/14/22 0481      Examination:  /68   Pulse 85   Temp 98.1 °F (36.7 °C) (Oral)   Resp 18   SpO2 98%   Gait - steady  Medication side effects(SE): denies    Mental Status Examination:    Level of consciousness:  within normal limits   Appearance:  fair grooming and good hygiene  Behavior/Motor:  no abnormalities noted  Attitude toward examiner:  cooperative and good eye contact  Speech:  spontaneous, normal rate, normal volume and well articulated   Mood: depression stated to be much improved  Affect:  mood congruent  Thought processes:  linear, goal directed and coherent   Thought content:  Homocidal ideation denies  Suicidal Ideation:  denies suicidal ideation  Delusions:  no evidence of delusions  Perceptual Disturbance:  denies any perceptual disturbance  Cognition:  oriented to person, place, and time   Concentration distractible  Insight fair   Judgement fair     ASSESSMENT:   Patient symptoms are:  [x] Well controlled  [] Improving  [] Worsening  [] No change      Diagnosis:   Bipolar Disorder, depressed, severe without psychotic features. S/p Suicide attempt    LABS:    Recent Labs     02/12/22  0727 02/13/22  0935   WBC 7.2 9.9   HGB 13.1 14.3    207     Recent Labs     02/12/22  0728 02/13/22  0935    143   K 3.8 4.2   * 108*   CO2 20 23   BUN 15 15   CREATININE 0.79 0.78   GLUCOSE 90 93     Recent Labs     02/12/22  0728 02/13/22  0935   BILITOT <0.2 0.3   ALKPHOS 54 68   AST 7 10   ALT 8 10     Lab Results   Component Value Date    LABAMPH Neg 02/10/2022    BARBSCNU Neg 02/10/2022    LABBENZ Neg 02/10/2022    LABMETH POSITIVE 02/10/2022    OPIATESCREENURINE Neg 02/10/2022    PHENCYCLIDINESCREENURINE Neg 02/10/2022    ETOH 155 02/10/2022     Lab Results   Component Value Date    TSH 4.140 02/10/2022     Lab Results   Component Value Date    LITHIUM 0.5 (L) 02/08/2019     No results found for: VALPROATE, CBMZ    RISK ASSESSMENT: high risk of suicide    Treatment Plan:  Reviewed current Medications with the patient. Will continue current medications. Discussed with the hospitalist about adjusting medications for diabetes. Risks, benefits, side effects, drug-to-drug interactions and alternatives to treatment were discussed. Collateral information:    CD evaluation   Encourage patient to attend group and other milieu activities.   Discharge planning discussed with the patient and treatment team.    PSYCHOTHERAPY/COUNSELING:  [x] Therapeutic interview  [x] Supportive  [] CBT  [] Ongoing  [] Other    [x] Patient continues to need, on a daily basis, active treatment furnished directly by or requiring the supervision of inpatient psychiatric personnel      Anticipated Length of stay:             Electronically signed by Russel Chilel MD on 2/14/2022 at 2:15 PM

## 2022-02-14 NOTE — GROUP NOTE
Group Therapy Note    Date: 2/14/2022    Group Start Time: 1300  Group End Time: 1320  Group Topic: Cognitive Skills    [facility]    Patrice Jesus RN        Group Therapy Note    Attendees: 6         Patient's Goal:  To improve self-esteem. Status After Intervention:  Improved    Participation Level:  Active Listener and Interactive    Participation Quality: Appropriate, Attentive and Sharing      Speech:  normal      Thought Process/Content: Logical  Linear      Affective Functioning: Congruent      Mood: euthymic      Level of consciousness:  Alert and Oriented x4      Response to Learning: Progressing to goal      Endings: None Reported    Modes of Intervention: Education, Support and Problem-solving      Discipline Responsible: Registered Nurse      Signature:  Patrice Jesus RN

## 2022-02-14 NOTE — GROUP NOTE
Group Therapy Note    Date: 2/14/2022    Group Start Time: 1000  Group End Time: 1050  Group Topic: Psychoeducation    [facility]    Juan Gilliam        Group Therapy Note    Attendees: 13         Patient's Goal:  \"be positive and attend all the groups\"    Notes:  Patient attended the 1000 skills group. Patient was talkative and sociable in group. She work fairly well on her task. Status After Intervention:  Improved    Participation Level:  Active Listener    Participation Quality: Appropriate      Speech:  normal      Thought Process/Content: Linear      Affective Functioning: Congruent      Mood: calm      Level of consciousness:  Alert      Response to Learning: Progressing to goal      Endings: None Reported    Modes of Intervention: Education, Socialization and Activity      Discipline Responsible: Psychoeducational Specialist      Signature:  Juan Gilliam

## 2022-02-14 NOTE — GROUP NOTE
Group Therapy Note    Date: 2/14/2022    Group Start Time: 0082  Group End Time: 1450  Group Topic: Cognitive Skills    [facility]    CESARIO Mclaughlin        Group Therapy Note    Attendees:9         Patient's Goal:  To participate in mood management group. Notes:  Patient learned to create an action plan. Status After Intervention:  Unchanged    Participation Level: Active Listener    Participation Quality: Appropriate      Speech:  normal      Thought Process/Content: Logical      Affective Functioning: Congruent      Mood: elevated      Level of consciousness:  Alert      Response to Learning: Able to verbalize current knowledge/experience      Endings: None Reported    Modes of Intervention: Education      Discipline Responsible: /Counselor      Signature:   CESARIO Mclaughlin

## 2022-02-14 NOTE — GROUP NOTE
Group Therapy Note    Date: 2/14/2022    Group Start Time: 1100  Group End Time: 2516  Group Topic: Psychotherapy    MLCADY 3W MINDYI    Zayra Aponte, [facility]        Group Therapy Note    Attendees:12         Patient's Goal: to feel better and have a good safety plan    Notes:  Patient stated that she is taking one day at a time    Status After Intervention:  Unchanged    Participation Level: Minimal    Participation Quality: Attentive      Speech:  normal      Thought Process/Content: Logical      Affective Functioning: Flat      Mood: depressed      Level of consciousness:  Alert      Response to Learning: Progressing to goal      Endings: None Reported    Modes of Intervention: Support      Discipline Responsible: /Counselor      Signature:  Rodrigo Caro, [facility]

## 2022-02-14 NOTE — PROGRESS NOTES
Patient did not attend group despite staff encouragement.   Electronically signed by Kristofer Crowell on 2/14/2022 at 5:22 PM

## 2022-02-15 LAB
EKG ATRIAL RATE: 83 BPM
EKG P AXIS: 47 DEGREES
EKG P-R INTERVAL: 160 MS
EKG Q-T INTERVAL: 378 MS
EKG QRS DURATION: 94 MS
EKG QTC CALCULATION (BAZETT): 444 MS
EKG R AXIS: 12 DEGREES
EKG T AXIS: 7 DEGREES
EKG VENTRICULAR RATE: 83 BPM

## 2022-02-15 PROCEDURE — 6370000000 HC RX 637 (ALT 250 FOR IP): Performed by: PSYCHIATRY & NEUROLOGY

## 2022-02-15 PROCEDURE — 6370000000 HC RX 637 (ALT 250 FOR IP): Performed by: INTERNAL MEDICINE

## 2022-02-15 PROCEDURE — 90833 PSYTX W PT W E/M 30 MIN: CPT | Performed by: PSYCHIATRY & NEUROLOGY

## 2022-02-15 PROCEDURE — 99232 SBSQ HOSP IP/OBS MODERATE 35: CPT | Performed by: PSYCHIATRY & NEUROLOGY

## 2022-02-15 PROCEDURE — 1240000000 HC EMOTIONAL WELLNESS R&B

## 2022-02-15 RX ORDER — CLONAZEPAM 1 MG/1
1 TABLET ORAL 3 TIMES DAILY PRN
Status: DISCONTINUED | OUTPATIENT
Start: 2022-02-15 | End: 2022-02-16 | Stop reason: HOSPADM

## 2022-02-15 RX ADMIN — NICOTINE POLACRILEX 2 MG: 2 GUM, CHEWING BUCCAL at 08:51

## 2022-02-15 RX ADMIN — CLONAZEPAM 1 MG: 1 TABLET ORAL at 11:37

## 2022-02-15 RX ADMIN — CLONAZEPAM 0.5 MG: 0.5 TABLET ORAL at 02:51

## 2022-02-15 RX ADMIN — CLONAZEPAM 1 MG: 1 TABLET ORAL at 21:27

## 2022-02-15 RX ADMIN — NICOTINE POLACRILEX 2 MG: 2 GUM, CHEWING BUCCAL at 02:53

## 2022-02-15 RX ADMIN — TOPIRAMATE 100 MG: 100 TABLET, FILM COATED ORAL at 08:38

## 2022-02-15 RX ADMIN — TOPIRAMATE 100 MG: 100 TABLET, FILM COATED ORAL at 21:27

## 2022-02-15 RX ADMIN — FLUVOXAMINE MALEATE 50 MG: 50 TABLET, COATED ORAL at 21:27

## 2022-02-15 RX ADMIN — BUPROPION HYDROCHLORIDE 75 MG: 75 TABLET, FILM COATED ORAL at 21:27

## 2022-02-15 RX ADMIN — NICOTINE POLACRILEX 2 MG: 2 GUM, CHEWING BUCCAL at 16:27

## 2022-02-15 NOTE — PROGRESS NOTES
Morning Community Meeting Topics    Tiffany Welsh attended the morning community meeting on 2/15/22. Topics discussed today     [x] Introduction   Day of the week and date   Mask distribution   Current mask requirements  [x]Teams   Explanation of  Green and Blue team criteria   Nurses assigned to each team for today   Explanation about green and blue paper  o Date  o Patient's Name  o Patient's Nurse  o Goals  [x] Visitation   Announce the visiting hours for the day   Announce which team is allowed to have visitors for the day   Review any updated Covid 19 requirements for visitors during visitation  o Vaccine Card or negative Covid test within 48 hours of visit  o State Identification   Patients are reminded to alert the  at least 1 hour before visitation   [x] Unit Orientation   Coffee use   Phone location and etiquette   Shower locations  [employer] and dryer location and process   Common area expectations   Staff rounds expectation  [x] Meals    Educate patient to the menu  o The patient is encouraged to fill out the menu to get preferences at mealtime  o The patient is educated that if they do not fill out the menu, they will get the standard tray  o The coffee pot is decaf, patient encouraged to order regular coffee from menu.    Educate patient to the meal process   Patient encouraged to eat snacks provided twice daily  o Snacks may stay in patient room     [x] Discharge Process   Discharge expectations   Fill out the survey after discharge   [x] Hygiene   Daily showers encouraged  o Showers availability discussed    Daily dressing encouraged  o Discussed wearing street clothing   Education provided on where to place linens and clothing  o Linens in the hamper  o personal clothing does not go into the linen hamper  [x] Group    Patient encouraged to attend group provided   Time of Group Meetings discussed   Gentle reminder that attendance is a Physician order  [x] Movement   Chair exercises completed   Stretching completed  Notes:Goal - \"To make a plan with the Doctor to go home\" Electronically signed by CHIRAG Mcfadden on 2/15/2022 at 9:55 AM

## 2022-02-15 NOTE — PROGRESS NOTES
Patient accepted PRN klonopin for complaint of anxiety.  Electronically signed by Ilya Neff RN on 2/15/22 at 11:38 AM EST

## 2022-02-15 NOTE — GROUP NOTE
Group Therapy Note    Date: 2/15/2022    Group Start Time: 1100  Group End Time: 1130  Group Topic: Psychotherapy    ML 3W I    CARMEN Ball        Group Therapy Note    Attendees: 12/17         Patient's Goal:  Did not state     Notes: Insightful     Status After Intervention:  Improved    Participation Level:  Active Listener and Interactive    Participation Quality: Appropriate, Attentive, Sharing and Supportive      Speech:  normal      Thought Process/Content: Logical      Affective Functioning: Congruent      Mood: anxious      Level of consciousness:  Alert      Response to Learning: Progressing to goal      Endings: None Reported    Modes of Intervention: Education      Discipline Responsible: /Counselor      Signature:  CARMEN Ball

## 2022-02-15 NOTE — GROUP NOTE
Group Therapy Note    Date: 2/15/2022    Group Start Time: 1300  Group End Time: 5006  Group Topic: Healthy Living/Wellness    [facility]    Jannette Stallworth RN        Group Therapy Note    Attendees: 14       Patient's Goal:  To learn more about bulleted journaling and it's positive benefits. Also to pick and discuss journaling prompt. Notes:  Pt actively and appropriately participated      Status After Intervention:  Unchanged    Participation Level:  Active Listener and Interactive    Participation Quality: Appropriate, Attentive and Sharing      Speech:  normal      Thought Process/Content: Logical  Linear      Affective Functioning: Congruent      Mood: euthymic      Level of consciousness:  Alert and Oriented x4      Response to Learning: Able to verbalize current knowledge/experience, Able to verbalize/acknowledge new learning and Able to retain information      Endings: None Reported    Modes of Intervention: Education, Support and Socialization      Discipline Responsible: Registered Nurse      Signature:  Jannette Stallworth RN

## 2022-02-15 NOTE — PROGRESS NOTES
[facility] FOLLOW-UP NOTE       2/15/2022     Patient was seen and examined in person, Chart reviewed   Patient's case discussed with staff/team    Chief Complaint: Depression, SA    Interim History:     Pt has been talking with  mom    is doing better  Less depressed  No active SI or HI  Pt denies any hopeless and worthless feeling  No AH or VH  Sleep was disturbed last night  Pt has been tolerating medication  Appetite:   [x] Normal/Unchanged  [] Increased  [] Decreased      Sleep:       [] Normal/Unchanged  [x] Fair       [] Poor              Energy:    [x] Normal/Unchanged  [] Increased  [] Decreased        SI [] Present  [x] Absent    HI  []Present  [x] Absent     Aggression:  [] yes  [x] no    Patient is [x] able  [] unable to CONTRACT FOR SAFETY     PAST MEDICAL/PSYCHIATRIC HISTORY:   Past Medical History:   Diagnosis Date    ADD (attention deficit disorder) 2021    Bipolar disorder ([facility]     Depression     DM (diabetes mellitus) ([facility]     History of drug abuse ([facility]     positive drug screens 9/22/14 and 5/18/14    Hyperlipidemia     Osteoarthritis        FAMILY/SOCIAL HISTORY:  Family History   Problem Relation Age of Onset    Other Mother         CHF    Cirrhosis Brother     Other Maternal Grandmother         CHF     Social History     Socioeconomic History    Marital status: Single     Spouse name: Not on file    Number of children: Not on file    Years of education: Not on file    Highest education level: Not on file   Occupational History    Not on file   Tobacco Use    Smoking status: Current Every Day Smoker     Packs/day: 1.00     Years: 24.00     Pack years: 24.00     Types: Cigarettes    Smokeless tobacco: Never Used   Vaping Use    Vaping Use: Some days    Substances: Nicotine, Flavoring    Devices: Disposable   Substance and Sexual Activity    Alcohol use: Not Currently     Comment: working on sobriety    Drug use: Not Currently    Sexual activity: Yes     Partners: Male   Other Topics Concern    Not on file   Social History Narrative    Not on file     Social Determinants of Health     Financial Resource Strain:     Difficulty of Paying Living Expenses: Not on file   Food Insecurity:     Worried About Running Out of Food in the Last Year: Not on file    Melia of Food in the Last Year: Not on file   Transportation Needs:     Lack of Transportation (Medical): Not on file    Lack of Transportation (Non-Medical): Not on file   Physical Activity:     Days of Exercise per Week: Not on file    Minutes of Exercise per Session: Not on file   Stress:     Feeling of Stress : Not on file   Social Connections:     Frequency of Communication with Friends and Family: Not on file    Frequency of Social Gatherings with Friends and Family: Not on file    Attends Jainism Services: Not on file    Active Member of [address] [place] [address] N[employer] or Organizations: Not on file    Attends Club or Organization Meetings: Not on file    Marital Status: Not on file   Intimate Partner Violence:     Fear of Current or Ex-Partner: Not on file    Emotionally Abused: Not on file    Physically Abused: Not on file    Sexually Abused: Not on file   Housing Stability:     Unable to Pay for Housing in the Last Year: Not on file    Number of [place] in the Last Year: Not on file    Unstable Housing in the Last Year: Not on file           ROS:  [x] All negative/unchanged except if checked.  Explain positive(checked items) below:  [] Constitutional  [] Eyes  [] Ear/Nose/Mouth/Throat  [] Respiratory  [] CV  [] GI  []   [] Musculoskeletal  [] Skin/Breast  [] Neurological  [] Endocrine  [] Heme/Lymph  [] Allergic/Immunologic    Explanation:     MEDICATIONS:    Current Facility-Administered Medications:     buPROPion (WELLBUTRIN) tablet 75 mg, 75 mg, Oral, Nightly, George Ca MD, 75 mg at 02/14/22 2026    fluvoxaMINE (LUVOX) tablet 50 mg, 50 mg, Oral, Nightly, Romina Jones MD, 50 mg at 02/14/22 2026    topiramate (TOPAMAX) tablet 100 mg, 100 mg, Oral, BID, Romina Jones MD, 100 mg at 02/15/22 3403    clonazePAM (KLONOPIN) tablet 0.5 mg, 0.5 mg, Oral, TID PRN, Romina Jones MD, 0.5 mg at 02/15/22 0251    aluminum & magnesium hydroxide-simethicone (MAALOX) 200-200-20 MG/5ML suspension 30 mL, 30 mL, Oral, PRN, Trina Mendes MD    benztropine mesylate (COGENTIN) injection 2 mg, 2 mg, IntraMUSCular, BID PRN, Trina Mendes MD    haloperidol (HALDOL) tablet 5 mg, 5 mg, Oral, Q6H PRN **OR** haloperidol lactate (HALDOL) injection 5 mg, 5 mg, IntraMUSCular, Q6H PRN, Trina Mendes MD    magnesium hydroxide (MILK OF MAGNESIA) 400 MG/5ML suspension 30 mL, 30 mL, Oral, Daily PRN, Trina Mendes MD    hydrOXYzine (VISTARIL) capsule 50 mg, 50 mg, Oral, Q6H PRN **OR** hydrOXYzine (VISTARIL) injection 50 mg, 50 mg, IntraMUSCular, Q6H PRN, Trina Mendes MD    acetaminophen (TYLENOL) tablet 650 mg, 650 mg, Oral, Q6H PRN **OR** acetaminophen (TYLENOL) suppository 650 mg, 650 mg, Rectal, Q6H PRN, Charlette Fee, DO    ondansetron (ZOFRAN-ODT) disintegrating tablet 4 mg, 4 mg, Oral, Q8H PRN **OR** [DISCONTINUED] ondansetron (ZOFRAN) injection 4 mg, 4 mg, IntraVENous, Q6H PRN, Charlette Fee, DO    polyethylene glycol (GLYCOLAX) packet 17 g, 17 g, Oral, Daily PRN, Charlette Fee, DO    nicotine polacrilex (NICORETTE) gum 2 mg, 2 mg, Oral, Q2H PRN, Charlette Palacios, DO, 2 mg at 02/15/22 5401      Examination:  /76   Pulse 103   Temp 98.2 °F (36.8 °C)   Resp 15   SpO2 97%   Gait - steady  Medication side effects(SE): no    Mental Status Examination:    Level of consciousness:  within normal limits   Appearance:  fair grooming and fair hygiene  Behavior/Motor:  Less psychomotor retardation  Attitude toward examiner:  cooperative  Speech:  normal rate   Mood: euthymic  Affect:  mood congruent  Thought processes:  goal directed   Thought content: is feeding and maintaining the depression symptoms         [x] Patient continues to need, on a daily basis, active treatment furnished directly by or requiring the supervision of inpatient psychiatric personnel      Anticipated Length of stay:            Electronically signed by Alejandrina Kennedy MD on 2/15/2022 at 11:01 AM

## 2022-02-15 NOTE — PROGRESS NOTES
Patient visible on unit, social with peers. Patient pleasant. Reactive affect. Patient reports high anxiety, states she is very anxious d/t being here. Pt states she is not sure if someone is taking care of her cat, she is not sure if her and her  are staying together, and her lease for her apartment is up in April. Pt states she has a lot of unknowns and it is hard to find them out while here. Pt reports mild depression. Pt states she regrets overdosing and feels she has a lot to live for. Pt states her  won't talk to her and she is not sure if he wants to continue the relationship. Pt speaks of her  abusing drugs and states \"If we don't stay together I'll be ok. \" Pt reports poor sleep last night d/t anxiety. Pt states she started praying last night and that helped. Pt reports good appetite. Pt denies SI, HI, and Hallucinations.   Electronically signed by Antoine Tamez RN on 2/15/22 at 10:23 AM EST

## 2022-02-15 NOTE — PROGRESS NOTES
Pt at the desk requested/ received PRN klonopin for anxiety. States her anxiety is \"through the roof. \" Reports she is sitting in bed having constant thoughts about her  and why he is not calling her back.

## 2022-02-15 NOTE — GROUP NOTE
Group Therapy Note    Date: 2/15/2022    Group Start Time: 8296  Group End Time: 1700  Group Topic: Recreational    MLO[facility] 3W I    Irlanda Abernathy        Group Therapy Note    Attendees: 16/18         Patient's Goal:  To play Heads Up with the group. Notes:  Patient played the game with peers.     Status After Intervention:  Unchanged    Participation Level: Interactive    Participation Quality: Appropriate and Attentive      Speech:  normal      Thought Process/Content: Logical      Affective Functioning: Congruent      Mood: euthymic      Level of consciousness:  Alert and Attentive      Response to Learning: Progressing to goal      Endings: None Reported    Modes of Intervention: Activity      Discipline Responsible: [address], [employer]      Signature:  Irlanda Abernathy

## 2022-02-15 NOTE — CARE COORDINATION
FAMILY COLLATERAL NOTE    Family/Support Name: Gabriele Draper  Contact #: [phone]  Relationship to Pt: [address] call to above for collateral. No answer. Left message requesting a return call.      Response:  No answer, SANDRA Loza, [facility]

## 2022-02-15 NOTE — GROUP NOTE
Group Therapy Note    Date: 2/15/2022    Group Start Time: 1410  Group End Time: 1440  Group Topic: Cognitive Skills    [facility]    Claudene Caul, [facility]        Group Therapy Note    Attendees: 14         Patient's Goal: coping skills    Notes:  Patient participated    Status After Intervention:  Improved    Participation Level: Interactive    Participation Quality: Appropriate      Speech:  normal      Thought Process/Content: Logical      Affective Functioning: Congruent      Mood: anxious      Level of consciousness:  Alert      Response to Learning: Progressing to goal      Endings: None Reported    Modes of Intervention: Support      Discipline Responsible: /Counselor      Signature:  Claudene Caul, [facility]

## 2022-02-15 NOTE — GROUP NOTE
Group Therapy Note    Date: 2/14/2022    Group Start Time: 2050  Group End Time: 2105  Group Topic: Wrap-Up    MLOZ 3W ADILSON Conti        Group Therapy Note    Attendees: 13/17         Patient's Goal:  \"to be positive and make it to all the groups\"    Notes:  Patient reported meeting their goal for the day. Patient shared being happy her mother in law brought her belongings today.     Status After Intervention:  Unchanged    Participation Level: Interactive    Participation Quality: Appropriate, Attentive and Sharing      Speech:  normal      Thought Process/Content: Logical      Affective Functioning: Congruent      Mood: euthymic      Level of consciousness:  Alert and Attentive      Response to Learning: Progressing to goal      Endings: None Reported    Modes of Intervention: Support      Discipline Responsible: [employer]      Signature:  Brooke Conti

## 2022-02-15 NOTE — GROUP NOTE
Group Therapy Note    Date: 2/14/2022    Group Start Time: 1900  Group End Time: 1945  Group Topic: Healthy Living/Wellness    MLO[facility] 3W ADILSON [facility] Notice        Group Therapy Note    Attendees: 12/17         Patient's Goal:  To learn about memory and communication.     Notes:  Patient participated in group discussion.      Status After Intervention:  Unchanged    Participation Level: Interactive    Participation Quality: Appropriate and Attentive      Speech:  normal      Thought Process/Content: Logical      Affective Functioning: Congruent      Mood: euthymic      Level of consciousness:  Alert and Attentive      Response to Learning: Progressing to goal      Endings: None Reported    Modes of Intervention: Education      Discipline Responsible: [address], [employer]      Signature:  [facility] [employer]

## 2022-02-16 VITALS
DIASTOLIC BLOOD PRESSURE: 76 MMHG | HEART RATE: 82 BPM | OXYGEN SATURATION: 97 % | RESPIRATION RATE: 18 BRPM | SYSTOLIC BLOOD PRESSURE: 102 MMHG | TEMPERATURE: 98.1 F

## 2022-02-16 PROCEDURE — 6370000000 HC RX 637 (ALT 250 FOR IP): Performed by: PSYCHIATRY & NEUROLOGY

## 2022-02-16 PROCEDURE — 99239 HOSP IP/OBS DSCHRG MGMT >30: CPT | Performed by: PSYCHIATRY & NEUROLOGY

## 2022-02-16 PROCEDURE — 6370000000 HC RX 637 (ALT 250 FOR IP): Performed by: INTERNAL MEDICINE

## 2022-02-16 RX ORDER — FLUVOXAMINE MALEATE 50 MG/1
50 TABLET, COATED ORAL NIGHTLY
Refills: 0 | COMMUNITY
Start: 2022-02-16

## 2022-02-16 RX ORDER — TOPIRAMATE 100 MG/1
100 TABLET, FILM COATED ORAL 2 TIMES DAILY
Refills: 0 | COMMUNITY
Start: 2022-02-16

## 2022-02-16 RX ORDER — BUPROPION HYDROCHLORIDE 75 MG/1
75 TABLET ORAL NIGHTLY
Refills: 0 | COMMUNITY
Start: 2022-02-16

## 2022-02-16 RX ADMIN — NICOTINE POLACRILEX 2 MG: 2 GUM, CHEWING BUCCAL at 05:57

## 2022-02-16 RX ADMIN — TOPIRAMATE 100 MG: 100 TABLET, FILM COATED ORAL at 08:50

## 2022-02-16 RX ADMIN — NICOTINE POLACRILEX 2 MG: 2 GUM, CHEWING BUCCAL at 08:50

## 2022-02-16 RX ADMIN — CLONAZEPAM 1 MG: 1 TABLET ORAL at 09:06

## 2022-02-16 NOTE — DISCHARGE INSTR - DIET

## 2022-02-16 NOTE — PROGRESS NOTES
Pt out on unit, social with select peers. Pt voiced goal, stay positive, work on discharge planning. Pt reports showering today. Pt reports good appetite. Pt reports good sleep, medication effective. .Pt rates anxiety 5/10, panic attacks. Pt rates depression 3/10. On a scale from 1 through 10, 10 being the highest. Pt denies SI, HI and A/V hallucinations. Will continue to monitor.

## 2022-02-16 NOTE — PROGRESS NOTES
Morning Community Meeting Topics    Merlyn Adams attended the morning community meeting on 2/16/22. Topics discussed today     [x] Introduction   Day of the week and date   Mask distribution   Current mask requirements  [x]Teams   Explanation of  Green and Blue team criteria   Nurses assigned to each team for today   Explanation about green and blue paper  o Date  o Patient's Name  o Patient's Nurse  o Goals  [x] Visitation   Announce the visiting hours for the day   Announce which team is allowed to have visitors for the day   Review any updated Covid 19 requirements for visitors during visitation  o Vaccine Card or negative Covid test within 48 hours of visit  o State Identification   Patients are reminded to alert the  at least 1 hour before visitation   [x] Unit Orientation   Coffee use   Phone location and etiquette   Shower locations  [employer] and dryer location and process   Common area expectations   Staff rounds expectation  [x] Meals    Educate patient to the menu  o The patient is encouraged to fill out the menu to get preferences at mealtime  o The patient is educated that if they do not fill out the menu, they will get the standard tray  o The coffee pot is decaf, patient encouraged to order regular coffee from menu.    Educate patient to the meal process   Patient encouraged to eat snacks provided twice daily  o Snacks may stay in patient room     [x] Discharge Process   Discharge expectations   Fill out the survey after discharge   [x] Hygiene   Daily showers encouraged  o Showers availability discussed    Daily dressing encouraged  o Discussed wearing street clothing   Education provided on where to place linens and clothing  o Linens in the hamper  o personal clothing does not go into the linen hamper  [x] Group    Patient encouraged to attend group provided   Time of Group Meetings discussed   Gentle reminder that attendance is a Physician order  [x]

## 2022-02-16 NOTE — PROGRESS NOTES
Patient visible on unit, social with peers this morning. Pleasant. Reactive affect. Pt reports her  called her last night, was angry with her, and told her she needs rehab. Pt states she doesn't feel she needs rehab because she has been clean since September, except for one relapse. Pt states \"I overdosed d/t the stress from that relationship. \"  Pt reports her  abuses drugs when at home and is the one that needs help. Pt reports her and her  are not together. Pt future oriented, states she may go to [place] [id number] upon discharge for support and to meet other sober people. Pt reports good sleep and appetite. Pt reports she has anxiety at times. Pt denies SI, HI, and Hallucinations.  Electronically signed by Charlie Bell RN on 2/16/22 at 8:19 AM EST

## 2022-02-16 NOTE — CARE COORDINATION
FAMILY COLLATERAL NOTE    Family/Support Name: Mauro Sicard  Contact #: [phone]  Relationship to Pt: [employer] at [facility] call to above for collateral. Left message requesting a return call. Zia Voss     Response:  No answer, left message        Jelena Arroyo, [facility]

## 2022-02-16 NOTE — PROGRESS NOTES
Patient did not attend group despite staff encouragement.   Electronically signed by Litzy Rocha on 2/15/2022 at 9:34 PM

## 2022-02-16 NOTE — GROUP NOTE
Group Therapy Note    Date: 2/16/2022    Group Start Time: 1000  Group End Time: 1100  Group Topic: Psychoeducation    MLO[facility] 3W BHMEI Mallory, [facility]        Group Therapy Note    Attendees: 14         Patient's Goal:  \"to continue to build a  Better me and move forward\"    Notes:  Pt. attended the 1000 skill group. Status After Intervention:  Improved    Participation Level:  Active Listener and Interactive    Participation Quality: Appropriate, Attentive and Sharing      Speech:  normal and loud      Thought Process/Content: Logical      Affective Functioning: Congruent      Mood: elevated and calm      Level of consciousness:  Alert, Oriented x4 and Attentive      Response to Learning: Able to change behavior and Progressing to goal      Endings: None Reported    Modes of Intervention: Education, Support, Socialization and Activity      Discipline Responsible: Psychoeducational Specialist      Signature:  Rebecca Hermosillo

## 2022-02-16 NOTE — FLOWSHEET NOTE
Reviewed discharge instructions with patient. Pt. Verbalized understanding. Denies SI/HI/AVH. Ambulatory off unit.

## 2022-02-16 NOTE — DISCHARGE SUMMARY
DISCHARGE SUMMARY      Patient ID:  Quentin Kelley  [id number]  39 y.o.  1980      Admit date: 2/11/2022    Discharge date and time: 2/16/2022    Admitting Physician: Rafaela Roberts MD     Discharge Physician: Dr Aaron De Paz MD    Admission Diagnoses: Bipolar depression [facility]) [F31.9]    Admission Condition: poor    Discharged Condition: stable    Admission Circumstance:   Ms. Quentin Kelley is a 39 y.o. female with a history of Bipolar Disorder, who was admitted to the hospital after a suicide attempt by overdose with multiple medications. She was seen as a consult by Dr. Aaron De Paz, and after medical clearance she was transferred to .        HISTORY OF PRESENT ILLNESS:       The patient is a 39 y.o. female with significant past history of Bipolar Disorder, who was admitted for a suicide attempt by overdose with multiple medications. She was initially admitted to a medical floor and when medically stable she was transferred to .     When seen today, the patient said she had been having a lot of stress at home; she had been having problems in marriage, and doing her best to deal with her situation. She said she felt her marriage was \"pretty much over\", and that \"the reality of that is hard\". She said her  was a drug addict, and an alcoholic, and \"up until I took pills I was completely sober\". She said she \"slipped\" in September, when she used marijuana and Daiquiry, and then went to [facility] on her own because she wanted to prevent herself from a full relapse. She said she had a 'normal marriage\", until she found out from his phone that her , who is a drug addict, had gotten in a relationship with an older male who, she said, was a pedophile. She said she had found out that her  had been impersonating her underage son, sending the other man pictures of her son's and pictures of men's genitals and had been getting money for drugs from him.  She also said she had confronted him in a text message and he went to his biological mother (who had only appeared in his life a few years ago) and had told her everything. This had all happened until around Thanksgiving and then he had deleted the tiara he had used. She said she then found out that he had gotten on [employer] and had been in contact with \"pornographic models\". She said she had finally realized that her marriage was not what she had thought it was, and she had to re-start her whole life. She said she was worried about her house who she didn't even have a key to, her belongings in the house and was worried about her cat who was alone in the house. She said the only one who had access to the house was her  who is currently in a nursing home post surgery (he is in [place] and will not answer her calls). The suicide attempt, she said, was because of the pain of realizing her marriage was over. She said that, \"when I bought the liquor I had every intention not to wake up\". She said she now realizes though that she 'has to start all over again' and live because she has 4 children and one grandchild. She said she \"has to focus an work with her , and housing authority to get new housing and get out of this situation and relationship\". She said she does not want to die anymore; she wants to live- \"he doesn't get to have this power over me\". She said she has no contact with him at this time. She admitted to have taken an overdose of alcohol, with \"some tramadol from him\", clonazepam and wellbutrin. She said her sleep was \"real good\" lately, but before it was at most 6 hours/night. She said her appetite was 'good\". She said she had been thinking of suicide for 'quite a bit' prior to this, but had not told her doctor about it though. She denied homicidal ideation.  She denied hallucinations, paranoia or other delusions.            Stressors: as above, relationship problems     The patient is currently receiving care for the above psychiatric illness.     Medications Prior to Admission:     Prescriptions Prior to Admission   Medications Prior to Admission: TRULICITY 1.5 RV/4.6WN SOPN, ADMINISTER 1.5 MG/0.5ML UNDER THE SKIN 1 TIME A WEEK  OLANZapine (ZYPREXA) 7.5 MG tablet, Take 7.5 mg by mouth nightly  prazosin (MINIPRESS) 2 MG capsule, Take 2 mg by mouth nightly  Omega-3 Fatty Acids (FISH OIL) 1200 MG CAPS, Take 1 capsule by mouth daily  albuterol sulfate  (90 Base) MCG/ACT inhaler, Indications: not taking   D3-1000 25 MCG (1000 UT) TABS tablet, Take 3 tablets by mouth daily   fluconazole (DIFLUCAN) 150 MG tablet, TAKE 1 TABLET BY MOUTH ONCE FOR 1 DOSE AND REPEAT IN 1 WEEK  GNP MELATONIN 3 MG TABS tablet, TAKE TWO TABLETS BY MOUTH EVERY NIGHT AT BEDTIME FOR SLEEP  OXcarbazepine (TRILEPTAL) 300 MG tablet, TAKE ONE TABLET BY MOUTH THREE TIMES DAILY  clonazePAM (KLONOPIN) 1 MG tablet, Take 1 mg by mouth 2 times daily as needed. fluvoxaMINE (LUVOX) 50 MG tablet, Take 50 mg by mouth nightly  blood glucose monitor kit and supplies, Dispense sufficient amount for indicated testing frequency plus additional to accommodate PRN testing needs. Dispense all needed supplies to include: monitor, strips, lancing device, lancets, control solutions, alcohol swabs. Lancets MISC, Patient test 3x daily E65.11  glycopyrrolate (ROBINUL) 1 MG tablet, 2 po tid (Patient taking differently: Take 1 mg by mouth daily Pt taking differently than prescribed c/o constipation)  blood glucose test strips (FREESTYLE LITE) strip, 1 each by In Vitro route 4 times daily As needed.   Continuous Blood Gluc  (FREESTYLE [place] 14 DAY READER) KATARINA, 1 Device by Does not apply route 4 times daily (before meals and nightly)  Continuous Blood Gluc Sensor (FREESTYLE [place] 14 DAY SENSOR) MISC, Every 2 weeks  brexpiprazole (REXULTI) 2 MG TABS tablet, Take 1 tablet by mouth daily  fenofibrate (TRICOR) 54 MG tablet, Take 1 tablet by mouth daily [facility]: Please dispense generic fenofibrate unless prescriber denote  omega-3 acid ethyl esters (LOVAZA) 1 g capsule, Take 2 capsules by mouth 2 times daily  Insulin Pen Needle (NOVOFINE) 32G X 6 MM MISC, 1 Device by Does not apply route 4 times daily (before meals and nightly)  blood glucose monitor kit and supplies, 1 kit by Other route 4 times daily (before meals and nightly) Pt test 4x daily Dx E11.65. May substitute for generic or insurance covered product  blood glucose monitor strips, 1 strip by Other route 4 times daily (before meals and nightly) Pt test 4x daily Dx E11.65.   May substitute for generic or insurance covered product  FreeStyle Lancets MISC, 1 each by Does not apply route 4 times daily (before meals and nightly)  Continuous Blood Gluc Sensor (FREESTYLE [place] 14 DAY SENSOR) MISC, 2 Devices by Does not apply route every 14 days  simvastatin (ZOCOR) 20 MG tablet, Take 1 tablet by mouth every evening        Compliance: yes     Psychiatric Review of Systems       Depression: yes     Jessica or Hypomania:  no     Panic Attacks:  no     Phobias:  no     Obsessions and Compulsions:  no     PTSD : no     Hallucinations:  no     Delusions:  no     Substance Abuse History:  ETOH: denies drinking until \"that day\"   Marijuana: denies   Opiates: denies  Other Drugs: denies (although her tox screen was positive for methadone)        Past Psychiatric History:  Prior Diagnosis:  Bipolar Disorder  Psychiatrist: yes  Therapist: yes  Hospitalization: yes  Hx of Suicidal Attempts: yes  Hx of violence:  no  ECT: no  Previous discontinued Psychiatric Med Trials: n/a             PAST MEDICAL/PSYCHIATRIC HISTORY:   Past Medical History:   Diagnosis Date    ADD (attention deficit disorder) 2021    [facility]     Depression     DM [facility]diabetes mellitus) [facility]     [facility] of [facility]     positive drug screens 9/22/14 and 5/18/14    Hyperlipidemia     Osteoarthritis        FAMILY/SOCIAL HISTORY:  Family History Problem Relation Age of Onset    Other Mother         CHF    Cirrhosis Brother     Other Maternal Grandmother         CHF     Social History     Socioeconomic History    Marital status: Single     Spouse name: Not on file    Number of children: Not on file    Years of education: Not on file    Highest education level: Not on file   Occupational History    Not on file   Tobacco Use    Smoking status: Current Every Day Smoker     Packs/day: 1.00     Years: 24.00     Pack years: 24.00     Types: Cigarettes    Smokeless tobacco: Never Used   Vaping Use    Vaping Use: Some days    Substances: Nicotine, Flavoring    Devices: Disposable   Substance and Sexual Activity    Alcohol use: Not Currently     Comment: working on sobriety    Drug use: Not Currently    Sexual activity: Yes     Partners: Male   Other Topics Concern    Not on file   Social History Narrative    Not on file     Social Determinants of Health     Financial Resource Strain:     Difficulty of Paying Living Expenses: Not on file   Food Insecurity:     Worried About [address] [employer] in the Last Year: Not on file    Melia of Food in the Last Year: Not on file   Transportation Needs:     Lack of Transportation (Medical): Not on file    Lack of Transportation (Non-Medical):  Not on file   Physical Activity:     Days of Exercise per Week: Not on file    Minutes of Exercise per Session: Not on file   Stress:     Feeling of Stress : Not on file   Social Connections:     Frequency of Communication with Friends and Family: Not on file    Frequency of Social Gatherings with Friends and Family: Not on file    Attends Muslim Services: Not on file    Active Member of Clubs or Organizations: Not on file    Attends Club or Organization Meetings: Not on file    Marital Status: Not on file   Intimate Partner Violence:     Fear of Current or Ex-Partner: Not on file    Emotionally Abused: Not on file    Physically Abused: Not on file  Sexually Abused: Not on file   Housing Stability:     Unable to Pay for Housing in the Last Year: Not on file    Number of Places Lived in the Last Year: Not on file    Unstable Housing in the Last Year: Not on file       MEDICATIONS:    Current Facility-Administered Medications:     clonazePAM (KLONOPIN) tablet 1 mg, 1 mg, Oral, TID PRN, Mejia Goff MD, 1 mg at 02/16/22 0906    buPROPion [facility]) tablet 75 mg, 75 mg, Oral, Nightly, Mejia Goff MD, 75 mg at 02/15/22 2127    fluvoxaMINE (LUVOX) tablet 50 mg, 50 mg, Oral, Nightly, Geronimo Carney MD, 50 mg at 02/15/22 2127    topiramate (TOPAMAX) tablet 100 mg, 100 mg, Oral, BID, Geronimo Carney MD, 100 mg at 02/16/22 0850    aluminum & magnesium hydroxide-simethicone (MAALOX) 200-200-20 MG/5ML suspension 30 mL, 30 mL, Oral, PRN, Mejia Goff MD    benztropine mesylate (COGENTIN) injection 2 mg, 2 mg, IntraMUSCular, BID PRN, Mejia Goff MD    haloperidol (HALDOL) tablet 5 mg, 5 mg, Oral, Q6H PRN **OR** haloperidol lactate (HALDOL) injection 5 mg, 5 mg, IntraMUSCular, Q6H PRN, Mejia Goff MD    magnesium hydroxide (MILK OF MAGNESIA) 400 MG/5ML suspension 30 mL, 30 mL, Oral, Daily PRN, Mejia Goff MD    hydrOXYzine (VISTARIL) capsule 50 mg, 50 mg, Oral, Q6H PRN **OR** hydrOXYzine (VISTARIL) injection 50 mg, 50 mg, IntraMUSCular, Q6H PRN, Mejia Goff MD    acetaminophen (TYLENOL) tablet 650 mg, 650 mg, Oral, Q6H PRN **OR** acetaminophen (TYLENOL) suppository 650 mg, 650 mg, Rectal, Q6H PRN, Pako Ellsworth DO    ondansetron (ZOFRAN-ODT) disintegrating tablet 4 mg, 4 mg, Oral, Q8H PRN **OR** [DISCONTINUED] ondansetron (ZOFRAN) injection 4 mg, 4 mg, IntraVENous, Q6H PRN, Pakodanyell Rangelra, DO    polyethylene glycol (GLYCOLAX) packet 17 g, 17 g, Oral, Daily PRN, Pakodanyell Rangelra, DO    nicotine polacrilex (NICORETTE) gum 2 mg, 2 mg, Oral, Q2H PRN, Pako Ellsworth, , 2 mg at 02/16/22 0850    Examination:  /76 Pulse 82   Temp 98.1 °F (36.7 °C) (Oral)   Resp 18   SpO2 97%   Gait - steady    HOSPITAL COURSE[de-identified]  Following admission to the hospital, patient had a complete physical exam and blood work up  Patient was monitored closely with suicide precaution  Patient was started on meds as listed below  Was encouraged to participate in group and other milieu activity  Patient started to feel better with this combination of treatment. Significant progress in the symptoms since admission. Mood better, with the score of 2/10 - bad  No AVH or paranoid thoughts  No Hopeless or worthless feeling  No active SI/HI  Appetite:  [x] Normal  [] Increased  [] Decreased    Sleep:       [x] Normal  [] Fair       [] Poor            Energy:    [x] Normal  [] Increased  [] Decreased     SI [] Present  [x] Absent  HI  []Present  [x] Absent   Aggression:  [] yes  [] no  Patient is [x] able  [] unable to CONTRACT FOR SAFETY   Medication side effects(SE):  [x] None(Psych. Meds.) [] Other      Mental Status Examination on discharge:    Level of consciousness:  within normal limits   Appearance:  well-appearing  Behavior/Motor:  no abnormalities noted  Attitude toward examiner:  attentive and good eye contact  Speech:  spontaneous, normal rate and normal volume   Mood: euthymic  Affect:  mood congruent  Thought processes:  coherent   Thought content:  Suicidal Ideation:  denies suicidal ideation  Delusions:  no evidence of delusions  Perceptual Disturbance:  denies any perceptual disturbance  Cognition:  oriented to person, place, and time   Concentration intact  Memory intact  Insight good   Judgement fair   Fund of Knowledge adequate      ASSESSMENT:  Patient symptoms are:  [x] Well controlled  [x] Improving  [] Worsening  [] No change      Diagnosis:  Active Problems:    [facility]  Resolved Problems:    * No resolved hospital problems.  *      LABS:    Recent Labs     02/13/22  0935   WBC 9.9   HGB 14.3        Recent Labs     02/13/22  0935      K 4.2   *   CO2 23   BUN 15   CREATININE 0.78   GLUCOSE 93     Recent Labs     02/13/22  0935   BILITOT 0.3   ALKPHOS 68   AST 10   ALT 10     Lab Results   Component Value Date    LABAMPH Neg 02/10/2022    BARBSCNU Neg 02/10/2022    LABBENZ Neg 02/10/2022    LABMETH POSITIVE 02/10/2022    OPIATESCREENURINE Neg 02/10/2022    PHENCYCLIDINESCREENURINE Neg 02/10/2022    ETOH 155 02/10/2022     Lab Results   Component Value Date    TSH 4.140 02/10/2022     Lab Results   Component Value Date    LITHIUM 0.5 (L) 02/08/2019     No results found for: VALPROATE, CBMZ    RISK ASSESSMENT AT DISCHARGE: Low risk for suicide and homicide. Treatment Plan:  Reviewed current Medications with the patient. Education provided on the complaince with treatment. Risks, benefits, side effects, drug-to-drug interactions and alternatives to treatment were discussed. Encourage patient to attend outpatient follow up appointment and therapy. Patient was advised to call the outpatient provider, visit the nearest ED or call 911 if symptoms are not manageable. Patient's family member was contacted prior to the discharge. Medication List      START taking these medications    buPROPion 75 MG tablet  Commonly known as: WELLBUTRIN     topiramate 100 MG tablet  Commonly known as: TOPAMAX        CONTINUE taking these medications    albuterol sulfate  (90 Base) MCG/ACT inhaler     * blood glucose monitor kit and supplies  1 kit by Other route 4 times daily (before meals and nightly) Pt test 4x daily Dx E11.65. May substitute for generic or insurance covered product     * blood glucose monitor kit and supplies  Dispense sufficient amount for indicated testing frequency plus additional to accommodate PRN testing needs. Dispense all needed supplies to include: monitor, strips, lancing device, lancets, control solutions, alcohol swabs.      clonazePAM 1 MG tablet  Commonly known as: single therapy        TIME SPEND - 35 MINUTES TO COMPLETE THE EVALUATION, DISCHARGE SUMMARY, MEDICATION RECONCILIATION AND FOLLOW UP CARE     Signed:  Ciaran Cowan MD  2/16/2022  9:32 AM

## 2022-02-16 NOTE — GROUP NOTE
Group Therapy Note    Date: 2/15/2022    Group Start Time: 1900  Group End Time: 1950  Group Topic: Healthy Living/Wellness    [id number]    Litzy Rocha        Group Therapy Note    Attendees: 15/20         Patient's Goal:  To learn about healthy coping skills. Notes:  Patient participated in group discussion.     Status After Intervention:  Improved    Participation Level: Interactive    Participation Quality: Appropriate and Attentive      Speech:  normal      Thought Process/Content: Logical      Affective Functioning: Congruent      Mood: euthymic      Level of consciousness:  Alert and Attentive      Response to Learning: Able to verbalize current knowledge/experience      Endings: None Reported    Modes of Intervention: Education      Discipline Responsible: [address], [employer]      Signature:  Litzy Rocha

## 2023-03-06 ENCOUNTER — TELEPHONE (OUTPATIENT)
Dept: PRIMARY CARE | Facility: CLINIC | Age: 43
End: 2023-03-06
Payer: MEDICAID

## 2023-03-06 NOTE — TELEPHONE ENCOUNTER
Pt says amoxicillin given to treat sinus infection gave her  a yeast infection  Wondering if she could be prescribed treatment for yeast infection  [facility] [place]

## 2023-08-03 ENCOUNTER — OFFICE VISIT (OUTPATIENT)
Dept: ENDOCRINOLOGY | Age: 43
End: 2023-08-03
Payer: MEDICAID

## 2023-08-03 VITALS
WEIGHT: 227 LBS | HEIGHT: 65 IN | BODY MASS INDEX: 37.82 KG/M2 | OXYGEN SATURATION: 96 % | SYSTOLIC BLOOD PRESSURE: 114 MMHG | HEART RATE: 79 BPM | DIASTOLIC BLOOD PRESSURE: 80 MMHG

## 2023-08-03 DIAGNOSIS — E11.65 UNCONTROLLED TYPE 2 DIABETES MELLITUS WITH HYPERGLYCEMIA (HCC): Primary | ICD-10-CM

## 2023-08-03 PROCEDURE — G8417 CALC BMI ABV UP PARAM F/U: HCPCS | Performed by: INTERNAL MEDICINE

## 2023-08-03 PROCEDURE — 2022F DILAT RTA XM EVC RTNOPTHY: CPT | Performed by: INTERNAL MEDICINE

## 2023-08-03 PROCEDURE — 82962 GLUCOSE BLOOD TEST: CPT | Performed by: INTERNAL MEDICINE

## 2023-08-03 PROCEDURE — G8427 DOCREV CUR MEDS BY ELIG CLIN: HCPCS | Performed by: INTERNAL MEDICINE

## 2023-08-03 PROCEDURE — 4004F PT TOBACCO SCREEN RCVD TLK: CPT | Performed by: INTERNAL MEDICINE

## 2023-08-03 PROCEDURE — 99213 OFFICE O/P EST LOW 20 MIN: CPT | Performed by: INTERNAL MEDICINE

## 2023-08-03 PROCEDURE — 3046F HEMOGLOBIN A1C LEVEL >9.0%: CPT | Performed by: INTERNAL MEDICINE

## 2023-08-03 PROCEDURE — 83037 HB GLYCOSYLATED A1C HOME DEV: CPT | Performed by: INTERNAL MEDICINE

## 2023-08-03 RX ORDER — CLONAZEPAM 0.5 MG/1
TABLET ORAL
COMMUNITY
Start: 2023-07-20

## 2023-08-03 RX ORDER — LANCETS 30 GAUGE
EACH MISCELLANEOUS
Qty: 100 EACH | Refills: 3 | Status: SHIPPED | OUTPATIENT
Start: 2023-08-03

## 2023-08-03 RX ORDER — LAMOTRIGINE 100 MG/1
100 TABLET ORAL DAILY
COMMUNITY
Start: 2023-07-20

## 2023-08-03 RX ORDER — GLUCOSAMINE HCL/CHONDROITIN SU 500-400 MG
CAPSULE ORAL
Qty: 100 STRIP | Refills: 3 | Status: SHIPPED | OUTPATIENT
Start: 2023-08-03

## 2023-08-03 RX ORDER — DULAGLUTIDE 1.5 MG/.5ML
1.5 INJECTION, SOLUTION SUBCUTANEOUS WEEKLY
Qty: 4 ADJUSTABLE DOSE PRE-FILLED PEN SYRINGE | Refills: 3 | Status: SHIPPED | OUTPATIENT
Start: 2023-08-03

## 2023-08-03 RX ORDER — BUPROPION HYDROCHLORIDE 200 MG/1
200 TABLET, EXTENDED RELEASE ORAL 2 TIMES DAILY
COMMUNITY
Start: 2023-07-20

## 2023-08-03 NOTE — PROGRESS NOTES
8/3/2023    Assessment:       Diagnosis Orders   1. Uncontrolled type 2 diabetes mellitus with hyperglycemia (HCC)  POCT Glucose    POCT glycosylated hemoglobin (Hb A1C)            PLAN:     Orders Placed This Encounter   Procedures    Basic Metabolic Panel     Standing Status:   Future     Standing Expiration Date:   8/3/2024    Hemoglobin A1C     Standing Status:   Future     Standing Expiration Date:   8/3/2024    POCT Glucose    POCT glycosylated hemoglobin (Hb A1C)     Orders Placed This Encounter   Medications    blood glucose monitor strips     Sig: Test 3x daily     Dispense:  100 strip     Refill:  3    blood glucose monitor kit and supplies     Si kit by Other route 4 times daily (before meals and nightly) Pt test 4x daily Dx E11.65. May substitute for generic or insurance covered product     Dispense:  1 kit     Refill:  0    Lancets MISC     Sig: Patient test 3x daily E65.11     Dispense:  100 each     Refill:  3    dulaglutide (TRULICITY) 1.5 DY/9.9WN SC injection     Sig: Inject 0.5 mLs into the skin once a week     Dispense:  4 Adjustable Dose Pre-filled Pen Syringe     Refill:  3           Orders Placed This Encounter   Procedures    POCT Glucose    POCT glycosylated hemoglobin (Hb A1C)     No orders of the defined types were placed in this encounter. No follow-ups on file.   Subjective:     Chief Complaint   Patient presents with    Diabetes     Vitals:    23 1350   BP: 114/80   Site: Left Upper Arm   Pulse: 79   SpO2: 96%   Weight: 227 lb (103 kg)   Height: 5' 5\" (1.651 m)     Wt Readings from Last 3 Encounters:   23 227 lb (103 kg)   21 205 lb (93 kg)   21 206 lb (93.4 kg)     BP Readings from Last 3 Encounters:   23 114/80   22 (!) 93/57   21 119/83     Follow-up left diabetes obesity patient requesting atorvastatin recent labs reviewed A1c done Lyrica was stable also requesting a meter and strips  Hemoglobin A1C       Date

## 2023-08-07 ASSESSMENT — ENCOUNTER SYMPTOMS: EYES NEGATIVE: 1

## 2023-08-31 ENCOUNTER — OFFICE VISIT (OUTPATIENT)
Dept: FAMILY MEDICINE CLINIC | Age: 43
End: 2023-08-31
Payer: MEDICAID

## 2023-08-31 VITALS
HEIGHT: 65 IN | HEART RATE: 93 BPM | DIASTOLIC BLOOD PRESSURE: 74 MMHG | SYSTOLIC BLOOD PRESSURE: 114 MMHG | WEIGHT: 230.38 LBS | RESPIRATION RATE: 12 BRPM | TEMPERATURE: 98 F | OXYGEN SATURATION: 97 % | BODY MASS INDEX: 38.38 KG/M2

## 2023-08-31 DIAGNOSIS — B34.9 VIRAL ILLNESS: Primary | ICD-10-CM

## 2023-08-31 DIAGNOSIS — R09.81 HEAD CONGESTION: ICD-10-CM

## 2023-08-31 LAB
INFLUENZA A ANTIBODY: NORMAL
INFLUENZA B ANTIBODY: NORMAL
Lab: 123
PERFORMING INSTRUMENT: NORMAL
QC PASS/FAIL: NORMAL
SARS-COV-2, POC: NORMAL

## 2023-08-31 PROCEDURE — 99213 OFFICE O/P EST LOW 20 MIN: CPT | Performed by: PHYSICIAN ASSISTANT

## 2023-08-31 PROCEDURE — 87804 INFLUENZA ASSAY W/OPTIC: CPT | Performed by: PHYSICIAN ASSISTANT

## 2023-08-31 PROCEDURE — 87426 SARSCOV CORONAVIRUS AG IA: CPT | Performed by: PHYSICIAN ASSISTANT

## 2023-08-31 RX ORDER — BENZONATATE 200 MG/1
200 CAPSULE ORAL 3 TIMES DAILY PRN
Qty: 21 CAPSULE | Refills: 0 | Status: SHIPPED | OUTPATIENT
Start: 2023-08-31 | End: 2023-09-07

## 2023-08-31 RX ORDER — MELATONIN
5000 DAILY
Qty: 35 TABLET | Refills: 0 | Status: SHIPPED | OUTPATIENT
Start: 2023-08-31 | End: 2023-09-07

## 2023-08-31 RX ORDER — ZINC GLUCONATE 50 MG
50 TABLET ORAL DAILY
Qty: 7 TABLET | Refills: 0 | Status: SHIPPED | OUTPATIENT
Start: 2023-08-31 | End: 2023-09-07

## 2023-08-31 SDOH — ECONOMIC STABILITY: FOOD INSECURITY: WITHIN THE PAST 12 MONTHS, THE FOOD YOU BOUGHT JUST DIDN'T LAST AND YOU DIDN'T HAVE MONEY TO GET MORE.: NEVER TRUE

## 2023-08-31 SDOH — ECONOMIC STABILITY: FOOD INSECURITY: WITHIN THE PAST 12 MONTHS, YOU WORRIED THAT YOUR FOOD WOULD RUN OUT BEFORE YOU GOT MONEY TO BUY MORE.: NEVER TRUE

## 2023-08-31 SDOH — ECONOMIC STABILITY: HOUSING INSECURITY
IN THE LAST 12 MONTHS, WAS THERE A TIME WHEN YOU DID NOT HAVE A STEADY PLACE TO SLEEP OR SLEPT IN A SHELTER (INCLUDING NOW)?: NO

## 2023-08-31 SDOH — ECONOMIC STABILITY: INCOME INSECURITY: HOW HARD IS IT FOR YOU TO PAY FOR THE VERY BASICS LIKE FOOD, HOUSING, MEDICAL CARE, AND HEATING?: NOT HARD AT ALL

## 2023-08-31 ASSESSMENT — PATIENT HEALTH QUESTIONNAIRE - PHQ9
8. MOVING OR SPEAKING SO SLOWLY THAT OTHER PEOPLE COULD HAVE NOTICED. OR THE OPPOSITE, BEING SO FIGETY OR RESTLESS THAT YOU HAVE BEEN MOVING AROUND A LOT MORE THAN USUAL: 0
10. IF YOU CHECKED OFF ANY PROBLEMS, HOW DIFFICULT HAVE THESE PROBLEMS MADE IT FOR YOU TO DO YOUR WORK, TAKE CARE OF THINGS AT HOME, OR GET ALONG WITH OTHER PEOPLE: 0
SUM OF ALL RESPONSES TO PHQ QUESTIONS 1-9: 0
9. THOUGHTS THAT YOU WOULD BE BETTER OFF DEAD, OR OF HURTING YOURSELF: 0
5. POOR APPETITE OR OVEREATING: 0
SUM OF ALL RESPONSES TO PHQ9 QUESTIONS 1 & 2: 0
4. FEELING TIRED OR HAVING LITTLE ENERGY: 0
1. LITTLE INTEREST OR PLEASURE IN DOING THINGS: 0
2. FEELING DOWN, DEPRESSED OR HOPELESS: 0
6. FEELING BAD ABOUT YOURSELF - OR THAT YOU ARE A FAILURE OR HAVE LET YOURSELF OR YOUR FAMILY DOWN: 0
3. TROUBLE FALLING OR STAYING ASLEEP: 0
SUM OF ALL RESPONSES TO PHQ QUESTIONS 1-9: 0
7. TROUBLE CONCENTRATING ON THINGS, SUCH AS READING THE NEWSPAPER OR WATCHING TELEVISION: 0

## 2023-08-31 ASSESSMENT — ENCOUNTER SYMPTOMS
EYES NEGATIVE: 1
DIARRHEA: 1
SORE THROAT: 1
RESPIRATORY NEGATIVE: 1

## 2023-08-31 NOTE — PROGRESS NOTES
of education: None    Highest education level: None   Tobacco Use    Smoking status: Every Day     Packs/day: 1.00     Years: 24.00     Pack years: 24.00     Types: Cigarettes    Smokeless tobacco: Never   Vaping Use    Vaping Use: Some days    Substances: Nicotine, Flavoring    Devices: Disposable   Substance and Sexual Activity    Alcohol use: Not Currently     Comment: working on sobriety    Drug use: Not Currently    Sexual activity: Yes     Partners: Male     Social Determinants of Health     Financial Resource Strain: Low Risk     Difficulty of Paying Living Expenses: Not hard at all   Food Insecurity: No Food Insecurity    Worried About Running Out of Food in the Last Year: Never true    Ran Out of Food in the Last Year: Never true   Transportation Needs: Unknown    Lack of Transportation (Non-Medical): No   Housing Stability: Unknown    Unstable Housing in the Last Year: No         PHYSICAL EXAM    (up to 7 for level 4, 8 or more for level 5)       Physical Exam  Constitutional:       General: She is not in acute distress. Appearance: She is well-developed. HENT:      Head: Normocephalic and atraumatic. Right Ear: No middle ear effusion. Tympanic membrane is not erythematous or bulging. Left Ear:  No middle ear effusion. Tympanic membrane is not erythematous or bulging. Nose: Congestion present. Eyes:      Conjunctiva/sclera: Conjunctivae normal.      Pupils: Pupils are equal, round, and reactive to light. Cardiovascular:      Rate and Rhythm: Normal rate and regular rhythm. Heart sounds: No murmur heard. Pulmonary:      Effort: No respiratory distress. Breath sounds: Normal breath sounds. No wheezing or rales. Abdominal:      General: There is no distension. Palpations: Abdomen is soft. Tenderness: There is no abdominal tenderness. Musculoskeletal:         General: Normal range of motion. Cervical back: Normal range of motion and neck supple.    Skin:

## 2023-11-09 ENCOUNTER — OFFICE VISIT (OUTPATIENT)
Dept: ENDOCRINOLOGY | Age: 43
End: 2023-11-09

## 2023-11-09 VITALS
HEIGHT: 65 IN | DIASTOLIC BLOOD PRESSURE: 78 MMHG | HEART RATE: 89 BPM | OXYGEN SATURATION: 95 % | WEIGHT: 230 LBS | SYSTOLIC BLOOD PRESSURE: 117 MMHG | BODY MASS INDEX: 38.32 KG/M2

## 2023-11-09 DIAGNOSIS — E66.9 OBESITY (BMI 30-39.9): ICD-10-CM

## 2023-11-09 DIAGNOSIS — E11.65 UNCONTROLLED TYPE 2 DIABETES MELLITUS WITH HYPERGLYCEMIA (HCC): Primary | ICD-10-CM

## 2023-11-09 LAB
CHP ED QC CHECK: NORMAL
GLUCOSE BLD-MCNC: 115 MG/DL
HBA1C MFR BLD: 5.4 %

## 2023-11-09 RX ORDER — LANCETS 30 GAUGE
EACH MISCELLANEOUS
COMMUNITY
Start: 2023-08-03

## 2023-11-09 RX ORDER — DULAGLUTIDE 3 MG/.5ML
3 INJECTION, SOLUTION SUBCUTANEOUS WEEKLY
Qty: 4 ADJUSTABLE DOSE PRE-FILLED PEN SYRINGE | Refills: 3 | Status: SHIPPED | OUTPATIENT
Start: 2023-11-09

## 2023-11-09 NOTE — PROGRESS NOTES
11/9/2023    Assessment:       Diagnosis Orders   1. Uncontrolled type 2 diabetes mellitus with hyperglycemia (HCC)  POCT Glucose    POCT glycosylated hemoglobin (Hb A1C)    Basic Metabolic Panel    Hemoglobin A1C    Microalbumin / Creatinine Urine Ratio      2. Obesity (BMI 30-39. 9)                PLAN:     Orders Placed This Encounter   Procedures    Basic Metabolic Panel     Standing Status:   Future     Standing Expiration Date:   11/9/2024    Hemoglobin A1C     Standing Status:   Future     Standing Expiration Date:   11/9/2024    Microalbumin / Creatinine Urine Ratio     Standing Status:   Future     Standing Expiration Date:   11/9/2024    Sara Patel MD, Bariatric Surgery, [place]     Referral Priority:   Routine     Referral Type:   Eval and Treat     Referral Reason:   Specialty Services Required     Referred to Provider:   Arletha Schirmer, MD     Requested Specialty:   Bariatric Surgery     Number of Visits Requested:   1    POCT Glucose    POCT glycosylated hemoglobin (Hb A1C)     Orders Placed This Encounter   Medications    Dulaglutide (TRULICITY) 3 SL/0.3AO SOPN     Sig: Inject 3 mg into the skin once a week     Dispense:  4 Adjustable Dose Pre-filled Pen Syringe     Refill:  3     Increase dose of Trulicity refer patient for bariatric surgery      Orders Placed This Encounter   Procedures    POCT Glucose    POCT glycosylated hemoglobin (Hb A1C)     No orders of the defined types were placed in this encounter. No follow-ups on file.   Subjective:     Chief Complaint   Patient presents with    Diabetes     Vitals:    11/09/23 1432   BP: 117/78   Pulse: 89   SpO2: 95%   Weight: 104.3 kg (230 lb)   Height: 1.651 m (5' 5\")     Wt Readings from Last 3 Encounters:   11/09/23 104.3 kg (230 lb)   08/31/23 104.5 kg (230 lb 6 oz)   08/03/23 103 kg (227 lb)     BP Readings from Last 3 Encounters:   11/09/23 117/78   08/31/23 114/74   08/03/23 114/80     Follow-up on type 2 diabetes obesity

## 2023-12-07 ENCOUNTER — PREP FOR PROCEDURE (OUTPATIENT)
Dept: BARIATRICS/WEIGHT MGMT | Age: 43
End: 2023-12-07

## 2023-12-07 ENCOUNTER — OFFICE VISIT (OUTPATIENT)
Dept: BARIATRICS/WEIGHT MGMT | Age: 43
End: 2023-12-07
Payer: MEDICAID

## 2023-12-07 VITALS
SYSTOLIC BLOOD PRESSURE: 102 MMHG | WEIGHT: 224.2 LBS | DIASTOLIC BLOOD PRESSURE: 78 MMHG | BODY MASS INDEX: 37.36 KG/M2 | HEIGHT: 65 IN | HEART RATE: 76 BPM

## 2023-12-07 DIAGNOSIS — F17.290 OTHER TOBACCO PRODUCT NICOTINE DEPENDENCE, UNCOMPLICATED: ICD-10-CM

## 2023-12-07 DIAGNOSIS — E66.01 MORBID OBESITY (HCC): ICD-10-CM

## 2023-12-07 DIAGNOSIS — E66.9 DIABETES MELLITUS TYPE 2 IN OBESE (HCC): ICD-10-CM

## 2023-12-07 DIAGNOSIS — E66.01 MORBID OBESITY (HCC): Primary | ICD-10-CM

## 2023-12-07 DIAGNOSIS — E11.69 DIABETES MELLITUS TYPE 2 IN OBESE (HCC): ICD-10-CM

## 2023-12-07 PROCEDURE — 2022F DILAT RTA XM EVC RTNOPTHY: CPT | Performed by: SURGERY

## 2023-12-07 PROCEDURE — G8484 FLU IMMUNIZE NO ADMIN: HCPCS | Performed by: SURGERY

## 2023-12-07 PROCEDURE — G8417 CALC BMI ABV UP PARAM F/U: HCPCS | Performed by: SURGERY

## 2023-12-07 PROCEDURE — 3044F HG A1C LEVEL LT 7.0%: CPT | Performed by: SURGERY

## 2023-12-07 PROCEDURE — G8427 DOCREV CUR MEDS BY ELIG CLIN: HCPCS | Performed by: SURGERY

## 2023-12-07 PROCEDURE — 4004F PT TOBACCO SCREEN RCVD TLK: CPT | Performed by: SURGERY

## 2023-12-07 PROCEDURE — 99205 OFFICE O/P NEW HI 60 MIN: CPT | Performed by: SURGERY

## 2023-12-07 RX ORDER — TOPIRAMATE 50 MG/1
50 TABLET, FILM COATED ORAL DAILY
COMMUNITY
Start: 2023-12-04 | End: 2023-12-07 | Stop reason: SDUPTHER

## 2023-12-07 NOTE — PATIENT INSTRUCTIONS
Make the following appointments:    Pablo Zhao. Psychologist, Dr. Honey Boast. Schedule screening upper endoscopy with Dr. Otilia Russo. No aspirin, ibuprofen or other non-steroidal anti-inflammatory drugs (NSAIDs) 7 days prior to surgery and endoscopy    No food or drink six hours prior to surgery. Sleep Medicine referral for Sleep Apnea assessment and treatment. Stop nicotine use. Give urine sample for nicotine each month before surgery. If you have [employer] and do not want to wait 6 months for surgery given that there is no scientific data to support such a delay, call [phone] to switch to a [employer]. THE BIG FOUR RULES:  1. Count calories! People who count calories eat less. Use the daily plate or other apps for your smart phone or computer. The more you count the more of an expert you will become and will get easier and easier. If you are trying to lose weight and exercising a lot, keep calories to around the thousand to 1200 a day. If you are not exercising consistently try to limit them to around 800 a day. 2.  Separate liquids and solids. Wait 30 minutes to an hour after you eat before drinking liquids. This will reduce the total amount of food you eat in the meal.    3.  Exercise! You need up to 5 hours a week with a combination of cardio and resistance or weight training in order to keep excess body fat off.    4.  Sleep! Try to get 7 or more hours of sleep a night. If you have obstructive sleep apnea definitely use your CPAP machine. THE RULE OF 20'S:  For every meal, chew each bite 20 seconds. Then put the fork down and wait 20 seconds after you swallow before picking up the fork again. Each meal should take at least 20 minutes so your brain can register that you are full.

## 2023-12-07 NOTE — PROGRESS NOTES
obesity ([address])    2. Diabetes mellitus type 2 in obese (HCC)    3. Other tobacco product nicotine dependence, uncomplicated        Assessment/Plan:            Charlie Núñez  is a 37 y.o. female with   1. Morbid obesity ([address])    2. Diabetes mellitus type 2 in obese ([address])    3. Other tobacco product nicotine dependence, uncomplicated       who reports multiple episodes of repeat weight-loss and re-gain with various programmatic weight loss programs such as Weight Watchers, Gib Lance, and TOPS. We discussed that the most effective treatment for morbid obesity is bariatric surgery. We discussed the risks and benefits of laparoscopic or robotic Keshav-en-Y gastric bypass. We discussed that over 80% of patients undergoing gastric bypass are able to maintain anywhere from half to all of her excess weight off and studies that exceed 25 years. We discussed that there are risks for infection, bleeding, pain, need for reoperation, death, and vitamin and mineral deficiencies associated with the surgery. We discussed that nicotine use is contraindicated after gastric bypass due to the risk of marginal ulcer formation. We also discussed the risks and benefits of laparoscopic or robotic sleeve gastrectomy. We discussed that most sleeve patients lose about 60 to 70% of their excess weight in a year and that review of multiple studies of 7 years in length reveal an average of 27% of sleeve patients regaining their lost weight and 19% requiring revision either for lack of weight loss or severe GERD. We discussed that there are risks for infection, bleeding, pain, need for reoperation, death, and vitamin and mineral deficiencies associated with the surgery. We discussed that if a hiatal hernia is observed at surgery, we would repair the hiatal hernia to avoid potential obstruction in the case of gastric bypass or severe GERD in the case of sleeve or duodenal switch.    We discussed the risks and benefits of laparoscopic or

## 2023-12-12 ENCOUNTER — OFFICE VISIT (OUTPATIENT)
Dept: BARIATRICS/WEIGHT MGMT | Age: 43
End: 2023-12-12
Payer: MEDICAID

## 2023-12-12 VITALS — HEIGHT: 65 IN | WEIGHT: 224.8 LBS | BODY MASS INDEX: 37.45 KG/M2

## 2023-12-12 DIAGNOSIS — E66.9 DIABETES MELLITUS TYPE 2 IN OBESE (HCC): Primary | ICD-10-CM

## 2023-12-12 DIAGNOSIS — E66.01 MORBID OBESITY (HCC): ICD-10-CM

## 2023-12-12 DIAGNOSIS — E11.69 DIABETES MELLITUS TYPE 2 IN OBESE (HCC): Primary | ICD-10-CM

## 2023-12-12 PROCEDURE — G8417 CALC BMI ABV UP PARAM F/U: HCPCS | Performed by: DIETITIAN, REGISTERED

## 2023-12-12 PROCEDURE — G8428 CUR MEDS NOT DOCUMENT: HCPCS | Performed by: DIETITIAN, REGISTERED

## 2023-12-12 PROCEDURE — 97802 MEDICAL NUTRITION INDIV IN: CPT | Performed by: DIETITIAN, REGISTERED

## 2023-12-12 PROCEDURE — 3044F HG A1C LEVEL LT 7.0%: CPT | Performed by: DIETITIAN, REGISTERED

## 2023-12-12 NOTE — PROGRESS NOTES
was reviewed today to assist with weight loss and establish a  long term healthy eating pattern. Individual goals set with patient to be reviewed at monthly office visits. Plan/Recommendations:   Increase meal frequency to include at least 4 meal times/day  Include protein source at each  Sample protein shakes, can use 1 /day  Add Calcium citrate in divided doses  Begin regular exercise  Keep written food journal or on tiara  Read \"Patient Guide to Bariatric Surgery\" before next visit    Education materials provided:   Patient Guide to Bariatric Surgery    Follow up visit: 4 weeks with dietitian.      Total time involved in direct patient education: 45 minutes    Ariel Gonzales RD

## 2023-12-19 DIAGNOSIS — E66.01 MORBID OBESITY (HCC): ICD-10-CM

## 2023-12-19 LAB — TSH REFLEX: 1.44 UIU/ML (ref 0.44–3.86)

## 2023-12-20 LAB — VITAMIN B-12: 632 PG/ML (ref 232–1245)

## 2023-12-23 LAB
ANABASINE UR-MCNC: <5 NG/ML
COTININE UR-MCNC: <15 NG/ML
NICOTINE UR-MCNC: <15 NG/ML
TRANS-3-OH-COTININE UR-MCNC: <50 NG/ML

## 2023-12-24 LAB — VIT B1 SERPL-MCNC: 14 NMOL/L (ref 4–15)

## 2023-12-29 DIAGNOSIS — F17.290 OTHER TOBACCO PRODUCT NICOTINE DEPENDENCE, UNCOMPLICATED: Primary | ICD-10-CM

## 2023-12-29 NOTE — PROGRESS NOTES
Repeat nicotine test ordered. Nicotine test of 12/19/2023 was negative.       Carolyn Nance MD, FACS, [facility]

## 2024-01-03 ENCOUNTER — HOSPITAL ENCOUNTER (OUTPATIENT)
Dept: SLEEP CENTER | Age: 44
Discharge: HOME | End: 2024-01-05
Payer: MEDICAID

## 2024-01-03 PROCEDURE — 95810 POLYSOM 6/> YRS 4/> PARAM: CPT

## 2024-01-04 ENCOUNTER — OFFICE VISIT (OUTPATIENT)
Dept: PRIMARY CARE | Facility: CLINIC | Age: 44
End: 2024-01-04
Payer: MEDICAID

## 2024-01-04 VITALS
TEMPERATURE: 97.3 F | WEIGHT: 227.8 LBS | HEART RATE: 86 BPM | BODY MASS INDEX: 37.95 KG/M2 | HEIGHT: 65 IN | OXYGEN SATURATION: 96 % | SYSTOLIC BLOOD PRESSURE: 128 MMHG | RESPIRATION RATE: 16 BRPM | DIASTOLIC BLOOD PRESSURE: 66 MMHG

## 2024-01-04 DIAGNOSIS — J45.20 MILD INTERMITTENT ASTHMA WITHOUT COMPLICATION (HHS-HCC): ICD-10-CM

## 2024-01-04 DIAGNOSIS — Z12.31 ENCOUNTER FOR SCREENING MAMMOGRAM FOR BREAST CANCER: ICD-10-CM

## 2024-01-04 DIAGNOSIS — E10.9 DIABETES MELLITUS, LABILE (MULTI): ICD-10-CM

## 2024-01-04 DIAGNOSIS — Z00.00 ANNUAL PHYSICAL EXAM: Primary | ICD-10-CM

## 2024-01-04 DIAGNOSIS — F20.1 DISORGANIZED SCHIZOPHRENIA (MULTI): ICD-10-CM

## 2024-01-04 PROBLEM — F30.9 MANIC DISORDER (MULTI): Status: ACTIVE | Noted: 2024-01-04

## 2024-01-04 PROBLEM — E78.5 DYSLIPIDEMIA: Status: ACTIVE | Noted: 2024-01-04

## 2024-01-04 PROBLEM — G47.00 INSOMNIA WITH SLEEP APNEA: Status: ACTIVE | Noted: 2024-01-04

## 2024-01-04 PROBLEM — M54.16 LUMBAR RADICULOPATHY: Status: ACTIVE | Noted: 2024-01-04

## 2024-01-04 PROBLEM — G47.30 INSOMNIA WITH SLEEP APNEA: Status: ACTIVE | Noted: 2024-01-04

## 2024-01-04 PROBLEM — F20.9 SCHIZOPHRENIA (MULTI): Status: ACTIVE | Noted: 2024-01-04

## 2024-01-04 PROBLEM — E55.9 VITAMIN D DEFICIENCY: Status: ACTIVE | Noted: 2024-01-04

## 2024-01-04 PROBLEM — J45.909 REACTIVE AIRWAY DISEASE (HHS-HCC): Status: ACTIVE | Noted: 2024-01-04

## 2024-01-04 PROBLEM — F98.8 ADD (ATTENTION DEFICIT DISORDER): Status: ACTIVE | Noted: 2024-01-04

## 2024-01-04 PROBLEM — F32.A DEPRESSION: Status: ACTIVE | Noted: 2024-01-04

## 2024-01-04 PROCEDURE — 99396 PREV VISIT EST AGE 40-64: CPT | Performed by: FAMILY MEDICINE

## 2024-01-04 RX ORDER — PRAZOSIN HYDROCHLORIDE 1 MG/1
1 CAPSULE ORAL NIGHTLY
COMMUNITY
Start: 2023-12-22

## 2024-01-04 RX ORDER — MELATONIN 10 MG
10 TABLET, SUBLINGUAL SUBLINGUAL AS NEEDED
COMMUNITY
Start: 2023-08-17

## 2024-01-04 RX ORDER — LANCETS 33 GAUGE
EACH MISCELLANEOUS
COMMUNITY
Start: 2023-08-10

## 2024-01-04 RX ORDER — CLONAZEPAM 0.5 MG/1
0.25 TABLET ORAL 3 TIMES DAILY
COMMUNITY

## 2024-01-04 RX ORDER — LANCETS 30 GAUGE
EACH MISCELLANEOUS
COMMUNITY
Start: 2023-08-03

## 2024-01-04 RX ORDER — CHOLECALCIFEROL (VITAMIN D3) 25 MCG
1000 TABLET ORAL DAILY
COMMUNITY

## 2024-01-04 RX ORDER — DULAGLUTIDE 3 MG/.5ML
INJECTION, SOLUTION SUBCUTANEOUS
COMMUNITY
Start: 2023-11-09 | End: 2024-01-09 | Stop reason: ALTCHOICE

## 2024-01-04 RX ORDER — LAMOTRIGINE 100 MG/1
100 TABLET ORAL DAILY
COMMUNITY
Start: 2023-12-20

## 2024-01-04 RX ORDER — TOPIRAMATE 50 MG/1
50 TABLET, FILM COATED ORAL DAILY
COMMUNITY
Start: 2023-12-27

## 2024-01-04 RX ORDER — TOPIRAMATE 100 MG/1
100 TABLET, FILM COATED ORAL DAILY
COMMUNITY

## 2024-01-04 RX ORDER — TIRZEPATIDE 2.5 MG/.5ML
2.5 INJECTION, SOLUTION SUBCUTANEOUS
Qty: 2 ML | Refills: 0 | Status: SHIPPED | OUTPATIENT
Start: 2024-01-04

## 2024-01-04 RX ORDER — BUPROPION HYDROCHLORIDE 200 MG/1
200 TABLET, EXTENDED RELEASE ORAL 2 TIMES DAILY
COMMUNITY
Start: 2023-12-18

## 2024-01-04 ASSESSMENT — ENCOUNTER SYMPTOMS
MYALGIAS: 0
CONFUSION: 0
FEVER: 0
PHOTOPHOBIA: 0
PALPITATIONS: 0
TROUBLE SWALLOWING: 0
ARTHRALGIAS: 0
CONSTIPATION: 0
DYSURIA: 0
CONSTITUTIONAL NEGATIVE: 1
ABDOMINAL DISTENTION: 0
ACTIVITY CHANGE: 0
SPEECH DIFFICULTY: 0
DIZZINESS: 0
FLANK PAIN: 0
SLEEP DISTURBANCE: 0
DYSPHORIC MOOD: 0
COLOR CHANGE: 0
NECK STIFFNESS: 0
SINUS PRESSURE: 0
NERVOUS/ANXIOUS: 0
STRIDOR: 0
COUGH: 0
SORE THROAT: 0
EYE PAIN: 0
ABDOMINAL PAIN: 0
SEIZURES: 0
BLOOD IN STOOL: 0
AGITATION: 0
SINUS PAIN: 0
RHINORRHEA: 0
ADENOPATHY: 0
POLYDIPSIA: 0
CHEST TIGHTNESS: 0
APPETITE CHANGE: 0
DECREASED CONCENTRATION: 0
POLYPHAGIA: 0
RECTAL PAIN: 0
FATIGUE: 0
HEMATURIA: 0
HEADACHES: 0
DIARRHEA: 0
SHORTNESS OF BREATH: 0

## 2024-01-04 NOTE — PROGRESS NOTES
"Subjective   Patient ID: Cinthya Zhou is a 43 y.o. female who presents for Annual Exam.    Patient is here for annual visit.    Patient states need a clearance letter for a bariatric surgery.    Patient denies any symptoms or concerns today.         Review of Systems   Constitutional: Negative.  Negative for activity change, appetite change, fatigue and fever.   HENT:  Negative for congestion, dental problem, ear discharge, ear pain, mouth sores, rhinorrhea, sinus pressure, sinus pain, sore throat, tinnitus and trouble swallowing.    Eyes:  Negative for photophobia, pain and visual disturbance.   Respiratory:  Negative for cough, chest tightness, shortness of breath and stridor.    Cardiovascular:  Negative for chest pain and palpitations.   Gastrointestinal:  Negative for abdominal distention, abdominal pain, blood in stool, constipation, diarrhea and rectal pain.   Endocrine: Negative for cold intolerance, heat intolerance, polydipsia, polyphagia and polyuria.   Genitourinary:  Negative for dysuria, flank pain, hematuria and urgency.   Musculoskeletal:  Negative for arthralgias, gait problem, myalgias and neck stiffness.   Skin:  Negative for color change and rash.   Allergic/Immunologic: Negative for environmental allergies and food allergies.   Neurological:  Negative for dizziness, seizures, syncope, speech difficulty and headaches.   Hematological:  Negative for adenopathy.   Psychiatric/Behavioral:  Negative for agitation, confusion, decreased concentration, dysphoric mood and sleep disturbance. The patient is not nervous/anxious.        Objective   /66 (BP Location: Right arm, Patient Position: Sitting, BP Cuff Size: Adult)   Pulse 86   Temp 36.3 °C (97.3 °F)   Resp 16   Ht 1.651 m (5' 5\")   Wt 103 kg (227 lb 12.8 oz)   SpO2 96%   BMI 37.91 kg/m²     Physical Exam  Vitals reviewed.   Constitutional:       General: She is not in acute distress.     Appearance: She is obese. She is not " ill-appearing or diaphoretic.   HENT:      Head: Normocephalic.      Right Ear: Tympanic membrane and external ear normal.      Left Ear: Tympanic membrane and external ear normal.      Nose: Nose normal. No congestion.      Mouth/Throat:      Pharynx: No posterior oropharyngeal erythema.   Eyes:      General:         Right eye: No discharge.         Left eye: No discharge.      Extraocular Movements: Extraocular movements intact.      Conjunctiva/sclera: Conjunctivae normal.      Pupils: Pupils are equal, round, and reactive to light.   Cardiovascular:      Rate and Rhythm: Normal rate and regular rhythm.      Pulses: Normal pulses.      Heart sounds: Normal heart sounds. No murmur heard.  Pulmonary:      Effort: Pulmonary effort is normal. No respiratory distress.      Breath sounds: Normal breath sounds. No wheezing or rales.   Chest:      Chest wall: No tenderness.   Abdominal:      General: Bowel sounds are normal. There is distension.      Palpations: There is no mass.      Tenderness: There is no abdominal tenderness. There is no guarding.   Musculoskeletal:         General: No tenderness. Normal range of motion.      Cervical back: Normal range of motion and neck supple. No tenderness.      Right lower leg: No edema.      Left lower leg: No edema.   Skin:     General: Skin is dry.      Coloration: Skin is not jaundiced.      Findings: No bruising, erythema or rash.   Neurological:      General: No focal deficit present.      Mental Status: She is alert and oriented to person, place, and time. Mental status is at baseline.      Cranial Nerves: No cranial nerve deficit.      Sensory: No sensory deficit.      Coordination: Coordination normal.      Gait: Gait normal.   Psychiatric:         Mood and Affect: Mood normal.         Thought Content: Thought content normal.         Judgment: Judgment normal.         Assessment/Plan   Problem List Items Addressed This Visit             ICD-10-CM    Diabetes mellitus,  labile (CMS/[facility]) E10.9    Relevant Medications    tirzepatide (Mounjaro) 2.5 mg/0.5 mL pen injector    Schizophrenia (CMS/[facility]) F20.9    Mild intermittent asthma J45.20     Other Visit Diagnoses         Codes    Annual physical exam    -  Primary Z00.00    Encounter for screening mammogram for breast cancer     Z12.31    Relevant Orders    BI mammo bilateral screening tomosynthesis              Scribe Attestation  By signing my name below, I, ASH Victoria , Scribe   attest that this documentation has been prepared under the direction and in the presence of William Siddiqi DO.   Provider Attestation - Scribe documentation    All medical record entries made by the Scribe were at my direction and personally dictated by me. I have reviewed the chart and agree that the record accurately reflects my personal performance of the history, physical exam, discussion and plan.

## 2024-01-05 ENCOUNTER — TELEPHONE (OUTPATIENT)
Dept: PRIMARY CARE | Facility: CLINIC | Age: 44
End: 2024-01-05
Payer: MEDICAID

## 2024-01-05 PROBLEM — G89.29 CHRONIC PAIN: Status: ACTIVE | Noted: 2024-01-05

## 2024-01-05 PROBLEM — G47.33 OSA (OBSTRUCTIVE SLEEP APNEA): Status: ACTIVE | Noted: 2018-09-28

## 2024-01-05 PROBLEM — E66.01 MORBID OBESITY (MULTI): Status: ACTIVE | Noted: 2023-12-07

## 2024-01-05 PROBLEM — J45.20 MILD INTERMITTENT ASTHMA (HHS-HCC): Status: ACTIVE | Noted: 2024-01-05

## 2024-01-05 PROBLEM — Z90.49 HISTORY OF CHOLECYSTECTOMY: Status: RESOLVED | Noted: 2024-01-05 | Resolved: 2024-01-05

## 2024-01-05 PROBLEM — F31.9 BIPOLAR DEPRESSION (MULTI): Status: ACTIVE | Noted: 2019-02-08

## 2024-01-05 PROBLEM — R61 HYPERHIDROSIS: Status: ACTIVE | Noted: 2024-01-05

## 2024-01-05 PROBLEM — F19.11 HISTORY OF DRUG ABUSE (MULTI): Status: RESOLVED | Noted: 2017-03-03 | Resolved: 2024-01-05

## 2024-01-05 NOTE — TELEPHONE ENCOUNTER
Pa is denied   Patient needs to try and fail 3 formulary medication before this is approved   Formulary medicaiton  Byetta  Victoza  Farxiga  Metformin  Glyburide  Glizide  Januvia  Acos  Jardiance    I will try for an appeal but it might not get approved

## 2024-01-05 NOTE — TELEPHONE ENCOUNTER
The insurance sees she tried formulary medication but they need updated A1c or glucose that shows that her A1c has approved since trying medications  They need A1c  within the last 6 months

## 2024-01-08 RX ORDER — SODIUM CHLORIDE 9 MG/ML
INJECTION, SOLUTION INTRAVENOUS PRN
Status: CANCELLED | OUTPATIENT
Start: 2024-01-08

## 2024-01-08 RX ORDER — SODIUM CHLORIDE 0.9 % (FLUSH) 0.9 %
5-40 SYRINGE (ML) INJECTION PRN
Status: CANCELLED | OUTPATIENT
Start: 2024-01-08

## 2024-01-08 RX ORDER — SODIUM CHLORIDE 9 MG/ML
INJECTION, SOLUTION INTRAVENOUS CONTINUOUS
Status: CANCELLED | OUTPATIENT
Start: 2024-01-08

## 2024-01-08 RX ORDER — SODIUM CHLORIDE 0.9 % (FLUSH) 0.9 %
5-40 SYRINGE (ML) INJECTION EVERY 12 HOURS SCHEDULED
Status: CANCELLED | OUTPATIENT
Start: 2024-01-08

## 2024-01-09 ENCOUNTER — DOCUMENTATION (OUTPATIENT)
Dept: PRIMARY CARE | Facility: CLINIC | Age: 44
End: 2024-01-09

## 2024-01-09 ENCOUNTER — HOSPITAL ENCOUNTER (OUTPATIENT)
Dept: RADIOLOGY | Facility: CLINIC | Age: 44
Discharge: HOME | End: 2024-01-09
Payer: MEDICAID

## 2024-01-09 ENCOUNTER — CLINICAL SUPPORT (OUTPATIENT)
Dept: PRIMARY CARE | Facility: CLINIC | Age: 44
End: 2024-01-09
Payer: MEDICAID

## 2024-01-09 ENCOUNTER — TELEPHONE (OUTPATIENT)
Dept: PRIMARY CARE | Facility: CLINIC | Age: 44
End: 2024-01-09

## 2024-01-09 DIAGNOSIS — Z12.31 ENCOUNTER FOR SCREENING MAMMOGRAM FOR BREAST CANCER: ICD-10-CM

## 2024-01-09 DIAGNOSIS — E10.9 DIABETES MELLITUS, LABILE (MULTI): ICD-10-CM

## 2024-01-09 LAB — POC HEMOGLOBIN A1C: 5.2 % (ref 4.2–6.5)

## 2024-01-09 PROCEDURE — 77067 SCR MAMMO BI INCL CAD: CPT

## 2024-01-09 PROCEDURE — 83036 HEMOGLOBIN GLYCOSYLATED A1C: CPT | Performed by: FAMILY MEDICINE

## 2024-01-09 PROCEDURE — 77063 BREAST TOMOSYNTHESIS BI: CPT | Performed by: RADIOLOGY

## 2024-01-09 PROCEDURE — 77067 SCR MAMMO BI INCL CAD: CPT | Performed by: RADIOLOGY

## 2024-01-09 NOTE — LETTER
January 25, 2024     Cinthya Winnie  [address] [place] [address]      Dear Ms. Zhou:    Below are the results from your recent visit:    MAMMOGRAM IS NORMAL     Resulted Orders   BI mammo bilateral screening tomosynthesis    Narrative    Interpreted By:  Kyler Canas and Avery Ross   STUDY:  BI MAMMO BILATERAL SCREENING TOMOSYNTHESIS;  1/9/2024 1:10 pm      ACCESSION NUMBER(S):  [id number]      ORDERING CLINICIAN:  EDUARD CASTELLANOS      INDICATION:  Screening.      COMPARISON:  05/11/2020, 02/03/2017, 12/03/2014.      FINDINGS:  2D and tomosynthesis images were reviewed at 1 mm slice thickness.  Per technologist, best possible images were obtained.      Density:  The breast tissue is heterogeneously dense, which may  obscure small masses.      No suspicious masses or calcifications are identified.        Impression    No mammographic evidence of malignancy.      BI-RADS CATEGORY:  BI-RADS Category:  1 Negative.  Recommendation:  Routine Screening Mammogram in 1 Year.  Recommended Date:  1 Year.  Laterality:  Bilateral.      For any future breast imaging appointments, please call [phone] ([phone]).      I personally reviewed the images/study and I agree with the findings  as stated by fellow physician, Dr. Peter Spicer.          MACRO:  None      Signed by: Kyler Canas 1/22/2024 2:06 PM  Dictation workstation:   [id number]     The test results show that your current treatment is working. Please continue your current medication and plan. We recommend that you repeat the above test(s) in 1 year.    If you have any questions or concerns, please don't hesitate to call.         Sincerely,      EDUARD CASTELLANOS, DO

## 2024-01-09 NOTE — PROGRESS NOTES
Subjective   Patient ID: Cinthya Zhou is a 43 y.o. female who presents for Diabetes.    HPI   Patient is here for an A1C check for diabetes. Patient would like to start Mounjaro 2.5 mg/0.5 mg pen but needs prior authorization and A1C needed for insurance approval.   She did the mammogram and endoscopy tomorrow. She is trying to go for bariatric surgery.  She had to stop the Trulicity due to the upcoming endoscopy.   She stopped Trulicity per PCP and will be hopefully starting Mounjaro.  Last A1C was done on 11/9/2023 with 5.4 result.   Today's result is 5.2.

## 2024-01-09 NOTE — TELEPHONE ENCOUNTER
Dr maloney pt  Calling back to check on the status  of her pa. I did see the denial and her A1C scanned it. Patient was wondering what the  next steps are.   115

## 2024-01-10 ENCOUNTER — HOSPITAL ENCOUNTER (OUTPATIENT)
Age: 44
Setting detail: OUTPATIENT SURGERY
Discharge: HOME | End: 2024-01-10
Attending: SURGERY | Admitting: SURGERY
Payer: MEDICAID

## 2024-01-10 ENCOUNTER — ANESTHESIA EVENT (OUTPATIENT)
Dept: ENDOSCOPY | Age: 44
End: 2024-01-10
Payer: MEDICAID

## 2024-01-10 ENCOUNTER — ANESTHESIA (OUTPATIENT)
Dept: ENDOSCOPY | Age: 44
End: 2024-01-10
Payer: MEDICAID

## 2024-01-10 VITALS
OXYGEN SATURATION: 98 % | WEIGHT: 230 LBS | HEIGHT: 65 IN | TEMPERATURE: 97.9 F | DIASTOLIC BLOOD PRESSURE: 72 MMHG | RESPIRATION RATE: 18 BRPM | SYSTOLIC BLOOD PRESSURE: 107 MMHG | BODY MASS INDEX: 38.32 KG/M2 | HEART RATE: 78 BPM

## 2024-01-10 DIAGNOSIS — E10.9 DIABETES MELLITUS, LABILE (MULTI): Primary | ICD-10-CM

## 2024-01-10 DIAGNOSIS — E66.01 MORBID OBESITY (HCC): ICD-10-CM

## 2024-01-10 PROCEDURE — 3609017100 HC EGD: Performed by: SURGERY

## 2024-01-10 PROCEDURE — 6360000002 HC RX W HCPCS: Performed by: NURSE ANESTHETIST, CERTIFIED REGISTERED

## 2024-01-10 PROCEDURE — 99024 POSTOP FOLLOW-UP VISIT: CPT | Performed by: SURGERY

## 2024-01-10 PROCEDURE — 2500000003 HC RX 250 WO HCPCS: Performed by: NURSE ANESTHETIST, CERTIFIED REGISTERED

## 2024-01-10 PROCEDURE — 2580000003 HC RX 258: Performed by: SURGERY

## 2024-01-10 PROCEDURE — 88342 IMHCHEM/IMCYTCHM 1ST ANTB: CPT

## 2024-01-10 PROCEDURE — 2580000003 HC RX 258

## 2024-01-10 PROCEDURE — 7100000011 HC PHASE II RECOVERY - ADDTL 15 MIN: Performed by: SURGERY

## 2024-01-10 PROCEDURE — 7100000010 HC PHASE II RECOVERY - FIRST 15 MIN: Performed by: SURGERY

## 2024-01-10 PROCEDURE — 6370000000 HC RX 637 (ALT 250 FOR IP): Performed by: SURGERY

## 2024-01-10 PROCEDURE — 43239 EGD BIOPSY SINGLE/MULTIPLE: CPT | Performed by: SURGERY

## 2024-01-10 PROCEDURE — 88305 TISSUE EXAM BY PATHOLOGIST: CPT

## 2024-01-10 PROCEDURE — 2709999900 HC NON-CHARGEABLE SUPPLY: Performed by: SURGERY

## 2024-01-10 PROCEDURE — 3700000000 HC ANESTHESIA ATTENDED CARE: Performed by: SURGERY

## 2024-01-10 RX ORDER — PROPOFOL 10 MG/ML
INJECTION, EMULSION INTRAVENOUS PRN
Status: DISCONTINUED | OUTPATIENT
Start: 2024-01-10 | End: 2024-01-10 | Stop reason: SDUPTHER

## 2024-01-10 RX ORDER — SODIUM CHLORIDE 9 MG/ML
INJECTION, SOLUTION INTRAVENOUS CONTINUOUS
Status: DISCONTINUED | OUTPATIENT
Start: 2024-01-10 | End: 2024-01-10 | Stop reason: HOSPADM

## 2024-01-10 RX ORDER — MAGNESIUM HYDROXIDE 1200 MG/15ML
LIQUID ORAL PRN
Status: DISCONTINUED | OUTPATIENT
Start: 2024-01-10 | End: 2024-01-10 | Stop reason: ALTCHOICE

## 2024-01-10 RX ORDER — SODIUM CHLORIDE 0.9 % (FLUSH) 0.9 %
5-40 SYRINGE (ML) INJECTION EVERY 12 HOURS SCHEDULED
Status: DISCONTINUED | OUTPATIENT
Start: 2024-01-10 | End: 2024-01-10 | Stop reason: HOSPADM

## 2024-01-10 RX ORDER — DULAGLUTIDE 0.75 MG/.5ML
0.75 INJECTION, SOLUTION SUBCUTANEOUS
Qty: 2 ML | Refills: 0 | Status: SHIPPED | OUTPATIENT
Start: 2024-01-10

## 2024-01-10 RX ORDER — LIDOCAINE HYDROCHLORIDE 20 MG/ML
INJECTION, SOLUTION INFILTRATION; PERINEURAL PRN
Status: DISCONTINUED | OUTPATIENT
Start: 2024-01-10 | End: 2024-01-10 | Stop reason: SDUPTHER

## 2024-01-10 RX ORDER — SODIUM CHLORIDE 0.9 % (FLUSH) 0.9 %
5-40 SYRINGE (ML) INJECTION PRN
Status: DISCONTINUED | OUTPATIENT
Start: 2024-01-10 | End: 2024-01-10 | Stop reason: HOSPADM

## 2024-01-10 RX ORDER — SIMETHICONE 20 MG/.3ML
EMULSION ORAL PRN
Status: DISCONTINUED | OUTPATIENT
Start: 2024-01-10 | End: 2024-01-10 | Stop reason: ALTCHOICE

## 2024-01-10 RX ORDER — SODIUM CHLORIDE 9 MG/ML
INJECTION, SOLUTION INTRAVENOUS PRN
Status: DISCONTINUED | OUTPATIENT
Start: 2024-01-10 | End: 2024-01-10 | Stop reason: HOSPADM

## 2024-01-10 RX ORDER — OMEPRAZOLE 20 MG/1
20 CAPSULE, DELAYED RELEASE ORAL
Qty: 30 CAPSULE | Refills: 5 | Status: SHIPPED | OUTPATIENT
Start: 2024-01-10

## 2024-01-10 RX ORDER — SODIUM CHLORIDE 9 MG/ML
INJECTION, SOLUTION INTRAVENOUS
Status: COMPLETED
Start: 2024-01-10 | End: 2024-01-10

## 2024-01-10 RX ADMIN — SODIUM CHLORIDE: 9 INJECTION, SOLUTION INTRAVENOUS at 06:54

## 2024-01-10 RX ADMIN — PROPOFOL 50 MG: 10 INJECTION, EMULSION INTRAVENOUS at 07:54

## 2024-01-10 RX ADMIN — LIDOCAINE HYDROCHLORIDE 100 MG: 20 INJECTION, SOLUTION INFILTRATION; PERINEURAL at 07:51

## 2024-01-10 RX ADMIN — PROPOFOL 150 MG: 10 INJECTION, EMULSION INTRAVENOUS at 07:51

## 2024-01-10 RX ADMIN — PROPOFOL 50 MG: 10 INJECTION, EMULSION INTRAVENOUS at 07:53

## 2024-01-10 ASSESSMENT — PAIN - FUNCTIONAL ASSESSMENT
PAIN_FUNCTIONAL_ASSESSMENT: 0-10
PAIN_FUNCTIONAL_ASSESSMENT: NONE - DENIES PAIN
PAIN_FUNCTIONAL_ASSESSMENT: NONE - DENIES PAIN

## 2024-01-10 NOTE — ANESTHESIA PRE PROCEDURE
Department of Anesthesiology  Preprocedure Note       Name:  Kanchan Cam   Age:  43 y.o.  :  1980                                          MRN:  [id number]         Date:  1/10/2024      Surgeon: Surgeon(s):  Aden Norris MD    Procedure: Procedure(s):  EGD ESOPHAGOGASTRODUODENOSCOPY    Medications prior to admission:   Prior to Admission medications    Medication Sig Start Date End Date Taking? Authorizing Provider   Blood Glucose Monitoring Suppl (ONETOUCH VERIO REFLECT) w/Device KIT USE TO TEST BLOOD SUGAR FOUR TIMES DAILY 8/3/23   Claudia Crespo MD   Dulaglutide (TRULICITY) 3 MG/0.5ML SOPN Inject 3 mg into the skin once a week 23   Fidel Pearce MD   vitamin D3 (CHOLECALCIFEROL) 25 MCG (1000 UT) TABS tablet Take 5 tablets by mouth daily for 7 days 8/31/23 1/10/24  Yanira Ferrer, PA-C   buPROPion (WELLBUTRIN SR) 200 MG extended release tablet Take 1 tablet by mouth 2 times daily 23   Claudia Crespo MD   clonazePAM (KLONOPIN) 0.5 MG tablet TAKE ONE TABLET BY MOUTH THREE TIMES DAILY AS NEEDED FOR ANXIETY 23   Claudia Crespo MD   lamoTRIgine (LAMICTAL) 100 MG tablet Take 1 tablet by mouth daily 23   Claudia Crespo MD   blood glucose monitor strips Test 3x daily 8/3/23   Fidel Pearce MD   Lancets [facility] test 3x daily E65.11 8/3/23   Fidel Pearce MD   dulaglutide (TRULICITY) 1.5 MG/0.5ML SC injection Inject 0.5 mLs into the skin once a week 8/3/23   Fidel Pearce MD   topiramate (TOPAMAX) 100 MG tablet Take 1 tablet by mouth 2 times daily 22   Tu Driver MD       Current medications:    Current Facility-Administered Medications   Medication Dose Route Frequency Provider Last Rate Last Admin    0.9 % sodium chloride infusion   IntraVENous PRN Aden Norris MD        0.9 % sodium chloride infusion   IntraVENous Continuous Aden Norris MD 75 mL/hr at 01/10/24 0654 New Bag at 01/10/24 0654    sodium chloride flush

## 2024-01-10 NOTE — ANESTHESIA POSTPROCEDURE EVALUATION
Department of Anesthesiology  Postprocedure Note    Patient: Kanchan Cam  MRN: [id number]  YOB: 1980  Date of evaluation: 1/10/2024    Procedure Summary       Date: 01/10/24 Room / Location: [facility] OR 03 / [facility]    Anesthesia Start: 0747 Anesthesia Stop:     Procedure: EGD ESOPHAGOGASTRODUODENOSCOPY Diagnosis:       Morbid obesity (HCC)      (Morbid obesity (HCC) [E66.01])    Surgeons: Aden Norris MD Responsible Provider: Sonya Reed APRN - CRNA    Anesthesia Type: general ASA Status: 3            Anesthesia Type: No value filed.    Leann Phase I: Leann Score: 10    Leann Phase II:      Anesthesia Post Evaluation    Patient location during evaluation: bedside  Patient participation: waiting for patient participation  Level of consciousness: responsive to physical stimuli  Airway patency: patent  Nausea & Vomiting: no nausea and no vomiting  Cardiovascular status: blood pressure returned to baseline and hemodynamically stable  Respiratory status: acceptable  Hydration status: euvolemic  Pain management: adequate        No notable events documented.

## 2024-01-10 NOTE — H&P
Surgery Consultation     Patient Name:  :  Hospital Day:   Kanchan Cam 1980 [unfilled]      Date of Service: 01/10/2024     Subjective:   History of Present Illness:    Kanchan Cam  is a 43 y.o. female referred by Dr. Pearce for morbid obesity and diabetes.  Kanchan Cam reports multiple episodes of weight loss and regain after multiple diet and exercise programs. She vapes nicotine.  No alcohol use.  She had a remote negative sleep study per her report but has regained weight (lost from 390's to 165lbs and is now 224 lbs).  There is not a current exercise routine.  Kanchan has been on trulicity and topamax but is not seeing fruther weight loss.            Past Medical History        Past Medical History:   Diagnosis Date    ADD (attention deficit disorder)     Bipolar disorder (HCC)      Depression      DM (diabetes mellitus) (HCC)      History of drug abuse (HCC)       positive drug screens 14 and 14    Hyperlipidemia      Osteoarthritis         Past Surgical History         Past Surgical History:   Procedure Laterality Date    CHOLECYSTECTOMY, LAPAROSCOPIC N/A 2017     CHOLECYSTECTOMY LAPAROSCOPIC WITH GRAMS POSS OPEN , RECENT LABS  performed by Issac Quintero MD at [facility]    HYSTERECTOMY (CERVIX STATUS UNKNOWN)        VA COLON CA SCRN NOT HI RSK IND N/A 2018     COLONOSCOPY ROOM 384 performed by Margareth Freeman MD at [facility] OR    RECTAL SURGERY N/A 2021     I&D LEFT  PERIRECTAL  ABSCESS performed by Jeffry Douglas MD at [facility] OR    TUBAL LIGATION                Family History         Family History   Problem Relation Age of Onset    Other Mother           CHF    Cirrhosis Brother      Other Maternal Grandmother           CHF       Social History            Tobacco Use    Smoking status: Every Day       Packs/day: 1.00       Years: 24.00       Additional pack years: 0.00       Total pack years: 24.00       Types: Cigarettes    Smokeless tobacco: Never   Vaping Use     and to our bariatric psychologist.  We will need a preoperative history and physical within 30 days of surgery.  We will also refer to Sleep Medicine to determine if there is sleep apnea and to begin treatment if present.     More than 30 minutes were spent in face-to-face interaction with patient majority of which were spent in care coordination of the above.  Kanchan Cam   has expressed interest in SLEEVE gastrectomy.         Aden Norris MD, FASMBS, FACS, MBSAQIP Verified Surgeon  [id number], Diplomate of The American Board of Surgery

## 2024-01-10 NOTE — TELEPHONE ENCOUNTER
Patient of Dr. Siddiqi    Patient insurance is still denying the mounjaro even with all the information I have sent of what she tried and her A1c       She wants to know if DR. Siddiqi can send her trulicity to her pharmacy. She has been out of medication for a week.     Pharmacy is on file     I will try one more time to try and get the mounjaro approved

## 2024-01-12 ENCOUNTER — OFFICE VISIT (OUTPATIENT)
Dept: BARIATRICS/WEIGHT MGMT | Age: 44
End: 2024-01-12

## 2024-01-12 VITALS — HEIGHT: 65 IN | BODY MASS INDEX: 37.89 KG/M2 | WEIGHT: 227.4 LBS

## 2024-01-12 DIAGNOSIS — E11.69 DIABETES MELLITUS TYPE 2 IN OBESE (HCC): Primary | ICD-10-CM

## 2024-01-12 DIAGNOSIS — E66.01 MORBID OBESITY (HCC): ICD-10-CM

## 2024-01-12 DIAGNOSIS — Z71.3 DIETARY COUNSELING AND SURVEILLANCE: ICD-10-CM

## 2024-01-12 DIAGNOSIS — E66.9 DIABETES MELLITUS TYPE 2 IN OBESE (HCC): Primary | ICD-10-CM

## 2024-01-12 NOTE — PROGRESS NOTES
Nutrition follow up prior to surgery  Visit number:  2 out of 3 for Keshav en Y gastric bypass.    Initial Weight: 224.8 lbs    Wt Readings from Last 3 Encounters:   01/10/24 104.3 kg (230 lb)   12/19/23 102 kg (224 lb 13.9 oz)   12/12/23 102 kg (224 lb 12.8 oz)     stable / unchanged.    Previous nutrition goals:   Increase meal frequency to include at least 4 meal times/day  Include protein source at each  Sample protein shakes, can use 1 /day  Add Calcium citrate in divided doses  Begin regular exercise  Keep written food journal or on tiara  Read \"Patient Guide to Bariatric Surgery\" before next visit    Nutrition goals: met, no nicotine since 12/9/23.  Including 4 meal times/day, some difficulty with evening snack but trying to include healthy choice. Patient drinking > 64 oz water/day, brought in vitamins to review, and joined a gym. Plans on going most days of the week.    PES Statement:  Overweight/Obesity related to a complex combination of decreased energy needs, disordered eating patterns, physical inactivity, and increased psychological/life stress as evidenced by BMI greater than 30 and inability to maintain a significant amount of weight loss through conventional weight loss interventions.    Intervention:  Reviewed previous goals and set new goals.  Stressed importance of preoperative weight loss or surgery may be postponed or cancelled.  Provided continued education regarding diet and lifestyle changes for optimal success post bariatric surgery.  Encouragement and support provided.    Education materials provided:   none    Plan/Recommendations:   Continue daily intake  Keep daily food journal: 1200-1350kcal/day  Exercise at least 3 days/week    Follow up: 4 weeks     Total time involved in direct patient education: 30 minutes    Cherelle Mott RD

## 2024-01-14 NOTE — PATIENT INSTRUCTIONS
Follow up in 3 months    Continue current medications and therapy for chronic medical conditions.    Patient was advised importance of proper diet/nutrition in addition adequate hydration. Patient was encouraged moderate exercise program to include 30 minutes daily for 5 days of the week or 150 minutes weekly. Patient will follow-up with us as scheduled.    Schedule screening mammogram    Start Mounjaro 25 mg weekly    Complete annual physical exam

## 2024-01-16 ENCOUNTER — TELEPHONE (OUTPATIENT)
Dept: BARIATRICS/WEIGHT MGMT | Age: 44
End: 2024-01-16

## 2024-01-16 DIAGNOSIS — A04.8 H. PYLORI INFECTION: Primary | ICD-10-CM

## 2024-01-16 DIAGNOSIS — G47.33 OSA (OBSTRUCTIVE SLEEP APNEA): ICD-10-CM

## 2024-01-16 RX ORDER — METRONIDAZOLE 500 MG/1
500 TABLET ORAL 2 TIMES DAILY
Qty: 14 TABLET | Refills: 0 | Status: SHIPPED | OUTPATIENT
Start: 2024-01-16 | End: 2024-01-23

## 2024-01-16 RX ORDER — AMOXICILLIN AND CLAVULANATE POTASSIUM 875; 125 MG/1; MG/1
1 TABLET, FILM COATED ORAL 2 TIMES DAILY
Qty: 20 TABLET | Refills: 0 | Status: SHIPPED | OUTPATIENT
Start: 2024-01-16 | End: 2024-01-26

## 2024-01-16 NOTE — TELEPHONE ENCOUNTER
I prescribed Bismuth, Augmentin, Flagyl, and omeprazole for H. Pylori treatment.  I called the patient and relayed the instructions for care via a message.    Guille Norris MD, FACS, [facility]

## 2024-01-22 ENCOUNTER — HOSPITAL ENCOUNTER (OUTPATIENT)
Dept: RADIOLOGY | Facility: EXTERNAL LOCATION | Age: 44
Discharge: HOME | End: 2024-01-22

## 2024-02-08 ENCOUNTER — OFFICE VISIT (OUTPATIENT)
Dept: ENDOCRINOLOGY | Age: 44
End: 2024-02-08

## 2024-02-08 VITALS
BODY MASS INDEX: 37.15 KG/M2 | OXYGEN SATURATION: 97 % | HEIGHT: 65 IN | SYSTOLIC BLOOD PRESSURE: 107 MMHG | HEART RATE: 84 BPM | WEIGHT: 223 LBS | DIASTOLIC BLOOD PRESSURE: 73 MMHG

## 2024-02-08 DIAGNOSIS — E66.9 OBESITY (BMI 30-39.9): ICD-10-CM

## 2024-02-08 DIAGNOSIS — E11.65 UNCONTROLLED TYPE 2 DIABETES MELLITUS WITH HYPERGLYCEMIA (HCC): Primary | ICD-10-CM

## 2024-02-08 LAB
CHP ED QC CHECK: NORMAL
GLUCOSE BLD-MCNC: 105 MG/DL
HBA1C MFR BLD: 5.5 %

## 2024-02-08 RX ORDER — DULAGLUTIDE 3 MG/.5ML
3 INJECTION, SOLUTION SUBCUTANEOUS WEEKLY
Qty: 4 ADJUSTABLE DOSE PRE-FILLED PEN SYRINGE | Refills: 3 | Status: SHIPPED | OUTPATIENT
Start: 2024-02-08

## 2024-02-08 RX ORDER — DULAGLUTIDE 0.75 MG/.5ML
0.75 INJECTION, SOLUTION SUBCUTANEOUS
COMMUNITY
Start: 2024-01-12

## 2024-02-08 NOTE — PROGRESS NOTES
3    topiramate (TOPAMAX) 100 MG tablet, Take 1 tablet by mouth 2 times daily, Disp: , Rfl: 0    vitamin D3 (CHOLECALCIFEROL) 25 MCG (1000 UT) TABS tablet, Take 5 tablets by mouth daily for 7 days, Disp: 35 tablet, Rfl: 0  Lab Results   Component Value Date     02/13/2022    K 4.2 02/13/2022     (H) 02/13/2022    CO2 23 02/13/2022    BUN 15 02/13/2022    CREATININE 0.78 02/13/2022    GLUCOSE 93 02/13/2022    CALCIUM 9.7 02/13/2022    PROT 7.3 02/13/2022    LABALBU 4.5 02/13/2022    BILITOT 0.3 02/13/2022    ALKPHOS 68 02/13/2022    AST 10 02/13/2022    ALT 10 02/13/2022    LABGLOM >60.0 02/13/2022    GFRAA >60.0 02/13/2022    GLOB 2.8 02/13/2022     Lab Results   Component Value Date    WBC 9.9 02/13/2022    HGB 14.3 02/13/2022    HCT 42.8 02/13/2022    MCV 88.6 02/13/2022     02/13/2022     Lab Results   Component Value Date    LABA1C 5.5 02/08/2024    LABA1C 5.4 11/09/2023    LABA1C 5.7 09/30/2021     Lab Results   Component Value Date    HDL 36 (L) 02/11/2022    HDL 43 02/10/2022    HDL 37 (L) 09/28/2021    LDLCALC 87 02/11/2022    LDLCALC 143 (H) 02/10/2022    LDLCALC 87 09/28/2021    CHOL 143 02/11/2022    CHOL 214 (H) 02/10/2022    CHOL 162 09/28/2021    TRIG 98 02/11/2022    TRIG 142 02/10/2022    TRIG 189 (H) 09/28/2021     No results found for: \"TESTM\"  Lab Results   Component Value Date    TSH 4.140 (H) 02/10/2022    TSH 1.400 09/28/2021    TSH 2.040 08/30/2021    TSHREFLEX 1.440 12/19/2023    FT3 3.6 02/08/2019    T4FREE 0.98 02/08/2019    T4FREE 1.11 07/19/2017     No results found for: \"TPOABS\"    Review of Systems   Cardiovascular: Negative.    Musculoskeletal:  Positive for arthralgias.   All other systems reviewed and are negative.      Objective:   Physical Exam  Vitals reviewed.   Constitutional:       General: She is not in acute distress.     Appearance: Normal appearance. She is obese.   HENT:      Head: Normocephalic and atraumatic.      Right Ear: External ear normal.

## 2024-02-13 ENCOUNTER — OFFICE VISIT (OUTPATIENT)
Dept: BARIATRICS/WEIGHT MGMT | Age: 44
End: 2024-02-13
Payer: MEDICAID

## 2024-02-13 VITALS — HEIGHT: 65 IN | BODY MASS INDEX: 36.91 KG/M2

## 2024-02-13 VITALS — BODY MASS INDEX: 36.96 KG/M2 | HEIGHT: 65 IN | WEIGHT: 221.8 LBS

## 2024-02-13 DIAGNOSIS — G47.33 OSA (OBSTRUCTIVE SLEEP APNEA): ICD-10-CM

## 2024-02-13 DIAGNOSIS — E66.9 DIABETES MELLITUS TYPE 2 IN OBESE (HCC): Primary | ICD-10-CM

## 2024-02-13 DIAGNOSIS — E11.69 DIABETES MELLITUS TYPE 2 IN OBESE (HCC): Primary | ICD-10-CM

## 2024-02-13 DIAGNOSIS — F54 PSYCHOLOGICAL FACTORS AFFECTING MORBID OBESITY (HCC): ICD-10-CM

## 2024-02-13 DIAGNOSIS — Z71.89 ENCOUNTER FOR PSYCHOLOGICAL ASSESSMENT PRIOR TO BARIATRIC SURGERY: Primary | ICD-10-CM

## 2024-02-13 DIAGNOSIS — F17.200 NICOTINE USE DISORDER: ICD-10-CM

## 2024-02-13 DIAGNOSIS — E66.9 DIABETES MELLITUS TYPE 2 IN OBESE (HCC): ICD-10-CM

## 2024-02-13 DIAGNOSIS — F43.10 PTSD (POST-TRAUMATIC STRESS DISORDER): ICD-10-CM

## 2024-02-13 DIAGNOSIS — E66.01 PSYCHOLOGICAL FACTORS AFFECTING MORBID OBESITY (HCC): ICD-10-CM

## 2024-02-13 DIAGNOSIS — E66.01 MORBID OBESITY (HCC): ICD-10-CM

## 2024-02-13 DIAGNOSIS — F90.9 ATTENTION DEFICIT HYPERACTIVITY DISORDER (ADHD), UNSPECIFIED ADHD TYPE: ICD-10-CM

## 2024-02-13 DIAGNOSIS — Z71.3 DIETARY COUNSELING AND SURVEILLANCE: ICD-10-CM

## 2024-02-13 DIAGNOSIS — F31.75 BIPOLAR I DISORDER, CURRENT OR MOST RECENT EPISODE DEPRESSED, IN PARTIAL REMISSION (HCC): ICD-10-CM

## 2024-02-13 DIAGNOSIS — E11.69 DIABETES MELLITUS TYPE 2 IN OBESE (HCC): ICD-10-CM

## 2024-02-13 PROCEDURE — 90791 PSYCH DIAGNOSTIC EVALUATION: CPT | Performed by: PSYCHOLOGIST

## 2024-02-13 PROCEDURE — 97803 MED NUTRITION INDIV SUBSEQ: CPT | Performed by: DIETITIAN, REGISTERED

## 2024-02-13 PROCEDURE — G8417 CALC BMI ABV UP PARAM F/U: HCPCS | Performed by: DIETITIAN, REGISTERED

## 2024-02-13 PROCEDURE — 3044F HG A1C LEVEL LT 7.0%: CPT | Performed by: DIETITIAN, REGISTERED

## 2024-02-13 PROCEDURE — G8428 CUR MEDS NOT DOCUMENT: HCPCS | Performed by: DIETITIAN, REGISTERED

## 2024-02-13 PROCEDURE — 4004F PT TOBACCO SCREEN RCVD TLK: CPT | Performed by: PSYCHOLOGIST

## 2024-02-13 NOTE — PROGRESS NOTES
Results:  Kanchan was also administered the PHQ-9 and ELLIE-7 to measure depression and anxiety. She obtained a score of 2 on the PHQ-9, indicating minimal depression, and a score of 3 on the ELLIE-7, indicating minimal anxiety. The AUDIT-10 was also completed to assess her potential for an alcohol use disorder. She obtained a total score of 0 on this measure, which reflects low risk consumption. Kanchan was also administered the Binge Eating Scale (BES); her score indicates little or no binge eating behavior. Kanchan was also administered the Brief Resilience Scale (BRS), which assesses her ability to recover from stress; her score on this measure suggests Kanchan possesses normal resiliency and ability to bounce back from stressful situations. Kanchan's responses on the Mood Disorder Questionnaire (MDQ), which is a screening instrument for bipolar disorder were reviewed. Kanchan endorsed 9 out of 13 symptoms associated with bipolar disorder (7 required for positive screen), occurring during the same period, resulting in moderate problems (impairment in functioning and/or negative consequences); her responses produced a positive screen on the measure, suggesting the likely presence of a mood disorder, such as bipolar disorder.       Weight Management and History of Eating Disordered Behavior:  Kanchan reported that difficulty with weight management began at age 34. Her highest reported adult weight has been 294 lbs., which was her weight at age 41. Her lowest reported adult weight has been 165 lbs., which was her weight at age 40. Many weight loss strategies have been attempted but none have resulted in long term maintenance. The most successful weight loss attempt was use of Adipex, following a Keto style diet, and \"walking a lot,\" with which they lost approximately 100-120 lbs over the course of 12-18 months.    The patient denies any history of bingeing. Binge Eating Scale score is 2, which is considered within normal

## 2024-02-13 NOTE — PROGRESS NOTES
Nutrition follow up prior to surgery  Visit number:  3 out of 6 for Keshav-en-Y gastric bypass.  Initial Weight: 224 lbs    Wt Readings from Last 3 Encounters:   02/13/24 100.6 kg (221 lb 12.8 oz)   02/08/24 101.2 kg (223 lb)   01/12/24 103.1 kg (227 lb 6.4 oz)     lost 6 lbs over 1 month, down 3 lbs from initial.    Previous nutrition goals:   Continue daily intake  Keep daily food journal: 1200-1350kcal/day  Exercise at least 3 days/week    Nutrition goals: met, consuming 3 meals and 1 snack/day, consuming protein first at meals, starch last. Drinking only approved beverages and taking MVI and Calcium citrate. Swimming 2-3 days/week and walking dog or walking track at gym several days/week.  Patient has remained nicotine free x 2 months.    PES Statement:  Overweight/Obesity related to a complex combination of decreased energy needs, disordered eating patterns, physical inactivity, and increased psychological/life stress as evidenced by BMI greater than 30 and inability to maintain a significant amount of weight loss through conventional weight loss interventions.    Intervention:  Reviewed previous goals and set new goals.  Stressed importance of preoperative weight loss or surgery may be postponed or cancelled.  Provided continued education regarding diet and lifestyle changes for optimal success post bariatric surgery.  Encouragement and support provided.    Education materials provided:   none    Plan/Recommendations:   Continue current meal plan and exercise  Attend support group    Follow up: 4 weeks     Total time involved in direct patient education: 15 minutes    Cherelle Mott RD

## 2024-02-14 DIAGNOSIS — F17.290 OTHER TOBACCO PRODUCT NICOTINE DEPENDENCE, UNCOMPLICATED: ICD-10-CM

## 2024-02-20 ENCOUNTER — OFFICE VISIT (OUTPATIENT)
Dept: PULMONOLOGY | Age: 44
End: 2024-02-20
Payer: MEDICAID

## 2024-02-20 VITALS
HEIGHT: 65 IN | DIASTOLIC BLOOD PRESSURE: 78 MMHG | OXYGEN SATURATION: 98 % | BODY MASS INDEX: 36.91 KG/M2 | SYSTOLIC BLOOD PRESSURE: 108 MMHG | HEART RATE: 95 BPM | TEMPERATURE: 97.6 F

## 2024-02-20 DIAGNOSIS — G47.33 OSA (OBSTRUCTIVE SLEEP APNEA): Primary | ICD-10-CM

## 2024-02-20 DIAGNOSIS — E66.9 OBESITY, UNSPECIFIED CLASSIFICATION, UNSPECIFIED OBESITY TYPE, UNSPECIFIED WHETHER SERIOUS COMORBIDITY PRESENT: ICD-10-CM

## 2024-02-20 PROCEDURE — 4004F PT TOBACCO SCREEN RCVD TLK: CPT | Performed by: INTERNAL MEDICINE

## 2024-02-20 PROCEDURE — G8417 CALC BMI ABV UP PARAM F/U: HCPCS | Performed by: INTERNAL MEDICINE

## 2024-02-20 PROCEDURE — G8427 DOCREV CUR MEDS BY ELIG CLIN: HCPCS | Performed by: INTERNAL MEDICINE

## 2024-02-20 PROCEDURE — 99213 OFFICE O/P EST LOW 20 MIN: CPT | Performed by: INTERNAL MEDICINE

## 2024-02-20 PROCEDURE — G8484 FLU IMMUNIZE NO ADMIN: HCPCS | Performed by: INTERNAL MEDICINE

## 2024-02-20 RX ORDER — VENLAFAXINE 75 MG/1
75 TABLET ORAL DAILY
COMMUNITY
Start: 2024-02-13

## 2024-02-20 NOTE — PROGRESS NOTES
abdominal pain and diarrhea    Adhesive Tape Rash       Medications  Current Outpatient Medications   Medication Sig Dispense Refill    venlafaxine (EFFEXOR) 75 MG tablet Take 1 tablet by mouth daily      Dulaglutide (TRULICITY) 3 MG/0.5ML SOPN Inject 3 mg into the skin once a week 4 Adjustable Dose Pre-filled Pen Syringe 3    omeprazole (PRILOSEC) 20 MG delayed release capsule Take 1 capsule by mouth every morning (before breakfast) 30 capsule 5    Blood Glucose Monitoring Suppl (ONETOUCH VERIO REFLECT) w/Device KIT USE TO TEST BLOOD SUGAR FOUR TIMES DAILY      buPROPion (WELLBUTRIN SR) 200 MG extended release tablet Take 1 tablet by mouth 2 times daily      clonazePAM (KLONOPIN) 0.5 MG tablet TAKE ONE TABLET BY MOUTH THREE TIMES DAILY AS NEEDED FOR ANXIETY      lamoTRIgine (LAMICTAL) 100 MG tablet Take 1 tablet by mouth daily      blood glucose monitor strips Test 3x daily 100 strip 3    Lancets MISC Patient test 3x daily E65.11 100 each 3    topiramate (TOPAMAX) 100 MG tablet Take 1 tablet by mouth 2 times daily  0    TRULICITY 0.75 MG/0.5ML SOPN 0.75 mg      Dulaglutide (TRULICITY) 3 MG/0.5ML SOPN Inject 3 mg into the skin once a week 4 Adjustable Dose Pre-filled Pen Syringe 3    vitamin D3 (CHOLECALCIFEROL) 25 MCG (1000 UT) TABS tablet Take 5 tablets by mouth daily for 7 days 35 tablet 0     No current facility-administered medications for this visit.       Social History  Social History     Tobacco Use    Smoking status: Every Day     Current packs/day: 1.00     Average packs/day: 1 pack/day for 24.0 years (24.0 ttl pk-yrs)     Types: Cigarettes    Smokeless tobacco: Never   Substance Use Topics    Alcohol use: Not Currently     Comment: working on sobriety       Family History  Family History   Problem Relation Age of Onset    Other Mother         CHF    Cirrhosis Brother     Other Maternal Grandmother         CHF       Review of Systems  All review of systems has been obtained and negative other than what

## 2024-02-24 NOTE — PROGRESS NOTES
Adri Haji DO. Electronically signed by :  JENNIFER Ruiz, Cong Pastor DO, personally performed the services described in this documentation, as scribed by Corrina Goodwin in my presence, and it is both accurate and complete.  Electronically signed by: Cong Pastor DO    10/7/18 10:49 AM    Cong Pastor DO
1 = Total assistance

## 2024-03-13 DIAGNOSIS — F17.210 CIGARETTE NICOTINE DEPENDENCE WITHOUT COMPLICATION: Primary | ICD-10-CM

## 2024-03-13 NOTE — PROGRESS NOTES
Repeat nicotine test ordered.  Nicotine test of 2/14/2024 was negative..      Guille Norris MD, FACS, [facility]

## 2024-04-12 ENCOUNTER — OFFICE VISIT (OUTPATIENT)
Dept: BARIATRICS/WEIGHT MGMT | Age: 44
End: 2024-04-12

## 2024-04-12 ENCOUNTER — OFFICE VISIT (OUTPATIENT)
Dept: BARIATRICS/WEIGHT MGMT | Age: 44
End: 2024-04-12
Payer: MEDICAID

## 2024-04-12 VITALS — HEIGHT: 65 IN | BODY MASS INDEX: 37.29 KG/M2 | WEIGHT: 223.8 LBS

## 2024-04-12 DIAGNOSIS — F90.9 ATTENTION DEFICIT HYPERACTIVITY DISORDER (ADHD), UNSPECIFIED ADHD TYPE: ICD-10-CM

## 2024-04-12 DIAGNOSIS — E66.9 TYPE 2 DIABETES MELLITUS WITH OBESITY (HCC): Primary | ICD-10-CM

## 2024-04-12 DIAGNOSIS — F31.75 BIPOLAR I DISORDER, CURRENT OR MOST RECENT EPISODE DEPRESSED, IN PARTIAL REMISSION (HCC): ICD-10-CM

## 2024-04-12 DIAGNOSIS — E66.01 PSYCHOLOGICAL FACTORS AFFECTING MORBID OBESITY (HCC): Primary | ICD-10-CM

## 2024-04-12 DIAGNOSIS — F43.10 PTSD (POST-TRAUMATIC STRESS DISORDER): ICD-10-CM

## 2024-04-12 DIAGNOSIS — E11.69 TYPE 2 DIABETES MELLITUS WITH OBESITY (HCC): Primary | ICD-10-CM

## 2024-04-12 DIAGNOSIS — E66.01 MORBID OBESITY (HCC): ICD-10-CM

## 2024-04-12 DIAGNOSIS — F54 PSYCHOLOGICAL FACTORS AFFECTING MORBID OBESITY (HCC): Primary | ICD-10-CM

## 2024-04-12 DIAGNOSIS — Z71.3 DIETARY COUNSELING AND SURVEILLANCE: ICD-10-CM

## 2024-04-12 DIAGNOSIS — F17.200 NICOTINE USE DISORDER: ICD-10-CM

## 2024-04-12 DIAGNOSIS — E66.09 CLASS 2 OBESITY DUE TO EXCESS CALORIES WITH BODY MASS INDEX (BMI) OF 37.0 TO 37.9 IN ADULT, UNSPECIFIED WHETHER SERIOUS COMORBIDITY PRESENT: ICD-10-CM

## 2024-04-12 DIAGNOSIS — G47.33 OSA (OBSTRUCTIVE SLEEP APNEA): ICD-10-CM

## 2024-04-12 PROCEDURE — G8428 CUR MEDS NOT DOCUMENT: HCPCS | Performed by: DIETITIAN, REGISTERED

## 2024-04-12 PROCEDURE — G8417 CALC BMI ABV UP PARAM F/U: HCPCS | Performed by: DIETITIAN, REGISTERED

## 2024-04-12 PROCEDURE — 3044F HG A1C LEVEL LT 7.0%: CPT | Performed by: DIETITIAN, REGISTERED

## 2024-04-12 PROCEDURE — 97803 MED NUTRITION INDIV SUBSEQ: CPT | Performed by: DIETITIAN, REGISTERED

## 2024-04-12 ASSESSMENT — PATIENT HEALTH QUESTIONNAIRE - PHQ9
8. MOVING OR SPEAKING SO SLOWLY THAT OTHER PEOPLE COULD HAVE NOTICED. OR THE OPPOSITE, BEING SO FIGETY OR RESTLESS THAT YOU HAVE BEEN MOVING AROUND A LOT MORE THAN USUAL: NOT AT ALL
5. POOR APPETITE OR OVEREATING: NOT AT ALL
1. LITTLE INTEREST OR PLEASURE IN DOING THINGS: SEVERAL DAYS
4. FEELING TIRED OR HAVING LITTLE ENERGY: NOT AT ALL
3. TROUBLE FALLING OR STAYING ASLEEP: SEVERAL DAYS
2. FEELING DOWN, DEPRESSED OR HOPELESS: SEVERAL DAYS
SUM OF ALL RESPONSES TO PHQ QUESTIONS 1-9: 3
7. TROUBLE CONCENTRATING ON THINGS, SUCH AS READING THE NEWSPAPER OR WATCHING TELEVISION: NOT AT ALL
SUM OF ALL RESPONSES TO PHQ QUESTIONS 1-9: 3
SUM OF ALL RESPONSES TO PHQ QUESTIONS 1-9: 3
9. THOUGHTS THAT YOU WOULD BE BETTER OFF DEAD, OR OF HURTING YOURSELF: NOT AT ALL
SUM OF ALL RESPONSES TO PHQ QUESTIONS 1-9: 3
SUM OF ALL RESPONSES TO PHQ9 QUESTIONS 1 & 2: 2
6. FEELING BAD ABOUT YOURSELF - OR THAT YOU ARE A FAILURE OR HAVE LET YOURSELF OR YOUR FAMILY DOWN: NOT AT ALL
10. IF YOU CHECKED OFF ANY PROBLEMS, HOW DIFFICULT HAVE THESE PROBLEMS MADE IT FOR YOU TO DO YOUR WORK, TAKE CARE OF THINGS AT HOME, OR GET ALONG WITH OTHER PEOPLE: SOMEWHAT DIFFICULT

## 2024-04-12 ASSESSMENT — ANXIETY QUESTIONNAIRES
1. FEELING NERVOUS, ANXIOUS, OR ON EDGE: MORE THAN HALF THE DAYS
6. BECOMING EASILY ANNOYED OR IRRITABLE: NEARLY EVERY DAY
7. FEELING AFRAID AS IF SOMETHING AWFUL MIGHT HAPPEN: NOT AT ALL
IF YOU CHECKED OFF ANY PROBLEMS ON THIS QUESTIONNAIRE, HOW DIFFICULT HAVE THESE PROBLEMS MADE IT FOR YOU TO DO YOUR WORK, TAKE CARE OF THINGS AT HOME, OR GET ALONG WITH OTHER PEOPLE: VERY DIFFICULT
2. NOT BEING ABLE TO STOP OR CONTROL WORRYING: SEVERAL DAYS
4. TROUBLE RELAXING: NOT AT ALL
5. BEING SO RESTLESS THAT IT IS HARD TO SIT STILL: NOT AT ALL
GAD7 TOTAL SCORE: 7
3. WORRYING TOO MUCH ABOUT DIFFERENT THINGS: SEVERAL DAYS

## 2024-04-12 NOTE — PROGRESS NOTES
Nutrition follow up prior to surgery  Visit number:  4 out of 6 for Keshav en Y gastric bypass.    Initial Weight: 224 lbs    Wt Readings from Last 3 Encounters:   02/13/24 100.6 kg (221 lb 12.8 oz)   02/08/24 101.2 kg (223 lb)   01/12/24 103.1 kg (227 lb 6.4 oz)     gained 2 lbs over 2 months, down 1 lbs from initial.    Previous nutrition goals:   Continue current meal plan and exercise  Attend support group     Nutrition goals: not met, states increased depression and weight gain over past month with start of Effexor, medication has been discontinued and reports improved mood.  Reports eating 3-4 small meals, occasionally using protein shakes as a meal.  Patient has been  liquids and solids and eating slower.  Walking weather permitting, also states has use of gym for free at [facility].    PES Statement:  Overweight/Obesity related to a complex combination of decreased energy needs, disordered eating patterns, physical inactivity, and increased psychological/life stress as evidenced by BMI greater than 30 and inability to maintain a significant amount of weight loss through conventional weight loss interventions.    Intervention:  Reviewed previous goals and set new goals.  Stressed importance of preoperative weight loss or surgery may be postponed or cancelled.  Provided continued education regarding diet and lifestyle changes for optimal success post bariatric surgery.  Encouragement and support provided.    Education materials provided:  List of protein choices    Plan/Recommendations:   Continue current meal plan  Increase consistency of walking/cardio, add strength training 2 days/week  Resume food journal    Follow up: 4 weeks     Total time involved in direct patient education: 30 minutes    Cherelle Mott RD

## 2024-04-12 NOTE — PROGRESS NOTES
Interpretation of ELLIE-7 score: 5-9 = mild anxiety, 10-14 = moderate anxiety, 15+ = severe anxiety. Recommend referral to behavioral health for scores 10 or greater.       Kanchan presents for follow up; she reports significant increase in depression and affective lability over past month or so due to an adverse effect or medication-interaction related to Effexor. The medication has been discontinued 1 week and Kanchan noted improvement of mood as a result; she is currently seeing her psychiatrist weekly as a consequence. A Letter of Support has not been received.      Diagnosis:    1. Psychological factors affecting morbid obesity (HCC)    2. Morbid obesity (HCC)    3. Bipolar I disorder, current or most recent episode depressed, in partial remission (HCC)    4. PTSD (post-traumatic stress disorder)    5. Attention deficit hyperactivity disorder (ADHD), unspecified ADHD type [by report]    6. Nicotine use disorder    7. QI (obstructive sleep apnea)             Diagnosis Date    ADD (attention deficit disorder) 2021    Bipolar disorder (HCC)     Depression     DM (diabetes mellitus) (HCC)     History of drug abuse (HCC)     positive drug screens 9/22/14 and 5/18/14    Hyperlipidemia     Osteoarthritis        PLAN  Treatment Goal: Kanchan must demonstrate abstinence from all nicotine use for a minimum of 3 months prior to surgery, as evidenced by negative laboratory testing; Kanchan must obtain a letter of support from her mental health treatment provider indicating good adherence to treatment, absence of any substance use, psychiatric hospitalization, and self-harm behavior in the past 12 months, reasonable control of symptoms, and overall stability of mood and behavior.     Follow up in 2 months to review progress.

## 2024-05-14 ENCOUNTER — NURSE ONLY (OUTPATIENT)
Dept: BARIATRICS/WEIGHT MGMT | Age: 44
End: 2024-05-14

## 2024-05-14 ENCOUNTER — OFFICE VISIT (OUTPATIENT)
Dept: BARIATRICS/WEIGHT MGMT | Age: 44
End: 2024-05-14
Payer: MEDICAID

## 2024-05-14 VITALS — WEIGHT: 219 LBS | BODY MASS INDEX: 36.49 KG/M2 | HEIGHT: 65 IN

## 2024-05-14 DIAGNOSIS — E11.69 TYPE 2 DIABETES MELLITUS WITH OBESITY (HCC): Primary | ICD-10-CM

## 2024-05-14 DIAGNOSIS — E66.09 CLASS 2 OBESITY DUE TO EXCESS CALORIES WITH BODY MASS INDEX (BMI) OF 37.0 TO 37.9 IN ADULT, UNSPECIFIED WHETHER SERIOUS COMORBIDITY PRESENT: ICD-10-CM

## 2024-05-14 DIAGNOSIS — E66.01 MORBID OBESITY (HCC): Primary | ICD-10-CM

## 2024-05-14 DIAGNOSIS — Z71.3 DIETARY COUNSELING AND SURVEILLANCE: ICD-10-CM

## 2024-05-14 DIAGNOSIS — E66.9 TYPE 2 DIABETES MELLITUS WITH OBESITY (HCC): Primary | ICD-10-CM

## 2024-05-14 PROCEDURE — 3044F HG A1C LEVEL LT 7.0%: CPT | Performed by: DIETITIAN, REGISTERED

## 2024-05-14 PROCEDURE — G8417 CALC BMI ABV UP PARAM F/U: HCPCS | Performed by: DIETITIAN, REGISTERED

## 2024-05-14 PROCEDURE — 97803 MED NUTRITION INDIV SUBSEQ: CPT | Performed by: DIETITIAN, REGISTERED

## 2024-05-14 PROCEDURE — G8428 CUR MEDS NOT DOCUMENT: HCPCS | Performed by: DIETITIAN, REGISTERED

## 2024-05-14 NOTE — PROGRESS NOTES
Nutrition follow up prior to surgery  Visit number:  5 out of 6 for Keshav en Y gastric bypass.    Initial Weight: 224 lbs    Wt Readings from Last 3 Encounters:   04/12/24 101.5 kg (223 lb 12.8 oz)   02/13/24 100.6 kg (221 lb 12.8 oz)   02/08/24 101.2 kg (223 lb)     lost 4 lbs over 1 month, down 5 lbs from initial.    Previous nutrition goals:   Continue current meal plan  Increase consistency of walking/cardio, add strength training 2 days/week  Resume food journal    Nutrition goals: met, has been walking 30 -45 minutes each morning.    PES Statement:  Overweight/Obesity related to a complex combination of decreased energy needs, disordered eating patterns, physical inactivity, and increased psychological/life stress as evidenced by BMI greater than 30 and inability to maintain a significant amount of weight loss through conventional weight loss interventions.    Intervention:  Reviewed previous goals and set new goals.  Stressed importance of preoperative weight loss or surgery may be postponed or cancelled.  Provided continued education regarding diet and lifestyle changes for optimal success post bariatric surgery.  Encouragement and support provided.    Education materials provided:   none    Plan/Recommendations:   Continue current weight loss efforts  Support group today    Follow up: 4 weeks     Total time involved in direct patient education: 15 minutes    Cherelle Mott RD

## 2024-05-28 DIAGNOSIS — F17.210 CIGARETTE NICOTINE DEPENDENCE WITHOUT COMPLICATION: ICD-10-CM

## 2024-05-28 DIAGNOSIS — E11.65 UNCONTROLLED TYPE 2 DIABETES MELLITUS WITH HYPERGLYCEMIA (HCC): ICD-10-CM

## 2024-05-28 LAB
ANION GAP SERPL CALCULATED.3IONS-SCNC: 13 MEQ/L (ref 9–15)
BUN SERPL-MCNC: 12 MG/DL (ref 6–20)
CALCIUM SERPL-MCNC: 9.4 MG/DL (ref 8.5–9.9)
CHLORIDE SERPL-SCNC: 109 MEQ/L (ref 95–107)
CO2 SERPL-SCNC: 18 MEQ/L (ref 20–31)
CREAT SERPL-MCNC: 0.94 MG/DL (ref 0.5–0.9)
GLUCOSE SERPL-MCNC: 95 MG/DL (ref 70–99)
POTASSIUM SERPL-SCNC: 3.4 MEQ/L (ref 3.4–4.9)
SODIUM SERPL-SCNC: 140 MEQ/L (ref 135–144)

## 2024-05-29 LAB
ESTIMATED AVERAGE GLUCOSE: 105 MG/DL
HBA1C MFR BLD: 5.3 % (ref 4–6)

## 2024-05-31 ENCOUNTER — OFFICE VISIT (OUTPATIENT)
Dept: ENDOCRINOLOGY | Age: 44
End: 2024-05-31
Payer: MEDICAID

## 2024-05-31 VITALS
HEIGHT: 65 IN | BODY MASS INDEX: 35.99 KG/M2 | OXYGEN SATURATION: 98 % | DIASTOLIC BLOOD PRESSURE: 70 MMHG | SYSTOLIC BLOOD PRESSURE: 104 MMHG | WEIGHT: 216 LBS | HEART RATE: 73 BPM

## 2024-05-31 DIAGNOSIS — E11.65 UNCONTROLLED TYPE 2 DIABETES MELLITUS WITH HYPERGLYCEMIA (HCC): Primary | ICD-10-CM

## 2024-05-31 DIAGNOSIS — R61 HYPERHIDROSIS: ICD-10-CM

## 2024-05-31 LAB
CHP ED QC CHECK: NORMAL
GLUCOSE BLD-MCNC: 109 MG/DL

## 2024-05-31 PROCEDURE — 4004F PT TOBACCO SCREEN RCVD TLK: CPT | Performed by: INTERNAL MEDICINE

## 2024-05-31 PROCEDURE — 82962 GLUCOSE BLOOD TEST: CPT | Performed by: INTERNAL MEDICINE

## 2024-05-31 PROCEDURE — G8427 DOCREV CUR MEDS BY ELIG CLIN: HCPCS | Performed by: INTERNAL MEDICINE

## 2024-05-31 PROCEDURE — 3044F HG A1C LEVEL LT 7.0%: CPT | Performed by: INTERNAL MEDICINE

## 2024-05-31 PROCEDURE — 99213 OFFICE O/P EST LOW 20 MIN: CPT | Performed by: INTERNAL MEDICINE

## 2024-05-31 PROCEDURE — 2022F DILAT RTA XM EVC RTNOPTHY: CPT | Performed by: INTERNAL MEDICINE

## 2024-05-31 PROCEDURE — G8417 CALC BMI ABV UP PARAM F/U: HCPCS | Performed by: INTERNAL MEDICINE

## 2024-05-31 RX ORDER — CLONAZEPAM 1 MG/1
1 TABLET ORAL 3 TIMES DAILY PRN
COMMUNITY
Start: 2024-05-09

## 2024-05-31 RX ORDER — TOPIRAMATE 50 MG/1
50 TABLET, FILM COATED ORAL
COMMUNITY
Start: 2024-05-28

## 2024-05-31 RX ORDER — SEMAGLUTIDE 2.68 MG/ML
2 INJECTION, SOLUTION SUBCUTANEOUS
Qty: 3 ML | Refills: 5 | Status: SHIPPED | OUTPATIENT
Start: 2024-05-31

## 2024-05-31 RX ORDER — PRAZOSIN HYDROCHLORIDE 1 MG/1
1 CAPSULE ORAL
COMMUNITY
Start: 2024-05-23

## 2024-05-31 RX ORDER — GLYCOPYRROLATE 1 MG/1
1 TABLET ORAL 3 TIMES DAILY
Qty: 90 TABLET | Refills: 3 | Status: SHIPPED | OUTPATIENT
Start: 2024-05-31

## 2024-05-31 NOTE — PROGRESS NOTES
5/31/2024    Assessment:       Diagnosis Orders   1. Uncontrolled type 2 diabetes mellitus with hyperglycemia (HCC)  POCT Glucose      2. Hyperhidrosis              PLAN:     Orders Placed This Encounter   Procedures    Basic Metabolic Panel     Standing Status:   Future     Standing Expiration Date:   5/31/2025    Hemoglobin A1C     Standing Status:   Future     Standing Expiration Date:   5/31/2025    POCT Glucose     Orders Placed This Encounter   Medications    semaglutide, 2 MG/DOSE, (OZEMPIC, 2 MG/DOSE,) 8 MG/3ML SOPN sc injection     Sig: Inject 2 mg into the skin every 7 days     Dispense:  3 mL     Refill:  5    glycopyrrolate (ROBINUL) 1 MG tablet     Sig: Take 1 tablet by mouth 3 times daily     Dispense:  90 tablet     Refill:  3           Orders Placed This Encounter   Procedures    POCT Glucose     No orders of the defined types were placed in this encounter.    No follow-ups on file.  Subjective:     Chief Complaint   Patient presents with    Diabetes    Obesity     Vitals:    05/31/24 1248   BP: 104/70   Site: Right Upper Arm   Position: Sitting   Cuff Size: Large Adult   Pulse: 73   SpO2: 98%   Weight: 98 kg (216 lb)   Height: 1.651 m (5' 5\")     Wt Readings from Last 3 Encounters:   05/31/24 98 kg (216 lb)   05/14/24 99.3 kg (219 lb)   04/12/24 101.5 kg (223 lb 12.8 oz)     BP Readings from Last 3 Encounters:   05/31/24 104/70   02/20/24 108/78   02/08/24 107/73     Follow-up on type 2 diabetes obesity patient bariatric hemoglobin A1c  Hemoglobin A1C unable to get Trulicity had GI side effects from metformin       Date                     Value               Ref Range           Status                05/28/2024               5.3                 4.0 - 6.0 %         Final            ----------  Also complains of excessive sweating has gotten worse wants to start medication (medication and bariatric program for weight loss BMI is at 36    Diabetes  She presents for her follow-up diabetic visit. She has

## 2024-06-03 ENCOUNTER — TELEPHONE (OUTPATIENT)
Dept: ENDOCRINOLOGY | Age: 44
End: 2024-06-03

## 2024-06-03 LAB
ANABASINE UR-MCNC: <5 NG/ML
COTININE UR-MCNC: <15 NG/ML
NICOTINE UR-MCNC: 18 NG/ML
TRANS-3-OH-COTININE UR-MCNC: <50 NG/ML

## 2024-06-03 NOTE — TELEPHONE ENCOUNTER
Patient calling in regarding the ozempic she was recently prescribed to [facility].  They told her it needs prior authorized.  Please advise.

## 2024-06-06 ENCOUNTER — OFFICE VISIT (OUTPATIENT)
Dept: BARIATRICS/WEIGHT MGMT | Age: 44
End: 2024-06-06
Payer: MEDICAID

## 2024-06-06 VITALS — BODY MASS INDEX: 35.39 KG/M2 | WEIGHT: 212.4 LBS | HEIGHT: 65 IN

## 2024-06-06 DIAGNOSIS — Z71.3 DIETARY COUNSELING AND SURVEILLANCE: ICD-10-CM

## 2024-06-06 DIAGNOSIS — E11.69 TYPE 2 DIABETES MELLITUS WITH OBESITY (HCC): Primary | ICD-10-CM

## 2024-06-06 DIAGNOSIS — E66.9 OBESITY, CLASS II, BMI 35-39.9: ICD-10-CM

## 2024-06-06 DIAGNOSIS — E66.9 TYPE 2 DIABETES MELLITUS WITH OBESITY (HCC): Primary | ICD-10-CM

## 2024-06-06 PROCEDURE — 3044F HG A1C LEVEL LT 7.0%: CPT | Performed by: DIETITIAN, REGISTERED

## 2024-06-06 PROCEDURE — G8417 CALC BMI ABV UP PARAM F/U: HCPCS | Performed by: DIETITIAN, REGISTERED

## 2024-06-06 PROCEDURE — 97803 MED NUTRITION INDIV SUBSEQ: CPT | Performed by: DIETITIAN, REGISTERED

## 2024-06-06 PROCEDURE — G8428 CUR MEDS NOT DOCUMENT: HCPCS | Performed by: DIETITIAN, REGISTERED

## 2024-06-06 NOTE — PROGRESS NOTES
Nutrition follow up prior to surgery  Visit number:  6 out of 6 for Keshav en Y gastric bypass.    Initial Weight: 224 lbs    Wt Readings from Last 3 Encounters:   05/31/24 98 kg (216 lb)   05/14/24 99.3 kg (219 lb)   04/12/24 101.5 kg (223 lb 12.8 oz)     lost 7 lbs over 1 month, down 12 lbs from initial.    Previous nutrition goals:   Continue current weight loss efforts  Support group today      Nutrition goals: met, third nicotine test from 5/28/24 came back positive, patient denies any nicotine, suspect d/t second hand smoke,  smokes in the car.    Patient has completed required nutrition appointments and has met nutrition goals.  Reviewed importance of preoperative weight loss (10% of body weight) and reminded patient that surgery may be postponed or cancelled if weight loss is inadequate.  Patient is cleared from nutrition for bariatric surgery at this time.  Will follow up preoperatively as needed.      PES Statement:  Overweight/Obesity related to a complex combination of decreased energy needs, disordered eating patterns, physical inactivity, and increased psychological/life stress as evidenced by BMI greater than 30 and inability to maintain a significant amount of weight loss through conventional weight loss interventions.    Intervention:  Reviewed previous goals and set new goals.  Stressed importance of preoperative weight loss or surgery may be postponed or cancelled.  Provided continued education regarding diet and lifestyle changes for optimal success post bariatric surgery.  Encouragement and support provided.    Education materials provided:   none    Plan/Recommendations:   Continue weight loss efforts  Prepare for 2 week pre op diet    Follow up: 1 week post op    Total time involved in direct patient education: 30 minutes    Cherelle Mott RD

## 2024-06-06 NOTE — TELEPHONE ENCOUNTER
I left a message PA was denied , she can call her insurance and see what cover . Her insurance does not cover weight loss medication. Her diabetes is doing good at A1c 5-3  They do cover trulicity

## 2024-06-10 ENCOUNTER — OFFICE VISIT (OUTPATIENT)
Dept: BARIATRICS/WEIGHT MGMT | Age: 44
End: 2024-06-10
Payer: MEDICAID

## 2024-06-10 VITALS — BODY MASS INDEX: 35.89 KG/M2 | HEIGHT: 65 IN | WEIGHT: 215.4 LBS

## 2024-06-10 DIAGNOSIS — E66.01 PSYCHOLOGICAL FACTORS AFFECTING MORBID OBESITY (HCC): Primary | ICD-10-CM

## 2024-06-10 DIAGNOSIS — E66.9 OBESITY, CLASS II, BMI 35-39.9: ICD-10-CM

## 2024-06-10 DIAGNOSIS — F90.9 ATTENTION DEFICIT HYPERACTIVITY DISORDER (ADHD), UNSPECIFIED ADHD TYPE: ICD-10-CM

## 2024-06-10 DIAGNOSIS — G47.33 OSA (OBSTRUCTIVE SLEEP APNEA): ICD-10-CM

## 2024-06-10 DIAGNOSIS — F54 PSYCHOLOGICAL FACTORS AFFECTING MORBID OBESITY (HCC): Primary | ICD-10-CM

## 2024-06-10 DIAGNOSIS — F43.10 PTSD (POST-TRAUMATIC STRESS DISORDER): ICD-10-CM

## 2024-06-10 DIAGNOSIS — F31.75 BIPOLAR I DISORDER, CURRENT OR MOST RECENT EPISODE DEPRESSED, IN PARTIAL REMISSION (HCC): ICD-10-CM

## 2024-06-10 DIAGNOSIS — F42.9 OBSESSIVE-COMPULSIVE DISORDER, UNSPECIFIED TYPE: ICD-10-CM

## 2024-06-10 PROCEDURE — 4004F PT TOBACCO SCREEN RCVD TLK: CPT | Performed by: PSYCHOLOGIST

## 2024-06-10 PROCEDURE — 90832 PSYTX W PT 30 MINUTES: CPT | Performed by: PSYCHOLOGIST

## 2024-06-10 ASSESSMENT — PATIENT HEALTH QUESTIONNAIRE - PHQ9
3. TROUBLE FALLING OR STAYING ASLEEP: SEVERAL DAYS
SUM OF ALL RESPONSES TO PHQ QUESTIONS 1-9: 4
SUM OF ALL RESPONSES TO PHQ QUESTIONS 1-9: 4
6. FEELING BAD ABOUT YOURSELF - OR THAT YOU ARE A FAILURE OR HAVE LET YOURSELF OR YOUR FAMILY DOWN: SEVERAL DAYS
SUM OF ALL RESPONSES TO PHQ QUESTIONS 1-9: 4
7. TROUBLE CONCENTRATING ON THINGS, SUCH AS READING THE NEWSPAPER OR WATCHING TELEVISION: NOT AT ALL
9. THOUGHTS THAT YOU WOULD BE BETTER OFF DEAD, OR OF HURTING YOURSELF: NOT AT ALL
4. FEELING TIRED OR HAVING LITTLE ENERGY: NOT AT ALL
8. MOVING OR SPEAKING SO SLOWLY THAT OTHER PEOPLE COULD HAVE NOTICED. OR THE OPPOSITE, BEING SO FIGETY OR RESTLESS THAT YOU HAVE BEEN MOVING AROUND A LOT MORE THAN USUAL: SEVERAL DAYS
2. FEELING DOWN, DEPRESSED OR HOPELESS: SEVERAL DAYS
SUM OF ALL RESPONSES TO PHQ QUESTIONS 1-9: 4
1. LITTLE INTEREST OR PLEASURE IN DOING THINGS: NOT AT ALL
SUM OF ALL RESPONSES TO PHQ9 QUESTIONS 1 & 2: 1
5. POOR APPETITE OR OVEREATING: NOT AT ALL
10. IF YOU CHECKED OFF ANY PROBLEMS, HOW DIFFICULT HAVE THESE PROBLEMS MADE IT FOR YOU TO DO YOUR WORK, TAKE CARE OF THINGS AT HOME, OR GET ALONG WITH OTHER PEOPLE: NOT DIFFICULT AT ALL

## 2024-06-10 ASSESSMENT — ANXIETY QUESTIONNAIRES
3. WORRYING TOO MUCH ABOUT DIFFERENT THINGS: SEVERAL DAYS
4. TROUBLE RELAXING: NOT AT ALL
2. NOT BEING ABLE TO STOP OR CONTROL WORRYING: SEVERAL DAYS
1. FEELING NERVOUS, ANXIOUS, OR ON EDGE: MORE THAN HALF THE DAYS
IF YOU CHECKED OFF ANY PROBLEMS ON THIS QUESTIONNAIRE, HOW DIFFICULT HAVE THESE PROBLEMS MADE IT FOR YOU TO DO YOUR WORK, TAKE CARE OF THINGS AT HOME, OR GET ALONG WITH OTHER PEOPLE: NOT DIFFICULT AT ALL
5. BEING SO RESTLESS THAT IT IS HARD TO SIT STILL: NOT AT ALL
6. BECOMING EASILY ANNOYED OR IRRITABLE: MORE THAN HALF THE DAYS
GAD7 TOTAL SCORE: 6
7. FEELING AFRAID AS IF SOMETHING AWFUL MIGHT HAPPEN: NOT AT ALL

## 2024-06-10 NOTE — PATIENT INSTRUCTIONS
Outcomes may be further improved if the following recommendations are adhered to:        1. Following surgery, you may benefit from additional supportive programming to help cope with the dietary restrictions required of you. Patients who belong to a support group are most likely to succeed in their weight loss regimen and maintain their weight loss long term; therefore, a group support model is recommended, if available.         2. Follow all discharge instructions closely and attend follow-up appointments: Regular follow-up appointments with the weight management office are important for monitoring your progress, addressing any concerns or challenges, and provide ongoing guidance and support. These appointments can also help you stay motivated and on track with your weight loss goals.        3. Please note that psychiatric medications may not work as well after bariatric surgery; specifically, the bioavailability of SSRI and some SNRI medications is reduced after gastric bypass, and many delayed/sustained/extended-release formulas result in problems with malabsorption following bariatric surgery, especially gastric bypass surgery. Carefully monitor your mood with your prescriber to determine any potential need for changes in dosage, types of medications prescribed, etc.      4. Regular exercise is important for maintaining weight loss and improving overall health. Start with low-impact exercises and gradually increase your intensity as your body adapts and heals. Aim for a well-rounded exercise routine that includes cardiovascular exercises (e.g., walking, swimming, cycling), strength training (using resistance bands or weights), and flexibility exercises (such as stretching or yoga). This combination helps promote weight loss, increase muscle strength, and improve flexibility.        5. Take vitamins and supplements as directed: Bariatric surgery can affect nutrient absorption, so it is important to take daily

## 2024-06-10 NOTE — PROGRESS NOTES
Following surgery, you may benefit from additional supportive programming to help cope with the dietary restrictions required of you. Patients who belong to a support group are most likely to succeed in their weight loss regimen and maintain their weight loss long term; therefore, a group support model is recommended, if available.         2. Follow all discharge instructions closely and attend follow-up appointments: Regular follow-up appointments with the weight management office are important for monitoring your progress, addressing any concerns or challenges, and provide ongoing guidance and support. These appointments can also help you stay motivated and on track with your weight loss goals.        3. Please note that psychiatric medications may not work as well after bariatric surgery; specifically, the bioavailability of SSRI and some SNRI medications is reduced after gastric bypass, and many delayed/sustained/extended-release formulas result in problems with malabsorption following bariatric surgery, especially gastric bypass surgery. Carefully monitor your mood with your prescriber to determine any potential need for changes in dosage, types of medications prescribed, etc.      4. Regular exercise is important for maintaining weight loss and improving overall health. Start with low-impact exercises and gradually increase your intensity as your body adapts and heals. Aim for a well-rounded exercise routine that includes cardiovascular exercises (e.g., walking, swimming, cycling), strength training (using resistance bands or weights), and flexibility exercises (such as stretching or yoga). This combination helps promote weight loss, increase muscle strength, and improve flexibility.        5. Take vitamins and supplements as directed: Bariatric surgery can affect nutrient absorption, so it is important to take daily vitamins and supplements as recommended by your surgeon and the bariatric dietitian.

## 2024-06-17 DIAGNOSIS — Z01.818 PREOPERATIVE TESTING: Primary | ICD-10-CM

## 2024-07-05 DIAGNOSIS — E78.5 DYSLIPIDEMIA: ICD-10-CM

## 2024-07-10 RX ORDER — ATORVASTATIN CALCIUM 10 MG/1
10 TABLET, FILM COATED ORAL DAILY
Qty: 30 TABLET | Refills: 0 | Status: SHIPPED | OUTPATIENT
Start: 2024-07-10

## 2024-07-30 ENCOUNTER — APPOINTMENT (OUTPATIENT)
Dept: PRIMARY CARE | Facility: CLINIC | Age: 44
End: 2024-07-30
Payer: MEDICAID

## 2024-08-12 NOTE — ED PROVIDER NOTES
8/12/2024 2011  Vicente Jones  [address] [facility] [address]    To Whom It May Concern:    This is to certify that Vicente Jones was evaluated in the Urgent Care on 8/12/2024. No gym class for 2 weeks due to injury.            Arun Chauhan DO        [facility] [address]  [facility] [address] [id number] [phone]     [facility] ED  EMERGENCY MEDICINE     Pt Name: Xiao Summers  MRN: [id number]  Digeogfjyoti 1980  Date of evaluation: 8/30/2021  PCP:    Michele Agustin DO  Provider: Sydnee Stovall DO    CHIEF COMPLAINT       Chief Complaint   Patient presents with    Mental Health Problem       HISTORY OF PRESENT ILLNESS    HPI     44-year-old female with history of bipolar presents to the emergency department with medication issues. She states that she was recently increased on her Zyprexa from 2-1/2 mg to 7 and half milligrams nightly. She states that she went to go fill her new medication at the pharmacy but states that they were unable to do so as insurance would not cover the new change until September 8. She states that she has just been feeling more manic and increased depression lately but denies any suicidal or homicidal thoughts. Patient has no other symptoms. Denies hallucinations, decreased appetite, decreased sleep. Triage notes and Nursing notes were reviewed by myself. Any discrepancies are addressed above.     PAST MEDICAL HISTORY     Past Medical History:   Diagnosis Date    [facility]     Depression     DM (diabetes mellitus) ([facility]     History of drug abuse ([facility]     positive drug screens 9/22/14 and 5/18/14    Hyperlipidemia     Osteoarthritis        SURGICAL HISTORY       Past Surgical History:   Procedure Laterality Date    CHOLECYSTECTOMY, LAPAROSCOPIC N/A 2/28/2017    CHOLECYSTECTOMY LAPAROSCOPIC WITH GRAMS POSS OPEN , RECENT LABS  performed by Edison Coleman MD at [facility], [address] [place] [address] N/A 11/27/2018    COLONOSCOPY ROOM 384 [facility] 6/16/2021    I&D LEFT  PERIRECTAL  ABSCESS performed by Gerri Arceo MD at [address] [place] [address]       Current Discharge Medication List      CONTINUE these medications which have NOT CHANGED    Details albuterol sulfate  (90 Base) MCG/ACT inhaler INHALE 1 TO 2 PUFFS BY MOUTH EVERY 4 TO 6 HOURS AS NEEDED      D3-1000 25 MCG (1000 UT) TABS tablet TAKE TWO TABLETS BY MOUTH DAILY AFTER BREAKFAST      fluconazole (DIFLUCAN) 150 MG tablet TAKE 1 TABLET BY MOUTH ONCE FOR 1 DOSE AND REPEAT IN 1 WEEK      GNP MELATONIN 3 MG TABS tablet TAKE TWO TABLETS BY MOUTH EVERY NIGHT AT BEDTIME FOR SLEEP      OXcarbazepine (TRILEPTAL) 300 MG tablet TAKE ONE TABLET BY MOUTH THREE TIMES DAILY      clonazePAM (KLONOPIN) 1 MG tablet Take 1 mg by mouth 2 times daily as needed. fluvoxaMINE (LUVOX) 50 MG tablet Take 50 mg by mouth nightly      Dulaglutide (TRULICITY) 1.5 CT/8.8UO SOPN Inject 1.5 mg into the skin once a week  Qty: 4 pen, Refills: 3      !! blood glucose monitor kit and supplies Dispense sufficient amount for indicated testing frequency plus additional to accommodate PRN testing needs. Dispense all needed supplies to include: monitor, strips, lancing device, lancets, control solutions, alcohol swabs. Qty: 1 kit, Refills: 0    Associated Diagnoses: Uncontrolled type 2 diabetes mellitus with hyperglycemia ([address]      ! ! Lancets MISC Patient test 3x daily E65.11  Qty: 100 each, Refills: 3    Associated Diagnoses: Uncontrolled type 2 diabetes mellitus with hyperglycemia (HCC)      glycopyrrolate (ROBINUL) 1 MG tablet 2 po tid  Qty: 180 tablet, Refills: 3      !! blood glucose test strips (FREESTYLE LITE) strip 1 each by In Vitro route 4 times daily As needed. Qty: 200 each, Refills: 3      Continuous Blood Gluc  (FREESTYLE [place] 14 DAY READER) KATARINA 1 Device by Does not apply route 4 times daily (before meals and nightly)  Qty: 1 Device, Refills: 0      !!  Continuous Blood Gluc Sensor (FREESTYLE [place] 14 DAY SENSOR) [facility] Every 2 weeks  Qty: 2 each, Refills: 03      brexpiprazole (REXULTI) 2 MG TABS tablet Take 1 tablet by mouth daily  Qty: 15 tablet, Refills: 2      fenofibrate (TRICOR) 54 MG tablet Take 1 tablet by mouth daily [facility] pharmacy: Please dispense generic fenofibrate unless prescriber denote  Qty: 30 tablet, Refills: 3      omega-3 acid ethyl esters (LOVAZA) 1 g capsule Take 2 capsules by mouth 2 times daily  Qty: 60 capsule, Refills: 3      Insulin Pen Needle (NOVOFINE) 32G X 6 MM MISC 1 Device by Does not apply route 4 times daily (before meals and nightly)  Qty: 300 each, Refills: 3      !! blood glucose monitor kit and supplies 1 kit by Other route 4 times daily (before meals and nightly) Pt test 4x daily Dx E11.65. May substitute for generic or insurance covered product  Qty: 1 kit, Refills: 0      !! blood glucose monitor strips 1 strip by Other route 4 times daily (before meals and nightly) Pt test 4x daily Dx E11.65. May substitute for generic or insurance covered product  Qty: 150 strip, Refills: 3      !! FreeStyle Lancets MISC 1 each by Does not apply route 4 times daily (before meals and nightly)  Qty: 300 each, Refills: 3      !! Continuous Blood Gluc Sensor (FREESTYLE [place] 14 DAY SENSOR) MISC 2 Devices by Does not apply route every 14 days  Qty: 2 each, Refills: 3      simvastatin (ZOCOR) 20 MG tablet Take 1 tablet by mouth every evening  Qty: 30 tablet, Refills: 3       !! - Potential duplicate medications found. Please discuss with provider.           ALLERGIES       Allergies   Allergen Reactions    Latuda [Lurasidone Hcl] Hives    Lurasidone Hives    Adhesive Tape     Adipex-P [Phentermine]     Metformin And Related        FAMILY HISTORY       Family History   Problem Relation Age of Onset    Other Mother         CHF    Cirrhosis Brother     Other Maternal Grandmother         CHF        SOCIAL HISTORY       Social History     Socioeconomic History    Marital status: Single     Spouse name: Not on file    Number of children: Not on file    Years of education: Not on file    Highest education level: Not on file   Occupational History    Not on file   Tobacco Use    Smoking status: Current Every Day Smoker     Packs/day: 1.00     Years: 24.00     Pack years: 24.00     Types: Cigarettes    Smokeless tobacco: Never Used   Vaping Use    Vaping Use: Never used   Substance and Sexual Activity    Alcohol use: No    Drug use: Not Currently     Types: Marijuana    Sexual activity: Yes   Other Topics Concern    Not on file   Social History Narrative    Not on file     Social Determinants of Health     Financial Resource Strain:     Difficulty of Paying Living Expenses:    Food Insecurity:     Worried About Running Out of Food in the Last Year:     Ran Out of Food in the Last Year:    Transportation Needs:     Lack of Transportation (Medical):  Lack of Transportation (Non-Medical):    Physical Activity:     Days of Exercise per Week:     Minutes of Exercise per Session:    Stress:     Feeling of Stress :    Social Connections:     Frequency of Communication with Friends and Family:     Frequency of Social Gatherings with Friends and Family:     Attends Yarsanism Services:     Active Member of Clubs or Organizations:     Attends Club or Organization Meetings:     Marital Status:    Intimate Partner Violence:     Fear of Current or Ex-Partner:     Emotionally Abused:     Physically Abused:     Sexually Abused:        REVIEW OF SYSTEMS     Review of Systems   Constitutional:        Medication issue       Except as noted above the remainder of the review of systems was reviewed and is negative. SCREENINGS                        PHYSICAL EXAM    (up to 7 for level 4, 8 or more for level 5)     ED Triage Vitals   BP Temp Temp src Pulse Resp SpO2 Height Weight   08/30/21 2050 08/30/21 2043 -- 08/30/21 2043 08/30/21 2043 08/30/21 2043 08/30/21 2043 08/30/21 2043   136/88 98.3 °F (36.8 °C)  95 16 96 % 5' 6\" (1.676 m) 206 lb (93.4 kg)       Physical Exam  Constitutional:       General: She is not in acute distress. Appearance: Normal appearance. She is normal weight.  She is not ill-appearing. HENT:      Head: Normocephalic and atraumatic. Right Ear: External ear normal.      Left Ear: External ear normal.      Nose: Nose normal. No congestion or rhinorrhea. Mouth/Throat:      Mouth: Mucous membranes are moist.      Pharynx: No oropharyngeal exudate or posterior oropharyngeal erythema. Eyes:      General:         Right eye: No discharge. Left eye: No discharge. Extraocular Movements: Extraocular movements intact. Pupils: Pupils are equal, round, and reactive to light. Cardiovascular:      Rate and Rhythm: Normal rate and regular rhythm. Pulses: Normal pulses. Pulmonary:      Effort: Pulmonary effort is normal.      Breath sounds: Normal breath sounds. Abdominal:      General: Abdomen is flat. Bowel sounds are normal. There is no distension. Tenderness: There is no abdominal tenderness. There is no right CVA tenderness, left CVA tenderness, guarding or rebound. Musculoskeletal:         General: No swelling or tenderness. Normal range of motion. Cervical back: Normal range of motion and neck supple. Right lower leg: No edema. Left lower leg: No edema. Skin:     General: Skin is warm and dry. Capillary Refill: Capillary refill takes less than 2 seconds. Neurological:      General: No focal deficit present. Mental Status: She is alert. Psychiatric:         Mood and Affect: Mood normal.           DIAGNOSTIC RESULTS     EKG:(none if blank)  All EKGs are interpreted by the Emergency Department Physician who either signs or Co-signs this chart in the absence of a cardiologist.        RADIOLOGY: (none if blank)   I directly visualized the following images and reviewed the radiologist interpretations.   Interpretation per the Radiologist below, if available at the time of this note:  No orders to display       LABS:  Labs Reviewed   ACETAMINOPHEN LEVEL - Abnormal; Notable for the following components:       Result Value    Acetaminophen Level <5 (*)     All other components within normal limits   CBC WITH AUTO DIFFERENTIAL - Abnormal; Notable for the following components:    WBC 15.8 (*)     RBC 5.86 (*)     Hemoglobin 17.0 (*)     Hematocrit 50.0 (*)     RDW 16.4 (*)     Neutrophils Absolute 11.3 (*)     All other components within normal limits   LIPID PANEL - Abnormal; Notable for the following components:    Triglycerides 207 (*)     All other components within normal limits   URINE RT REFLEX TO CULTURE - Abnormal; Notable for the following components:    Clarity, UA CLOUDY (*)     Ketones, Urine TRACE (*)     All other components within normal limits   SALICYLATE LEVEL - Abnormal; Notable for the following components:    Salicylate, Serum <1.9 (*)     All other components within normal limits   COVID-19, RAPID   CK   COMPREHENSIVE METABOLIC PANEL   ETHANOL   URINE DRUG SCREEN   TSH WITHOUT REFLEX   PREGNANCY, URINE       All other labs were within normal range or not returned as of this dictation. Please note, any cultures that may have been sent were not resulted at the time of this patient visit. EMERGENCY DEPARTMENT COURSE and Medical Decision Making:     Vitals:    Vitals:    08/30/21 2043 08/30/21 2050   BP:  136/88   Pulse: 95    Resp: 16    Temp: 98.3 °F (36.8 °C)    SpO2: 96%    Weight: 206 lb (93.4 kg)    Height: 5' 6\" (1.676 m)        PROCEDURES: (None if blank)  Procedures       MDM     Patient states that she was unable to get her Zyprexa filled and her new dose at 7 and half milligrams. We will dose her with her nightly 7 and half milligram dose today and will be discharged in stable condition. She is not suicidal or homicidal and will call her psychiatrist tomorrow for the medication to be filled at her pharmacy.     Strict return precautions and follow up instructions were discussed with the patient with which the patient agrees    ED Medications administered this visit:    Medications   OLANZapine

## 2024-08-19 ENCOUNTER — OFFICE VISIT (OUTPATIENT)
Dept: FAMILY MEDICINE CLINIC | Age: 44
End: 2024-08-19
Payer: MEDICAID

## 2024-08-19 VITALS
DIASTOLIC BLOOD PRESSURE: 82 MMHG | HEART RATE: 86 BPM | HEIGHT: 65 IN | SYSTOLIC BLOOD PRESSURE: 130 MMHG | WEIGHT: 193 LBS | BODY MASS INDEX: 32.15 KG/M2 | OXYGEN SATURATION: 99 % | TEMPERATURE: 98.2 F | RESPIRATION RATE: 16 BRPM

## 2024-08-19 DIAGNOSIS — E11.9 TYPE 2 DIABETES MELLITUS WITHOUT COMPLICATION, WITHOUT LONG-TERM CURRENT USE OF INSULIN (HCC): ICD-10-CM

## 2024-08-19 DIAGNOSIS — E34.9 HORMONE IMBALANCE: Primary | ICD-10-CM

## 2024-08-19 DIAGNOSIS — E34.9 HORMONE IMBALANCE: ICD-10-CM

## 2024-08-19 PROBLEM — G47.33 OSA (OBSTRUCTIVE SLEEP APNEA): Status: RESOLVED | Noted: 2018-09-28 | Resolved: 2024-08-19

## 2024-08-19 PROBLEM — E66.01 MORBID OBESITY WITH BODY MASS INDEX (BMI) OF 40.0 TO 44.9 IN ADULT (HCC): Status: RESOLVED | Noted: 2018-10-12 | Resolved: 2024-08-19

## 2024-08-19 LAB
ALBUMIN SERPL-MCNC: 4.5 G/DL (ref 3.5–4.6)
ALP SERPL-CCNC: 75 U/L (ref 40–130)
ALT SERPL-CCNC: 7 U/L (ref 0–33)
ANION GAP SERPL CALCULATED.3IONS-SCNC: 12 MEQ/L (ref 9–15)
AST SERPL-CCNC: 15 U/L (ref 0–35)
BASOPHILS # BLD: 0.1 K/UL (ref 0–0.2)
BASOPHILS NFR BLD: 0.7 %
BILIRUB SERPL-MCNC: 0.3 MG/DL (ref 0.2–0.7)
BUN SERPL-MCNC: 15 MG/DL (ref 6–20)
CALCIUM SERPL-MCNC: 9.9 MG/DL (ref 8.5–9.9)
CHLORIDE SERPL-SCNC: 109 MEQ/L (ref 95–107)
CHOLEST SERPL-MCNC: 160 MG/DL (ref 0–199)
CO2 SERPL-SCNC: 22 MEQ/L (ref 20–31)
CREAT SERPL-MCNC: 0.81 MG/DL (ref 0.5–0.9)
EOSINOPHIL # BLD: 0.2 K/UL (ref 0–0.7)
EOSINOPHIL NFR BLD: 1.6 %
ERYTHROCYTE [DISTWIDTH] IN BLOOD BY AUTOMATED COUNT: 14.5 % (ref 11.5–14.5)
GLOBULIN SER CALC-MCNC: 2.9 G/DL (ref 2.3–3.5)
GLUCOSE SERPL-MCNC: 87 MG/DL (ref 70–99)
HCT VFR BLD AUTO: 45.3 % (ref 37–47)
HDLC SERPL-MCNC: 46 MG/DL (ref 40–59)
HGB BLD-MCNC: 14.8 G/DL (ref 12–16)
LDLC SERPL CALC-MCNC: 84 MG/DL (ref 0–129)
LYMPHOCYTES # BLD: 2.8 K/UL (ref 1–4.8)
LYMPHOCYTES NFR BLD: 29 %
MCH RBC QN AUTO: 27.8 PG (ref 27–31.3)
MCHC RBC AUTO-ENTMCNC: 32.7 % (ref 33–37)
MCV RBC AUTO: 85.2 FL (ref 79.4–94.8)
MONOCYTES # BLD: 0.5 K/UL (ref 0.2–0.8)
MONOCYTES NFR BLD: 5.1 %
NEUTROPHILS # BLD: 6.1 K/UL (ref 1.4–6.5)
NEUTS SEG NFR BLD: 63.5 %
PLATELET # BLD AUTO: 251 K/UL (ref 130–400)
POTASSIUM SERPL-SCNC: 3.6 MEQ/L (ref 3.4–4.9)
PROT SERPL-MCNC: 7.4 G/DL (ref 6.3–8)
RBC # BLD AUTO: 5.32 M/UL (ref 4.2–5.4)
SODIUM SERPL-SCNC: 143 MEQ/L (ref 135–144)
TRIGL SERPL-MCNC: 148 MG/DL (ref 0–150)
TSH SERPL-MCNC: 1.06 UIU/ML (ref 0.44–3.86)
WBC # BLD AUTO: 9.6 K/UL (ref 4.8–10.8)

## 2024-08-19 PROCEDURE — G8417 CALC BMI ABV UP PARAM F/U: HCPCS | Performed by: NURSE PRACTITIONER

## 2024-08-19 PROCEDURE — 4004F PT TOBACCO SCREEN RCVD TLK: CPT | Performed by: NURSE PRACTITIONER

## 2024-08-19 PROCEDURE — 2022F DILAT RTA XM EVC RTNOPTHY: CPT | Performed by: NURSE PRACTITIONER

## 2024-08-19 PROCEDURE — 3044F HG A1C LEVEL LT 7.0%: CPT | Performed by: NURSE PRACTITIONER

## 2024-08-19 PROCEDURE — 99214 OFFICE O/P EST MOD 30 MIN: CPT | Performed by: NURSE PRACTITIONER

## 2024-08-19 PROCEDURE — G8427 DOCREV CUR MEDS BY ELIG CLIN: HCPCS | Performed by: NURSE PRACTITIONER

## 2024-08-19 RX ORDER — LANOLIN ALCOHOL/MO/W.PET/CERES
CREAM (GRAM) TOPICAL
COMMUNITY
Start: 2024-06-28

## 2024-08-19 RX ORDER — DULAGLUTIDE 3 MG/.5ML
3 INJECTION, SOLUTION SUBCUTANEOUS WEEKLY
Qty: 4 ADJUSTABLE DOSE PRE-FILLED PEN SYRINGE | Refills: 3 | Status: SHIPPED | OUTPATIENT
Start: 2024-08-19

## 2024-08-19 NOTE — PROGRESS NOTES
treatment resultsand for coordination of care.  I have reviewed the patient's medical historyin detail and updated the computerized patient record.    NAOMI Burr - CNP

## 2024-08-20 LAB
ESTIMATED AVERAGE GLUCOSE: 111 MG/DL
FOLLICLE STIMULATING HORMONE: 11 MIU/ML
HBA1C MFR BLD: 5.5 % (ref 4–6)
LH: 7.4 MIU/ML (ref 1.7–8.6)
SHBG SERPL-SCNC: 65 NMOL/L (ref 25–122)
TESTOST FREE SERPL-MCNC: <1 PG/ML (ref 1.1–5.8)
TESTOST SERPL-MCNC: 3 NG/DL (ref 8–48)

## 2024-08-20 ASSESSMENT — ENCOUNTER SYMPTOMS
VOMITING: 0
NAUSEA: 0
COUGH: 0
SORE THROAT: 0
ABDOMINAL PAIN: 0
SHORTNESS OF BREATH: 0
TROUBLE SWALLOWING: 0

## 2024-08-22 LAB
ESTRADIOL SERPL HS-MCNC: 38.9 PG/ML
ESTROGEN SERPL CALC-MCNC: 61.7 PG/ML
ESTRONE SERPL-MCNC: 22.8 PG/ML

## 2024-08-25 LAB — PROGEST SERPL-MCNC: <0.1 NG/ML

## 2024-08-27 NOTE — CARE COORDINATION
Patient did not attend group despite staff encouragement.   Electronically signed by Gino Richardson on 12/19/2020 at 12:12 PM Patient states he would prefer to have his MRI of the right shoulder done at the [facility]. Please fax order.    Patient can be reached at [phone].

## 2024-09-25 ENCOUNTER — OFFICE VISIT (OUTPATIENT)
Dept: ENDOCRINOLOGY | Age: 44
End: 2024-09-25
Payer: MEDICAID

## 2024-09-25 VITALS
DIASTOLIC BLOOD PRESSURE: 62 MMHG | SYSTOLIC BLOOD PRESSURE: 97 MMHG | BODY MASS INDEX: 34.66 KG/M2 | OXYGEN SATURATION: 96 % | WEIGHT: 208 LBS | HEART RATE: 65 BPM | HEIGHT: 65 IN

## 2024-09-25 DIAGNOSIS — E11.65 UNCONTROLLED TYPE 2 DIABETES MELLITUS WITH HYPERGLYCEMIA (HCC): Primary | ICD-10-CM

## 2024-09-25 LAB
CHP ED QC CHECK: NORMAL
GLUCOSE BLD-MCNC: 90 MG/DL

## 2024-09-25 PROCEDURE — G8427 DOCREV CUR MEDS BY ELIG CLIN: HCPCS | Performed by: INTERNAL MEDICINE

## 2024-09-25 PROCEDURE — G8417 CALC BMI ABV UP PARAM F/U: HCPCS | Performed by: INTERNAL MEDICINE

## 2024-09-25 PROCEDURE — 1036F TOBACCO NON-USER: CPT | Performed by: INTERNAL MEDICINE

## 2024-09-25 PROCEDURE — 82962 GLUCOSE BLOOD TEST: CPT | Performed by: INTERNAL MEDICINE

## 2024-09-25 PROCEDURE — 3044F HG A1C LEVEL LT 7.0%: CPT | Performed by: INTERNAL MEDICINE

## 2024-09-25 PROCEDURE — 99213 OFFICE O/P EST LOW 20 MIN: CPT | Performed by: INTERNAL MEDICINE

## 2024-09-25 PROCEDURE — 2022F DILAT RTA XM EVC RTNOPTHY: CPT | Performed by: INTERNAL MEDICINE

## 2024-09-25 RX ORDER — VENLAFAXINE HYDROCHLORIDE 37.5 MG/1
37.5 CAPSULE, EXTENDED RELEASE ORAL DAILY
COMMUNITY
Start: 2024-09-11

## 2024-09-25 RX ORDER — MAGNESIUM GLUCONATE 27 MG(500)
500 TABLET ORAL
COMMUNITY

## 2024-09-25 RX ORDER — DULAGLUTIDE 4.5 MG/.5ML
4.5 INJECTION, SOLUTION SUBCUTANEOUS WEEKLY
Qty: 4 ADJUSTABLE DOSE PRE-FILLED PEN SYRINGE | Refills: 3 | Status: SHIPPED | OUTPATIENT
Start: 2024-09-25

## 2024-12-16 RX ORDER — DULAGLUTIDE 4.5 MG/.5ML
INJECTION, SOLUTION SUBCUTANEOUS
Qty: 3 ML | Refills: 5 | Status: SHIPPED | OUTPATIENT
Start: 2024-12-16

## 2024-12-16 NOTE — TELEPHONE ENCOUNTER
Requested Prescriptions     Pending Prescriptions Disp Refills    Dulaglutide (TRULICITY) 4.5 MG/0.5ML SOAJ       Sig: Inject into the skin   Patint unable to make it today scheduled now for feb13

## 2025-01-02 ENCOUNTER — OFFICE VISIT (OUTPATIENT)
Dept: FAMILY MEDICINE CLINIC | Age: 45
End: 2025-01-02

## 2025-01-02 VITALS
OXYGEN SATURATION: 98 % | HEART RATE: 93 BPM | BODY MASS INDEX: 34.66 KG/M2 | SYSTOLIC BLOOD PRESSURE: 122 MMHG | HEIGHT: 65 IN | TEMPERATURE: 97.7 F | DIASTOLIC BLOOD PRESSURE: 80 MMHG | WEIGHT: 208 LBS

## 2025-01-02 DIAGNOSIS — J01.90 ACUTE BACTERIAL SINUSITIS: Primary | ICD-10-CM

## 2025-01-02 DIAGNOSIS — B96.89 ACUTE BACTERIAL SINUSITIS: Primary | ICD-10-CM

## 2025-01-02 DIAGNOSIS — R11.2 NAUSEA AND VOMITING, UNSPECIFIED VOMITING TYPE: ICD-10-CM

## 2025-01-02 DIAGNOSIS — R06.02 SOB (SHORTNESS OF BREATH): ICD-10-CM

## 2025-01-02 DIAGNOSIS — R50.9 SUBJECTIVE FEVER: ICD-10-CM

## 2025-01-02 DIAGNOSIS — J06.9 URI WITH COUGH AND CONGESTION: ICD-10-CM

## 2025-01-02 LAB
INFLUENZA A ANTIBODY: NORMAL
INFLUENZA B ANTIBODY: NORMAL

## 2025-01-02 RX ORDER — ONDANSETRON 4 MG/1
4 TABLET, FILM COATED ORAL 3 TIMES DAILY PRN
Qty: 15 TABLET | Refills: 0 | Status: SHIPPED | OUTPATIENT
Start: 2025-01-02

## 2025-01-02 RX ORDER — PROMETHAZINE HYDROCHLORIDE 25 MG/ML
25 INJECTION, SOLUTION INTRAMUSCULAR; INTRAVENOUS ONCE
Status: COMPLETED | OUTPATIENT
Start: 2025-01-02 | End: 2025-01-02

## 2025-01-02 RX ORDER — PREDNISONE 50 MG/1
50 TABLET ORAL DAILY
Qty: 5 TABLET | Refills: 0 | Status: SHIPPED | OUTPATIENT
Start: 2025-01-02 | End: 2025-01-07

## 2025-01-02 RX ORDER — PROMETHAZINE HYDROCHLORIDE 50 MG/ML
25 INJECTION, SOLUTION INTRAMUSCULAR; INTRAVENOUS ONCE
Status: DISCONTINUED | OUTPATIENT
Start: 2025-01-02 | End: 2025-01-02

## 2025-01-02 RX ADMIN — PROMETHAZINE HYDROCHLORIDE 25 MG: 25 INJECTION, SOLUTION INTRAMUSCULAR; INTRAVENOUS at 12:11

## 2025-01-02 ASSESSMENT — ENCOUNTER SYMPTOMS
SORE THROAT: 0
SINUS PRESSURE: 1
SINUS PAIN: 1
NAUSEA: 1
DIARRHEA: 1
VOMITING: 1
WHEEZING: 0
SHORTNESS OF BREATH: 1
EYE REDNESS: 0
COUGH: 1
ABDOMINAL PAIN: 1
RHINORRHEA: 1
TROUBLE SWALLOWING: 0

## 2025-01-02 NOTE — PROGRESS NOTES
After obtaining consent, and per orders of Aruna Blanco CNP, injection of Phenergan given in Left upper quad. gluteus by Tita Stephens MA. Patient instructed to remain in clinic for 20 minutes afterwards, and to report any adverse reaction to me immediately. Pt tolerated well.

## 2025-01-02 NOTE — PROGRESS NOTES
Subjective:      Patient ID: Kanchan Cam is a 44 y.o. female who presents today for:  Chief Complaint   Patient presents with    Cold Symptoms     States started vomiting and diarrhea, congested, coughing and did OTC meds, stomach hurts really bad,her face and ears hurts. Did a covid tested three times and it was negative        HPI      She got sick a couple days before xmas  Congestion , tired a little fever   Headache   Felt a little bit better   New years katharine a little bit better   Been taking zinc, increased protein water   Drinking 74 ounces of water with liquid IV   Cough syrup   This morning woke up and vomtting     Face hurts so bad   She cannot hear   Slept with heating pad on her face  Hot showers  Steam    She feels SOB,  has a nebulizer and she did 3 breathing tx yesterday and this helped   She does not have asthma   She took vitmains this morning and drank a little coffee   Then she threw up   ''coughing up a lot of phelgm   When she blows her nose its green now     Upper belly pain   Couldn't sleep last night because she couldn't stop coughing       Sinus pressure     She does not drink alcohol     Past Medical History:   Diagnosis Date    ADD (attention deficit disorder) 2021    Bipolar disorder (HCC)     Depression     DM (diabetes mellitus) (HCC)     History of drug abuse (HCC)     positive drug screens 9/22/14 and 5/18/14    Hyperlipidemia     Morbid obesity with body mass index (BMI) of 40.0 to 44.9 in adult 10/12/2018    QI (obstructive sleep apnea) 09/28/2018    Osteoarthritis      Past Surgical History:   Procedure Laterality Date    CHOLECYSTECTOMY, LAPAROSCOPIC N/A 2/28/2017    CHOLECYSTECTOMY LAPAROSCOPIC WITH GRAMS POSS OPEN , RECENT LABS  performed by Issac Quintero MD at [facility] [place]    HYSTERECTOMY (CERVIX STATUS UNKNOWN)      IN COLON CA SCRN NOT HI RSK IND N/A 11/27/2018    COLONOSCOPY ROOM 384 performed by Margareth Freeman MD at [facility] [place]    RECTAL SURGERY N/A 6/16/2021    I&D

## 2025-01-21 ENCOUNTER — HOSPITAL ENCOUNTER (INPATIENT)
Age: 45
LOS: 1 days | Discharge: HOME | DRG: 753 | End: 2025-01-23
Attending: PSYCHIATRY & NEUROLOGY | Admitting: PSYCHIATRY & NEUROLOGY
Payer: MEDICAID

## 2025-01-21 DIAGNOSIS — F31.9 BIPOLAR 1 DISORDER (HCC): Primary | ICD-10-CM

## 2025-01-21 LAB
ALBUMIN SERPL-MCNC: 4.3 G/DL (ref 3.5–4.6)
ALP SERPL-CCNC: 75 U/L (ref 40–130)
ALT SERPL-CCNC: 11 U/L (ref 0–33)
AMPHET UR QL SCN: ABNORMAL
ANION GAP SERPL CALCULATED.3IONS-SCNC: 14 MEQ/L (ref 9–15)
APAP SERPL-MCNC: <5 UG/ML (ref 10–30)
AST SERPL-CCNC: 13 U/L (ref 0–35)
BARBITURATES UR QL SCN: ABNORMAL
BASOPHILS # BLD: 0.1 K/UL (ref 0–0.2)
BASOPHILS NFR BLD: 0.8 %
BENZODIAZ UR QL SCN: POSITIVE
BILIRUB SERPL-MCNC: 0.4 MG/DL (ref 0.2–0.7)
BILIRUB UR QL STRIP: NEGATIVE
BUN SERPL-MCNC: 16 MG/DL (ref 6–20)
CALCIUM SERPL-MCNC: 9.3 MG/DL (ref 8.5–9.9)
CANNABINOIDS UR QL SCN: POSITIVE
CHLORIDE SERPL-SCNC: 106 MEQ/L (ref 95–107)
CHOLEST SERPL-MCNC: 135 MG/DL (ref 0–199)
CK SERPL-CCNC: 67 U/L (ref 0–170)
CLARITY UR: CLEAR
CO2 SERPL-SCNC: 21 MEQ/L (ref 20–31)
COCAINE UR QL SCN: ABNORMAL
COLOR UR: YELLOW
CREAT SERPL-MCNC: 0.8 MG/DL (ref 0.5–0.9)
DRUG SCREEN COMMENT UR-IMP: ABNORMAL
EOSINOPHIL # BLD: 0.2 K/UL (ref 0–0.7)
EOSINOPHIL NFR BLD: 1.9 %
ERYTHROCYTE [DISTWIDTH] IN BLOOD BY AUTOMATED COUNT: 13.2 % (ref 11.5–14.5)
ETHANOL PERCENT: NORMAL G/DL
ETHANOLAMINE SERPL-MCNC: <10 MG/DL (ref 0–0.08)
FENTANYL SCREEN, URINE: ABNORMAL
GLOBULIN SER CALC-MCNC: 2.5 G/DL (ref 2.3–3.5)
GLUCOSE SERPL-MCNC: 81 MG/DL (ref 70–99)
GLUCOSE UR STRIP-MCNC: NEGATIVE MG/DL
HCG UR QL: NEGATIVE
HCT VFR BLD AUTO: 44.3 % (ref 37–47)
HDLC SERPL-MCNC: 33 MG/DL (ref 40–59)
HGB BLD-MCNC: 15.4 G/DL (ref 12–16)
HGB UR QL STRIP: NEGATIVE
KETONES UR STRIP-MCNC: 40 MG/DL
LDLC SERPL CALC-MCNC: 84 MG/DL (ref 0–129)
LEUKOCYTE ESTERASE UR QL STRIP: NEGATIVE
LYMPHOCYTES # BLD: 2.4 K/UL (ref 1–4.8)
LYMPHOCYTES NFR BLD: 27.4 %
MCH RBC QN AUTO: 29.4 PG (ref 27–31.3)
MCHC RBC AUTO-ENTMCNC: 34.8 % (ref 33–37)
MCV RBC AUTO: 84.7 FL (ref 79.4–94.8)
METHADONE UR QL SCN: ABNORMAL
MONOCYTES # BLD: 0.7 K/UL (ref 0.2–0.8)
MONOCYTES NFR BLD: 7.8 %
NEUTROPHILS # BLD: 5.5 K/UL (ref 1.4–6.5)
NEUTS SEG NFR BLD: 61.9 %
NITRITE UR QL STRIP: NEGATIVE
OPIATES UR QL SCN: ABNORMAL
OXYCODONE UR QL SCN: ABNORMAL
PCP UR QL SCN: ABNORMAL
PH UR STRIP: 5 [PH] (ref 5–9)
PLATELET # BLD AUTO: 215 K/UL (ref 130–400)
POTASSIUM SERPL-SCNC: 3.8 MEQ/L (ref 3.4–4.9)
PROPOXYPH UR QL SCN: ABNORMAL
PROT SERPL-MCNC: 6.8 G/DL (ref 6.3–8)
PROT UR STRIP-MCNC: NEGATIVE MG/DL
RBC # BLD AUTO: 5.23 M/UL (ref 4.2–5.4)
SALICYLATES SERPL-MCNC: <0.3 MG/DL (ref 15–30)
SODIUM SERPL-SCNC: 141 MEQ/L (ref 135–144)
SP GR UR STRIP: 1.02 (ref 1–1.03)
TRIGL SERPL-MCNC: 90 MG/DL (ref 0–150)
TSH SERPL-MCNC: 0.9 UIU/ML (ref 0.44–3.86)
URINE REFLEX TO CULTURE: ABNORMAL
UROBILINOGEN UR STRIP-ACNC: 0.2 E.U./DL
WBC # BLD AUTO: 8.9 K/UL (ref 4.8–10.8)

## 2025-01-21 PROCEDURE — 83036 HEMOGLOBIN GLYCOSYLATED A1C: CPT

## 2025-01-21 PROCEDURE — 80143 DRUG ASSAY ACETAMINOPHEN: CPT

## 2025-01-21 PROCEDURE — 81003 URINALYSIS AUTO W/O SCOPE: CPT

## 2025-01-21 PROCEDURE — 80053 COMPREHEN METABOLIC PANEL: CPT

## 2025-01-21 PROCEDURE — 80061 LIPID PANEL: CPT

## 2025-01-21 PROCEDURE — 82077 ASSAY SPEC XCP UR&BREATH IA: CPT

## 2025-01-21 PROCEDURE — 82550 ASSAY OF CK (CPK): CPT

## 2025-01-21 PROCEDURE — 84443 ASSAY THYROID STIM HORMONE: CPT

## 2025-01-21 PROCEDURE — 36415 COLL VENOUS BLD VENIPUNCTURE: CPT

## 2025-01-21 PROCEDURE — 80307 DRUG TEST PRSMV CHEM ANLYZR: CPT

## 2025-01-21 PROCEDURE — 99285 EMERGENCY DEPT VISIT HI MDM: CPT

## 2025-01-21 PROCEDURE — 85025 COMPLETE CBC W/AUTO DIFF WBC: CPT

## 2025-01-21 PROCEDURE — 84703 CHORIONIC GONADOTROPIN ASSAY: CPT

## 2025-01-21 PROCEDURE — 80179 DRUG ASSAY SALICYLATE: CPT

## 2025-01-21 ASSESSMENT — LIFESTYLE VARIABLES
HOW OFTEN DO YOU HAVE A DRINK CONTAINING ALCOHOL: NEVER
HOW MANY STANDARD DRINKS CONTAINING ALCOHOL DO YOU HAVE ON A TYPICAL DAY: PATIENT DOES NOT DRINK

## 2025-01-21 ASSESSMENT — PAIN - FUNCTIONAL ASSESSMENT: PAIN_FUNCTIONAL_ASSESSMENT: NONE - DENIES PAIN

## 2025-01-22 LAB
ESTIMATED AVERAGE GLUCOSE: 97 MG/DL
GLUCOSE BLD-MCNC: 93 MG/DL (ref 70–99)
HBA1C MFR BLD: 5 % (ref 4–6)
PERFORMED ON: NORMAL

## 2025-01-22 PROCEDURE — 1240000000 HC EMOTIONAL WELLNESS R&B

## 2025-01-22 PROCEDURE — 6370000000 HC RX 637 (ALT 250 FOR IP): Performed by: PSYCHIATRY & NEUROLOGY

## 2025-01-22 RX ORDER — POLYETHYLENE GLYCOL 3350 17 G
2 POWDER IN PACKET (EA) ORAL
Status: DISCONTINUED | OUTPATIENT
Start: 2025-01-22 | End: 2025-01-22

## 2025-01-22 RX ORDER — HALOPERIDOL 5 MG/1
5 TABLET ORAL EVERY 6 HOURS PRN
Status: DISCONTINUED | OUTPATIENT
Start: 2025-01-22 | End: 2025-01-23 | Stop reason: HOSPADM

## 2025-01-22 RX ORDER — DEXTROSE MONOHYDRATE 100 MG/ML
INJECTION, SOLUTION INTRAVENOUS CONTINUOUS PRN
Status: DISCONTINUED | OUTPATIENT
Start: 2025-01-22 | End: 2025-01-23 | Stop reason: HOSPADM

## 2025-01-22 RX ORDER — ACETAMINOPHEN 325 MG/1
650 TABLET ORAL EVERY 4 HOURS PRN
Status: DISCONTINUED | OUTPATIENT
Start: 2025-01-22 | End: 2025-01-23 | Stop reason: HOSPADM

## 2025-01-22 RX ORDER — BENZTROPINE MESYLATE 1 MG/ML
2 INJECTION, SOLUTION INTRAMUSCULAR; INTRAVENOUS 2 TIMES DAILY PRN
Status: DISCONTINUED | OUTPATIENT
Start: 2025-01-22 | End: 2025-01-23 | Stop reason: HOSPADM

## 2025-01-22 RX ORDER — MAGNESIUM HYDROXIDE/ALUMINUM HYDROXICE/SIMETHICONE 120; 1200; 1200 MG/30ML; MG/30ML; MG/30ML
30 SUSPENSION ORAL PRN
Status: DISCONTINUED | OUTPATIENT
Start: 2025-01-22 | End: 2025-01-23 | Stop reason: HOSPADM

## 2025-01-22 RX ORDER — HYDROXYZINE PAMOATE 50 MG/1
50 CAPSULE ORAL EVERY 6 HOURS PRN
Status: DISCONTINUED | OUTPATIENT
Start: 2025-01-22 | End: 2025-01-23 | Stop reason: HOSPADM

## 2025-01-22 RX ORDER — HYDROXYZINE HYDROCHLORIDE 50 MG/ML
50 INJECTION, SOLUTION INTRAMUSCULAR EVERY 6 HOURS PRN
Status: DISCONTINUED | OUTPATIENT
Start: 2025-01-22 | End: 2025-01-23 | Stop reason: HOSPADM

## 2025-01-22 RX ORDER — NICOTINE 21 MG/24HR
1 PATCH, TRANSDERMAL 24 HOURS TRANSDERMAL DAILY
Status: DISCONTINUED | OUTPATIENT
Start: 2025-01-22 | End: 2025-01-23 | Stop reason: HOSPADM

## 2025-01-22 RX ORDER — TRAZODONE HYDROCHLORIDE 50 MG/1
50 TABLET, FILM COATED ORAL NIGHTLY PRN
Status: DISCONTINUED | OUTPATIENT
Start: 2025-01-22 | End: 2025-01-23 | Stop reason: HOSPADM

## 2025-01-22 RX ORDER — GLUCAGON 1 MG/ML
1 KIT INJECTION PRN
Status: DISCONTINUED | OUTPATIENT
Start: 2025-01-22 | End: 2025-01-23 | Stop reason: HOSPADM

## 2025-01-22 RX ORDER — POLYETHYLENE GLYCOL 3350 17 G
2 POWDER IN PACKET (EA) ORAL
Status: DISCONTINUED | OUTPATIENT
Start: 2025-01-22 | End: 2025-01-23 | Stop reason: HOSPADM

## 2025-01-22 RX ORDER — CLONAZEPAM 1 MG/1
1 TABLET ORAL 2 TIMES DAILY PRN
Status: DISCONTINUED | OUTPATIENT
Start: 2025-01-22 | End: 2025-01-23 | Stop reason: HOSPADM

## 2025-01-22 RX ORDER — TOPIRAMATE 25 MG/1
50 TABLET, FILM COATED ORAL DAILY
Status: DISCONTINUED | OUTPATIENT
Start: 2025-01-22 | End: 2025-01-23 | Stop reason: HOSPADM

## 2025-01-22 RX ORDER — HALOPERIDOL 5 MG/ML
5 INJECTION INTRAMUSCULAR EVERY 6 HOURS PRN
Status: DISCONTINUED | OUTPATIENT
Start: 2025-01-22 | End: 2025-01-23 | Stop reason: HOSPADM

## 2025-01-22 RX ADMIN — CLONAZEPAM 1 MG: 1 TABLET ORAL at 12:38

## 2025-01-22 RX ADMIN — TOPIRAMATE 50 MG: 25 TABLET, FILM COATED ORAL at 12:37

## 2025-01-22 RX ADMIN — CLONAZEPAM 1 MG: 1 TABLET ORAL at 21:02

## 2025-01-22 RX ADMIN — NICOTINE POLACRILEX 2 MG: 2 LOZENGE ORAL at 16:14

## 2025-01-22 ASSESSMENT — PATIENT HEALTH QUESTIONNAIRE - PHQ9
3. TROUBLE FALLING OR STAYING ASLEEP: NEARLY EVERY DAY
SUM OF ALL RESPONSES TO PHQ QUESTIONS 1-9: 13
5. POOR APPETITE OR OVEREATING: NOT AT ALL
9. THOUGHTS THAT YOU WOULD BE BETTER OFF DEAD, OR OF HURTING YOURSELF: NOT AT ALL
8. MOVING OR SPEAKING SO SLOWLY THAT OTHER PEOPLE COULD HAVE NOTICED. OR THE OPPOSITE, BEING SO FIGETY OR RESTLESS THAT YOU HAVE BEEN MOVING AROUND A LOT MORE THAN USUAL: NOT AT ALL
SUM OF ALL RESPONSES TO PHQ QUESTIONS 1-9: 13
6. FEELING BAD ABOUT YOURSELF - OR THAT YOU ARE A FAILURE OR HAVE LET YOURSELF OR YOUR FAMILY DOWN: SEVERAL DAYS
1. LITTLE INTEREST OR PLEASURE IN DOING THINGS: MORE THAN HALF THE DAYS
7. TROUBLE CONCENTRATING ON THINGS, SUCH AS READING THE NEWSPAPER OR WATCHING TELEVISION: NEARLY EVERY DAY
4. FEELING TIRED OR HAVING LITTLE ENERGY: MORE THAN HALF THE DAYS
SUM OF ALL RESPONSES TO PHQ9 QUESTIONS 1 & 2: 4
10. IF YOU CHECKED OFF ANY PROBLEMS, HOW DIFFICULT HAVE THESE PROBLEMS MADE IT FOR YOU TO DO YOUR WORK, TAKE CARE OF THINGS AT HOME, OR GET ALONG WITH OTHER PEOPLE: NOT DIFFICULT AT ALL
2. FEELING DOWN, DEPRESSED OR HOPELESS: MORE THAN HALF THE DAYS

## 2025-01-22 ASSESSMENT — SLEEP AND FATIGUE QUESTIONNAIRES
SLEEP PATTERN: DISTURBED/INTERRUPTED SLEEP;RESTLESSNESS;NIGHTMARES/TERRORS
DO YOU HAVE DIFFICULTY SLEEPING: YES
AVERAGE NUMBER OF SLEEP HOURS: 6
DO YOU USE A SLEEP AID: NO

## 2025-01-22 NOTE — CONSULTS
Consult Note            Date:1/22/2025        Patient Name:Kanchan Cam     YOB: 1980     Age:44 y.o.    Reason for Consult: Evaluation and recommendation for chronic disease managed    Chief Complaint     Chief Complaint   Patient presents with    Mental Health Problem          History Obtained From   patient, electronic medical record    History of Present Illness   Patient is a 44-year-old female admitted for mental health evaluation with a past medical history of ADD, bipolar, diabetes, hyperlipidemia, QI, osteoarthritis with thoughts of suicide ideation.  Patient medically cleared.  Hospitalist consulted for evaluation and recommendations for acute and chronic disease management while receiving inpatient psych services.  Blood pressure noted mildly low upon admission.    Patient denies chest pain, palpitations, lightheadedness, headache, dizziness, shortness of breath, cough, N/V/D, and changes in appetite.  Patient also denies smoking, illicit drug, and alcohol use.  Denies self-inflicted injuries or wounds.      Past Medical History     Past Medical History:   Diagnosis Date    ADD (attention deficit disorder) 2021    Bipolar disorder (HCC)     Depression     DM (diabetes mellitus) (HCC)     History of drug abuse ([facility])     positive drug screens 9/22/14 and 5/18/14    Hyperlipidemia     Morbid obesity with body mass index (BMI) of 40.0 to 44.9 in adult 10/12/2018    QI (obstructive sleep apnea) 09/28/2018    Osteoarthritis         Past Surgical History     Past Surgical History:   Procedure Laterality Date    CHOLECYSTECTOMY, LAPAROSCOPIC N/A 2/28/2017    CHOLECYSTECTOMY LAPAROSCOPIC WITH GRAMS POSS OPEN , RECENT LABS  performed by Issac Quintero MD at [facility] [place]    HYSTERECTOMY (CERVIX STATUS UNKNOWN)      DE COLON CA SCRN NOT HI RSK IND N/A 11/27/2018    COLONOSCOPY ROOM 384 performed by Margareth Freeman MD at [facility] [place]    RECTAL SURGERY N/A 6/16/2021    I&D LEFT  PERIRECTAL  ABSCESS performed

## 2025-01-22 NOTE — ED NOTES
Provisional Diagnosis:    Major Depression with sucidial ideations and Bipolar    Psychosocial and Contextual Factors:   Kanchan lives at home with her spouse and support dog. Her children are grown up and living on their own.  She is active with [facility]. Her last in patient admission was June 2024    C-SSRS Summary:      1) Within the past month, have you wished you were dead or wished you could go to sleep and not wake up? : Yes2) Have you actually had any thoughts of killing yourself? : Yes3) Have you been thinking about how you might kill yourself? : No4) Have you had these thoughts and had some intention of acting on them? : Yes5) Have you started to work out or worked out the details of how to kill yourself? Do you intend to carry out this plan? : No6) Have you ever done anything, started to do anything, or prepared to do anything to end your life?: NoRisk of Suicide: High Risk  C-SSRS Risk AssessmentSuicidal and Self-Injurious Behavior : Actual suicide attempt - LifetimeSuicidal Ideation (Most Severe in Past Month): Wish to be dead; Suicidal thoughtsActivating Events (Recent): Other (comment) (per pt menopause)Treatment History: Previous psychiatric diagnosis and treatmentsClinical Status (Recent): Major depression episode; Agitation or severe anxietyProtective Factors (Recent): Identifies reasons for living; Responsibility to family or others/living with family    Patient:  Calm and Cooperative   Family: None present at this time  Agency:  [facility]     Substance Abuse: Pt states that she has not had THC since Thursday and has not drank for 3 years. Tox screen + for THC and Benzodiazepines     Present Suicidal Behavior:      Verbal: Patient is denying active SI states that she just had thoughts     Attempt: Pt denies     Past Suicidal Behavior:     Verbal: Pt verbally open about past attempt    Attempt: Pt states 3 years ago she attempted by Overdose        Self-Injurious/Self-Mutilation: Pt denies       Violence

## 2025-01-22 NOTE — PROGRESS NOTES
Patient arrived to unit at 0133 by wheelchair. Belongings secured behind nurse's station. Skin and contraband check performed by this RN and Neha PERSAUD. Contraband negative. Skin assessment WNL. Pt given orientation to unit and room. Toiletries provided to patient. Pt cooperative with admission assessment and consents completed.

## 2025-01-22 NOTE — PROGRESS NOTES
Kanchan is out and about on the unit. Is laying down to rest at this time. States that she is feeling better than on arrival states her anxiety is high as she had not yet been started back on her klonpin at time of assessment but in good control, ' I am holding it together\". Dr Driver has been informed that pt was ordreed klonopin 1 mg BID PRN but has routinely been using TID. See new orders. She states that she is sleeping good and that appetite is good. She denies SI/HI and symptoms of psychosis. She does has some somatic focus, but can redirect.
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
## 2025-01-22 NOTE — CARE COORDINATION
Psychosocial Assessment     Admission Reason: Per ER Note Presents emergency department via EMS for depression with suicidal ideation. Patient states that she has a history of depression and bipolar disorder. She is a tangential historian.     C-SSRS Lifetime Recent Completed - Current Suicide Risk:   [] No Risk  [] Low [x] Moderate [] High     Risk Factors:     Poor coping skills  Minimizing    Protective Factors:   Stable home  Motivation for treatment    Gender:  [] Male [x] Female [] Transgender  [] Other    Sexual Orientation:  [x] Heterosexual [] Homosexual [] Bisexual [] Other    Current or Past Mental Health and/or Addictions Treatment (and response to treatment):  [x] Yes, When and Where: Hx of inpatient admissions  [] No    Substance Use/Alcohol Use/Addiction (document name of substance, age of onset, how much and how often, route of use and date of last use):  [] Reports   Substance:  Age of Onset:   How Much and how often:  Last Usage    [x] Denies    AUDIT: 0  DAST: 0  PHQ 9:13    Education provided:  [] Yes  [] No  [x] N/A [] Refused    Learners: Patient []  Family []  Significant Other  [] Caregiver [] Other []   Readiness: Eager []   Acceptance  []   Nonacceptances []   Refused []   Method: Explanation []   Handout []   Response: Verbalizes Understanding []   No evidence of Learning []   Refuses []     Referral made to Let's get Real  [] Yes       Date:              [x] No    Family History of Mental Illness or Substance Use/Abuse:   [] Yes (Specify)    [] No  [place]    Trauma and Abuse History:   [] Reports   ( Physical [x]  Verbal [x]  Emotional [x]  Financial []   Sexual [x] )  [] Denies      Legal History:  [x]  Yes (Specify)  Contempt of Court  [] No     Involvement:  [] Yes (Specify)    [x] No     Employment and/or Benefits:    [x] Yes (Specify)   SSD [x]  SSI []   SNAP[x] Medicaid[x]  [] No

## 2025-01-22 NOTE — ED PROVIDER NOTES
attending    Risk  Decision regarding hospitalization.       Coding     PROCEDURES:    Procedures      FINAL IMPRESSION      1. Bipolar 1 disorder (HCC)          DISPOSITION/PLAN   DISPOSITION Decision To Admit 01/22/2025 12:28:30 AM   DISPOSITION CONDITION Stable           PATIENT REFERRED TO:  No follow-up provider specified.    DISCHARGE MEDICATIONS:  New Prescriptions    No medications on file       (Please note that portions of this note were completed with a voice recognition program.  Efforts were made to edit the dictations but occasionally words are mis-transcribed.)    Darren Sparrow PA-C    Supervising Physician Darren Weaver PA-C  01/22/25 0127

## 2025-01-22 NOTE — PLAN OF CARE
Problem: Self Harm/Suicidality  Goal: Will have no self-injury during hospital stay  Description: INTERVENTIONS:  1.  Ensure constant observer at bedside with Q15M safety checks  2.  Maintain a safe environment  3.  Secure patient belongings  4.  Ensure family/visitors adhere to safety recommendations  5.  Ensure safety tray has been added to patient's diet order  6.  Every shift and PRN: Re-assess suicidal risk via Frequent Screener    1/22/2025 1404 by Deanna Brown, RN  Outcome: Progressing  1/22/2025 1328 by Deanna Brown, RN  Outcome: Progressing     Problem: ABCDS Injury Assessment  Goal: Absence of physical injury  1/22/2025 1404 by Deanna Brown, RN  Outcome: Progressing  1/22/2025 1328 by Deanna Brown, RN  Outcome: Progressing     Problem: Risk for Elopement  Goal: Patient will not exit the unit/facility without proper excort  Outcome: Progressing

## 2025-01-22 NOTE — GROUP NOTE
Date: 1/22/2025    Group Start Time: 0925  Group End Time: 0957  Group Topic: Music Therapy    [facility] 3W Louise Solomon    Self-Exploration  Celia Analysis/Discussion, Receptive Music Listening    Patients will listen to \"Look Up [email or web address] the Rodo\" by Nia Allison and discuss themes/interpretations/lyrics derived from the song. Patients will be encouraged to share their reflections with the group and explore how the song may relate to their lives.     Focus: Self-Exploration, Support, Validation    Goals: Improve/Maintain Attention to Task, Improve/Maintain Identity Development, Improve/Maintain Insight / Self-Awareness, Increase/Maintain Level of Socialization, Improve Mood, Improve/Maintain Self-Esteem, Improve/Maintain Self-Expression, and Improve/Maintain Use of Coping Skills     Patient listened to recorded music and engaged in peer song discussions. Patient expressed that she tries to challenge her negative self-thoughts \"as soon as they start\" through going to a mirror and reciting self-affirmations. Patient explained that she manages her poor self-talk by setting/maintaining clear boundaries, communicating her feelings, and \"love from a distance\" when necessary with unhealthy relationships. Patient resonated with the lyrics \"I know that there's an answer it's clear as glass / but I had to find, I had to find it for myself,\" interpreting that the celia referred to \"something you don't want to face.\" Patient verbalized support for peers' contributions and reassured peers as appropriate when they were expressing self-doubt.    Attended: Full attendance  Participation Level: Active Listener and Interactive  Participation Quality: Attentive, Sharing, and Supportive  Affect/Mood: Congruent/Elevated and Brightens  Speech: spontaneous, normal rate, and normal volume   Thought Content/Processes: Linear  Level of consciousness: Alert  Response: Able to verbalize current knowledge/experience  Outcomes: Progressing towards

## 2025-01-22 NOTE — ED TRIAGE NOTES
Patient present to the ED from home via EMS with SI, upon arrival patient is denying SI at this time.  Patient report that she forgot her purse and will need to go and get her purse.  Patient report that she need long term stabilization and is working with her counselor

## 2025-01-22 NOTE — PROGRESS NOTES
Admit Note- Report from Neha PERSAUD/[facility]. Patient brought to ED by mary. Pt called 911 on herself, was making suicidal statements. Upon arrival to ED patient denied having suicidal ideation and reported she was supposed to be starting TMS and became anxious at home waiting to start the therapy. Pt open with [facility], receives Klonopin and Topamax. Pt states she has tried \"everything\" and several medications do not agree with her body. Pt reports poor sleep at home. Pt presentation in [facility] noted to be manic with fast, pressured speech. Thought process with flight of ideas and racing thoughts. Pt agreeable to admission and help seeking. Tox +THC and Benzos (prescribed Klonopin). PT reports x1 past SA three years ago by overdose. Remote hx of alcohol and substance use. Pt identifies as recovering alcoholic and addict.

## 2025-01-22 NOTE — PROGRESS NOTES
Behavioral Services  Medicare Certification Upon Admission    I certify that this patient's inpatient psychiatric hospital admission is medically necessary for:    [x] (1) Treatment which could reasonably be expected to improve this patient's condition,       [x] (2) Or for diagnostic study;     AND     [x](2) The inpatient psychiatric services are provided while the individual is under the care of a physician and are included in the individualized plan of care.    Estimated length of stay/service 3-5    Plan for post-hospital care OP care    Electronically signed by ALINA KRAUS MD on 1/22/2025 at 11:14 AM

## 2025-01-22 NOTE — GROUP NOTE
Patient did not attend group.    Date: 1/22/2025    Group Start Time: 1405  Group End Time: 1445  Group Topic: Music Therapy    [facility] [place] Louise Solomon    Live Music Group  Active Music Making, Live Music Listening, and Music Reminiscence    Patients will be given a songbook and offered the opportunity to select (a) song(s) of their choice for live music listening on [employer]. Patients will be encouraged to sing along, move along, and listen to the music.    Focus: Building Positive Experiences and Relaxation     Goals: Increase/Maintain Level of Socialization, Improve Mood, Improve/Maintain Self-Esteem, Improve/Maintain Self-Expression, Improve/Maintain Use of Coping Skills, and Promote Reality Orientation     Signature: Louise Dunaway, Licensed Professional Music Therapist (LPMT)   Subjective:     Patient ID: Taqueria Diego is a 21 y.o. female.    CC:   Chief Complaint   Patient presents with   • Migraine       HPI:   History of Present Illness     Taqueria is here today for follow-up on her migraines.  When I first saw hersilana was here for evaluation for chronic migraines, ongoing since she a young age.  At that time she was experiencing chronic daily headache with 2-3 severe migraines per week at least.  After first visit we did order MRI of the brain which has been performed and showed no abnormalities. I  did start her on amitriptyline 10 mg at night, she has  been tolerating this well overall and this has helped greatly for her headaches.  She is now having about 2 migraines per month with sporadic mild headaches otherwise.  She is wanted to discuss the possibility of this causing depression.  She does have hx of depression and bipolar disorder, she is having a bit more depression in the past month or so, no suicidal thoughts.  She is seeing a counselor and a new psychiatrist.  She has no new complaints today    The following portions of the patient's history were reviewed and updated as appropriate: allergies, current medications, past family history, past medical history, past social history, past surgical history and problem list.    Past Medical History:   Diagnosis Date   • ADHD (attention deficit hyperactivity disorder)    • Anxiety    • Asthma    • Depression        No past surgical history on file.    Social History     Socioeconomic History   • Marital status: Single     Spouse name: Not on file   • Number of children: Not on file   • Years of education: Not on file   • Highest education level: Not on file   Social Needs   • Financial resource strain: Not on file   • Food insecurity - worry: Not on file   • Food insecurity - inability: Not on file   • Transportation needs - medical: Not on file   • Transportation needs - non-medical: Not on file   Occupational History   • Not on  file   Tobacco Use   • Smoking status: Never Smoker   • Smokeless tobacco: Never Used   Substance and Sexual Activity   • Alcohol use: Yes   • Drug use: No   • Sexual activity: No   Other Topics Concern   • Not on file   Social History Narrative   • Not on file       Family History   Problem Relation Age of Onset   • Migraines Maternal Aunt    • Mental illness Maternal Uncle    • Alcohol abuse Paternal Aunt    • Mental illness Paternal Aunt    • Alcohol abuse Paternal Uncle    • Hypertension Maternal Grandmother    • Alcohol abuse Maternal Grandfather         Review of Systems   Constitutional: Negative.    HENT: Negative for hearing loss, tinnitus, trouble swallowing and voice change.    Eyes: Negative.    Gastrointestinal: Negative.  Negative for nausea.   Endocrine: Negative.    Genitourinary: Negative.    Musculoskeletal: Negative.    Skin: Negative.    Allergic/Immunologic: Negative.    Neurological: Positive for headaches. Negative for dizziness, tremors, seizures, syncope, facial asymmetry, speech difficulty, weakness, light-headedness and numbness.   Hematological: Negative.    Psychiatric/Behavioral: Negative for self-injury, sleep disturbance and suicidal ideas.        Depression        Objective:    Neurologic Exam     Mental Status   Oriented to person, place, and time.   Attention: normal. Concentration: normal.   Speech: speech is normal   Level of consciousness: alert  Knowledge: consistent with education.   Able to read. Able to write. Normal comprehension.     Cranial Nerves   Cranial nerves II through XII intact.     CN III, IV, VI   Pupils are equal, round, and reactive to light.  Extraocular motions are normal.     Motor Exam   Muscle bulk: normal  Overall muscle tone: normal  Right arm tone: normal  Left arm tone: normal  Right arm pronator drift: absent  Left arm pronator drift: absent  Right leg tone: normal  Left leg tone: normal    Strength   Strength 5/5 throughout.     Gait, Coordination,  and Reflexes     Gait  Gait: normal    Coordination   Romberg: negative  Finger to nose coordination: normal    Tremor   Resting tremor: absent  Intention tremor: absent  Action tremor: absent    Reflexes   Reflexes 2+ except as noted.   Right Geronimo: absent  Left Geronimo: absent      Physical Exam   Constitutional: She is oriented to person, place, and time. She appears well-developed and well-nourished. No distress.   HENT:   Head: Normocephalic and atraumatic.   Eyes: EOM are normal. Pupils are equal, round, and reactive to light.   Neck: Normal range of motion. Neck supple.   Cardiovascular: Normal rate.   Pulmonary/Chest: Effort normal. No respiratory distress.   Neurological: She is alert and oriented to person, place, and time. She has normal strength. She has a normal Finger-Nose-Finger Test and a normal Romberg Test. Gait normal.   Skin: Skin is warm. Capillary refill takes less than 2 seconds.   Psychiatric: She has a normal mood and affect. Her speech is normal and behavior is normal. Judgment and thought content normal.   Vitals reviewed.      Assessment/Plan:       Taqueria was seen today for migraine.    Diagnoses and all orders for this visit:    Migraine with aura and without status migrainosus, not intractable  -     amitriptyline (ELAVIL) 10 MG tablet; Take 1-2 PO qhs    Taqueria has done very well to amitriptyline in regards to her headaches.  She does have a history of depression and bipolar disorder, she's been struggling with a bit more depression recently and wonders if this medication could be causing this.  I think it is highly unlikely but I do suggest she talk to her psychiatrist about it, she actually saw her yesterday and she did not suggest she come off the medicine.  I had a long discussion about other options for migraine prevention, I would be very hesitant to give her beta blocker given her reported history of moderate asthma.  We could possibly use another antiepileptic however she is on  Lamictal and is hesitant to add another seizure medication with this medicine.  I don't think she would tolerate verapamil given her blood pressure of 110/60 today.  We did also discuss Ajovy injections.  I have given her information on the injections and she will do some research and decide if she wants to make the switch, certainly if her psychiatrist think she needs to come off the amitriptyline we will find another option for her.  She will let me know after she speaks to her psychiatrist again if she decides she wants to come off the amitriptyline.  Reviewed medications, potential side effects and signs and symptoms to report. Discussed risk versus benefits of treatment plan with patient and/or family-including medications, labs and radiology that may be ordered. Addressed questions and concerns during visit. Patient and/or family verbalized understanding and agree with plan.  We reviewed possible headache triggers, stress relief techniques, and alternative treatments for headaches. The patient was in understanding and all questions were addressed. We reviewed the diagnosis and treatment plan and medication side effect profile. The patient will follow up as scheduled.  EMR Dragon/Transcription Disclaimer:  Much of this encounter note is an electronic transcription of spoken language to printed text. Electronic transcription of spoken language may permit erroneous words or phrases to be inadvertently transcribed. Although I have reviewed the note for such errors, some may still exist in this documentation.           Deirdre Calderon, APRN  2/19/2019

## 2025-01-22 NOTE — GROUP NOTE
Group Therapy Note    Date: 1/22/2025    Group Start Time: 1015  Group End Time: 1100  Group Topic: Art Therapy     SAMANTH[facility] 3W Felisa Veronica, CESARIO        Group Therapy Note    Attendees: 8       Patient's Goal:  To participate in morning group art therapy.    Notes:  Patient did not attend.     Modes of Intervention: Activity      Discipline Responsible: Psychoeducational Specialist      Signature:  Felisa Wallace MA ART LPAT

## 2025-01-22 NOTE — CARE COORDINATION
FAMILY COLLATERAL NOTE    Family/Support Name: Dean Cam  Contact #: [phone]  Relationship to Pt:: Spouse    LSW attempted to contact above for collateral information. Unable to leave a message as voicemail box is not set up.  GIOVANNY Briscoe

## 2025-01-22 NOTE — H&P
[facility] - Department of Psychiatry    History and Physical - Adult     CHIEF COMPLAINT:  Depression SI    History obtained from:  patient    Patient was seen after discussing with the treatment team and reviewing the chart        CIRCUMSTANCES OF ADMISSION:     Per ED: Kanchan Cam is a 44 y.o. female who presents to the emergency department       Presents emergency department via EMS for depression with suicidal ideation.  Patient states that she has a history of depression and bipolar disorder.  She is a tangential historian.  Stating she was talking with her doctor and had plans for placement tomorrow but ended up presenting today for evaluation.     While in the BAC patient is cooperative and rambling. She states that she was going to start TMS  treatments through a counselor tomorrow, however she became so anxious that she called 911. She made suicide statements and is stating that she just said that to get brought to the ED.During the assessment the patient states that she struggles to get through the day and that the nights are even harder. She stopped taking her Bipolar medications as she believes that they do not agree with her body d/t menopause.  Her anxiety escalates the more she talks and her speech became pressured.  She states that life is just hard and she helps take care of her  that only has one leg, she called him to bring her purse and phone to the ED.  When asked she stated well he as a prosthetic leg now.  Her stories bounce all over the place. She is denies SI, HI, and  A/V hallucinations. She is agreeable to admission and states she needs help.    HISTORY OF PRESENT ILLNESS:      The patient is a 44 y.o. female with significant past history of bipolar  Pt lives with .  on SSD, unemployed  Pt see [facility]  Pt is currently on Topamax 50 mg once daily for mood swings and klonopin 1 mg bid as needed  Pt has tried various medication in the past - and she was

## 2025-01-23 VITALS
RESPIRATION RATE: 16 BRPM | BODY MASS INDEX: 32.15 KG/M2 | SYSTOLIC BLOOD PRESSURE: 102 MMHG | HEART RATE: 90 BPM | HEIGHT: 65 IN | TEMPERATURE: 97.3 F | DIASTOLIC BLOOD PRESSURE: 77 MMHG | OXYGEN SATURATION: 99 % | WEIGHT: 193 LBS

## 2025-01-23 LAB
ESTIMATED AVERAGE GLUCOSE: 100 MG/DL
HBA1C MFR BLD: 5.1 % (ref 4–6)

## 2025-01-23 PROCEDURE — 6370000000 HC RX 637 (ALT 250 FOR IP): Performed by: REGISTERED NURSE

## 2025-01-23 PROCEDURE — 6370000000 HC RX 637 (ALT 250 FOR IP): Performed by: PSYCHIATRY & NEUROLOGY

## 2025-01-23 PROCEDURE — 83036 HEMOGLOBIN GLYCOSYLATED A1C: CPT

## 2025-01-23 PROCEDURE — 2500000003 HC RX 250 WO HCPCS: Performed by: NURSE PRACTITIONER

## 2025-01-23 PROCEDURE — 36415 COLL VENOUS BLD VENIPUNCTURE: CPT

## 2025-01-23 RX ORDER — GUAIFENESIN 200 MG/10ML
200 LIQUID ORAL EVERY 4 HOURS PRN
Status: DISCONTINUED | OUTPATIENT
Start: 2025-01-23 | End: 2025-01-23 | Stop reason: HOSPADM

## 2025-01-23 RX ORDER — TOPIRAMATE 50 MG/1
50 TABLET, FILM COATED ORAL DAILY
COMMUNITY
Start: 2025-01-24

## 2025-01-23 RX ORDER — FLUCONAZOLE 100 MG/1
150 TABLET ORAL ONCE
Status: COMPLETED | OUTPATIENT
Start: 2025-01-23 | End: 2025-01-23

## 2025-01-23 RX ADMIN — TOPIRAMATE 50 MG: 25 TABLET, FILM COATED ORAL at 08:30

## 2025-01-23 RX ADMIN — GUAIFENESIN 200 MG: 100 LIQUID ORAL at 05:44

## 2025-01-23 RX ADMIN — ACETAMINOPHEN 650 MG: 325 TABLET ORAL at 05:37

## 2025-01-23 RX ADMIN — GUAIFENESIN 200 MG: 100 LIQUID ORAL at 10:13

## 2025-01-23 RX ADMIN — FLUCONAZOLE 150 MG: 100 TABLET ORAL at 11:17

## 2025-01-23 RX ADMIN — CLONAZEPAM 1 MG: 1 TABLET ORAL at 09:21

## 2025-01-23 ASSESSMENT — PAIN SCALES - GENERAL: PAINLEVEL_OUTOF10: 5

## 2025-01-23 ASSESSMENT — PAIN DESCRIPTION - LOCATION: LOCATION: HEAD;OTHER (COMMENT)

## 2025-01-23 ASSESSMENT — PAIN DESCRIPTION - DESCRIPTORS: DESCRIPTORS: ACHING;DISCOMFORT

## 2025-01-23 NOTE — DISCHARGE INSTR - DIET

## 2025-01-23 NOTE — TRANSITION OF CARE
Immunizations administered during this encounter:   Immunization History   Administered Date(s) Administered    COVID-19, PFIZER GRAY top, DO NOT Dilute, (age 12 y+), IM, 30 mcg/0.3 mL 05/18/2022    TDaP, ADACEL (age 10y-64y), BOOSTRIX (age 10y+), IM, 0.5mL 03/20/2020     Influenza Vaccination Status: Documentation of patient's or caregiver's refusal of influenza vaccine.    Screening for Metabolic Disorders for Patients on Antipsychotic Medications  (Data obtained from the EMR)    Estimated Body Mass Index  Body mass index is 32.12 kg/m².      Vital Signs/Blood Pressure  /77   Pulse 90   Temp 97.3 °F (36.3 °C)   Resp 16   Ht 1.651 m (5' 5\")   Wt 87.5 kg (193 lb)   SpO2 99%   BMI 32.12 kg/m²      Fasting Blood Glucose or Hemoglobin A1c  No results found for: \"GLU\", \"GLUCPOC\"    Hemoglobin A1C   Date Value Ref Range Status   01/21/2025 5.0 4.0 - 6.0 % Final       Discharge Diagnosis: Bipolar 1 disorder, depressed    Discharge Plan/Destination: pt plans to return to home and then follow up with [facility] 1/28/25 @ 2:20 pm.     Discharge Medication List and Instructions:      Medication List        START taking these medications      topiramate 50 MG tablet  Commonly known as: TOPAMAX  Start taking on: January 24, 2025            CHANGE how you take these medications      MAGNESIUM GLUCONATE PO  What changed: Another medication with the same name was removed. Continue taking this medication, and follow the directions you see here.            CONTINUE taking these medications      clonazePAM 1 MG tablet  Commonly known as: KLONOPIN     Lancets Misc  Patient test 3x daily E65.11     MULTIVITAMIN/IRON PO     OneTouch Verio Reflect w/Device Kit            STOP taking these medications      blood glucose test strips     buPROPion 200 MG extended release tablet  Commonly known as: WELLBUTRIN SR     calcium citrate-vitamin D 315-5 MG-MCG Tabs per tablet  Commonly known as: CITRACAL+D     omeprazole 20

## 2025-01-23 NOTE — CARE COORDINATION
FAMILY COLLATERAL NOTE    Family/Support Name: Dean Cam   Contact #:[phone]   Relationship to Pt:: Spouse    Family/Support contact aware of hospitalization: Yes    Presenting Symptoms/Current Concerns:  -Pt was paranoid  -Grief - unresolved guilt from mother's death  -Deep sadness and depression  -Unable to function in daily life  -Isolating in a spare bedroom  -Stand offish with above and paranoid with him.  -Not trusting of others  -Believing others do not care about are.  -\"Something is off\"  -Concerned about patient's safety. Patient is minimizing her SI and reports patient has a hx of doing this and needs to be assessed further for stabilization.  -Patient has a doctor and  at [facility].    Top 3 Life Stressors:   Patient's son is addicted to Fentanyl and she is having a hard time dealing with it.    Background History Relevant to Current Hospitalization:  HX of multiple inpatient admissions.    Family Mental Health/Substance Use History:   Hx of mental health issues an JARRELL    Discharge Plan:   Return home with Spouse    Support Network Supportive of Discharge Plan:   Yes    Support can confirm Safety of Location and Security of Weapons:   No weapons    Support agreeable to Safeguard and Monitor Medications (including Prescription and OTC):   Reports that patient is a grown women and he tries to monitor the best he can.    Recommendations for Support Network:   Available for follow up calls.      GIOVANNY Briscoe

## 2025-01-23 NOTE — DISCHARGE INSTRUCTIONS
You were offered the flu vaccine and you declined it 1/23/25.    Due to the Covid-19 Pandemic, [facility] is not currently available. For assistance with quitting smoking please go to https://smokefree.gov. A prescription for an FDA-approved tobacco cessation medication was offered at discharge and the patient refused.    Someone from [facility] will be calling you tomorrow to follow up on your care. If you don't hear from us, give us a call! [phone].    Keep all follow up appointments, take medications as ordered, utilize positive supports, abstain from use of alcohol and drugs. If symptoms return or you feel at risk to yourself or others, please call 911, return the nearest emergency room, or call your local crisis hotline:  [facility]: [phone]  [facility]: [phone]  [facility]: [phone]

## 2025-01-23 NOTE — DISCHARGE SUMMARY
DISCHARGE SUMMARY      Patient ID:  Kanchan Cam  [id number]  44 y.o.  1980      Admit date: 1/21/2025    Discharge date and time: 1/23/2025    Admitting Physician: Bhavik Mauro MD     Discharge Physician: Dr Villa MEZA    Admission Diagnoses: Bipolar 1 disorder (HCC) [F31.9]  Bipolar 1 disorder, depressed (HCC) [F31.9]    Admission Condition: poor    Discharged Condition: stable    Admission Circumstance:     Per ED: Kanchan Cam is a 44 y.o. female who presents to the emergency department       Presents emergency department via EMS for depression with suicidal ideation.  Patient states that she has a history of depression and bipolar disorder.  She is a tangential historian.  Stating she was talking with her doctor and had plans for placement tomorrow but ended up presenting today for evaluation.     While in the RICK patient is cooperative and rambling. She states that she was going to start TMS  treatments through a counselor tomorrow, however she became so anxious that she called 911. She made suicide statements and is stating that she just said that to get brought to the ED.During the assessment the patient states that she struggles to get through the day and that the nights are even harder. She stopped taking her Bipolar medications as she believes that they do not agree with her body d/t menopause.  Her anxiety escalates the more she talks and her speech became pressured.  She states that life is just hard and she helps take care of her  that only has one leg, she called him to bring her purse and phone to the ED.  When asked she stated well he as a prosthetic leg now.  Her stories bounce all over the place. She is denies SI, HI, and  A/V hallucinations. She is agreeable to admission and states she needs help.     HISTORY OF PRESENT ILLNESS:       The patient is a 44 y.o. female with significant past history of bipolar  Pt lives with .  on SSD, unemployed  Pt see Psych Jose Alejandro

## 2025-01-23 NOTE — GROUP NOTE
Date: 1/23/2025    Group Start Time: 0925  Group End Time: 1035  Group Topic: Music Therapy    [facility] 3W Louise Solomon    Iso-Principle Playlist  Music Reminiscence, Playlist Building, and Receptive Music Listening     Patients will discuss how they use music currently, in both healthy and unhealthy ways. Patients will learn about the \"iso-principle\", or the idea of matching music to your current mood in order to gradually change it to a desired mood state. Patients will identify how they feel currently, and how they would like to feel after listening. Patients will brainstorm songs to fit into select categories: song that represents current feeling; song for validation; song to challenge feeling; song for motivation; song that represents desired feeling. Patients will contribute songs to a group playlist and reflect on their feelings. Patients will explore the possible benefits of using this in their personal lives.    Focus: Emotion Identification & Regulation, Self-Esteem    Goals: Improve/Maintain Attention to Task, Improve/Maintain Cognitive Skills, Improve/Maintain Insight / Self-Awareness, Increase/Maintain Level of Socialization, Improve Mood, Improve/Maintain Self-Expression, and Improve/Maintain Use of Coping Skills     Patient was in/out of group related to discharge preparation. Patient expressed that she used to use music in a more unhealthy way, but has found music that helps her feel better. Patient sang/moved to familiar music and verbalized support for peers' selections.     Attended: 1/4-1/2 attendance  Participation Level: Interactive  Participation Quality: Sharing and Supportive  Affect/Mood: Bright/Euthymic  Speech: spontaneous, normal rate, and normal volume   Thought Content/Processes: Linear  Level of consciousness: Alert  Response: Able to retain information  Outcomes: Actively participated in the group experience and Anticipated discharge today    Signature: Louise Dunaway, Licensed

## 2025-01-23 NOTE — GROUP NOTE
Group Therapy Note    Date: 1/22/2025    Group Start Time: 1940  Group End Time: 2020  Group Topic: Wrap-Up    [facility] 3W Roz Faustin        Group Therapy Note    Attendees: 8/18     Notes:  Pt attended tonight's wrap up group.     Discipline Responsible: [employer]      Signature:  Roz Chávez

## 2025-01-23 NOTE — PROGRESS NOTES
Pt reports she is feeling much better, reports depression level is #2/10, reports no anxiety. Pt denies SI/HI/AVH.Pt is visible on unit in DR, is social with select peers.Pt reports attending 1 group today. Pt reports good appetite, pt reports sleeping all night last night.

## 2025-01-23 NOTE — PROGRESS NOTES
Pt to NS with c/o headache,body aches,nasal congestion,cough. Medicated with tylenol 650mg po at 0537 for pain level of 5/10. Temp 98.1. Roscoe PERSAUD informed of pts complaints.

## 2025-04-09 ENCOUNTER — HOSPITAL ENCOUNTER (INPATIENT)
Age: 45
LOS: 6 days | Discharge: HOME | DRG: 753 | End: 2025-04-15
Attending: EMERGENCY MEDICINE | Admitting: PSYCHIATRY & NEUROLOGY
Payer: MEDICAID

## 2025-04-09 DIAGNOSIS — F31.9 BIPOLAR 1 DISORDER (HCC): ICD-10-CM

## 2025-04-09 DIAGNOSIS — R45.851 SUICIDAL IDEATION: Primary | ICD-10-CM

## 2025-04-09 LAB
ALBUMIN SERPL-MCNC: 4.4 G/DL (ref 3.5–4.6)
ALP SERPL-CCNC: 61 U/L (ref 40–130)
ALT SERPL-CCNC: 10 U/L (ref 0–33)
AMPHET UR QL SCN: ABNORMAL
ANION GAP SERPL CALCULATED.3IONS-SCNC: 12 MEQ/L (ref 9–15)
APAP SERPL-MCNC: <5 UG/ML (ref 10–30)
AST SERPL-CCNC: 10 U/L (ref 0–35)
BARBITURATES UR QL SCN: ABNORMAL
BASOPHILS # BLD: 0.1 K/UL (ref 0–0.2)
BASOPHILS NFR BLD: 0.5 %
BENZODIAZ UR QL SCN: ABNORMAL
BILIRUB SERPL-MCNC: 0.8 MG/DL (ref 0.2–0.7)
BILIRUB UR QL STRIP: NEGATIVE
BUN SERPL-MCNC: 11 MG/DL (ref 6–20)
CALCIUM SERPL-MCNC: 9.3 MG/DL (ref 8.5–9.9)
CANNABINOIDS UR QL SCN: POSITIVE
CHLORIDE SERPL-SCNC: 103 MEQ/L (ref 95–107)
CHOLEST SERPL-MCNC: 129 MG/DL (ref 0–199)
CK SERPL-CCNC: 42 U/L (ref 0–170)
CLARITY UR: CLEAR
CO2 SERPL-SCNC: 21 MEQ/L (ref 20–31)
COCAINE UR QL SCN: ABNORMAL
COLOR UR: YELLOW
CREAT SERPL-MCNC: 0.68 MG/DL (ref 0.5–0.9)
DRUG SCREEN COMMENT UR-IMP: ABNORMAL
EKG ATRIAL RATE: 76 BPM
EKG P AXIS: 6 DEGREES
EKG P-R INTERVAL: 140 MS
EKG Q-T INTERVAL: 374 MS
EKG QRS DURATION: 78 MS
EKG QTC CALCULATION (BAZETT): 420 MS
EKG R AXIS: 19 DEGREES
EKG T AXIS: 31 DEGREES
EKG VENTRICULAR RATE: 76 BPM
EOSINOPHIL # BLD: 0 K/UL (ref 0–0.7)
EOSINOPHIL NFR BLD: 0.1 %
ERYTHROCYTE [DISTWIDTH] IN BLOOD BY AUTOMATED COUNT: 14 % (ref 11.5–14.5)
ESTIMATED AVERAGE GLUCOSE: 103 MG/DL
ETHANOL PERCENT: NORMAL G/DL
ETHANOLAMINE SERPL-MCNC: <10 MG/DL (ref 0–0.08)
FENTANYL SCREEN, URINE: ABNORMAL
GLOBULIN SER CALC-MCNC: 2.5 G/DL (ref 2.3–3.5)
GLUCOSE SERPL-MCNC: 110 MG/DL (ref 70–99)
GLUCOSE UR STRIP-MCNC: NEGATIVE MG/DL
HBA1C MFR BLD: 5.2 % (ref 4–6)
HCT VFR BLD AUTO: 45.6 % (ref 37–47)
HDLC SERPL-MCNC: 46 MG/DL (ref 40–59)
HGB BLD-MCNC: 15 G/DL (ref 12–16)
HGB UR QL STRIP: NEGATIVE
KETONES UR STRIP-MCNC: ABNORMAL MG/DL
LDLC SERPL CALC-MCNC: 65 MG/DL (ref 0–129)
LEUKOCYTE ESTERASE UR QL STRIP: NEGATIVE
LYMPHOCYTES # BLD: 1.7 K/UL (ref 1–4.8)
LYMPHOCYTES NFR BLD: 17.3 %
MCH RBC QN AUTO: 27.7 PG (ref 27–31.3)
MCHC RBC AUTO-ENTMCNC: 32.9 % (ref 33–37)
MCV RBC AUTO: 84.3 FL (ref 79.4–94.8)
METHADONE UR QL SCN: ABNORMAL
MONOCYTES # BLD: 0.5 K/UL (ref 0.2–0.8)
MONOCYTES NFR BLD: 5.1 %
NEUTROPHILS # BLD: 7.6 K/UL (ref 1.4–6.5)
NEUTS SEG NFR BLD: 76.7 %
NITRITE UR QL STRIP: NEGATIVE
OPIATES UR QL SCN: ABNORMAL
OXYCODONE UR QL SCN: ABNORMAL
PCP UR QL SCN: ABNORMAL
PH UR STRIP: 5 [PH] (ref 5–9)
PLATELET # BLD AUTO: 230 K/UL (ref 130–400)
POTASSIUM SERPL-SCNC: 3.6 MEQ/L (ref 3.4–4.9)
PROPOXYPH UR QL SCN: ABNORMAL
PROT SERPL-MCNC: 6.9 G/DL (ref 6.3–8)
PROT UR STRIP-MCNC: NEGATIVE MG/DL
RBC # BLD AUTO: 5.41 M/UL (ref 4.2–5.4)
SALICYLATES SERPL-MCNC: <0.3 MG/DL (ref 15–30)
SODIUM SERPL-SCNC: 136 MEQ/L (ref 135–144)
SP GR UR STRIP: 1.02 (ref 1–1.03)
TRIGL SERPL-MCNC: 88 MG/DL (ref 0–150)
TSH SERPL-MCNC: 0.9 UIU/ML (ref 0.44–3.86)
URINE REFLEX TO CULTURE: ABNORMAL
UROBILINOGEN UR STRIP-ACNC: 0.2 E.U./DL
WBC # BLD AUTO: 9.9 K/UL (ref 4.8–10.8)

## 2025-04-09 PROCEDURE — 85025 COMPLETE CBC W/AUTO DIFF WBC: CPT

## 2025-04-09 PROCEDURE — 93005 ELECTROCARDIOGRAM TRACING: CPT | Performed by: EMERGENCY MEDICINE

## 2025-04-09 PROCEDURE — 80053 COMPREHEN METABOLIC PANEL: CPT

## 2025-04-09 PROCEDURE — 82550 ASSAY OF CK (CPK): CPT

## 2025-04-09 PROCEDURE — 6370000000 HC RX 637 (ALT 250 FOR IP): Performed by: PSYCHIATRY & NEUROLOGY

## 2025-04-09 PROCEDURE — 36415 COLL VENOUS BLD VENIPUNCTURE: CPT

## 2025-04-09 PROCEDURE — 80061 LIPID PANEL: CPT

## 2025-04-09 PROCEDURE — 82077 ASSAY SPEC XCP UR&BREATH IA: CPT

## 2025-04-09 PROCEDURE — 83036 HEMOGLOBIN GLYCOSYLATED A1C: CPT

## 2025-04-09 PROCEDURE — 90792 PSYCH DIAG EVAL W/MED SRVCS: CPT

## 2025-04-09 PROCEDURE — 99285 EMERGENCY DEPT VISIT HI MDM: CPT

## 2025-04-09 PROCEDURE — 80143 DRUG ASSAY ACETAMINOPHEN: CPT

## 2025-04-09 PROCEDURE — 1240000000 HC EMOTIONAL WELLNESS R&B

## 2025-04-09 PROCEDURE — 6370000000 HC RX 637 (ALT 250 FOR IP): Performed by: EMERGENCY MEDICINE

## 2025-04-09 PROCEDURE — 80179 DRUG ASSAY SALICYLATE: CPT

## 2025-04-09 PROCEDURE — 80307 DRUG TEST PRSMV CHEM ANLYZR: CPT

## 2025-04-09 PROCEDURE — 84443 ASSAY THYROID STIM HORMONE: CPT

## 2025-04-09 PROCEDURE — 81003 URINALYSIS AUTO W/O SCOPE: CPT

## 2025-04-09 RX ORDER — IBUPROFEN 600 MG/1
600 TABLET, FILM COATED ORAL ONCE
Status: COMPLETED | OUTPATIENT
Start: 2025-04-09 | End: 2025-04-09

## 2025-04-09 RX ORDER — HYDROXYZINE HYDROCHLORIDE 50 MG/ML
50 INJECTION, SOLUTION INTRAMUSCULAR EVERY 6 HOURS PRN
Status: DISCONTINUED | OUTPATIENT
Start: 2025-04-09 | End: 2025-04-15 | Stop reason: HOSPADM

## 2025-04-09 RX ORDER — POLYETHYLENE GLYCOL 3350 17 G
2 POWDER IN PACKET (EA) ORAL
Status: DISCONTINUED | OUTPATIENT
Start: 2025-04-09 | End: 2025-04-15 | Stop reason: HOSPADM

## 2025-04-09 RX ORDER — HALOPERIDOL 5 MG/1
5 TABLET ORAL EVERY 6 HOURS PRN
Status: DISCONTINUED | OUTPATIENT
Start: 2025-04-09 | End: 2025-04-15 | Stop reason: HOSPADM

## 2025-04-09 RX ORDER — BENZTROPINE MESYLATE 1 MG/ML
2 INJECTION, SOLUTION INTRAMUSCULAR; INTRAVENOUS 2 TIMES DAILY PRN
Status: DISCONTINUED | OUTPATIENT
Start: 2025-04-09 | End: 2025-04-15 | Stop reason: HOSPADM

## 2025-04-09 RX ORDER — TRAZODONE HYDROCHLORIDE 50 MG/1
50 TABLET ORAL NIGHTLY PRN
Status: DISCONTINUED | OUTPATIENT
Start: 2025-04-09 | End: 2025-04-10

## 2025-04-09 RX ORDER — MAGNESIUM HYDROXIDE/ALUMINUM HYDROXICE/SIMETHICONE 120; 1200; 1200 MG/30ML; MG/30ML; MG/30ML
30 SUSPENSION ORAL PRN
Status: DISCONTINUED | OUTPATIENT
Start: 2025-04-09 | End: 2025-04-15 | Stop reason: HOSPADM

## 2025-04-09 RX ORDER — HYDROXYZINE PAMOATE 50 MG/1
50 CAPSULE ORAL EVERY 6 HOURS PRN
Status: DISCONTINUED | OUTPATIENT
Start: 2025-04-09 | End: 2025-04-15 | Stop reason: HOSPADM

## 2025-04-09 RX ORDER — HALOPERIDOL 5 MG/ML
5 INJECTION INTRAMUSCULAR EVERY 6 HOURS PRN
Status: DISCONTINUED | OUTPATIENT
Start: 2025-04-09 | End: 2025-04-15 | Stop reason: HOSPADM

## 2025-04-09 RX ORDER — ACETAMINOPHEN 325 MG/1
650 TABLET ORAL EVERY 4 HOURS PRN
Status: DISCONTINUED | OUTPATIENT
Start: 2025-04-09 | End: 2025-04-15 | Stop reason: HOSPADM

## 2025-04-09 RX ADMIN — NICOTINE POLACRILEX 2 MG: 2 LOZENGE ORAL at 21:54

## 2025-04-09 RX ADMIN — IBUPROFEN 600 MG: 600 TABLET, FILM COATED ORAL at 14:20

## 2025-04-09 ASSESSMENT — PAIN SCALES - GENERAL
PAINLEVEL_OUTOF10: 6
PAINLEVEL_OUTOF10: 6

## 2025-04-09 ASSESSMENT — PATIENT HEALTH QUESTIONNAIRE - PHQ9
SUM OF ALL RESPONSES TO PHQ QUESTIONS 1-9: 16
10. IF YOU CHECKED OFF ANY PROBLEMS, HOW DIFFICULT HAVE THESE PROBLEMS MADE IT FOR YOU TO DO YOUR WORK, TAKE CARE OF THINGS AT HOME, OR GET ALONG WITH OTHER PEOPLE: SOMEWHAT DIFFICULT
7. TROUBLE CONCENTRATING ON THINGS, SUCH AS READING THE NEWSPAPER OR WATCHING TELEVISION: NEARLY EVERY DAY
6. FEELING BAD ABOUT YOURSELF - OR THAT YOU ARE A FAILURE OR HAVE LET YOURSELF OR YOUR FAMILY DOWN: SEVERAL DAYS
SUM OF ALL RESPONSES TO PHQ QUESTIONS 1-9: 17
5. POOR APPETITE OR OVEREATING: MORE THAN HALF THE DAYS
1. LITTLE INTEREST OR PLEASURE IN DOING THINGS: MORE THAN HALF THE DAYS
SUM OF ALL RESPONSES TO PHQ QUESTIONS 1-9: 17
SUM OF ALL RESPONSES TO PHQ QUESTIONS 1-9: 17
3. TROUBLE FALLING OR STAYING ASLEEP: NEARLY EVERY DAY
2. FEELING DOWN, DEPRESSED OR HOPELESS: MORE THAN HALF THE DAYS
8. MOVING OR SPEAKING SO SLOWLY THAT OTHER PEOPLE COULD HAVE NOTICED. OR THE OPPOSITE, BEING SO FIGETY OR RESTLESS THAT YOU HAVE BEEN MOVING AROUND A LOT MORE THAN USUAL: NOT AT ALL
4. FEELING TIRED OR HAVING LITTLE ENERGY: NEARLY EVERY DAY
9. THOUGHTS THAT YOU WOULD BE BETTER OFF DEAD, OR OF HURTING YOURSELF: SEVERAL DAYS

## 2025-04-09 ASSESSMENT — SLEEP AND FATIGUE QUESTIONNAIRES
AVERAGE NUMBER OF SLEEP HOURS: 4
DO YOU HAVE DIFFICULTY SLEEPING: YES
DO YOU USE A SLEEP AID: YES
SLEEP PATTERN: DIFFICULTY FALLING ASLEEP;DISTURBED/INTERRUPTED SLEEP

## 2025-04-09 ASSESSMENT — PAIN DESCRIPTION - LOCATION
LOCATION: BACK
LOCATION: BACK;KNEE

## 2025-04-09 ASSESSMENT — PAIN DESCRIPTION - PAIN TYPE: TYPE: CHRONIC PAIN

## 2025-04-09 ASSESSMENT — PAIN DESCRIPTION - DESCRIPTORS: DESCRIPTORS: ACHING

## 2025-04-09 ASSESSMENT — PAIN - FUNCTIONAL ASSESSMENT: PAIN_FUNCTIONAL_ASSESSMENT: 0-10

## 2025-04-09 NOTE — ED NOTES
3W to call back with bed assignment.  
Patient changed into psych safe clothing.   Urine  obtained and sent to lab.   Skin and contraband check performed.   Contraband check negative at this time.   Lab called to draw blood.  
Patient resting quietly with eyes closed. Respirations are even and unlabored. No distress noted at this time.  
Patient resting quietly with eyes closed. Respirations are even and unlabored. No distress noted at this time.  
Pt updated on plan of care. Verbalized understanding.  
Report given to Pablo PERSAUD.  
Reviewed pt with Dr. Driver. Received order to admit to 3W.  
Tele psych at bedside.  
Products Recommended: JANNIE RICE of Melanoma bookmark provided
Detail Level: Detailed
General Sunscreen Counseling: I recommended a broad spectrum sunscreen with a SPF of 30 or higher when outdoors for more than 15 minutes.  Higher SPF of 70 or greater is recommended for intense or prolonged sun exposure. Sunscreens should be applied at least 15 minutes prior to expected sun exposure and then every 2 hours after that as long as sun exposure continues. If swimming or exercising sunscreen should be reapplied every 45 minutes to an hour after getting wet or sweating.  One ounce, or the equivalent of a shot glass full of sunscreen, is adequate to protect the skin not covered by a bathing suit. Sun protective clothing can be used in lieu of sunscreen but must be worn the entire time you are exposed to the sun's rays.
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
## 2025-04-09 NOTE — ED TRIAGE NOTES
Patient states is feeling a lot of triggers and stress lately.   States is having a lot of crying and not feeling safe.   Patient not taking her medications right now.

## 2025-04-10 PROCEDURE — 1240000000 HC EMOTIONAL WELLNESS R&B

## 2025-04-10 PROCEDURE — 6370000000 HC RX 637 (ALT 250 FOR IP): Performed by: PSYCHIATRY & NEUROLOGY

## 2025-04-10 PROCEDURE — GZHZZZZ GROUP PSYCHOTHERAPY: ICD-10-PCS | Performed by: PSYCHIATRY & NEUROLOGY

## 2025-04-10 PROCEDURE — 99223 1ST HOSP IP/OBS HIGH 75: CPT | Performed by: PSYCHIATRY & NEUROLOGY

## 2025-04-10 RX ORDER — IBUPROFEN 400 MG/1
400 TABLET, FILM COATED ORAL EVERY 6 HOURS PRN
Status: DISCONTINUED | OUTPATIENT
Start: 2025-04-10 | End: 2025-04-15 | Stop reason: HOSPADM

## 2025-04-10 RX ORDER — ZOLPIDEM TARTRATE 5 MG/1
5 TABLET ORAL NIGHTLY
Status: DISCONTINUED | OUTPATIENT
Start: 2025-04-10 | End: 2025-04-15 | Stop reason: HOSPADM

## 2025-04-10 RX ORDER — TOPIRAMATE 25 MG/1
50 TABLET, FILM COATED ORAL 2 TIMES DAILY
Status: DISCONTINUED | OUTPATIENT
Start: 2025-04-10 | End: 2025-04-15 | Stop reason: HOSPADM

## 2025-04-10 RX ADMIN — TOPIRAMATE 50 MG: 25 TABLET, FILM COATED ORAL at 11:16

## 2025-04-10 RX ADMIN — NICOTINE POLACRILEX 2 MG: 2 LOZENGE ORAL at 07:42

## 2025-04-10 RX ADMIN — IBUPROFEN 400 MG: 400 TABLET, FILM COATED ORAL at 11:16

## 2025-04-10 RX ADMIN — NICOTINE POLACRILEX 2 MG: 2 LOZENGE ORAL at 10:34

## 2025-04-10 RX ADMIN — ZOLPIDEM TARTRATE 5 MG: 5 TABLET ORAL at 20:59

## 2025-04-10 RX ADMIN — NICOTINE POLACRILEX 2 MG: 2 LOZENGE ORAL at 17:28

## 2025-04-10 RX ADMIN — TOPIRAMATE 50 MG: 25 TABLET, FILM COATED ORAL at 21:00

## 2025-04-10 RX ADMIN — NICOTINE POLACRILEX 2 MG: 2 LOZENGE ORAL at 13:55

## 2025-04-10 RX ADMIN — ACETAMINOPHEN 650 MG: 325 TABLET ORAL at 03:14

## 2025-04-10 ASSESSMENT — PAIN DESCRIPTION - LOCATION
LOCATION: HEAD
LOCATION: HEAD

## 2025-04-10 ASSESSMENT — PAIN - FUNCTIONAL ASSESSMENT: PAIN_FUNCTIONAL_ASSESSMENT: ACTIVITIES ARE NOT PREVENTED

## 2025-04-10 ASSESSMENT — PAIN DESCRIPTION - DESCRIPTORS
DESCRIPTORS: ACHING
DESCRIPTORS: ACHING

## 2025-04-10 ASSESSMENT — PAIN SCALES - GENERAL
PAINLEVEL_OUTOF10: 5
PAINLEVEL_OUTOF10: 7
PAINLEVEL_OUTOF10: 2

## 2025-04-10 ASSESSMENT — PAIN DESCRIPTION - ORIENTATION: ORIENTATION: MID

## 2025-04-10 ASSESSMENT — PAIN SCALES - WONG BAKER: WONGBAKER_NUMERICALRESPONSE: HURTS A LITTLE BIT
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
## 2025-04-10 NOTE — GROUP NOTE
Date: 4/10/2025    Group Start Time: 1205  Group End Time: 1300  Group Topic: Music Therapy    [facility] 3[facility] Louise Solomon    Live Music Group  Live Music Listening and Re-creative Instrument Playing/Singing    Patients will be given a songbook and offered the opportunity to select (a) song(s) of their choice for live music listening on [employer]. Patients will be encouraged to sing along, move along, and listen to the music.    Focus: Building Positive Experiences and Relaxation     Goals: Increase/Maintain Level of Socialization, Improve Mood, Improve/Maintain Self-Esteem, Improve/Maintain Use of Coping Skills, and Promote Reality Orientation      Patient selected songs for live music listening and confidently sang along to familiar music. Patient socialized with peers/staff throughout group and bonded over shared music interests. Patient explained her connections to certain songs and expressed that she benefited from the experience.     Attended: Full attendance  Participation Level: Active Listener and Interactive  Participation Quality: Attentive, Sharing, and Supportive  Affect/Mood: Congruent/Elevated  Speech: spontaneous, normal rate, and normal volume   Thought Content/Processes: Linear  Level of consciousness: Alert  Response: Able to verbalize current knowledge/experience  Outcomes: Successfully internalized the purpose of intervention and Actively participated in the group experience    Signature: Louise Dunaway, Licensed Professional Music Therapist (LPMT)

## 2025-04-10 NOTE — GROUP NOTE
Date: 4/10/2025    Group Start Time: 0915  Group End Time: 1015  Group Topic: Music Therapy    [facility] 3W Louise Solomon    What do you need today, and how can music help?  Playlist Building and Receptive Music Listening    Patients will identify different songs that fall under various prompts (support/validation, motivation, energy, relaxation). Patients will be invited to select a song that aligns with something they need today. Patients will be encouraged to explain their connection to the song and the experience of listening to it in this setting.    Focus: Emotion Identification/Regulation, Validation/Support  Goals: Increase/Maintain Level of Socialization, Improve Mood, Improve/Maintain Self-Expression, and Improve/Maintain Use of Coping Skills     Patient independently completed her personal playlist and chose to share a song under the prompt of support/validation. Patient sang along and explained how she has survived the \"highs and lows\" of her mental health / addiction, and is grateful she is making different choices today. Patient verbalized support for peers' contributions and encouraged peers to engage in group.     Attended: 3/4-full attendance  Participation Level: Active Listener and Interactive  Participation Quality: Attentive, Sharing, and Supportive  Affect/Mood: Congruent/Euthymic  Speech: spontaneous, normal rate, and normal volume   Thought Content/Processes: Linear  Level of consciousness: Alert  Response: Able to verbalize current knowledge/experience  Outcomes: Progressing towards goal, Actively participated in the group experience, and Initial interaction with patient    Signature: Louise Dunaway, Licensed Professional Music Therapist (LPMT)
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
## 2025-04-11 PROCEDURE — 6370000000 HC RX 637 (ALT 250 FOR IP): Performed by: PSYCHIATRY & NEUROLOGY

## 2025-04-11 PROCEDURE — 99232 SBSQ HOSP IP/OBS MODERATE 35: CPT | Performed by: PSYCHIATRY & NEUROLOGY

## 2025-04-11 PROCEDURE — 1240000000 HC EMOTIONAL WELLNESS R&B

## 2025-04-11 RX ADMIN — IBUPROFEN 400 MG: 400 TABLET, FILM COATED ORAL at 04:31

## 2025-04-11 RX ADMIN — NICOTINE POLACRILEX 2 MG: 2 LOZENGE ORAL at 15:11

## 2025-04-11 RX ADMIN — NICOTINE POLACRILEX 2 MG: 2 LOZENGE ORAL at 08:11

## 2025-04-11 RX ADMIN — NICOTINE POLACRILEX 2 MG: 2 LOZENGE ORAL at 21:15

## 2025-04-11 RX ADMIN — TOPIRAMATE 50 MG: 25 TABLET, FILM COATED ORAL at 08:28

## 2025-04-11 RX ADMIN — NICOTINE POLACRILEX 2 MG: 2 LOZENGE ORAL at 10:37

## 2025-04-11 RX ADMIN — ZOLPIDEM TARTRATE 5 MG: 5 TABLET ORAL at 21:15

## 2025-04-11 RX ADMIN — NICOTINE POLACRILEX 2 MG: 2 LOZENGE ORAL at 05:37

## 2025-04-11 RX ADMIN — TOPIRAMATE 50 MG: 25 TABLET, FILM COATED ORAL at 21:15

## 2025-04-11 RX ADMIN — IBUPROFEN 400 MG: 400 TABLET, FILM COATED ORAL at 21:15

## 2025-04-11 RX ADMIN — NICOTINE POLACRILEX 2 MG: 2 LOZENGE ORAL at 18:09

## 2025-04-11 ASSESSMENT — PAIN DESCRIPTION - LOCATION
LOCATION: GENERALIZED
LOCATION: HIP;KNEE;BACK

## 2025-04-11 ASSESSMENT — PAIN DESCRIPTION - ORIENTATION: ORIENTATION: LEFT;RIGHT

## 2025-04-11 ASSESSMENT — PAIN DESCRIPTION - DESCRIPTORS: DESCRIPTORS: ACHING

## 2025-04-11 ASSESSMENT — PAIN SCALES - GENERAL: PAINLEVEL_OUTOF10: 3

## 2025-04-11 NOTE — CARE COORDINATION
FAMILY COLLATERAL NOTE    Family/Support Name: Dean   Contact #: [phone]  Relationship to Pt::          Family/Support contact aware of hospitalization: Yes   Presenting Symptoms/Current Concerns:  Reports \" Grief and loss issues, emotional management issues, she acts grandiose, and has delusions and creates lies.\"     Top 3 Life Stressors:     Reports that she beings on others stress as her own.     Background History Relevant to Current Hospitalization:    Reports\" This has been on going for years, She is in and out of psychiatric units and [facility].      Family Mental Health/Substance Use History:     None     Support Network's Goal for Hospitalization:  Yes     Discharge Plan:  \"She can't live here\"  Agreeable to a family meeting to arranger some type of agreement on housing       Support Network Supportive of Discharge Plan:  NO      Support can confirm Safety of Location and Security of Weapons:    NONE     Support agreeable to Safeguard and Monitor Medications (including Prescription and OTC):     Identified Barriers to Compliance with Discharge Plan:  D/C Home F/U [place]     Recommendations for Support Network:         GIOVANNY Kaye

## 2025-04-11 NOTE — FLOWSHEET NOTE
Pt rates her anxiety 6/10 Depression 0/10 Pt states she tried to call home with no answer that is what caused her anxiety to go up. Pt attends groups, eats in the dinning room with her peers. Pt is social with select peers. Pt denies SI,HI,AVH. Will continue to monitor.

## 2025-04-11 NOTE — CARE COORDINATION
LSW arranged time for Saturday 04/12/2025 for a family meeting at Noon. Email sent to LSW BUCK Holman about meeting and time.  to call BUCK BALTAZAR to confirm time and meting.     Client has been advised of meeting and parameters that LSW presence is to medicate and amicable solution to active issues.

## 2025-04-11 NOTE — CARE COORDINATION
Client met with LSW to discuss a need fro a family meeting with her . Client wanted LSW to relay her desire for a divorce and a specific living arrangement. LSW advised client that a safe environment would be created for HER to have this conversation on the unit with her  and a  would be available to maintain safety.     Client gave verbal permission (MORENA) and phone number to call her  Dean and arrange a meeting.

## 2025-04-11 NOTE — GROUP NOTE
Group Therapy Note    Date: 4/11/2025    Group Start Time: 1015  Group End Time: 1100  Group Topic: Activity    [facility] 3W Kathrin Wilkes        Group Therapy Note    Attendees: 8       Patient's Goal:  To attend group    Notes:  Pt was attentive     Status After Intervention:  Unchanged    Participation Level: Interactive    Participation Quality: Attentive      Speech:  normal      Thought Process/Content: Logical      Affective Functioning: Congruent      Mood: euthymic      Level of consciousness:  Alert      Response to Learning: Able to verbalize current knowledge/experience      Endings: None Reported    Modes of Intervention: Activity      Discipline Responsible: Behavorial Health Tech      Signature:  Kathrin Mccord

## 2025-04-12 PROCEDURE — 1240000000 HC EMOTIONAL WELLNESS R&B

## 2025-04-12 PROCEDURE — 6370000000 HC RX 637 (ALT 250 FOR IP): Performed by: PSYCHIATRY & NEUROLOGY

## 2025-04-12 PROCEDURE — 99232 SBSQ HOSP IP/OBS MODERATE 35: CPT | Performed by: PSYCHIATRY & NEUROLOGY

## 2025-04-12 RX ORDER — CLONAZEPAM 1 MG/1
1 TABLET ORAL EVERY 12 HOURS PRN
Status: DISCONTINUED | OUTPATIENT
Start: 2025-04-12 | End: 2025-04-12

## 2025-04-12 RX ORDER — CLONAZEPAM 0.5 MG/1
0.5 TABLET ORAL EVERY 12 HOURS PRN
Status: DISCONTINUED | OUTPATIENT
Start: 2025-04-12 | End: 2025-04-15 | Stop reason: HOSPADM

## 2025-04-12 RX ADMIN — IBUPROFEN 400 MG: 400 TABLET, FILM COATED ORAL at 03:35

## 2025-04-12 RX ADMIN — NICOTINE POLACRILEX 2 MG: 2 LOZENGE ORAL at 14:06

## 2025-04-12 RX ADMIN — ZOLPIDEM TARTRATE 5 MG: 5 TABLET ORAL at 21:09

## 2025-04-12 RX ADMIN — NICOTINE POLACRILEX 2 MG: 2 LOZENGE ORAL at 16:59

## 2025-04-12 RX ADMIN — TOPIRAMATE 50 MG: 25 TABLET, FILM COATED ORAL at 11:29

## 2025-04-12 RX ADMIN — NICOTINE POLACRILEX 2 MG: 2 LOZENGE ORAL at 09:50

## 2025-04-12 RX ADMIN — CLONAZEPAM 0.5 MG: 0.5 TABLET ORAL at 09:50

## 2025-04-12 RX ADMIN — IBUPROFEN 400 MG: 400 TABLET, FILM COATED ORAL at 21:17

## 2025-04-12 RX ADMIN — NICOTINE POLACRILEX 2 MG: 2 LOZENGE ORAL at 05:45

## 2025-04-12 RX ADMIN — NICOTINE POLACRILEX 2 MG: 2 LOZENGE ORAL at 21:17

## 2025-04-12 RX ADMIN — NICOTINE POLACRILEX 2 MG: 2 LOZENGE ORAL at 12:15

## 2025-04-12 RX ADMIN — NICOTINE POLACRILEX 2 MG: 2 LOZENGE ORAL at 03:35

## 2025-04-12 RX ADMIN — TOPIRAMATE 50 MG: 25 TABLET, FILM COATED ORAL at 21:09

## 2025-04-12 ASSESSMENT — PAIN DESCRIPTION - DESCRIPTORS
DESCRIPTORS: ACHING
DESCRIPTORS: ACHING

## 2025-04-12 ASSESSMENT — PAIN SCALES - GENERAL
PAINLEVEL_OUTOF10: 7
PAINLEVEL_OUTOF10: 8

## 2025-04-12 ASSESSMENT — PAIN DESCRIPTION - ORIENTATION
ORIENTATION: LEFT;RIGHT
ORIENTATION: RIGHT;LEFT;LOWER

## 2025-04-12 ASSESSMENT — PAIN DESCRIPTION - LOCATION
LOCATION: BACK;HEAD
LOCATION: SHOULDER

## 2025-04-12 NOTE — GROUP NOTE
Group Therapy Note  Patient did not attend group.     Date: 4/12/2025    Group Start Time: 0930  Group End Time: 1000  Group Topic: Psychoeducation    [facility] 3[facility] Lesa Garrett    Group Therapy Note    End of week check-in     Description:   Patients will be given a paper to complete during this intervention. This template includes questions about patient's weeks: how were you feeling this week, what color would describe your week, etc. Patients will be encouraged to share their findings with peers/staff.     Goals:  Improve/Maintain Attention to Task, Improve/Maintain Identity Development, Improve/Maintain Insight / Self-Awareness, Improve Mood, Improve/Maintain Self-Esteem, Improve/Maintain Self-Expression, and Improve/Maintain Use of Coping Skills    Guess Who?     Description:   Patients will be directed to write \"something unique\" about themselves onto a piece of paper, and fold it. Patients will then be directed to choose one piece of paper, and will then take turns guessing who wrote that prompt.     Goals:   Improve/Maintain Attention to Task, Improve/Maintain Identity Development, Increase/Maintain Level of Socialization, Improve Mood, Improve/Maintain Self-Esteem, and Improve/Maintain Self-Expression           Patient did not attend group.     Signature: Lesa Abel, Psychoeducational Specialist

## 2025-04-12 NOTE — GROUP NOTE
Group Therapy Note  Patient did not attend group.       Date: 4/12/2025    Group Start Time: 1200  Group End Time: 1230  Group Topic: Psychoeducation    [facility] 3[facility] Lesa Garrett    Group Therapy Note    Dear Future Self    Description:   Patients will be handed a paper that has multiple directives on the front and back. The directives state to write down what they want to achieve overall in their future. The next directive entails writing 5 short term goals (1 month-1 year), and 5 long term goals (1 year+). The next directive is to write the things needed to accomplish these goals (I.e. material items, mindset/mentality, etc), and the final directive entails writing a positive message to their future self to stick with their plans.     Goals:   Improve/Maintain Attention to Task, Improve/Maintain Cognitive Skills, Improve/Maintain Identity Development, Improve/Maintain Insight / Self-Awareness, Improve/Maintain Self-Esteem, Improve/Maintain Self-Expression, and Improve/Maintain Use of Coping Skills       Patient did not attend group.     Signature: Lesa Abel, Psychoeducational Specialist

## 2025-04-13 PROCEDURE — 1240000000 HC EMOTIONAL WELLNESS R&B

## 2025-04-13 PROCEDURE — 6370000000 HC RX 637 (ALT 250 FOR IP): Performed by: PSYCHIATRY & NEUROLOGY

## 2025-04-13 PROCEDURE — 90833 PSYTX W PT W E/M 30 MIN: CPT | Performed by: PSYCHIATRY & NEUROLOGY

## 2025-04-13 PROCEDURE — 99232 SBSQ HOSP IP/OBS MODERATE 35: CPT | Performed by: PSYCHIATRY & NEUROLOGY

## 2025-04-13 RX ADMIN — TOPIRAMATE 50 MG: 25 TABLET, FILM COATED ORAL at 08:50

## 2025-04-13 RX ADMIN — IBUPROFEN 400 MG: 400 TABLET, FILM COATED ORAL at 11:50

## 2025-04-13 RX ADMIN — NICOTINE POLACRILEX 2 MG: 2 LOZENGE ORAL at 08:50

## 2025-04-13 RX ADMIN — NICOTINE POLACRILEX 2 MG: 2 LOZENGE ORAL at 05:55

## 2025-04-13 RX ADMIN — CLONAZEPAM 0.5 MG: 0.5 TABLET ORAL at 08:50

## 2025-04-13 RX ADMIN — NICOTINE POLACRILEX 2 MG: 2 LOZENGE ORAL at 14:16

## 2025-04-13 RX ADMIN — NICOTINE POLACRILEX 2 MG: 2 LOZENGE ORAL at 21:02

## 2025-04-13 RX ADMIN — NICOTINE POLACRILEX 2 MG: 2 LOZENGE ORAL at 18:08

## 2025-04-13 RX ADMIN — NICOTINE POLACRILEX 2 MG: 2 LOZENGE ORAL at 02:03

## 2025-04-13 RX ADMIN — ZOLPIDEM TARTRATE 5 MG: 5 TABLET ORAL at 21:18

## 2025-04-13 RX ADMIN — NICOTINE POLACRILEX 2 MG: 2 LOZENGE ORAL at 11:50

## 2025-04-13 RX ADMIN — TOPIRAMATE 50 MG: 25 TABLET, FILM COATED ORAL at 21:18

## 2025-04-13 ASSESSMENT — PAIN - FUNCTIONAL ASSESSMENT: PAIN_FUNCTIONAL_ASSESSMENT: ACTIVITIES ARE NOT PREVENTED

## 2025-04-13 ASSESSMENT — PAIN SCALES - GENERAL: PAINLEVEL_OUTOF10: 7

## 2025-04-13 ASSESSMENT — PAIN DESCRIPTION - LOCATION: LOCATION: HIP;NECK;SHOULDER

## 2025-04-13 ASSESSMENT — PAIN DESCRIPTION - ORIENTATION: ORIENTATION: RIGHT;LEFT

## 2025-04-13 ASSESSMENT — PAIN DESCRIPTION - DESCRIPTORS: DESCRIPTORS: ACHING;DISCOMFORT

## 2025-04-13 NOTE — CARE COORDINATION
Morning Community Meeting Topics    Kanchan Cam attended the morning community meeting on 4/13/25.    Topics discussed today     [x] Introduction  Day of the week and date  Mask distribution  Current mask requirements  [x]Teams  Explanation of  Green and Blue team criteria  Nurses assigned to each team for today  Explanation about green and blue paper  Date  Patient's Name  Patient's Nurse  Goals  [x] Visitation  Announce the visiting hours for the day  Announce which team is allowed to have visitors for the day  Review any updated Covid 19 requirements for visitors during visitation  Vaccine Card or negative Covid test within 48 hours of visit  State Identification  Patients are reminded to alert the  at least 1 hour before visitation   [x] Unit Orientation  Coffee use  Phone location and etiquette  Shower locations  Washer and dryer location and process  Common area expectations  Staff rounds expectation  [x] Meals   Educate patient to the menu  The patient is encouraged to fill out the menu to get preferences at mealtime  The patient is educated that if they do not fill out the menu, they will get the standard tray  The coffee pot is decaf, patient encouraged to order regular coffee from menu.  Educate patient to the meal process  Patient encouraged to eat snacks provided twice daily  Snacks may stay in patient room     [x] Discharge Process  Discharge expectations  Fill out the survey after discharge   [x] Hygiene  Daily showers encouraged  Showers availability discussed   Daily dressing encouraged  Discussed wearing street clothing  Education provided on where to place linens and clothing  Linens in the hamper  personal clothing does not go into the linen hamper  [x] Group   Patient encouraged to attend group provided  Time of Group Meetings discussed  Gentle reminder that attendance is a Physician order  [x] Movement  Chair exercises completed  Stretching completed  Notes:   Patient participated in

## 2025-04-13 NOTE — GROUP NOTE
Group Therapy Note    Date: 4/12/2025    Group Start Time: 2000  Group End Time: 2035  Group Topic: Recreational    [facility] 3[facility] Roz Faustin        Group Therapy Note    Attendees: 7/22     Notes:  Pt attended today's rec group.     Discipline Responsible: Behavorial Health Tech      Signature:  Roz Chávez

## 2025-04-13 NOTE — GROUP NOTE
Group Therapy Note    Date: 4/13/2025    Group Start Time: 1000  Group End Time: 1050  Group Topic: Art Therapy     [facility]ANTH[facility] 3W Felisa Veronica, MARCIOW        Group Therapy Note    Attendees: 8       Patient's Goal:  To participate in morning group art therapy.     Notes:  Patient attended briefly and left prematurely.     Modes of Intervention: Activity      Discipline Responsible: Psychoeducational Specialist      Signature:  Felisa Wallace MA ATR LPAT

## 2025-04-13 NOTE — GROUP NOTE
Group Therapy Note    Date: 4/13/2025    Group Start Time: 1630  Group End Time: 1750  Group Topic: Recreational    [facility] 3[facility] Roz Faustin        Group Therapy Note    Attendees: 11/21    Notes:  Pt attended today's group. Pt was interactive and supportive of peers throughout today's session.     Discipline Responsible: Behavorial Health Tech      Signature:  Roz Chávez
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
## 2025-04-13 NOTE — FLOWSHEET NOTE
Pt rates her anxiety 10/10 Depression 3/10 Pt stated her meeting today went badly that is what caused her anxiety to go up. Pt attends groups, eats in the dinning room with her peers. Pt is social with select peers. Pt denies SI,HI,AVH. Will continue to monitor.

## 2025-04-13 NOTE — GROUP NOTE
Group Therapy Note    Date: 4/12/2025    Group Start Time: 1945  Group End Time: 2000  Group Topic: Wrap-Up    [facility] 3W Roz Faustin        Group Therapy Note    /Attendees: 7/22       Patient's Goal:  \"To go home.\"    Notes:  Pt attended tonight's wrap up group.    Discipline Responsible: Behavorial Health Tech      Signature:  Roz Chávez

## 2025-04-14 PROCEDURE — 6370000000 HC RX 637 (ALT 250 FOR IP): Performed by: PSYCHIATRY & NEUROLOGY

## 2025-04-14 PROCEDURE — 1240000000 HC EMOTIONAL WELLNESS R&B

## 2025-04-14 PROCEDURE — 99232 SBSQ HOSP IP/OBS MODERATE 35: CPT | Performed by: PSYCHIATRY & NEUROLOGY

## 2025-04-14 RX ADMIN — NICOTINE POLACRILEX 2 MG: 2 LOZENGE ORAL at 16:53

## 2025-04-14 RX ADMIN — NICOTINE POLACRILEX 2 MG: 2 LOZENGE ORAL at 04:23

## 2025-04-14 RX ADMIN — NICOTINE POLACRILEX 2 MG: 2 LOZENGE ORAL at 11:45

## 2025-04-14 RX ADMIN — ZOLPIDEM TARTRATE 5 MG: 5 TABLET ORAL at 21:55

## 2025-04-14 RX ADMIN — CLONAZEPAM 0.5 MG: 0.5 TABLET ORAL at 05:01

## 2025-04-14 RX ADMIN — NICOTINE POLACRILEX 2 MG: 2 LOZENGE ORAL at 07:43

## 2025-04-14 RX ADMIN — TOPIRAMATE 50 MG: 25 TABLET, FILM COATED ORAL at 08:23

## 2025-04-14 RX ADMIN — IBUPROFEN 400 MG: 400 TABLET, FILM COATED ORAL at 21:55

## 2025-04-14 RX ADMIN — CLONAZEPAM 0.5 MG: 0.5 TABLET ORAL at 17:26

## 2025-04-14 RX ADMIN — NICOTINE POLACRILEX 2 MG: 2 LOZENGE ORAL at 19:53

## 2025-04-14 RX ADMIN — TOPIRAMATE 50 MG: 25 TABLET, FILM COATED ORAL at 21:54

## 2025-04-14 RX ADMIN — NICOTINE POLACRILEX 2 MG: 2 LOZENGE ORAL at 14:20

## 2025-04-14 RX ADMIN — NICOTINE POLACRILEX 2 MG: 2 LOZENGE ORAL at 21:55

## 2025-04-14 ASSESSMENT — PAIN DESCRIPTION - PAIN TYPE: TYPE: CHRONIC PAIN

## 2025-04-14 ASSESSMENT — PAIN DESCRIPTION - DESCRIPTORS
DESCRIPTORS: ACHING
DESCRIPTORS: ACHING

## 2025-04-14 ASSESSMENT — PAIN DESCRIPTION - ORIENTATION: ORIENTATION: LOWER

## 2025-04-14 ASSESSMENT — PAIN SCALES - GENERAL
PAINLEVEL_OUTOF10: 4
PAINLEVEL_OUTOF10: 7

## 2025-04-14 ASSESSMENT — PAIN DESCRIPTION - LOCATION
LOCATION: BACK
LOCATION: BACK

## 2025-04-14 NOTE — GROUP NOTE
Group Therapy Note    Date: 4/14/2025    Group Start Time: 1000  Group End Time: 1100  Group Topic: Art Therapy     SAMANTHA 3W Felisa Veronica, MARCIOW        Group Therapy Note    Attendees: 7       Patient's Goal:  To participate in morning group art therapy.    Notes:  Patient attended briefly towards the end of the session.     Status After Intervention:  Improved    Participation Level: Active Listener    Participation Quality: Appropriate      Speech:  normal      Thought Process/Content: Logical      Affective Functioning: Congruent      Mood: elevated      Level of consciousness:  Alert      Response to Learning: Progressing to goal      Endings: None Reported    Modes of Intervention: Activity      Discipline Responsible: Psychoeducational Specialist      Signature:  Felisa Wallace MA ATR LPAT
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
## 2025-04-14 NOTE — GROUP NOTE
Group Therapy Note    Date: 4/13/2025    Group Start Time: 1945  Group End Time: 2125  Group Topic: Wrap-Up    [facility] 3W Roz Faustin    Patients engaged in group karaoke night.     Group Therapy Note    Attendees: 11/21     Notes:  Pt attended shanae's group.     Discipline Responsible: Behavorial Health Tech      Signature:  Roz Chávez
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
## 2025-04-15 VITALS
DIASTOLIC BLOOD PRESSURE: 83 MMHG | BODY MASS INDEX: 29.59 KG/M2 | RESPIRATION RATE: 18 BRPM | HEART RATE: 87 BPM | TEMPERATURE: 98.2 F | SYSTOLIC BLOOD PRESSURE: 121 MMHG | OXYGEN SATURATION: 100 % | WEIGHT: 177.6 LBS | HEIGHT: 65 IN

## 2025-04-15 PROCEDURE — 99239 HOSP IP/OBS DSCHRG MGMT >30: CPT | Performed by: PSYCHIATRY & NEUROLOGY

## 2025-04-15 PROCEDURE — 6370000000 HC RX 637 (ALT 250 FOR IP): Performed by: PSYCHIATRY & NEUROLOGY

## 2025-04-15 RX ORDER — CLONAZEPAM 0.5 MG/1
0.5 TABLET ORAL EVERY 12 HOURS PRN
COMMUNITY
Start: 2025-04-15

## 2025-04-15 RX ORDER — TOPIRAMATE 50 MG/1
50 TABLET, FILM COATED ORAL 2 TIMES DAILY
Qty: 30 TABLET | Refills: 3 | Status: SHIPPED | OUTPATIENT
Start: 2025-04-15

## 2025-04-15 RX ORDER — ZOLPIDEM TARTRATE 5 MG/1
5 TABLET ORAL NIGHTLY
Qty: 14 TABLET | Refills: 1 | Status: SHIPPED | OUTPATIENT
Start: 2025-04-15 | End: 2025-05-13

## 2025-04-15 RX ADMIN — CLONAZEPAM 0.5 MG: 0.5 TABLET ORAL at 08:34

## 2025-04-15 RX ADMIN — NICOTINE POLACRILEX 2 MG: 2 LOZENGE ORAL at 08:34

## 2025-04-15 RX ADMIN — NICOTINE POLACRILEX 2 MG: 2 LOZENGE ORAL at 05:58

## 2025-04-15 RX ADMIN — TOPIRAMATE 50 MG: 25 TABLET, FILM COATED ORAL at 08:34

## 2025-04-15 RX ADMIN — IBUPROFEN 400 MG: 400 TABLET, FILM COATED ORAL at 08:34

## 2025-04-15 RX ADMIN — NICOTINE POLACRILEX 2 MG: 2 LOZENGE ORAL at 03:49

## 2025-04-15 ASSESSMENT — PAIN DESCRIPTION - ORIENTATION: ORIENTATION: LOWER

## 2025-04-15 ASSESSMENT — PAIN DESCRIPTION - DESCRIPTORS: DESCRIPTORS: ACHING

## 2025-04-15 ASSESSMENT — PAIN DESCRIPTION - LOCATION: LOCATION: BACK

## 2025-04-15 ASSESSMENT — PAIN SCALES - GENERAL: PAINLEVEL_OUTOF10: 7

## 2025-04-15 NOTE — DISCHARGE INSTRUCTIONS
Someone from [facility] will be calling you tomorrow to follow up on your care. If you don't hear from us, give us a call! [phone].    Keep all follow up appointments, take medications as ordered, utilize positive supports, abstain from use of alcohol and drugs. If symptoms return or you feel at risk to yourself or others, please call 911, return the nearest emergency room, or call your local crisis hotline:  [facility]: [phone]  [facility]: [phone]  [facility]: [phone]    Due to the Covid-19 Pandemic, [facility] is not currently available. For assistance with quitting smoking please go to https://smokefree.gov. A prescription for an FDA-approved tobacco cessation medication was offered at discharge and the patient refused.    You were offered the flu vaccine during your hospital stay and you declined it
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
## 2025-04-15 NOTE — GROUP NOTE
Group Therapy Note    Date: 4/14/2025    Group Start Time: 1935  Group End Time: 1955  Group Topic: Activity    AWILDA 3W Carissa Cruz        Group Therapy Note    Attendees: 8/20         Patient's Goal:  Participate in Wii Bowling / Wii Baseball.    Notes:  Patient actively participated.    Status After Intervention:  Improved    Participation Level: Interactive    Participation Quality: Appropriate, Attentive, and Sharing      Speech:  normal      Thought Process/Content: Logical      Affective Functioning: Congruent      Mood: euthymic      Level of consciousness:  Alert and Attentive      Response to Learning: Progressing to goal      Endings: None Reported    Modes of Intervention: Activity      Discipline Responsible: Behavorial Health Tech      Signature:  Carissa Hatch

## 2025-04-15 NOTE — GROUP NOTE
Group Therapy Note    Date: 4/14/2025    Group Start Time: 1955  Group End Time: 2005  Group Topic: Wrap-Up    ML[facility] 3W Carissa Cruz        Group Therapy Note    Attendees: 8/20     Patient's Goal:  Share one positive thing about patient's day.    Notes:  Patient shared many positive things about her day, including: having a civil conversation with her , having great meals, no depression, enjoying groups, and overall having a great day.

## 2025-04-15 NOTE — PLAN OF CARE
Problem: Pain  Goal: Verbalizes/displays adequate comfort level or baseline comfort level  4/13/2025 0914 by Lynda Rivera RN  Outcome: Progressing  4/13/2025 0047 by Keyana Das RN  Outcome: Progressing     Problem: Chronic Conditions and Co-morbidities  Goal: Patient's chronic conditions and co-morbidity symptoms are monitored and maintained or improved  4/13/2025 0914 by Lynda Rivera RN  Outcome: Progressing  4/13/2025 0047 by Keyana Das RN  Outcome: Progressing     Problem: Risk for Elopement  Goal: Patient will not exit the unit/facility without proper excort  4/13/2025 0914 by Lynda Rivera RN  Outcome: Progressing  4/13/2025 0047 by Keyana Das RN  Outcome: Progressing     Problem: Anxiety  Goal: Will report anxiety at manageable levels  Description: INTERVENTIONS:  1. Administer medication as ordered  2. Teach and rehearse alternative coping skills  3. Provide emotional support with 1:1 interaction with staff  4/13/2025 0914 by Lynda Rivera RN  Outcome: Progressing  4/13/2025 0047 by Keyana Das RN  Outcome: Progressing     Problem: Coping  Goal: Pt/Family able to verbalize concerns and demonstrate effective coping strategies  Description: INTERVENTIONS:  1. Assist patient/family to identify coping skills, available support systems and cultural and spiritual values  2. Provide emotional support, including active listening and acknowledgement of concerns of patient and caregivers  3. Reduce environmental stimuli, as able  4. Instruct patient/family in relaxation techniques, as appropriate  5. Assess for spiritual pain/suffering and initiate Spiritual Care, Psychosocial Clinical Specialist consults as needed  4/13/2025 0914 by Lynda Rivera RN  Outcome: Progressing  4/13/2025 0047 by Keyana Das RN  Outcome: Progressing     Problem: Behavior  Goal: Pt/Family maintain appropriate behavior and adhere to behavioral management agreement, if implemented  Description: INTERVENTIONS:  1. Assess 
  Problem: Pain  Goal: Verbalizes/displays adequate comfort level or baseline comfort level  4/13/2025 2119 by Keyana Das RN  Outcome: Progressing  4/13/2025 0914 by Lynda Rivera RN  Outcome: Progressing     Problem: Chronic Conditions and Co-morbidities  Goal: Patient's chronic conditions and co-morbidity symptoms are monitored and maintained or improved  4/13/2025 2119 by Keyana Das RN  Outcome: Progressing  4/13/2025 0914 by Lynda Rivera RN  Outcome: Progressing     Problem: Risk for Elopement  Goal: Patient will not exit the unit/facility without proper excort  4/13/2025 2119 by Keyana Das RN  Outcome: Progressing  4/13/2025 0914 by Lynda Rivera RN  Outcome: Progressing     Problem: Anxiety  Goal: Will report anxiety at manageable levels  4/13/2025 2119 by Keyana Das RN  Outcome: Progressing  4/13/2025 0914 by Lynda Rivera RN  Outcome: Progressing     Problem: Coping  Goal: Pt/Family able to verbalize concerns and demonstrate effective coping strategies  4/13/2025 2119 by Keyana Das RN  Outcome: Progressing  4/13/2025 0914 by Lynda Rivera RN  Outcome: Progressing     Problem: Behavior  Goal: Pt/Family maintain appropriate behavior and adhere to behavioral management agreement, if implemented  4/13/2025 2119 by Keyana Das RN  Outcome: Progressing  4/13/2025 0914 by Lynda Rivera RN  Outcome: Progressing     Problem: Depression/Self Harm  Goal: Effect of psychiatric condition will be minimized and patient will be protected from self harm  4/13/2025 2119 by Keyana Das RN  Outcome: Progressing  4/13/2025 0914 by Lynda Rivera RN  Outcome: Progressing     Problem: Discharge Planning  Goal: Discharge to home or other facility with appropriate resources  4/13/2025 2119 by Keyana Das RN  Outcome: Progressing  4/13/2025 0914 by Lynda Rivera RN  Outcome: Progressing     Problem: Safety - Adult  Goal: Free from fall injury  4/13/2025 2119 by Keyana Das RN  Outcome: Progressing  4/13/2025 
  Problem: Pain  Goal: Verbalizes/displays adequate comfort level or baseline comfort level  4/14/2025 1007 by Elisa Castro RN  Outcome: Progressing  4/13/2025 2119 by Keyana Das RN  Outcome: Progressing     Problem: Chronic Conditions and Co-morbidities  Goal: Patient's chronic conditions and co-morbidity symptoms are monitored and maintained or improved  4/14/2025 1007 by Elisa Castro RN  Outcome: Progressing  4/13/2025 2119 by Keyana Das RN  Outcome: Progressing     Problem: Risk for Elopement  Goal: Patient will not exit the unit/facility without proper excort  4/14/2025 1007 by Elisa Castro RN  Outcome: Progressing  Flowsheets (Taken 4/14/2025 1002)  Nursing Interventions for Elopement Risk:   Make sure patient has all necessary personal care items   Reduce environmental triggers  4/13/2025 2119 by Keyana Das RN  Outcome: Progressing     Problem: Anxiety  Goal: Will report anxiety at manageable levels  Description: INTERVENTIONS:  1. Administer medication as ordered  2. Teach and rehearse alternative coping skills  3. Provide emotional support with 1:1 interaction with staff  4/14/2025 1007 by Elisa Castro RN  Outcome: Progressing  Flowsheets (Taken 4/14/2025 1002)  Will report anxiety at manageable levels:   Administer medication as ordered   Teach and rehearse alternative coping skills  4/13/2025 2119 by Keyana Das RN  Outcome: Progressing     Problem: Coping  Goal: Pt/Family able to verbalize concerns and demonstrate effective coping strategies  Description: INTERVENTIONS:  1. Assist patient/family to identify coping skills, available support systems and cultural and spiritual values  2. Provide emotional support, including active listening and acknowledgement of concerns of patient and caregivers  3. Reduce environmental stimuli, as able  4. Instruct patient/family in relaxation techniques, as appropriate  5. Assess for spiritual pain/suffering and initiate Spiritual Care, Psychosocial Clinical 
  Problem: Pain  Goal: Verbalizes/displays adequate comfort level or baseline comfort level  4/14/2025 1007 by Elisa Castro RN  Outcome: Progressing  4/13/2025 2119 by Keyana Das RN  Outcome: Progressing     Problem: Chronic Conditions and Co-morbidities  Goal: Patient's chronic conditions and co-morbidity symptoms are monitored and maintained or improved  4/14/2025 1007 by Elisa Castro RN  Outcome: Progressing  4/13/2025 2119 by Keyana Das RN  Outcome: Progressing     Problem: Risk for Elopement  Goal: Patient will not exit the unit/facility without proper excort  4/14/2025 1007 by Elisa Castro RN  Outcome: Progressing  Flowsheets (Taken 4/14/2025 1002)  Nursing Interventions for Elopement Risk:   Make sure patient has all necessary personal care items   Reduce environmental triggers  4/13/2025 2119 by Keyana Das RN  Outcome: Progressing     Problem: Anxiety  Goal: Will report anxiety at manageable levels  Description: INTERVENTIONS:  1. Administer medication as ordered  2. Teach and rehearse alternative coping skills  3. Provide emotional support with 1:1 interaction with staff  4/14/2025 1007 by Elisa Castro RN  Outcome: Progressing  Flowsheets (Taken 4/14/2025 1002)  Will report anxiety at manageable levels:   Administer medication as ordered   Teach and rehearse alternative coping skills  4/13/2025 2119 by Keyana Das RN  Outcome: Progressing     Problem: Coping  Goal: Pt/Family able to verbalize concerns and demonstrate effective coping strategies  Description: INTERVENTIONS:  1. Assist patient/family to identify coping skills, available support systems and cultural and spiritual values  2. Provide emotional support, including active listening and acknowledgement of concerns of patient and caregivers  3. Reduce environmental stimuli, as able  4. Instruct patient/family in relaxation techniques, as appropriate  5. Assess for spiritual pain/suffering and initiate Spiritual Care, Psychosocial Clinical 
  Problem: Pain  Goal: Verbalizes/displays adequate comfort level or baseline comfort level  Outcome: Progressing     Problem: Chronic Conditions and Co-morbidities  Goal: Patient's chronic conditions and co-morbidity symptoms are monitored and maintained or improved  Outcome: Progressing     Problem: Risk for Elopement  Goal: Patient will not exit the unit/facility without proper excort  Outcome: Progressing  Flowsheets (Taken 4/12/2025 1221)  Nursing Interventions for Elopement Risk:   Make sure patient has all necessary personal care items   Reduce environmental triggers     Problem: Anxiety  Goal: Will report anxiety at manageable levels  Description: INTERVENTIONS:  1. Administer medication as ordered  2. Teach and rehearse alternative coping skills  3. Provide emotional support with 1:1 interaction with staff  Outcome: Progressing  Flowsheets (Taken 4/12/2025 1221)  Will report anxiety at manageable levels:   Administer medication as ordered   Teach and rehearse alternative coping skills   Provide emotional support with 1:1 interaction with staff     Problem: Coping  Goal: Pt/Family able to verbalize concerns and demonstrate effective coping strategies  Description: INTERVENTIONS:  1. Assist patient/family to identify coping skills, available support systems and cultural and spiritual values  2. Provide emotional support, including active listening and acknowledgement of concerns of patient and caregivers  3. Reduce environmental stimuli, as able  4. Instruct patient/family in relaxation techniques, as appropriate  5. Assess for spiritual pain/suffering and initiate Spiritual Care, Psychosocial Clinical Specialist consults as needed  Outcome: Progressing  Flowsheets (Taken 4/12/2025 1221)  Patient/family able to verbalize anxieties, fears, and concerns, and demonstrate effective coping: Reduce environmental stimuli, as able     Problem: Behavior  Goal: Pt/Family maintain appropriate behavior and adhere to 
Lability noted. Pt's mood fluctuates  rapidly from pleasant and calm to angry and demanding. Pt appears preoccupied with medications. She states she came to the unit to get back on a regimen. Pt was offered PRN medication but refuses stating: Trazodone gives her nightmares, Vistaril makes her dizzy, and Haldol is \"booty juice\" that she is not taking. Pt also states Xanax causes her to feel very depressed. Pt rates anxiety 10/10 with 10 being the worst. States depression fluctuates up and down and cannot rate. Racing thoughts and impaired concentration. Denies SI/HI/AVH. Pt showered. She is eager to see the doctor to discuss medications that will work for her.     Problem: Pain  Goal: Verbalizes/displays adequate comfort level or baseline comfort level  4/9/2025 2250 by Elizabet Bullard RN  Outcome: Progressing  4/9/2025 2221 by Elizabet Bullard RN  Outcome: Progressing  4/9/2025 2221 by Elizabet Bullard RN  Outcome: Progressing     Problem: Chronic Conditions and Co-morbidities  Goal: Patient's chronic conditions and co-morbidity symptoms are monitored and maintained or improved  4/9/2025 2250 by Elizabet Bullard RN  Outcome: Progressing  4/9/2025 2221 by Elizabet Bullard RN  Outcome: Progressing  4/9/2025 2221 by Elizabet Bullard RN  Outcome: Progressing     Problem: Risk for Elopement  Goal: Patient will not exit the unit/facility without proper excort  4/9/2025 2250 by Elizabet Bullard RN  Outcome: Progressing  4/9/2025 2221 by Elizabet Bullard RN  Outcome: Progressing  4/9/2025 2221 by Elizabet Bullard RN  Outcome: Progressing  Flowsheets (Taken 4/9/2025 1756 by Pablo Sheikh, RN)  Nursing Interventions for Elopement Risk:   Make sure patient has all necessary personal care items   Communicate to physician the risk for elopement   Communicate/escalate to charge nurse the risk of elopement   Reduce environmental triggers     Problem: Anxiety  Goal: Will report anxiety at manageable 
Visible on the unit, social with staff and peers. Walks with strong and steady gait. Denies SI, HI, AVH. Rates anxiety 10/10 and reports having \"manageable\" panic attacks throughout the day r/t no longer having klonopin. Denies depression. Remains cooperative with an irritable edge. Reports good sleep and appetite. Appears well-groomed, showered. Denies further needs at this time.           Problem: Pain  Goal: Verbalizes/displays adequate comfort level or baseline comfort level  4/11/2025 2128 by Diann Jorge RN  Outcome: Progressing  4/11/2025 0954 by Rosmery Jack RN  Outcome: Progressing  Flowsheets (Taken 4/11/2025 0954)  Verbalizes/displays adequate comfort level or baseline comfort level:   Encourage patient to monitor pain and request assistance   Assess pain using appropriate pain scale   Administer analgesics based on type and severity of pain and evaluate response     Problem: Chronic Conditions and Co-morbidities  Goal: Patient's chronic conditions and co-morbidity symptoms are monitored and maintained or improved  4/11/2025 2128 by Diann Jorge RN  Outcome: Progressing  4/11/2025 0954 by Rosmery Jack RN  Outcome: Progressing  Flowsheets (Taken 4/11/2025 0954)  Care Plan - Patient's Chronic Conditions and Co-Morbidity Symptoms are Monitored and Maintained or Improved: Monitor and assess patient's chronic conditions and comorbid symptoms for stability, deterioration, or improvement     Problem: Risk for Elopement  Goal: Patient will not exit the unit/facility without proper excort  4/11/2025 2128 by Diann Jorge RN  Outcome: Progressing  4/11/2025 0954 by Rosmery Jack RN  Outcome: Progressing  Flowsheets  Taken 4/11/2025 0954  Nursing Interventions for Elopement Risk:   Collaborate with treatment team for nicotine replacement   Make sure patient has all necessary personal care items   Reduce environmental triggers  Taken 4/11/2025 0949  Nursing Interventions for Elopement 
1223 by Lynda Rivera, RN  Outcome: Progressing     
effective coping strategies  Description: INTERVENTIONS:  1. Assist patient/family to identify coping skills, available support systems and cultural and spiritual values  2. Provide emotional support, including active listening and acknowledgement of concerns of patient and caregivers  3. Reduce environmental stimuli, as able  4. Instruct patient/family in relaxation techniques, as appropriate  5. Assess for spiritual pain/suffering and initiate Spiritual Care, Psychosocial Clinical Specialist consults as needed  4/14/2025 2142 by Elizabet Bullard, RN  Outcome: Progressing  4/14/2025 1007 by Elisa Castro RN  Outcome: Progressing  Flowsheets (Taken 4/14/2025 1002)  Patient/family able to verbalize anxieties, fears, and concerns, and demonstrate effective coping:   Reduce environmental stimuli, as able   Provide emotional support, including active listening and acknowledgement of concerns of patient and caregivers     Problem: Behavior  Goal: Pt/Family maintain appropriate behavior and adhere to behavioral management agreement, if implemented  Description: INTERVENTIONS:  1. Assess patient/family's coping skills and  non-compliant behavior (including use of illegal substances)  2. Notify security of behavior or suspected illegal substances which indicate the need for search of the family and/or belongings  3. Encourage verbalization of thoughts and concerns in a socially appropriate manner  4. Utilize positive, consistent limit setting strategies supporting safety of patient, staff and others  5. Encourage participation in the decision making process about the behavioral management agreement  6. If a visitor's behavior poses a threat to safety call refer to organization policy.  7. Initiate consult with , Psychosocial CNS, Spiritual Care as appropriate  4/14/2025 2142 by Elizabet Bullard, RN  Outcome: Progressing  4/14/2025 1007 by Elisa Castro, RN  Outcome: Progressing  Flowsheets (Taken 4/14/2025 
illegal substances)  2. Notify security of behavior or suspected illegal substances which indicate the need for search of the family and/or belongings  3. Encourage verbalization of thoughts and concerns in a socially appropriate manner  4. Utilize positive, consistent limit setting strategies supporting safety of patient, staff and others  5. Encourage participation in the decision making process about the behavioral management agreement  6. If a visitor's behavior poses a threat to safety call refer to organization policy.  7. Initiate consult with , Psychosocial CNS, Spiritual Care as appropriate  Outcome: Progressing  Flowsheets (Taken 4/11/2025 0975)  Patient/family maintains appropriate behavior and adheres to behavioral management agreement, if implemented:   Assess patient/family’s coping skills and  non-compliant behavior (including use of illegal substances)   Notify security of behavior or suspected illegal substances which indicate the need for search of the patient and/or belongings     Problem: Depression/Self Harm  Goal: Effect of psychiatric condition will be minimized and patient will be protected from self harm  Description: INTERVENTIONS:  1. Assess impact of patient's symptoms on level of functioning, self care needs and offer support as indicated  2. Assess patient/family knowledge of depression, impact on illness and need for teaching  3. Provide emotional support, presence and reassurance  4. Assess for possible suicidal thoughts or ideation. If patient expresses suicidal thoughts or statements do not leave alone, initiate Suicide Precautions, move to a room close to the nursing station and obtain sitter  5. Initiate consults as appropriate with Mental Health Professional, Spiritual Care, Psychosocial CNS, and consider a recommendation to the [facility] for a Psychiatric Consultation  Outcome: Progressing  Flowsheets  Taken 4/11/2025 0959  Effect of psychiatric condition will be
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
## 2025-04-15 NOTE — DISCHARGE INSTR - DIET
Good nutrition is important when healing from an illness, injury, or surgery.  Follow any nutrition recommendations given to you during your hospital stay.   If you were given an oral nutrition supplement while in the hospital, continue to take this supplement at home.  You can take it with meals, in-between meals, and/or before bedtime. These supplements can be purchased at most local grocery stores, pharmacies, and chain [employer]-stores.   If you have any questions about your diet or nutrition, call the hospital and ask for the dietitian.  As tolerated

## 2025-04-23 ENCOUNTER — OFFICE VISIT (OUTPATIENT)
Age: 45
End: 2025-04-23
Payer: MEDICAID

## 2025-04-23 VITALS
BODY MASS INDEX: 30.32 KG/M2 | DIASTOLIC BLOOD PRESSURE: 70 MMHG | SYSTOLIC BLOOD PRESSURE: 120 MMHG | WEIGHT: 182 LBS | OXYGEN SATURATION: 98 % | HEART RATE: 86 BPM | TEMPERATURE: 98 F | HEIGHT: 65 IN

## 2025-04-23 DIAGNOSIS — F31.9 BIPOLAR 1 DISORDER, DEPRESSED (HCC): Primary | ICD-10-CM

## 2025-04-23 DIAGNOSIS — Z09 HOSPITAL DISCHARGE FOLLOW-UP: ICD-10-CM

## 2025-04-23 PROCEDURE — G8417 CALC BMI ABV UP PARAM F/U: HCPCS | Performed by: NURSE PRACTITIONER

## 2025-04-23 PROCEDURE — 99213 OFFICE O/P EST LOW 20 MIN: CPT | Performed by: NURSE PRACTITIONER

## 2025-04-23 PROCEDURE — G8427 DOCREV CUR MEDS BY ELIG CLIN: HCPCS | Performed by: NURSE PRACTITIONER

## 2025-04-23 PROCEDURE — 1036F TOBACCO NON-USER: CPT | Performed by: NURSE PRACTITIONER

## 2025-04-23 PROCEDURE — 1111F DSCHRG MED/CURRENT MED MERGE: CPT | Performed by: NURSE PRACTITIONER

## 2025-04-23 PROCEDURE — 99214 OFFICE O/P EST MOD 30 MIN: CPT | Performed by: NURSE PRACTITIONER

## 2025-04-23 SDOH — ECONOMIC STABILITY: FOOD INSECURITY: WITHIN THE PAST 12 MONTHS, THE FOOD YOU BOUGHT JUST DIDN'T LAST AND YOU DIDN'T HAVE MONEY TO GET MORE.: NEVER TRUE

## 2025-04-23 SDOH — ECONOMIC STABILITY: FOOD INSECURITY: WITHIN THE PAST 12 MONTHS, YOU WORRIED THAT YOUR FOOD WOULD RUN OUT BEFORE YOU GOT MONEY TO BUY MORE.: NEVER TRUE

## 2025-04-23 ASSESSMENT — ENCOUNTER SYMPTOMS
SHORTNESS OF BREATH: 0
WHEEZING: 0
COUGH: 0
RESPIRATORY NEGATIVE: 1
BLOOD IN STOOL: 0

## 2025-04-23 NOTE — PROGRESS NOTES
[facility]  [facility]  [address]  [place] [address]  Dept: [phone]  Dept Fax: [phone]  Loc: [phone]       Kanchan Cam   YOB: 1980    Date of Office Visit:  4/23/2025  Follow up from: [facility]  Date of Hospital Admission: 4/9/25  Date of Hospital Discharge: 4/15/25      Patient Active Problem List   Diagnosis    Chronic cholecystitis with calculus    History of drug abuse    Spondylosis of lumbar region without myelopathy or radiculopathy    Bipolar disorder, current episode depressed, moderate (HCC)    Bipolar 1 disorder, depressed (HCC)    Diarrhea    Bipolar disorder with depression (HCC)    Closed fracture of middle or proximal phalanx or phalanges of hand    Major depression, single episode    Uncontrolled type 2 diabetes mellitus with hyperglycemia (HCC)    Cellulitis of abdominal wall    Abscess    Obesity, morbid (more than 100 lbs over ideal weight or BMI > 40)    Major depressive disorder, recurrent    Suicide attempt (HCC)    Bipolar depression (HCC)    Morbid obesity    Dietary counseling and surveillance    Bipolar 1 disorder (HCC)       Allergies   Allergen Reactions    Latuda [Lurasidone Hcl] Hives    Lurasidone Hives     Uses paper tape    Adipex-P [Phentermine] Other (See Comments)     Causes kenna     Metformin And Related Other (See Comments)     Causes abdominal pain and diarrhea    Adhesive Tape Rash       Medications marked \"taking\" at this time  Outpatient Medications Marked as Taking for the 4/23/25 encounter (Office Visit) with Nahomi Orantes APRN - CNP   Medication Sig Dispense Refill    topiramate (TOPAMAX) 50 MG tablet Take 1 tablet by mouth 2 times daily (Patient taking differently: Take 2 tablets by mouth 2 times daily) 30 tablet 3    zolpidem (AMBIEN) 5 MG tablet Take 1

## 2025-05-08 ENCOUNTER — OFFICE VISIT (OUTPATIENT)
Age: 45
End: 2025-05-08
Payer: MEDICAID

## 2025-05-08 VITALS
BODY MASS INDEX: 30.16 KG/M2 | TEMPERATURE: 98.6 F | HEIGHT: 65 IN | OXYGEN SATURATION: 99 % | DIASTOLIC BLOOD PRESSURE: 62 MMHG | SYSTOLIC BLOOD PRESSURE: 120 MMHG | HEART RATE: 88 BPM | WEIGHT: 181 LBS

## 2025-05-08 DIAGNOSIS — Z12.31 ENCOUNTER FOR SCREENING MAMMOGRAM FOR MALIGNANT NEOPLASM OF BREAST: Primary | ICD-10-CM

## 2025-05-08 DIAGNOSIS — Z00.00 ENCOUNTER FOR WELL ADULT EXAM WITHOUT ABNORMAL FINDINGS: ICD-10-CM

## 2025-05-08 DIAGNOSIS — E11.9 TYPE 2 DIABETES MELLITUS WITHOUT COMPLICATION, WITHOUT LONG-TERM CURRENT USE OF INSULIN (HCC): ICD-10-CM

## 2025-05-08 PROCEDURE — 99396 PREV VISIT EST AGE 40-64: CPT | Performed by: NURSE PRACTITIONER

## 2025-05-08 RX ORDER — CLONAZEPAM 1 MG/1
1 TABLET ORAL 2 TIMES DAILY PRN
COMMUNITY
Start: 2025-04-23
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
## 2025-05-09 LAB
CREAT UR-MCNC: 88.6 MG/DL
MICROALBUMIN UR-MCNC: <1.2 MG/DL
MICROALBUMIN/CREAT UR-RTO: NORMAL MG/G (ref 0–30)

## 2025-05-30 ENCOUNTER — OFFICE VISIT (OUTPATIENT)
Age: 45
End: 2025-05-30
Payer: MEDICAID

## 2025-05-30 ENCOUNTER — HOSPITAL ENCOUNTER (OUTPATIENT)
Dept: WOMENS IMAGING | Age: 45
Discharge: HOME | End: 2025-05-30
Payer: MEDICAID

## 2025-05-30 ENCOUNTER — APPOINTMENT (OUTPATIENT)
Dept: ULTRASOUND IMAGING | Age: 45
End: 2025-05-30
Payer: MEDICAID

## 2025-05-30 VITALS
WEIGHT: 177 LBS | DIASTOLIC BLOOD PRESSURE: 70 MMHG | RESPIRATION RATE: 16 BRPM | HEIGHT: 65 IN | SYSTOLIC BLOOD PRESSURE: 110 MMHG | BODY MASS INDEX: 29.49 KG/M2 | OXYGEN SATURATION: 98 % | HEART RATE: 90 BPM

## 2025-05-30 DIAGNOSIS — R10.2 PELVIC PAIN: ICD-10-CM

## 2025-05-30 DIAGNOSIS — Z12.31 ENCOUNTER FOR SCREENING MAMMOGRAM FOR MALIGNANT NEOPLASM OF BREAST: ICD-10-CM

## 2025-05-30 DIAGNOSIS — Z11.3 SCREEN FOR STD (SEXUALLY TRANSMITTED DISEASE): Primary | ICD-10-CM

## 2025-05-30 DIAGNOSIS — N89.8 VAGINAL DISCHARGE: ICD-10-CM

## 2025-05-30 DIAGNOSIS — N89.8 VAGINAL ODOR: ICD-10-CM

## 2025-05-30 DIAGNOSIS — Z11.3 SCREEN FOR STD (SEXUALLY TRANSMITTED DISEASE): ICD-10-CM

## 2025-05-30 LAB
REAGIN+T PALLIDUM IGG+IGM SERPL-IMP: NORMAL
SPECIMEN SOURCE: NORMAL

## 2025-05-30 PROCEDURE — G8427 DOCREV CUR MEDS BY ELIG CLIN: HCPCS | Performed by: NURSE PRACTITIONER

## 2025-05-30 PROCEDURE — 99213 OFFICE O/P EST LOW 20 MIN: CPT | Performed by: NURSE PRACTITIONER

## 2025-05-30 PROCEDURE — 77063 BREAST TOMOSYNTHESIS BI: CPT

## 2025-05-30 PROCEDURE — 1036F TOBACCO NON-USER: CPT | Performed by: NURSE PRACTITIONER

## 2025-05-30 PROCEDURE — G8417 CALC BMI ABV UP PARAM F/U: HCPCS | Performed by: NURSE PRACTITIONER

## 2025-05-30 PROCEDURE — 99212 OFFICE O/P EST SF 10 MIN: CPT | Performed by: NURSE PRACTITIONER

## 2025-05-30 RX ORDER — PRAZOSIN HYDROCHLORIDE 1 MG/1
1 CAPSULE ORAL NIGHTLY
COMMUNITY
Start: 2025-05-21

## 2025-05-30 ASSESSMENT — ENCOUNTER SYMPTOMS: ABDOMINAL PAIN: 0

## 2025-05-30 NOTE — PROGRESS NOTES
Subjective  Kanchan Cam, 45 y.o. female presents today with:  Chief Complaint   Patient presents with    Vaginal Odor     Vaginal odor, discharge yellow in color .       HPI  Presents to walk-in clinic for concern of vaginal discharge   C/o vaginal odor   States yellow vaginal discharge   Incontinent urine at times   She has concern  may have been with other women & would like blood/urine STI studies   Denies fever   C/o recent GI illness N/V/D                Past Medical History:   Diagnosis Date    ADD (attention deficit disorder) 2021    Bipolar disorder ([facility])     Depression     DM (diabetes mellitus) ([facility])     History of drug abuse ([facility])     positive drug screens 9/22/14 and 5/18/14    Hyperlipidemia     Morbid obesity with body mass index (BMI) of 40.0 to 44.9 in adult ([facility]) 10/12/2018    QI (obstructive sleep apnea) 09/28/2018    Osteoarthritis       Past Surgical History:   Procedure Laterality Date    CHOLECYSTECTOMY, LAPAROSCOPIC N/A 02/28/2017    CHOLECYSTECTOMY LAPAROSCOPIC WITH GRAMS POSS OPEN , RECENT LABS  performed by Issac Quintero [place] [facility]    HYSTERECTOMY (CERVIX STATUS UNKNOWN)      IL COLON CA SCRN NOT HI RSK IND N/A 11/27/2018    COLONOSCOPY ROOM 384 performed by Margareth Freeman MD at [facility]    RECTAL SURGERY N/A 06/16/2021    I&D LEFT  PERIRECTAL  ABSCESS performed by Jeffry Douglas MD at [facility]    TUBAL LIGATION      UPPER GASTROINTESTINAL ENDOSCOPY N/A 01/10/2024    EGD ESOPHAGOGASTRODUODENOSCOPY performed by Aden Norris MD at [facility]     Family History   Problem Relation Age of Onset    Cirrhosis Brother     Breast Cancer Maternal Aunt              Review of Systems   Constitutional:  Negative for activity change, chills and fever.   Gastrointestinal:  Negative for abdominal pain. Diarrhea: resolved. Vomiting: resolved.  Genitourinary:  Positive for frequency and vaginal discharge. Negative for difficulty urinating, dysuria, flank pain,

## 2025-05-31 LAB
BACTERIA UR CULT: NORMAL
CLUE CELLS VAG QL WET PREP: NORMAL
HEPATITIS C ANTIBODY: NONREACTIVE
HIV AG/AB: NONREACTIVE
T VAGINALIS VAG QL WET PREP: NORMAL
TRICHOMONAS VAGINALIS SCREEN: NEGATIVE
YEAST VAG QL WET PREP: NORMAL

## 2025-06-02 ENCOUNTER — RESULTS FOLLOW-UP (OUTPATIENT)
Age: 45
End: 2025-06-02

## 2025-06-02 LAB
SPECIMEN SOURCE: NORMAL
TRICHOMONAS VAGINALIS, MOLECULAR: NEGATIVE

## 2025-06-03 LAB
C TRACH DNA UR QL NAA+PROBE: NEGATIVE
N GONORRHOEA DNA UR QL NAA+PROBE: NEGATIVE

## 2025-06-12 ENCOUNTER — RESULTS FOLLOW-UP (OUTPATIENT)
Age: 45
End: 2025-06-12

## 2025-06-12 DIAGNOSIS — R92.8 ABNORMAL MAMMOGRAM: Primary | ICD-10-CM

## 2025-06-16 ENCOUNTER — HOSPITAL ENCOUNTER (OUTPATIENT)
Dept: WOMENS IMAGING | Age: 45
Discharge: HOME | End: 2025-06-18
Payer: MEDICAID

## 2025-06-16 VITALS — BODY MASS INDEX: 29.45 KG/M2 | HEIGHT: 65 IN

## 2025-06-16 DIAGNOSIS — R92.8 ABNORMAL MAMMOGRAM: ICD-10-CM

## 2025-06-16 PROCEDURE — G0279 TOMOSYNTHESIS, MAMMO: HCPCS

## 2025-06-17 ENCOUNTER — RESULTS FOLLOW-UP (OUTPATIENT)
Age: 45
End: 2025-06-17

## 2025-06-19 ENCOUNTER — APPOINTMENT (OUTPATIENT)
Dept: ULTRASOUND IMAGING | Age: 45
End: 2025-06-19
Payer: MEDICAID

## 2025-07-04 ENCOUNTER — HOSPITAL ENCOUNTER (INPATIENT)
Age: 45
LOS: 6 days | Discharge: HOME | DRG: 750 | End: 2025-07-11
Attending: PSYCHIATRY & NEUROLOGY | Admitting: PSYCHIATRY & NEUROLOGY
Payer: MEDICAID

## 2025-07-04 DIAGNOSIS — F29 PSYCHOSIS, UNSPECIFIED PSYCHOSIS TYPE (HCC): Primary | ICD-10-CM

## 2025-07-04 DIAGNOSIS — F31.9 BIPOLAR 1 DISORDER (HCC): ICD-10-CM

## 2025-07-04 DIAGNOSIS — E87.6 HYPOKALEMIA: ICD-10-CM

## 2025-07-04 LAB
ALBUMIN SERPL-MCNC: 4.3 G/DL (ref 3.5–4.6)
ALP SERPL-CCNC: 77 U/L (ref 40–130)
ALT SERPL-CCNC: <5 U/L (ref 0–33)
ANION GAP SERPL CALCULATED.3IONS-SCNC: 13 MEQ/L (ref 9–15)
APAP SERPL-MCNC: <5 UG/ML (ref 10–30)
AST SERPL-CCNC: 22 U/L (ref 0–35)
BASOPHILS # BLD: 0.1 K/UL (ref 0–0.2)
BASOPHILS NFR BLD: 0.8 %
BILIRUB SERPL-MCNC: 0.4 MG/DL (ref 0.2–0.7)
BUN SERPL-MCNC: 16 MG/DL (ref 6–20)
CALCIUM SERPL-MCNC: 9.2 MG/DL (ref 8.5–9.9)
CHLORIDE SERPL-SCNC: 109 MEQ/L (ref 95–107)
CHOLEST SERPL-MCNC: 115 MG/DL (ref 0–199)
CK SERPL-CCNC: 388 U/L (ref 0–170)
CO2 SERPL-SCNC: 20 MEQ/L (ref 20–31)
CREAT SERPL-MCNC: 0.86 MG/DL (ref 0.5–0.9)
EOSINOPHIL # BLD: 0.1 K/UL (ref 0–0.7)
EOSINOPHIL NFR BLD: 0.8 %
ERYTHROCYTE [DISTWIDTH] IN BLOOD BY AUTOMATED COUNT: 13.8 % (ref 11.5–14.5)
ETHANOL PERCENT: NORMAL G/DL
ETHANOLAMINE SERPL-MCNC: <10 MG/DL (ref 0–0.08)
GLOBULIN SER CALC-MCNC: 2 G/DL (ref 2.3–3.5)
GLUCOSE SERPL-MCNC: 144 MG/DL (ref 70–99)
HCT VFR BLD AUTO: 39.5 % (ref 37–47)
HDLC SERPL-MCNC: 48 MG/DL (ref 40–59)
HGB BLD-MCNC: 13.6 G/DL (ref 12–16)
LDLC SERPL CALC-MCNC: 51 MG/DL (ref 0–129)
LYMPHOCYTES # BLD: 1.7 K/UL (ref 1–4.8)
LYMPHOCYTES NFR BLD: 22.2 %
MAGNESIUM SERPL-MCNC: 2 MG/DL (ref 1.7–2.4)
MCH RBC QN AUTO: 29.4 PG (ref 27–31.3)
MCHC RBC AUTO-ENTMCNC: 34.4 % (ref 33–37)
MCV RBC AUTO: 85.3 FL (ref 79.4–94.8)
MONOCYTES # BLD: 0.5 K/UL (ref 0.2–0.8)
MONOCYTES NFR BLD: 6.9 %
NEUTROPHILS # BLD: 5.3 K/UL (ref 1.4–6.5)
NEUTS SEG NFR BLD: 69 %
PLATELET # BLD AUTO: 226 K/UL (ref 130–400)
POTASSIUM SERPL-SCNC: 2.7 MEQ/L (ref 3.4–4.9)
PROT SERPL-MCNC: 6.3 G/DL (ref 6.3–8)
RBC # BLD AUTO: 4.63 M/UL (ref 4.2–5.4)
SALICYLATES SERPL-MCNC: <0.3 MG/DL (ref 15–30)
SODIUM SERPL-SCNC: 142 MEQ/L (ref 135–144)
TRIGL SERPL-MCNC: 79 MG/DL (ref 0–150)
TSH REFLEX: 1.28 UIU/ML (ref 0.44–3.86)
WBC # BLD AUTO: 7.7 K/UL (ref 4.8–10.8)

## 2025-07-04 PROCEDURE — 83735 ASSAY OF MAGNESIUM: CPT

## 2025-07-04 PROCEDURE — 84443 ASSAY THYROID STIM HORMONE: CPT

## 2025-07-04 PROCEDURE — 80179 DRUG ASSAY SALICYLATE: CPT

## 2025-07-04 PROCEDURE — 82077 ASSAY SPEC XCP UR&BREATH IA: CPT

## 2025-07-04 PROCEDURE — 81001 URINALYSIS AUTO W/SCOPE: CPT

## 2025-07-04 PROCEDURE — 36415 COLL VENOUS BLD VENIPUNCTURE: CPT

## 2025-07-04 PROCEDURE — 99285 EMERGENCY DEPT VISIT HI MDM: CPT

## 2025-07-04 PROCEDURE — 2580000003 HC RX 258: Performed by: PHYSICIAN ASSISTANT

## 2025-07-04 PROCEDURE — 96365 THER/PROPH/DIAG IV INF INIT: CPT

## 2025-07-04 PROCEDURE — 96361 HYDRATE IV INFUSION ADD-ON: CPT

## 2025-07-04 PROCEDURE — 93005 ELECTROCARDIOGRAM TRACING: CPT | Performed by: PHYSICIAN ASSISTANT

## 2025-07-04 PROCEDURE — 6370000000 HC RX 637 (ALT 250 FOR IP): Performed by: PHYSICIAN ASSISTANT

## 2025-07-04 PROCEDURE — 80143 DRUG ASSAY ACETAMINOPHEN: CPT

## 2025-07-04 PROCEDURE — 82550 ASSAY OF CK (CPK): CPT

## 2025-07-04 PROCEDURE — 83036 HEMOGLOBIN GLYCOSYLATED A1C: CPT

## 2025-07-04 PROCEDURE — 6360000002 HC RX W HCPCS: Performed by: PHYSICIAN ASSISTANT

## 2025-07-04 PROCEDURE — 80307 DRUG TEST PRSMV CHEM ANLYZR: CPT

## 2025-07-04 PROCEDURE — 80061 LIPID PANEL: CPT

## 2025-07-04 PROCEDURE — 80053 COMPREHEN METABOLIC PANEL: CPT

## 2025-07-04 PROCEDURE — 85025 COMPLETE CBC W/AUTO DIFF WBC: CPT

## 2025-07-04 RX ORDER — POTASSIUM CHLORIDE 7.45 MG/ML
10 INJECTION INTRAVENOUS ONCE
Status: COMPLETED | OUTPATIENT
Start: 2025-07-04 | End: 2025-07-04

## 2025-07-04 RX ORDER — SODIUM CHLORIDE, SODIUM LACTATE, POTASSIUM CHLORIDE, AND CALCIUM CHLORIDE .6; .31; .03; .02 G/100ML; G/100ML; G/100ML; G/100ML
1000 INJECTION, SOLUTION INTRAVENOUS ONCE
Status: COMPLETED | OUTPATIENT
Start: 2025-07-04 | End: 2025-07-05

## 2025-07-04 RX ORDER — POTASSIUM CHLORIDE 1500 MG/1
20 TABLET, EXTENDED RELEASE ORAL ONCE
Status: COMPLETED | OUTPATIENT
Start: 2025-07-04 | End: 2025-07-04

## 2025-07-04 RX ORDER — POTASSIUM CHLORIDE 7.45 MG/ML
10 INJECTION INTRAVENOUS ONCE
Status: DISCONTINUED | OUTPATIENT
Start: 2025-07-04 | End: 2025-07-04

## 2025-07-04 RX ADMIN — POTASSIUM CHLORIDE 20 MEQ: 1500 TABLET, EXTENDED RELEASE ORAL at 23:48

## 2025-07-04 RX ADMIN — SODIUM CHLORIDE, SODIUM LACTATE, POTASSIUM CHLORIDE, AND CALCIUM CHLORIDE 1000 ML: .6; .31; .03; .02 INJECTION, SOLUTION INTRAVENOUS at 22:22

## 2025-07-04 RX ADMIN — POTASSIUM CHLORIDE 10 MEQ: 7.46 INJECTION, SOLUTION INTRAVENOUS at 22:15

## 2025-07-04 RX ADMIN — POTASSIUM BICARBONATE 50 MEQ: 978 TABLET, EFFERVESCENT ORAL at 22:10

## 2025-07-04 ASSESSMENT — LIFESTYLE VARIABLES
HOW MANY STANDARD DRINKS CONTAINING ALCOHOL DO YOU HAVE ON A TYPICAL DAY: PATIENT DOES NOT DRINK
HOW OFTEN DO YOU HAVE A DRINK CONTAINING ALCOHOL: NEVER

## 2025-07-04 ASSESSMENT — PAIN - FUNCTIONAL ASSESSMENT: PAIN_FUNCTIONAL_ASSESSMENT: NONE - DENIES PAIN

## 2025-07-05 LAB
AMORPH SED URNS QL MICRO: ABNORMAL
AMPHET UR QL SCN: ABNORMAL
ANION GAP SERPL CALCULATED.3IONS-SCNC: 10 MEQ/L (ref 9–15)
ANION GAP SERPL CALCULATED.3IONS-SCNC: 9 MEQ/L (ref 9–15)
BACTERIA URNS QL MICRO: NEGATIVE /HPF
BARBITURATES UR QL SCN: ABNORMAL
BENZODIAZ UR QL SCN: POSITIVE
BILIRUB UR QL STRIP: NEGATIVE
BUN SERPL-MCNC: 13 MG/DL (ref 6–20)
BUN SERPL-MCNC: 9 MG/DL (ref 6–20)
CALCIUM SERPL-MCNC: 8.6 MG/DL (ref 8.5–9.9)
CALCIUM SERPL-MCNC: 8.7 MG/DL (ref 8.5–9.9)
CANNABINOIDS UR QL SCN: POSITIVE
CHLORIDE SERPL-SCNC: 106 MEQ/L (ref 95–107)
CHLORIDE SERPL-SCNC: 111 MEQ/L (ref 95–107)
CLARITY UR: ABNORMAL
CO2 SERPL-SCNC: 22 MEQ/L (ref 20–31)
CO2 SERPL-SCNC: 23 MEQ/L (ref 20–31)
COCAINE UR QL SCN: ABNORMAL
COLOR UR: YELLOW
CREAT SERPL-MCNC: 0.64 MG/DL (ref 0.5–0.9)
CREAT SERPL-MCNC: 0.78 MG/DL (ref 0.5–0.9)
DRUG SCREEN COMMENT UR-IMP: ABNORMAL
EPI CELLS #/AREA URNS AUTO: ABNORMAL /HPF (ref 0–5)
ESTIMATED AVERAGE GLUCOSE: 105 MG/DL
FENTANYL SCREEN, URINE: ABNORMAL
GLUCOSE SERPL-MCNC: 109 MG/DL (ref 70–99)
GLUCOSE SERPL-MCNC: 132 MG/DL (ref 70–99)
GLUCOSE UR STRIP-MCNC: NEGATIVE MG/DL
HBA1C MFR BLD: 5.3 % (ref 4–6)
HCG UR QL: NEGATIVE
HGB UR QL STRIP: NEGATIVE
HYALINE CASTS #/AREA URNS AUTO: ABNORMAL /HPF (ref 0–5)
KETONES UR STRIP-MCNC: NEGATIVE MG/DL
LEUKOCYTE ESTERASE UR QL STRIP: NEGATIVE
METHADONE UR QL SCN: ABNORMAL
NITRITE UR QL STRIP: NEGATIVE
OPIATES UR QL SCN: ABNORMAL
OXYCODONE UR QL SCN: ABNORMAL
PCP UR QL SCN: ABNORMAL
PH UR STRIP: 7 [PH] (ref 5–9)
POTASSIUM SERPL-SCNC: 3.2 MEQ/L (ref 3.4–4.9)
POTASSIUM SERPL-SCNC: 3.9 MEQ/L (ref 3.4–4.9)
PROPOXYPH UR QL SCN: ABNORMAL
PROT UR STRIP-MCNC: 30 MG/DL
RBC #/AREA URNS HPF: ABNORMAL /HPF (ref 0–2)
SODIUM SERPL-SCNC: 139 MEQ/L (ref 135–144)
SODIUM SERPL-SCNC: 142 MEQ/L (ref 135–144)
SP GR UR STRIP: 1.02 (ref 1–1.03)
URINE REFLEX TO CULTURE: ABNORMAL
UROBILINOGEN UR STRIP-ACNC: 0.2 E.U./DL
WBC #/AREA URNS AUTO: ABNORMAL /HPF (ref 0–5)

## 2025-07-05 PROCEDURE — 80048 BASIC METABOLIC PNL TOTAL CA: CPT

## 2025-07-05 PROCEDURE — 84703 CHORIONIC GONADOTROPIN ASSAY: CPT

## 2025-07-05 PROCEDURE — 6370000000 HC RX 637 (ALT 250 FOR IP): Performed by: PSYCHIATRY & NEUROLOGY

## 2025-07-05 PROCEDURE — 36415 COLL VENOUS BLD VENIPUNCTURE: CPT

## 2025-07-05 PROCEDURE — 90791 PSYCH DIAGNOSTIC EVALUATION: CPT | Performed by: SOCIAL WORKER

## 2025-07-05 PROCEDURE — 1240000000 HC EMOTIONAL WELLNESS R&B

## 2025-07-05 PROCEDURE — 6370000000 HC RX 637 (ALT 250 FOR IP): Performed by: PHYSICIAN ASSISTANT

## 2025-07-05 PROCEDURE — 93005 ELECTROCARDIOGRAM TRACING: CPT | Performed by: PHYSICIAN ASSISTANT

## 2025-07-05 RX ORDER — OLANZAPINE 5 MG/1
10 TABLET, FILM COATED ORAL ONCE
Status: COMPLETED | OUTPATIENT
Start: 2025-07-05 | End: 2025-07-05

## 2025-07-05 RX ORDER — LANOLIN ALCOHOL/MO/W.PET/CERES
800 CREAM (GRAM) TOPICAL ONCE
Status: COMPLETED | OUTPATIENT
Start: 2025-07-05 | End: 2025-07-05

## 2025-07-05 RX ORDER — HYDROXYZINE PAMOATE 50 MG/1
50 CAPSULE ORAL EVERY 6 HOURS PRN
Status: DISCONTINUED | OUTPATIENT
Start: 2025-07-05 | End: 2025-07-11 | Stop reason: HOSPADM

## 2025-07-05 RX ORDER — HALOPERIDOL 5 MG/ML
5 INJECTION INTRAMUSCULAR EVERY 6 HOURS PRN
Status: DISCONTINUED | OUTPATIENT
Start: 2025-07-05 | End: 2025-07-11 | Stop reason: HOSPADM

## 2025-07-05 RX ORDER — TRAZODONE HYDROCHLORIDE 50 MG/1
50 TABLET ORAL NIGHTLY PRN
Status: DISCONTINUED | OUTPATIENT
Start: 2025-07-05 | End: 2025-07-08

## 2025-07-05 RX ORDER — HALOPERIDOL 5 MG/1
5 TABLET ORAL EVERY 6 HOURS PRN
Status: DISCONTINUED | OUTPATIENT
Start: 2025-07-05 | End: 2025-07-11 | Stop reason: HOSPADM

## 2025-07-05 RX ORDER — CLONAZEPAM 0.5 MG/1
0.5 TABLET ORAL 2 TIMES DAILY
COMMUNITY
Start: 2025-06-10

## 2025-07-05 RX ORDER — CLONAZEPAM 0.5 MG/1
0.5 TABLET ORAL 2 TIMES DAILY PRN
Status: DISCONTINUED | OUTPATIENT
Start: 2025-07-05 | End: 2025-07-11 | Stop reason: HOSPADM

## 2025-07-05 RX ORDER — HYDROXYZINE HYDROCHLORIDE 50 MG/ML
50 INJECTION, SOLUTION INTRAMUSCULAR EVERY 6 HOURS PRN
Status: DISCONTINUED | OUTPATIENT
Start: 2025-07-05 | End: 2025-07-11 | Stop reason: HOSPADM

## 2025-07-05 RX ORDER — ACETAMINOPHEN 325 MG/1
650 TABLET ORAL EVERY 4 HOURS PRN
Status: DISCONTINUED | OUTPATIENT
Start: 2025-07-05 | End: 2025-07-11 | Stop reason: HOSPADM

## 2025-07-05 RX ORDER — MAGNESIUM HYDROXIDE/ALUMINUM HYDROXICE/SIMETHICONE 120; 1200; 1200 MG/30ML; MG/30ML; MG/30ML
30 SUSPENSION ORAL PRN
Status: DISCONTINUED | OUTPATIENT
Start: 2025-07-05 | End: 2025-07-11 | Stop reason: HOSPADM

## 2025-07-05 RX ORDER — BENZTROPINE MESYLATE 1 MG/ML
2 INJECTION, SOLUTION INTRAMUSCULAR; INTRAVENOUS 2 TIMES DAILY PRN
Status: DISCONTINUED | OUTPATIENT
Start: 2025-07-05 | End: 2025-07-11 | Stop reason: HOSPADM

## 2025-07-05 RX ADMIN — NICOTINE POLACRILEX 4 MG: 4 LOZENGE ORAL at 17:32

## 2025-07-05 RX ADMIN — TRAZODONE HYDROCHLORIDE 50 MG: 50 TABLET ORAL at 20:51

## 2025-07-05 RX ADMIN — POTASSIUM BICARBONATE 50 MEQ: 978 TABLET, EFFERVESCENT ORAL at 05:36

## 2025-07-05 RX ADMIN — OLANZAPINE 10 MG: 5 TABLET, FILM COATED ORAL at 05:40

## 2025-07-05 RX ADMIN — Medication 800 MG: at 02:52

## 2025-07-05 RX ADMIN — NICOTINE POLACRILEX 4 MG: 4 LOZENGE ORAL at 20:51

## 2025-07-05 ASSESSMENT — SLEEP AND FATIGUE QUESTIONNAIRES
DO YOU HAVE DIFFICULTY SLEEPING: YES
AVERAGE NUMBER OF SLEEP HOURS: 4
SLEEP PATTERN: DIFFICULTY FALLING ASLEEP;DISTURBED/INTERRUPTED SLEEP;INSOMNIA
DO YOU USE A SLEEP AID: NO

## 2025-07-05 NOTE — VIRTUAL HEALTH
tangential. She made a series of disjointed and incoherent statements: “My son's been missing, so we have a missing child, it's the 4th of July. So I ran down the boarder. So I know it doesn't make any sense so maybe I should’ve just called my daughter but there is only one God we pray to. But you know he has been missing for a while. When I think about the future this war thing makes me nervous. The telephone lines are down. So everyone knows my child is missing. I am so overwhelmed. So I went all over God’s creation. Now I went through this divorce.” She also stated, “My name is not Concepcion, it's Kanchan, and I am a messenger of God and so that’s why I was worried.”    Although the patient reports taking her prescribed medications, she demonstrates impaired insight and judgment, lacks awareness of the events that led to her hospitalization, and attributes her current state to feeling overwhelmed. She denies suicidal ideation, homicidal ideation, or auditory/visual hallucinations at this time.    Telepsych recommends inpatient psychiatric admission due to the patient’s acute manic presentation, disorganized thought processes, impaired reality testing, and inability to care for herself in the community. Despite denying SI/HI/AVH, the patient’s behavior in public posed a safety concern, prompting law enforcement intervention. Her tangential and delusional thinking, poor insight, and significant functional impairment indicate a need for a structured, supervised environment to ensure stabilization, safety, medication management, and further diagnostic clarification.      Dx:   Associated Dx: Bipolar I, most recent episode manic, severe (HCC) [F31.13]      Plan:  Inpatient psychiatric admission at appropriate care level facility, once medically cleared and stable and However, if the patient changes their mind about voluntary admission, they DO meet criteria for an involuntary hold, and should not be allowed to sign out

## 2025-07-05 NOTE — ED TRIAGE NOTES
Patient to ER due to reports of manic behavior in public. She was approaching [employer] police, making non sensical statements. [place] slipped by [employer]. She was given versed in EMS.

## 2025-07-05 NOTE — GROUP NOTE
Group Therapy Note    Pt was not admitted to 3W at time of group session.   Date: 7/5/2025    Group Start Time: 1215  Group End Time: 1300  Group Topic: Psychoeducation    [facility] [place] [facility] Lesa Garrett    Group Therapy Note    Anxiety Triggers    Patients will be given a list of potential anxiety triggers and asked to rate them 1-10. Patients will then be encouraged to speak about their feelings of anxiety and why these triggers cause anxiety. Patients will then be asked to give potential coping strategies to reduce the anxiety, and will be given other options if none are mentioned.     Goals:   Improve/Maintain Attention to Task, Improve/Maintain Cognitive Skills, Improve/Maintain Identity Development, Improve/Maintain Insight / Self-Awareness, Increase/Maintain Level of Socialization, Improve/Maintain Self-Esteem, Improve/Maintain Self-Expression, and Improve/Maintain Use of Coping Skills     Signature: Lesa Abel, Psychoeducational Specialist

## 2025-07-05 NOTE — GROUP NOTE
[facility]                                                                      Group Therapy Note     Date: 7/5/2025     Group Start Time: 1500  Group End Time: 1600  Group Topic: Education Group - Inpatient     [facility] 3W Lynda Baum RN           Group Therapy Note     Attendees: 7/17        Patient's Goal:  [facility] and introduction and discussed coping skills, explored feelings , spiritual fitness, discussed alcoholism and 5 stages of grief.     Status After Intervention:  Unchanged     Participation Level: Active Listener and Interactive     Participation Quality: Appropriate        Speech:  normal        Thought Process/Content: Logical        Affective Functioning: Congruent        Mood: euthymic        Level of consciousness:  Alert        Response to Learning: Progressing to goal        Endings: None Reported     Modes of Intervention: Education, Support, Socialization, and Exploration        Discipline Responsible: /Counselor        Signature:  Lynda Rivera RN

## 2025-07-05 NOTE — ED PROVIDER NOTES
[facility] EMERGENCY DEPARTMENT  eMERGENCYdEPARTMENT eNCOUnter        Pt Name: Kanchan Cam  MRN: [id number]  Birthdate 1980of evaluation: 7/4/2025  Provider:Darren Sparrow PA-C  8:24 PM EDT    CHIEF COMPLAINT       Chief Complaint   Patient presents with    Mental Health Problem         HISTORY OF PRESENT ILLNESS  (Location/Symptom, Timing/Onset, Context/Setting, Quality, Duration, Modifying Factors, Severity.)   Kanchan Cam is a 45 y.o. female who presents to the emergency department      Patient presents emergency department via private vehicle for acutely psychotic behavior.  This occurred shortly prior to arrival.  Patient was outside where people were calling regarding for a local [employer] display when she reportedly started running in and out of the roadway.  When police were promptly on scene she was standing at the rest of the vomiting of the [employer] and that a local street [address] was the entrance to a new world.  They states she was talking nonsensically at this point very frantic and fearful appearing.  Patient does have a history of bipolar disorder.  She received 10 of Versed and Haldol en route for agitation per EMS she was yelling and screaming though not intentionally physically aggressive.          Nursing Notes were reviewed and I agree.    REVIEW OF SYSTEMS    (2-9 systems for level 4, 10 or more for level 5)     Review of Systems   All other systems reviewed and are negative.       as noted above the remainder of the review of systems was reviewed and negative.       PAST MEDICAL HISTORY     Past Medical History:   Diagnosis Date    ADD (attention deficit disorder) 2021    Bipolar disorder (HCC)     Depression     DM (diabetes mellitus) ([facility])     History of drug abuse ([facility])     positive drug screens 9/22/14 and 5/18/14    Hyperlipidemia     Morbid obesity with body mass index (BMI) of 40.0 to 44.9 in adult ([facility]) 10/12/2018    QI (obstructive sleep apnea) 09/28/2018

## 2025-07-05 NOTE — ED NOTES
EKG attempted, patient became paranoid and verbally agitated. ED provider updated, approved to given previously held zyprexa for agitation. Pt agreeable to take by mouth with redirection. Pt agreeable to take potassium supplement after further redirection.

## 2025-07-05 NOTE — ED NOTES
Patient changed into psych safe gown. Athletic pants pockets checked, no contraband. Belongings collected and secured. Patient on monitor. EKG completed and lab called for draw.

## 2025-07-05 NOTE — ED NOTES
Case reviewed with Dr. Mauro, requesting that the patient have repeat BMP before he is willing to make decision on admission. Patient continues to refused BMP at this time. ED provider aware

## 2025-07-05 NOTE — ED NOTES
Patient is tangential in her speech. Talking about her adult children being missed, a war in [place], the gas station rumbling. Reoriented to her plan of care. Denies additional needs at this time.

## 2025-07-05 NOTE — ED NOTES
Patient reviewed with Dr. Morris. Discussed events leading to admit, current assessment, previous history, labs and EKG. Received order to admit to 3.

## 2025-07-05 NOTE — ED NOTES
Pt calm but remains paranoid, insistent on not getting an EKG because \"the lines will make my heart stop.\"

## 2025-07-05 NOTE — ED NOTES
Transferred pt to [facility] 1. Pt oriented to unit and vocalized no current complaints. Pt rambling and paranoid throughout introduction

## 2025-07-05 NOTE — ED NOTES
Dr. Mauro requesting that patient's potassium be 3.5 or higher prior to admission. 3W indicates that patient will be unable to be moved this shift related to staffing. ER provider notified, placed orders.

## 2025-07-05 NOTE — GROUP NOTE
DID NOT ATTEND        [facility]                                                                      Group Therapy Note    Date: 7/5/2025    Group Start Time: 1500  Group End Time: 1600  Group Topic: Education Group - Inpatient    [facility] 3W Lynda Baum RN

## 2025-07-06 PROCEDURE — 1240000000 HC EMOTIONAL WELLNESS R&B

## 2025-07-06 PROCEDURE — 36415 COLL VENOUS BLD VENIPUNCTURE: CPT

## 2025-07-06 PROCEDURE — 6370000000 HC RX 637 (ALT 250 FOR IP): Performed by: PSYCHIATRY & NEUROLOGY

## 2025-07-06 PROCEDURE — 83036 HEMOGLOBIN GLYCOSYLATED A1C: CPT

## 2025-07-06 RX ORDER — TOPIRAMATE 25 MG/1
50 TABLET, FILM COATED ORAL 2 TIMES DAILY
Status: DISCONTINUED | OUTPATIENT
Start: 2025-07-06 | End: 2025-07-07

## 2025-07-06 RX ADMIN — TOPIRAMATE 50 MG: 25 TABLET, FILM COATED ORAL at 12:09

## 2025-07-06 RX ADMIN — NICOTINE POLACRILEX 4 MG: 4 LOZENGE ORAL at 08:13

## 2025-07-06 RX ADMIN — TOPIRAMATE 50 MG: 25 TABLET, FILM COATED ORAL at 20:55

## 2025-07-06 RX ADMIN — NICOTINE POLACRILEX 4 MG: 4 LOZENGE ORAL at 20:56

## 2025-07-06 RX ADMIN — NICOTINE POLACRILEX 4 MG: 4 LOZENGE ORAL at 12:09

## 2025-07-06 ASSESSMENT — PATIENT HEALTH QUESTIONNAIRE - PHQ9
SUM OF ALL RESPONSES TO PHQ QUESTIONS 1-9: 0
2. FEELING DOWN, DEPRESSED OR HOPELESS: NOT AT ALL
SUM OF ALL RESPONSES TO PHQ QUESTIONS 1-9: 0
SUM OF ALL RESPONSES TO PHQ QUESTIONS 1-9: 0
1. LITTLE INTEREST OR PLEASURE IN DOING THINGS: NOT AT ALL
SUM OF ALL RESPONSES TO PHQ QUESTIONS 1-9: 0

## 2025-07-06 NOTE — CARE COORDINATION
7/6/2025 @ 1229 - Patient was approached regarding the completion of the Leisure Assessment. She was located wandering in the [facility] area. Before beginning the interview, patient seemed to be expressing her concern about routine blood draws. Patient appeared confused about the reason for the procedure.  encouraged patient to speak with the nurse and/or doctor. Patient was able to identify swimming, reading, and praying as her primary leisure activities. She endorsed being social with family and friends. At the end of the interview, patient started rambling about her quality of life concerns as it relates to the social problems in the world. Patient's thoughts and feelings were somewhat nonsensical.     Documentation completed by: Felisa Wallace [facility]

## 2025-07-06 NOTE — CARE COORDINATION
Morning Community Meeting Topics    Kanchan Cam attended the morning community meeting on 7/6/25.    Topics discussed today     [x] Introduction  Day of the week and date  Mask distribution  Current mask requirements  [x]Teams  Explanation of  Green and Blue team criteria  Nurses assigned to each team for today  Explanation about green and blue paper  Date  Patient's Name  Patient's Nurse  Goals  [x] Visitation  Announce the visiting hours for the day  Announce which team is allowed to have visitors for the day  Review any updated Covid 19 requirements for visitors during visitation  Vaccine Card or negative Covid test within 48 hours of visit  State Identification  Patients are reminded to alert the  at least 1 hour before visitation   [x] Unit Orientation  Coffee use  Phone location and etiquette  Shower locations  Washer and dryer location and process  Common area expectations  Staff rounds expectation  [x] Meals   Educate patient to the menu  The patient is encouraged to fill out the menu to get preferences at mealtime  The patient is educated that if they do not fill out the menu, they will get the standard tray  The coffee pot is decaf, patient encouraged to order regular coffee from menu.  Educate patient to the meal process  Patient encouraged to eat snacks provided twice daily  Snacks may stay in patient room     [x] Discharge Process  Discharge expectations  Fill out the survey after discharge   [x] Hygiene  Daily showers encouraged  Showers availability discussed   Daily dressing encouraged  Discussed wearing street clothing  Education provided on where to place linens and clothing  Linens in the hamper  personal clothing does not go into the linen hamper  [x] Group   Patient encouraged to attend group provided  Time of Group Meetings discussed  Gentle reminder that attendance is a Physician order  [x] Movement  Chair exercises completed  Stretching completed  Notes:   Patient participated in

## 2025-07-06 NOTE — GROUP NOTE
Group Therapy Note    Date: 7/6/2025    Group Start Time: 1000  Group End Time: 1100  Group Topic: Art Therapy     [facility] 3W Felisa Veronica, MARCIOW        Group Therapy Note    Attendees: 8       Patient's Goal:  To participate in morning group art therapy.     Notes:  Patient attended briefly and left prematurely.     Modes of Intervention: Activity      Discipline Responsible: Psychoeducational Specialist      Signature:  Felisa Wallace MA ATR [facility]AT

## 2025-07-06 NOTE — CONSULTS
Patient was seen evaluated.  Patient does not take any for diabetes history of diabetes.  Will check hemoglobin A1c.  Admitted under psych lin for psychosis.  Patient was treated for hypokalemia.    Past Medical History:   Diagnosis Date    ADD (attention deficit disorder) 2021    Bipolar disorder (HCC)     Depression     DM (diabetes mellitus) (HCC)     History of drug abuse (HCC)     positive drug screens 9/22/14 and 5/18/14    Hyperlipidemia     Morbid obesity with body mass index (BMI) of 40.0 to 44.9 in adult (HCC) 10/12/2018    QI (obstructive sleep apnea) 09/28/2018    Osteoarthritis      Past Surgical History:   Procedure Laterality Date    CHOLECYSTECTOMY, LAPAROSCOPIC N/A 02/28/2017    CHOLECYSTECTOMY LAPAROSCOPIC WITH GRAMS POSS OPEN , RECENT LABS  performed by Issac Quintero MD at [facility]    HYSTERECTOMY (CERVIX STATUS UNKNOWN)      ID COLON CA SCRN NOT HI RSK IND N/A 11/27/2018    COLONOSCOPY ROOM 384 performed by Margareth Freeman MD at [facility]    RECTAL SURGERY N/A 06/16/2021    I&D LEFT  PERIRECTAL  ABSCESS performed by Jeffry Douglas MD at [facility]    TUBAL LIGATION      UPPER GASTROINTESTINAL ENDOSCOPY N/A 01/10/2024    EGD ESOPHAGOGASTRODUODENOSCOPY performed by Aden Norris MD at [facility]     Social Connections: Not on file    The current method of family planning is   Family History   Problem Relation Age of Onset    Cirrhosis Brother     Breast Cancer Maternal Aunt     Breast Cancer Other      Past Surgical History:   Procedure Laterality Date    CHOLECYSTECTOMY, LAPAROSCOPIC N/A 02/28/2017    CHOLECYSTECTOMY LAPAROSCOPIC WITH GRAMS POSS OPEN , RECENT LABS  performed by Issac Quintero MD at [facility]    HYSTERECTOMY (CERVIX STATUS UNKNOWN)      ID COLON CA SCRN NOT HI RSK IND N/A 11/27/2018    COLONOSCOPY ROOM 384 performed by Margareth Freeman MD at [facility]    RECTAL SURGERY N/A 06/16/2021    I&D LEFT  PERIRECTAL  ABSCESS performed by Jeffry Douglas MD at

## 2025-07-06 NOTE — CARE COORDINATION
Psychosocial Assessment     Admission Reason: Client has been admitted to [facility] for psychiatric evaluation. Client is difficult to assess due to high level of psychosis and delusions     C-SSRS Lifetime Recent Completed - Current Suicide Risk:   [x] No Risk  [] Low [] Moderate [] High     Risk Factors:   Denies SI     Protective Factors: Denies SI       Gender:  [] Male [x] Female [] Transgender  [] Other    Sexual Orientation:  [x] Heterosexual [] Homosexual [] Bisexual [] Other    Current or Past Mental Health and/or Addictions Treatment (and response to treatment):  [x] Yes, When and Where:  [facility]   [] No    Substance Use/Alcohol Use/Addiction (document name of substance, age of onset, how much and how often, route of use and date of last use):  [] Reports   Substance:  Age of Onset:   How Much and how often:  Last Usage    [x] Patient was provided a listing of community sobriety resources on admission.    [x] Denies    AUDIT: 0  DAST:  0  PHQ 9: 0    Sobriety Education provided:  [] Yes  [] No  [x] N/A [] Refused    Learners: Patient []  Family []  Significant Other  [] Caregiver [] Other []   Readiness: Eager []   Acceptance  []   Nonacceptances []   Refused []   Method: Explanation []   Handout []   Response: Verbalizes Understanding []   No evidence of Learning []   Refuses []     Referral made to [facility] for sobriety services and support  [] Yes       Date:              [x] No    Family History of Mental Illness or Substance Use/Abuse:   [] Yes (Specify)    [x] No    Trauma and Abuse History:   [] Reports   ( Physical []  Verbal []  Emotional []  Financial []   Sexual [] )  [x] Denies      Legal History:  []  Yes (Specify)    [x] No     Involvement:  [] Yes (Specify)    [x] No     Employment and/or Benefits:    [] Yes (Specify)   SSD [x]  SSI []   SNAP[x] Medicaid[x]  [] No      Leisure & Recreational Interests and Hobbies/Coping Skills:  Reports time outside, music     Ability to Complete

## 2025-07-06 NOTE — H&P
[facility] - Department of Psychiatry    History and Physical - Adult         Behavioral Services  Medicare Certification Upon Admission    I certify that this patient's inpatient psychiatric hospital admission is medically necessary for:    [x] (1) Treatment which could reasonably be expected to improve this patient's condition,       [x] (2) Or for diagnostic study;     AND     [x](2) The inpatient psychiatric services are provided while the individual is under the care of a physician and are included in the individualized plan of care.    Estimated length of stay/service 7-10 days, depending on stability    Plan for post-hospital care follow up with outpatient provider    Electronically signed by Rush Morris MD on 7/6/2025 at 4:28 PM        CHIEF COMPLAINT:  kenna, psychosis    History obtained from:  patient    Patient was seen after discussing with the treatment team and reviewing the chart        CIRCUMSTANCES OF ADMISSION:     Ms. Kanchan Cam is a 45 y.o. female with a history of Bipolar Disorder, who was brought to the ER by Police for acutely psychotic behavior. Per ER notes, \"Patient was outside where people were calling regarding for a local [employer] display when she reportedly started running in and out of the roadway.\". She reportedly was making nonsensical statements, and had to be sedated. While in the ER, she was tangential, talking about her adult children missing, the war in [place], the gas station \"rumbling\". She needed to be sedated in the ambulance, and again in the ER. After medical clearance, she was admitted to .    HISTORY OF PRESENT ILLNESS:      Ms. Kanchan Cam is a 45 y.o. female with a history of Bipolar Disorder, who was brought to the ER for manic and psychotic symptoms, as above.  When admitted to , she reportedly was argumentative.  When interviewed today, she said, when asked about the circumstances of her admission, that she \"has been overwhelmed\", and then

## 2025-07-07 LAB
EKG ATRIAL RATE: 75 BPM
EKG ATRIAL RATE: 89 BPM
EKG DIAGNOSIS: NORMAL
EKG DIAGNOSIS: NORMAL
EKG P AXIS: 12 DEGREES
EKG P AXIS: 57 DEGREES
EKG P-R INTERVAL: 136 MS
EKG P-R INTERVAL: 162 MS
EKG Q-T INTERVAL: 400 MS
EKG Q-T INTERVAL: 404 MS
EKG QRS DURATION: 82 MS
EKG QRS DURATION: 92 MS
EKG QTC CALCULATION (BAZETT): 451 MS
EKG QTC CALCULATION (BAZETT): 486 MS
EKG R AXIS: 19 DEGREES
EKG R AXIS: 22 DEGREES
EKG T AXIS: 32 DEGREES
EKG T AXIS: 45 DEGREES
EKG VENTRICULAR RATE: 75 BPM
EKG VENTRICULAR RATE: 89 BPM
ESTIMATED AVERAGE GLUCOSE: 100 MG/DL
HBA1C MFR BLD: 5.1 % (ref 4–6)

## 2025-07-07 PROCEDURE — 6370000000 HC RX 637 (ALT 250 FOR IP): Performed by: PSYCHIATRY & NEUROLOGY

## 2025-07-07 PROCEDURE — 1240000000 HC EMOTIONAL WELLNESS R&B

## 2025-07-07 PROCEDURE — 93010 ELECTROCARDIOGRAM REPORT: CPT | Performed by: INTERNAL MEDICINE

## 2025-07-07 RX ORDER — TOPIRAMATE 100 MG/1
100 TABLET, FILM COATED ORAL 2 TIMES DAILY
Status: DISCONTINUED | OUTPATIENT
Start: 2025-07-07 | End: 2025-07-11 | Stop reason: HOSPADM

## 2025-07-07 RX ADMIN — NICOTINE POLACRILEX 4 MG: 4 LOZENGE ORAL at 11:22

## 2025-07-07 RX ADMIN — NICOTINE POLACRILEX 4 MG: 4 LOZENGE ORAL at 15:34

## 2025-07-07 RX ADMIN — TOPIRAMATE 50 MG: 25 TABLET, FILM COATED ORAL at 08:13

## 2025-07-07 RX ADMIN — NICOTINE POLACRILEX 4 MG: 4 LOZENGE ORAL at 20:49

## 2025-07-07 RX ADMIN — NICOTINE POLACRILEX 4 MG: 4 LOZENGE ORAL at 17:40

## 2025-07-07 RX ADMIN — NICOTINE POLACRILEX 4 MG: 4 LOZENGE ORAL at 06:42

## 2025-07-07 RX ADMIN — TOPIRAMATE 100 MG: 100 TABLET, FILM COATED ORAL at 20:49

## 2025-07-07 NOTE — FLOWSHEET NOTE
Patient continues to be very argumentative with staff.  Approach staff and started taking off clothes stating she got them out of the closet and the sweater she had on was the patient in [employer].  Then became very angry talking about staff last night mixing up her clothes and she wants her clothes and some of what she was saying was not making sense. Patient did eventually go back to her room.

## 2025-07-07 NOTE — GROUP NOTE
Patient did not attend group.    Date: 7/7/2025    Group Start Time: 0910  Group End Time: 0932  Group Topic: Community Meeting    [facility] [place] [facility] Louise Solomon    Stages of Change  Celia Analysis/Discussion, Receptive Music Listening    Patients will listen to \"It's Not the Same Anymore\" by [facility] and discuss lyrics, themes, and interpretations derived from the song. Patients will learn about the 5 Stages of Change (pre-contemplation, contemplation, preparation, action, and maintenance), and explore what stage of change they feel like they are in currently.   Focus: Breaking Negative Cycles, Motivation, Processing, Recovery    Goals: Improve/Maintain Attention to Task, Improve/Maintain Identity Development, Improve/Maintain Self-Awareness, Improve/Maintain Self-Expression, Promote Reality Orientation     Signature: Louise Dunaway, Licensed Professional Music Therapist (LPMT)

## 2025-07-07 NOTE — FLOWSHEET NOTE
Patient has rash on upper right arm and claims it is from the water.  Patient did just get out of the shower. Offered patient vistaril and she refuses, stating it makes her sick.

## 2025-07-07 NOTE — GROUP NOTE
Group Therapy Note    Date: 7/7/2025    Group Start Time: 1005  Group End Time: 1100  Group Topic: Art Therapy     [facility] 3W Felisa Veronica, MARCIOW        Group Therapy Note    Attendees: 8       Patient's Goal:  To participate in morning group art therapy.     Notes:  Patient attended briefly.     Status After Intervention:  Unchanged    Participation Level: Interactive    Participation Quality: Appropriate and Attentive      Speech:  normal      Thought Process/Content: Logical      Affective Functioning: Congruent      Mood: elevated      Level of consciousness:  Alert      Response to Learning: Progressing to goal      Endings: None Reported    Modes of Intervention: Activity      Discipline Responsible: Psychoeducational Specialist      Signature:  Felisa Wallace MA ATR LPAT

## 2025-07-07 NOTE — GROUP NOTE
Date: 7/7/2025    Group Start Time: 1155  Group End Time: 1232  Group Topic: Music Therapy    [facility] [place] 3W Louise Solomon    Live Music & Creative Expression  Improvisation, Live Music Listening, Re-creative Instrument Playing/Singing    Patients will be given a songbook and offered the opportunity to select (a) song(s) of their choice for live music listening on [employer]. Patients will be encouraged to sing/move along to the music. Patients will have the option to play a variety of percussion instruments while listening.    Focus: Building Positive Experiences, Creativity, Relaxation   Goals: Maintain Level of Socialization, Improve Mood, Improve Self-Esteem, Improve Self-Expression, Improve Coping Skills, Increase Sensory Stimulation, Promote Reality Orientation     Patient selected songs for live music listening. Patient socialized with peers/staff, and presented with rapid, pressured speech. Patient had some difficulty concentrating to pick a song, but was able to do so successfully with encouragement. Patient sang spontaneously with increased volume during familiar music and expressed frequently how much she enjoys singing.      Attended: 1/4-1/2 attendance  Participation Level: Interactive  Participation Quality: Sharing  Affect/Mood: Congruent/Elevated  Speech: spontaneous, rapid, and pressured   Thought Content/Processes: Tangential and Disorganized  Level of consciousness: Alert  Response: Able to verbalize current knowledge/experience  Outcomes: Progressing towards goal and Actively participated in the group experience    Signature: Louise Dunaway, Licensed Professional Music Therapist (LPMT)

## 2025-07-07 NOTE — CARE COORDINATION
Client again approaches the nurses station asking nursing staff about LSW, and making further Lutheran statements and accusations against Religion.     LSW asked client to step away from the nurses station and return to her room if she was going to continue her outbursts. Client was successfully redirected.

## 2025-07-07 NOTE — CASE COMMUNICATION
Client approached LSW at the nurses station with a bible. Client is pointing out a passage and asked LSW to read the bible. When client was advised LSW was a Evangelical, she exclaimed \"Oh no! This is about you\" and walked away.

## 2025-07-08 PROCEDURE — 6370000000 HC RX 637 (ALT 250 FOR IP): Performed by: REGISTERED NURSE

## 2025-07-08 PROCEDURE — 6370000000 HC RX 637 (ALT 250 FOR IP): Performed by: PSYCHIATRY & NEUROLOGY

## 2025-07-08 PROCEDURE — 1240000000 HC EMOTIONAL WELLNESS R&B

## 2025-07-08 RX ORDER — CETIRIZINE HYDROCHLORIDE 10 MG/1
10 TABLET ORAL DAILY
Status: DISCONTINUED | OUTPATIENT
Start: 2025-07-08 | End: 2025-07-11 | Stop reason: HOSPADM

## 2025-07-08 RX ORDER — ZOLPIDEM TARTRATE 5 MG/1
5 TABLET ORAL NIGHTLY
Status: DISCONTINUED | OUTPATIENT
Start: 2025-07-08 | End: 2025-07-11 | Stop reason: HOSPADM

## 2025-07-08 RX ORDER — RISPERIDONE 0.25 MG/1
0.5 TABLET ORAL 2 TIMES DAILY
Status: DISCONTINUED | OUTPATIENT
Start: 2025-07-08 | End: 2025-07-09

## 2025-07-08 RX ORDER — DIPHENHYDRAMINE HCL 25 MG
25 TABLET ORAL EVERY 6 HOURS PRN
Status: DISCONTINUED | OUTPATIENT
Start: 2025-07-08 | End: 2025-07-11 | Stop reason: HOSPADM

## 2025-07-08 RX ORDER — BENZOCAINE/MENTHOL 6 MG-10 MG
LOZENGE MUCOUS MEMBRANE 2 TIMES DAILY
Status: DISCONTINUED | OUTPATIENT
Start: 2025-07-08 | End: 2025-07-11 | Stop reason: HOSPADM

## 2025-07-08 RX ADMIN — ZOLPIDEM TARTRATE 5 MG: 5 TABLET ORAL at 20:57

## 2025-07-08 RX ADMIN — NICOTINE POLACRILEX 4 MG: 4 LOZENGE ORAL at 06:37

## 2025-07-08 RX ADMIN — RISPERIDONE 0.5 MG: 0.25 TABLET, FILM COATED ORAL at 11:12

## 2025-07-08 RX ADMIN — HYDROXYZINE PAMOATE 50 MG: 50 CAPSULE ORAL at 03:50

## 2025-07-08 RX ADMIN — NICOTINE POLACRILEX 4 MG: 4 LOZENGE ORAL at 16:50

## 2025-07-08 RX ADMIN — HYDROCORTISONE ACETATE: 1 CREAM TOPICAL at 14:15

## 2025-07-08 RX ADMIN — RISPERIDONE 0.5 MG: 0.25 TABLET, FILM COATED ORAL at 20:57

## 2025-07-08 RX ADMIN — TOPIRAMATE 100 MG: 100 TABLET, FILM COATED ORAL at 20:57

## 2025-07-08 RX ADMIN — TOPIRAMATE 100 MG: 100 TABLET, FILM COATED ORAL at 08:45

## 2025-07-08 RX ADMIN — CETIRIZINE HYDROCHLORIDE 10 MG: 10 TABLET, FILM COATED ORAL at 14:14

## 2025-07-08 RX ADMIN — NICOTINE POLACRILEX 4 MG: 4 LOZENGE ORAL at 08:45

## 2025-07-08 RX ADMIN — NICOTINE POLACRILEX 4 MG: 4 LOZENGE ORAL at 14:14

## 2025-07-08 RX ADMIN — NICOTINE POLACRILEX 4 MG: 4 LOZENGE ORAL at 19:03

## 2025-07-08 RX ADMIN — NICOTINE POLACRILEX 4 MG: 4 LOZENGE ORAL at 11:51

## 2025-07-08 RX ADMIN — NICOTINE POLACRILEX 4 MG: 4 LOZENGE ORAL at 21:34

## 2025-07-08 RX ADMIN — HYDROCORTISONE ACETATE: 1 CREAM TOPICAL at 21:10

## 2025-07-08 NOTE — FLOWSHEET NOTE
Attempted to review admission consents,with patient.Pt initially agreeable and bright. Became more paranoid as consents were explained. Distractible and guarded. Pt says \"I am hungry and I just wanted a snack\" Pt then made comments about writings on the papers and walked away.

## 2025-07-08 NOTE — GROUP NOTE
Date: 7/8/2025    Group Start Time: 0910  Group End Time: 0950  Group Topic: Community Meeting    [facility] 3W Louise Solomon    Goal Setting  Celia Analysis/Discussion, Playlist Building/Song Share    Patients will listen to \"I Choose\" by Estella Alejandra and discuss lyrics/interpretations derived from the song. Patients will identify a song to share that would address their emotional needs today.    Focus: Building Happiness, Motivation  Goals: Improve Self-Awareness/Insight, Increase Level of Socialization, Improve Mood, Improve Self-Expression, Improve Coping Skills     Patient listened to recorded music and engaged in peer song discussions. Patient interpreted that the song's message is choosing \"to be a better me.\" Patient discussed how past experiences influence her currently, and that she wants to develop stronger boundaries with people in her life. Patient left early and did not return.     Attended: 1/2-3/4 attendance  Participation Level: Active Listener and Interactive  Participation Quality: Attentive, Sharing, and Supportive  Affect/Mood: Congruent/Euthymic  Speech: spontaneous   Thought Content/Processes: Disorganized  Level of consciousness: Alert  Response: Able to verbalize current knowledge/experience  Outcomes: Progressing towards goal and Actively participated in the group experience    Signature: Louise Dunaway, Licensed Professional Music Therapist (LPMT)

## 2025-07-08 NOTE — GROUP NOTE
Date: 7/8/2025    Group Start Time: 1215  Group End Time: 1250  Group Topic: Music Therapy    [facility] [place] [facility] Louise Solomon    Music-Based Cognitive Activity  Live Music Listening, Re-creative Singing    Patients will guess song titles in the style of Wheel of Fortune. Patients will collaborate as a group to identify possible letters and solve the puzzle. Patients will join in a live rendition of the corresponding song title after the puzzle is solved.     Focus: Building Positive Experiences, Creativity  Goals: Improve Cognitive Skills, Increase Socialization, Improve Mood, Improve Self-Esteem, Improve Coping Skills, Promote Reality Orientation     Patient provided relevant guesses for letters/solving the puzzle. Patient socialized with peers/staff frequently throughout group. Patient sang along to and moved to the beat of familiar music. Patient expressed appreciation for the experience.     Attended: 1/2-3/4 attendance  Participation Level: Active Listener and Interactive  Participation Quality: Attentive, Sharing, and Supportive  Affect/Mood: Congruent/Euthymic and Brightens  Speech: spontaneous, normal rate, and normal volume   Thought Content/Processes: Vague  Level of consciousness: Alert  Response: Able to verbalize current knowledge/experience  Outcomes: Progressing towards goal and Actively participated in the group experience    Signature: Louise Dunaway, Licensed Professional Music Therapist (LPMT)

## 2025-07-09 PROBLEM — F31.2 SEVERE MANIC BIPOLAR 1 DISORDER WITH PSYCHOTIC BEHAVIOR (HCC): Status: ACTIVE | Noted: 2025-07-09

## 2025-07-09 PROCEDURE — 1240000000 HC EMOTIONAL WELLNESS R&B

## 2025-07-09 PROCEDURE — 6370000000 HC RX 637 (ALT 250 FOR IP): Performed by: REGISTERED NURSE

## 2025-07-09 PROCEDURE — 6370000000 HC RX 637 (ALT 250 FOR IP): Performed by: PSYCHIATRY & NEUROLOGY

## 2025-07-09 RX ORDER — RISPERIDONE 1 MG/1
1 TABLET ORAL 2 TIMES DAILY
Status: DISCONTINUED | OUTPATIENT
Start: 2025-07-09 | End: 2025-07-11 | Stop reason: HOSPADM

## 2025-07-09 RX ADMIN — CETIRIZINE HYDROCHLORIDE 10 MG: 10 TABLET, FILM COATED ORAL at 08:15

## 2025-07-09 RX ADMIN — CLONAZEPAM 0.5 MG: 0.5 TABLET ORAL at 09:20

## 2025-07-09 RX ADMIN — HYDROCORTISONE ACETATE: 1 CREAM TOPICAL at 20:48

## 2025-07-09 RX ADMIN — TOPIRAMATE 100 MG: 100 TABLET, FILM COATED ORAL at 20:47

## 2025-07-09 RX ADMIN — RISPERIDONE 0.5 MG: 0.25 TABLET, FILM COATED ORAL at 08:15

## 2025-07-09 RX ADMIN — NICOTINE POLACRILEX 4 MG: 4 LOZENGE ORAL at 13:43

## 2025-07-09 RX ADMIN — NICOTINE POLACRILEX 4 MG: 4 LOZENGE ORAL at 09:29

## 2025-07-09 RX ADMIN — TOPIRAMATE 100 MG: 100 TABLET, FILM COATED ORAL at 08:15

## 2025-07-09 RX ADMIN — RISPERIDONE 1 MG: 1 TABLET, FILM COATED ORAL at 20:41

## 2025-07-09 RX ADMIN — NICOTINE POLACRILEX 4 MG: 4 LOZENGE ORAL at 19:43

## 2025-07-09 RX ADMIN — NICOTINE POLACRILEX 4 MG: 4 LOZENGE ORAL at 16:41

## 2025-07-09 RX ADMIN — ZOLPIDEM TARTRATE 5 MG: 5 TABLET ORAL at 20:41

## 2025-07-09 RX ADMIN — NICOTINE POLACRILEX 4 MG: 4 LOZENGE ORAL at 06:17

## 2025-07-09 RX ADMIN — HYDROCORTISONE ACETATE: 1 CREAM TOPICAL at 08:13

## 2025-07-09 NOTE — FLOWSHEET NOTE
Pt reports attending groups, poor sleep feeling tired all day.Pt rates anxiety 4/10 Denies depression. Pt socializes with select peers. Pt was irritable throughout my shift. With flight of ideas. Pt denies SI,HI,AVH. Will continue to monitor.

## 2025-07-09 NOTE — FLOWSHEET NOTE
Pt to desk saying \" elicia Brooks I am having a panic attack, I need my klonopin.\"  Pt continues with rapid tangential speech. Easily redirected. Makes many paranoid statements about the unit, people here, and the music being played. Pt medicated with klonopin PO. Pt offered and accepted to sit in sensory room.

## 2025-07-09 NOTE — GROUP NOTE
Group Therapy Note    Date: 7/8/2025    Group Start Time: 1400  Group End Time: 1500  Group Topic:  Group    [facility] Dean Wesley LSW        Group Therapy Note    Attendees: 6       Patient's Goal:  [facility] Group with Nara Choudhury     Notes:  Client participated in [facility] group with Nara Choudhury. Client engaged in open discussion about the services available at [facility].  Discussed the role of a peer supporter and the services available by [facility]. All the clients introduced themselves and we shared a little about ourselves and what we wanted to. Some did not share and some did. Some peers offered supportive feedback and others did not. Discussed SMART goals, boundaries, making our own network of supports that we trust, family roles and dealing with stigma, judgement, positive and negative coping skills, acceptance, and powerlessness in sober living and treatment options.       Status After Intervention:  Improved    Participation Level: Active Listener    Participation Quality: Appropriate      Speech:  normal      Thought Process/Content: Logical      Affective Functioning: Congruent      Mood: euthymic      Level of consciousness:  Alert      Response to Learning: Able to verbalize current knowledge/experience      Endings: None Reported    Modes of Intervention: Education      Discipline Responsible: /Counselor      Signature:  GIOVANNY Kaye

## 2025-07-09 NOTE — GROUP NOTE
Group Therapy Note    Date: 7/9/2025    Group Start Time: 1005  Group End Time: 1100  Group Topic: Art Therapy     [facility] 3W Felisa Veronica, MARCIOW        Group Therapy Note    Attendees: 6       Patient's Goal:  To participate in morning group art therapy.     Notes:  Patient attended briefly and left prematurely.     Modes of Intervention: Activity      Discipline Responsible: Psychoeducational Specialist      Signature:  Felisa Wallace MA ATR LPAT

## 2025-07-09 NOTE — GROUP NOTE
Patient joined for the last few minutes of group, and perseverated on wanting to be discharged.     Date: 7/9/2025    Group Start Time: 0910  Group End Time: 0941  Group Topic: Music Therapy    [facility] 3W Louise Solomon    Song Cover Comparisons  Music Analysis/Discussion, Receptive Music Listening    Patients will be introduced to the concept of mindfulness in relation to music listening. Patients will listen to an original version of a song, and then compare it to 1-2 covers of the same song. Patients will be encouraged to focus on elements such as song interpretation, vocal quality, instrumentation, etc. while listening. Patients will discuss the benefits of staying of mindful, challenges of staying mindful, and how mindfulness may apply to their lives.    Focus: Mindfulness Techniques  Goals: Improve Attention to Task, Improve Self-Awareness, Improve Self-Expression, Improve Coping Skills    Songs used: \"Stand by Me\" original by Richie Prasad; covers by Katie and the Phil Acosta     Signature: Louise Dunaway, Licensed Professional Music Therapist (LPMT)

## 2025-07-09 NOTE — GROUP NOTE
Date: 7/9/2025    Group Start Time: 1215  Group End Time: 1310  Group Topic: Psychoeducation    [facility] [place] 3W Louise Solomon    Music-Based Coping Skills for Anger Management  Clarifying, Education, Exploration, Improvisation, Relaxation, Validation/Support    Patients will learn about the anger cycle, identify their triggers/warning signs, and explore how anger can mask underlying emotions. Patients will complete a drumming exercise to facilitate healthy anger expression as a group. Patients will engage in progressive muscle relaxation (PMR) with LPMT playing guitar, and reflect on how they can manage feelings of anger/related emotions in the future.    Focus: Anger Management, Healthy Emotional Expression, Interpersonal Communication  Goals: Improve Cognitive Skills, Improve Self-Awareness, Improve Self-Expression, Improve Coping Skills, Increase Sensory Stimulation     Patient engaged in discussions about the anger cycle, his triggers/warning signs, and anger masking other emotions. Patient identified her warning signs were increased heart rate, difficulty breathing, and chest pain. Patient identified her triggers are being  from loved ones/loved ones being in danger, and not understanding. Patient contributed to discussions nonsensically at times, often going on tangents about circumstances irrelevant to the topic being explored. Patient utilized the [employer] for group improvisation and engaged in PMR. Patient verbalized finding the intervention to be beneficial.    Attended: 3/4-full attendance  Participation Level: Interactive  Participation Quality: Sharing and Supportive  Affect/Mood: Congruent/Elevated  Speech: spontaneous, normal rate, and normal volume   Thought Content/Processes: Vague  Level of consciousness: Alert  Response: Able to verbalize current knowledge/experience  Outcomes: Progressing towards goal and Actively participated in the group experience    Signature: Kushal Vega

## 2025-07-10 PROCEDURE — 6370000000 HC RX 637 (ALT 250 FOR IP): Performed by: REGISTERED NURSE

## 2025-07-10 PROCEDURE — 6370000000 HC RX 637 (ALT 250 FOR IP): Performed by: PSYCHIATRY & NEUROLOGY

## 2025-07-10 PROCEDURE — 1240000000 HC EMOTIONAL WELLNESS R&B

## 2025-07-10 RX ORDER — NICOTINE 21 MG/24HR
1 PATCH, TRANSDERMAL 24 HOURS TRANSDERMAL DAILY
Status: DISCONTINUED | OUTPATIENT
Start: 2025-07-10 | End: 2025-07-11 | Stop reason: HOSPADM

## 2025-07-10 RX ADMIN — RISPERIDONE 1 MG: 1 TABLET, FILM COATED ORAL at 09:10

## 2025-07-10 RX ADMIN — RISPERIDONE 1 MG: 1 TABLET, FILM COATED ORAL at 20:52

## 2025-07-10 RX ADMIN — NICOTINE POLACRILEX 4 MG: 4 LOZENGE ORAL at 09:10

## 2025-07-10 RX ADMIN — TOPIRAMATE 100 MG: 100 TABLET, FILM COATED ORAL at 09:11

## 2025-07-10 RX ADMIN — CLONAZEPAM 0.5 MG: 0.5 TABLET ORAL at 11:29

## 2025-07-10 RX ADMIN — CETIRIZINE HYDROCHLORIDE 10 MG: 10 TABLET, FILM COATED ORAL at 09:11

## 2025-07-10 RX ADMIN — HYDROCORTISONE ACETATE: 1 CREAM TOPICAL at 20:51

## 2025-07-10 RX ADMIN — HYDROCORTISONE ACETATE: 1 CREAM TOPICAL at 09:10

## 2025-07-10 RX ADMIN — ZOLPIDEM TARTRATE 5 MG: 5 TABLET ORAL at 20:52

## 2025-07-10 RX ADMIN — NICOTINE POLACRILEX 4 MG: 4 LOZENGE ORAL at 06:20

## 2025-07-10 RX ADMIN — TOPIRAMATE 100 MG: 100 TABLET, FILM COATED ORAL at 20:52

## 2025-07-10 NOTE — GROUP NOTE
Group Therapy Note    Date: 7/10/2025    Group Start Time: 1500  Group End Time: 1600  Group Topic: Recovery    [facility] 3W Lynda Baum RN        Group Therapy Note    Attendees:          Patient's Goal:  ***    Notes:  ***    Status After Intervention:  {Status After Intervention:781379837}    Participation Level: {Participation Level:411326096}    Participation Quality: {[facility] PARTICIPATION QUALITY:646229777}      Speech:  {[facility] CD_SPEECH:77633}      Thought Process/Content: {Thought Process/Content:167932366}      Affective Functioning: {Affective Functionin}      Mood: {Mood:580783086}      Level of consciousness:  {Level of consciousness:051589821}      Response to Learning: {[facility] Responses to Learnin}      Endings: {[facility] Endings:01071}    Modes of Intervention: {[facility] Modes of Intervention:424704792}      Discipline Responsible: {[facility] Multidisciplinary:686178401}      Signature:  Lynda Rivera RN

## 2025-07-10 NOTE — GROUP NOTE
[facility]                                                                      Group Therapy Note    Date: 7/10/2025    Group Start Time: 1500  Group End Time: 1600  Group Topic: Recovery    [facility] 3W Lynda Baum RN        Group Therapy Note    Attendees: 6/16    Notes:  Introduction to [facility]     Status After Intervention:  Unchanged    Participation Level: Active Listener    Participation Quality: Appropriate      Speech:  normal      Thought Process/Content: Logical      Affective Functioning: Congruent      Mood: euthymic      Level of consciousness:  Alert      Response to Learning: Progressing to goal      Endings: None Reported    Modes of Intervention: Education, Support, Socialization, and Exploration      Discipline Responsible: /Counselor      Signature:  Lynda Rivera RN

## 2025-07-10 NOTE — GROUP NOTE
Purpose of Group:    To educate patient about supportive community-based resources available post-discharge through [facility].   Intervention/Activities:    Patient participated in an informational group session facilitated by peer supports from [facility]. Peer representatives provided an overview of the services offered, including:   Free daily transportation to and from the facility   Complimentary daily meals for all participants   Access to an Art Room, Fitness Center, and Computer Room   Opportunities for volunteering and social engagement to support reintegration into the community   Patient Response:    Patient was attentive and engaged during the discussion. Asked appropriate questions regarding transportation availability and volunteer options. Demonstrated interest in accessing services that promote structure, socialization, and personal growth in the community post-discharge.   Clinical Impressions:    Patient appears motivated to engage in supportive services that will facilitate recovery and community reintegration. [facility] may offer a stabilizing and enriching environment as part of patient’s discharge planning.   Plan/Recommendations:   Provide patient with [facility] referral and contact information   Encourage follow-up with peer support staff upon discharge   Include [facility] in patient’s discharge plan as a resource for structured community support

## 2025-07-10 NOTE — GROUP NOTE
Date: 7/10/2025    Group Start Time: 0915  Group End Time: 1015  Group Topic: Music Therapy    [facility] [place] 3W Louise Solomon    Motivation Playlist  Music Analysis/Discussion, Playlist Building/Song Share, Receptive Music Listening    Patients will discuss what motivation means to them, and express what things in their lives motivate them. Patients will have the option to identify/select a motivational song to listen to today. Patients will explain their connection to the song and the experience of listening to it in this setting.     Focus: Emotion Identification/Regulation, Empowerment, Self-Esteem  Goals: Improve Identity Development, Improve Self-Awareness, Increase Level of Socialization, Improve Self-Expression, Improve Coping Skills, Promote Reality Orientation     Patient socialized with peers/staff and connected over similar experiences of loss. Patient presented as religiously preoccupied with tangential speech AEB difficulty concentrating, responding to all prompts as related to her anil, and interrupting peers to discuss anil-based topics. Patient shared a motivational song and sang along while listening. Patient expressed that she heard the song while \"cleaning my room and it just hit me.\" Patient spontaneously brought up family members and their history of substance use as well as the passing of her brother several times during group. Patient was receptive to positive feedback from peers.     Attended: 1/2-3/4 attendance  Participation Level: Monopolizing  Participation Quality: Sharing and Supportive  Affect/Mood: Congruent/Elevated  Speech: spontaneous and interrupting   Thought Content/Processes: Tangential, Disorganized, and Perseverative  Level of consciousness: Alert  Response: Able to verbalize current knowledge/experience  Outcomes: Progressing towards goal and Actively participated in the group experience    Signature: Louise Dunaway, Licensed Professional Music Therapist (LPMT)

## 2025-07-10 NOTE — CARE COORDINATION
Observed pt hugging a peer while standing in his doorway. Reminded pt of code of conduct and pt walked away without incident.

## 2025-07-11 VITALS
OXYGEN SATURATION: 100 % | RESPIRATION RATE: 16 BRPM | SYSTOLIC BLOOD PRESSURE: 103 MMHG | HEART RATE: 100 BPM | DIASTOLIC BLOOD PRESSURE: 65 MMHG | HEIGHT: 65 IN | BODY MASS INDEX: 29.45 KG/M2 | TEMPERATURE: 98.6 F

## 2025-07-11 PROCEDURE — 6370000000 HC RX 637 (ALT 250 FOR IP): Performed by: PSYCHIATRY & NEUROLOGY

## 2025-07-11 PROCEDURE — 6370000000 HC RX 637 (ALT 250 FOR IP): Performed by: REGISTERED NURSE

## 2025-07-11 RX ORDER — TOPIRAMATE 100 MG/1
100 TABLET, FILM COATED ORAL 2 TIMES DAILY
Qty: 30 TABLET | Refills: 3 | Status: SHIPPED | OUTPATIENT
Start: 2025-07-11

## 2025-07-11 RX ORDER — ZOLPIDEM TARTRATE 5 MG/1
5 TABLET ORAL NIGHTLY
Qty: 14 TABLET | Refills: 1 | Status: SHIPPED | OUTPATIENT
Start: 2025-07-11 | End: 2025-08-08

## 2025-07-11 RX ORDER — CETIRIZINE HYDROCHLORIDE 10 MG/1
10 TABLET ORAL DAILY
Qty: 15 TABLET | Refills: 1 | Status: SHIPPED | OUTPATIENT
Start: 2025-07-12

## 2025-07-11 RX ORDER — BENZOCAINE/MENTHOL 6 MG-10 MG
LOZENGE MUCOUS MEMBRANE
Qty: 30 G | Refills: 1 | Status: SHIPPED | OUTPATIENT
Start: 2025-07-11 | End: 2025-07-18

## 2025-07-11 RX ORDER — RISPERIDONE 1 MG/1
1 TABLET ORAL 2 TIMES DAILY
Qty: 30 TABLET | Refills: 3 | Status: SHIPPED | OUTPATIENT
Start: 2025-07-11

## 2025-07-11 RX ADMIN — RISPERIDONE 1 MG: 1 TABLET, FILM COATED ORAL at 09:03

## 2025-07-11 RX ADMIN — HYDROCORTISONE ACETATE: 1 CREAM TOPICAL at 09:16

## 2025-07-11 RX ADMIN — TOPIRAMATE 100 MG: 100 TABLET, FILM COATED ORAL at 09:03

## 2025-07-11 RX ADMIN — CLONAZEPAM 0.5 MG: 0.5 TABLET ORAL at 01:42

## 2025-07-11 RX ADMIN — CETIRIZINE HYDROCHLORIDE 10 MG: 10 TABLET, FILM COATED ORAL at 09:03

## 2025-07-11 NOTE — DISCHARGE INSTRUCTIONS
Someone from [facility] will be calling you tomorrow to follow up on your care. If you don't hear from us, give us a call! [phone].    Keep all follow up appointments, take medications as ordered, utilize positive supports, abstain from use of alcohol and drugs. If symptoms return or you feel at risk to yourself or others, please call 911, return the nearest emergency room, or call your local crisis hotline:  [facility]: [phone]  [facility]: [phone]  [facility]: [phone]     For assistance with quitting smoking please go to https://smokefree.gov. A prescription for an FDA-approved tobacco cessation medication was offered at discharge and the patient refused.

## 2025-07-11 NOTE — PLAN OF CARE
Problem: Anxiety  Goal: Will report anxiety at manageable levels  Description: INTERVENTIONS:  1. Administer medication as ordered  2. Teach and rehearse alternative coping skills  3. Provide emotional support with 1:1 interaction with staff  7/6/2025 0916 by Lynda Rivera RN  Outcome: Not Progressing  7/5/2025 1955 by Mallika Turner RN  Outcome: Progressing  Flowsheets (Taken 7/5/2025 1941)  Will report anxiety at manageable levels: Administer medication as ordered     Problem: Coping  Goal: Pt/Family able to verbalize concerns and demonstrate effective coping strategies  Description: INTERVENTIONS:  1. Assist patient/family to identify coping skills, available support systems and cultural and spiritual values  2. Provide emotional support, including active listening and acknowledgement of concerns of patient and caregivers  3. Reduce environmental stimuli, as able  4. Instruct patient/family in relaxation techniques, as appropriate  5. Assess for spiritual pain/suffering and initiate Spiritual Care, Psychosocial Clinical Specialist consults as needed  7/6/2025 0916 by Lynda Rivera RN  Outcome: Not Progressing  7/5/2025 1955 by Mallika Turner RN  Outcome: Progressing  Flowsheets (Taken 7/5/2025 1941)  Patient/family able to verbalize anxieties, fears, and concerns, and demonstrate effective coping: Reduce environmental stimuli, as able     Problem: Jessica  Goal: Will exhibit normal sleep and speech and no impulsivity  Description: INTERVENTIONS:  1. Administer medication as ordered  2. Set limits on impulsive behavior  3. Make attempts to decrease external stimuli as possible  7/6/2025 0916 by Lynda Rivera RN  Outcome: Not Progressing  7/5/2025 1955 by Mallika Turner RN  Outcome: Progressing     
  Problem: Risk for Elopement  Goal: Patient will not exit the unit/facility without proper excort  7/10/2025 2129 by Kate Magana RN  Outcome: Progressing  Flowsheets (Taken 7/10/2025 1732 by Deanna Brown, RN)  Nursing Interventions for Elopement Risk: Reduce environmental triggers  7/10/2025 1717 by Deanna Brown RN  Outcome: Progressing     Problem: Anxiety  Goal: Will report anxiety at manageable levels  Description: INTERVENTIONS:  1. Administer medication as ordered  2. Teach and rehearse alternative coping skills  3. Provide emotional support with 1:1 interaction with staff  7/10/2025 2129 by Kate Magana RN  Outcome: Progressing  Flowsheets (Taken 7/10/2025 1732 by Deanna Brown, RN)  Will report anxiety at manageable levels:   Provide emotional support with 1:1 interaction with staff   Teach and rehearse alternative coping skills   Administer medication as ordered  7/10/2025 1717 by Deanna Brown RN  Outcome: Progressing     Problem: Coping  Goal: Pt/Family able to verbalize concerns and demonstrate effective coping strategies  Description: INTERVENTIONS:  1. Assist patient/family to identify coping skills, available support systems and cultural and spiritual values  2. Provide emotional support, including active listening and acknowledgement of concerns of patient and caregivers  3. Reduce environmental stimuli, as able  4. Instruct patient/family in relaxation techniques, as appropriate  5. Assess for spiritual pain/suffering and initiate Spiritual Care, Psychosocial Clinical Specialist consults as needed  7/10/2025 2129 by Kate Magana RN  Outcome: Progressing  Flowsheets (Taken 7/10/2025 1732 by Deanna Brown, RN)  Patient/family able to verbalize anxieties, fears, and concerns, and demonstrate effective coping: Reduce environmental stimuli, as able  7/10/2025 1717 by Deanna Brown RN  Outcome: Progressing     Problem: Behavior  Goal: Pt/Family maintain appropriate behavior and adhere 
  Problem: Risk for Elopement  Goal: Patient will not exit the unit/facility without proper excort  7/9/2025 1229 by Mallika Turner RN  Outcome: Progressing  Flowsheets (Taken 7/9/2025 1224)  Nursing Interventions for Elopement Risk:   Reduce environmental triggers   Make sure patient has all necessary personal care items  7/8/2025 2309 by Rosie Mckeon RN  Outcome: Progressing  Flowsheets (Taken 7/8/2025 1209 by Ryan Bowman, RN)  Nursing Interventions for Elopement Risk:   Reduce environmental triggers   Make sure patient has all necessary personal care items     Problem: Anxiety  Goal: Will report anxiety at manageable levels  Description: INTERVENTIONS:  1. Administer medication as ordered  2. Teach and rehearse alternative coping skills  3. Provide emotional support with 1:1 interaction with staff  7/9/2025 1229 by Mallika Turner RN  Outcome: Progressing  Flowsheets (Taken 7/9/2025 1224)  Will report anxiety at manageable levels:   Provide emotional support with 1:1 interaction with staff   Administer medication as ordered  7/8/2025 2309 by Rosie Mckeon RN  Outcome: Progressing  Flowsheets (Taken 7/8/2025 1209 by Ryan Bowman, RN)  Will report anxiety at manageable levels:   Administer medication as ordered   Teach and rehearse alternative coping skills   Provide emotional support with 1:1 interaction with staff     Problem: Coping  Goal: Pt/Family able to verbalize concerns and demonstrate effective coping strategies  Description: INTERVENTIONS:  1. Assist patient/family to identify coping skills, available support systems and cultural and spiritual values  2. Provide emotional support, including active listening and acknowledgement of concerns of patient and caregivers  3. Reduce environmental stimuli, as able  4. Instruct patient/family in relaxation techniques, as appropriate  5. Assess for spiritual pain/suffering and initiate Spiritual Care, Psychosocial Clinical 
  Problem: Risk for Elopement  Goal: Patient will not exit the unit/facility without proper excort  Outcome: Not Progressing  Flowsheets (Taken 7/7/2025 1234)  Nursing Interventions for Elopement Risk:   Reduce environmental triggers   Make sure patient has all necessary personal care items     Problem: Anxiety  Goal: Will report anxiety at manageable levels  Description: INTERVENTIONS:  1. Administer medication as ordered  2. Teach and rehearse alternative coping skills  3. Provide emotional support with 1:1 interaction with staff  Outcome: Not Progressing  Flowsheets (Taken 7/7/2025 1234)  Will report anxiety at manageable levels:   Administer medication as ordered   Teach and rehearse alternative coping skills   Provide emotional support with 1:1 interaction with staff     Problem: Coping  Goal: Pt/Family able to verbalize concerns and demonstrate effective coping strategies  Description: INTERVENTIONS:  1. Assist patient/family to identify coping skills, available support systems and cultural and spiritual values  2. Provide emotional support, including active listening and acknowledgement of concerns of patient and caregivers  3. Reduce environmental stimuli, as able  4. Instruct patient/family in relaxation techniques, as appropriate  5. Assess for spiritual pain/suffering and initiate Spiritual Care, Psychosocial Clinical Specialist consults as needed  Outcome: Not Progressing  Flowsheets (Taken 7/7/2025 1234)  Patient/family able to verbalize anxieties, fears, and concerns, and demonstrate effective coping:   Reduce environmental stimuli, as able   Provide emotional support, including active listening and acknowledgement of concerns of patient and caregivers   Instruct patient/family in relaxation techniques, as appropriate     Problem: Behavior  Goal: Pt/Family maintain appropriate behavior and adhere to behavioral management agreement, if implemented  Description: INTERVENTIONS:  1. Assess patient/family's 
  Problem: Risk for Elopement  Goal: Patient will not exit the unit/facility without proper excort  Outcome: Progressing     Problem: Anxiety  Goal: Will report anxiety at manageable levels  Description: INTERVENTIONS:  1. Administer medication as ordered  2. Teach and rehearse alternative coping skills  3. Provide emotional support with 1:1 interaction with staff  Outcome: Progressing     Problem: Coping  Goal: Pt/Family able to verbalize concerns and demonstrate effective coping strategies  Description: INTERVENTIONS:  1. Assist patient/family to identify coping skills, available support systems and cultural and spiritual values  2. Provide emotional support, including active listening and acknowledgement of concerns of patient and caregivers  3. Reduce environmental stimuli, as able  4. Instruct patient/family in relaxation techniques, as appropriate  5. Assess for spiritual pain/suffering and initiate Spiritual Care, Psychosocial Clinical Specialist consults as needed  Outcome: Progressing     Problem: Behavior  Goal: Pt/Family maintain appropriate behavior and adhere to behavioral management agreement, if implemented  Description: INTERVENTIONS:  1. Assess patient/family's coping skills and  non-compliant behavior (including use of illegal substances)  2. Notify security of behavior or suspected illegal substances which indicate the need for search of the family and/or belongings  3. Encourage verbalization of thoughts and concerns in a socially appropriate manner  4. Utilize positive, consistent limit setting strategies supporting safety of patient, staff and others  5. Encourage participation in the decision making process about the behavioral management agreement  6. If a visitor's behavior poses a threat to safety call refer to organization policy.  7. Initiate consult with , Psychosocial CNS, Spiritual Care as appropriate  Outcome: Progressing     Problem: Involuntary Admit  Goal: Will 
  Problem: Risk for Elopement  Goal: Patient will not exit the unit/facility without proper excort  Outcome: Progressing  Flowsheets (Taken 7/8/2025 1209 by Ryan Bowman, RN)  Nursing Interventions for Elopement Risk:   Reduce environmental triggers   Make sure patient has all necessary personal care items     Problem: Anxiety  Goal: Will report anxiety at manageable levels  Description: INTERVENTIONS:  1. Administer medication as ordered  2. Teach and rehearse alternative coping skills  3. Provide emotional support with 1:1 interaction with staff  Outcome: Progressing  Flowsheets (Taken 7/8/2025 1209 by Ryan Bowman, RN)  Will report anxiety at manageable levels:   Administer medication as ordered   Teach and rehearse alternative coping skills   Provide emotional support with 1:1 interaction with staff     Problem: Coping  Goal: Pt/Family able to verbalize concerns and demonstrate effective coping strategies  Description: INTERVENTIONS:  1. Assist patient/family to identify coping skills, available support systems and cultural and spiritual values  2. Provide emotional support, including active listening and acknowledgement of concerns of patient and caregivers  3. Reduce environmental stimuli, as able  4. Instruct patient/family in relaxation techniques, as appropriate  5. Assess for spiritual pain/suffering and initiate Spiritual Care, Psychosocial Clinical Specialist consults as needed  Outcome: Progressing  Flowsheets (Taken 7/8/2025 1209 by Ryan Bowman, RN)  Patient/family able to verbalize anxieties, fears, and concerns, and demonstrate effective coping:   Provide emotional support, including active listening and acknowledgement of concerns of patient and caregivers   Reduce environmental stimuli, as able   Instruct patient/family in relaxation techniques, as appropriate     Problem: Behavior  Goal: Pt/Family maintain appropriate behavior and adhere to behavioral management agreement, 
Kanchan has been out on the unit and participating in groups. She is much less intrusive stating she feels like she needs to be on her best  behavior and is putting forth the effort to do so, doing well at self monitoring. She denies SI/HI and hallucinations. She reports both Anxiety and depression at low at a level 1/10.   Problem: Risk for Elopement  Goal: Patient will not exit the unit/facility without proper excort  Outcome: Progressing     Problem: Anxiety  Goal: Will report anxiety at manageable levels  Description: INTERVENTIONS:  1. Administer medication as ordered  2. Teach and rehearse alternative coping skills  3. Provide emotional support with 1:1 interaction with staff  Outcome: Progressing     Problem: Coping  Goal: Pt/Family able to verbalize concerns and demonstrate effective coping strategies  Description: INTERVENTIONS:  1. Assist patient/family to identify coping skills, available support systems and cultural and spiritual values  2. Provide emotional support, including active listening and acknowledgement of concerns of patient and caregivers  3. Reduce environmental stimuli, as able  4. Instruct patient/family in relaxation techniques, as appropriate  5. Assess for spiritual pain/suffering and initiate Spiritual Care, Psychosocial Clinical Specialist consults as needed  Outcome: Progressing     Problem: Behavior  Goal: Pt/Family maintain appropriate behavior and adhere to behavioral management agreement, if implemented  Description: INTERVENTIONS:  1. Assess patient/family's coping skills and  non-compliant behavior (including use of illegal substances)  2. Notify security of behavior or suspected illegal substances which indicate the need for search of the family and/or belongings  3. Encourage verbalization of thoughts and concerns in a socially appropriate manner  4. Utilize positive, consistent limit setting strategies supporting safety of patient, staff and others  5. Encourage participation 
Patient admitted to 3 on pink slip from [employer] for manic behaviors, versed was given by EMS prior to arrival. Upon arrival to unit patient was paranoid, disorganized, and argumentative. Has poor insight and slightly uncooperative. Patient has rapid, pressured speech, tangential with flight of ideas. Is preoccupied and repeating \"my son is missing and no-one cares, how would you feel\" Patient denies any depression. Denies SI/HI/AVH. Patient also states \"I take my medicine, I don't know why I have to stay here now\"     Problem: Behavior  Goal: Pt/Family maintain appropriate behavior and adhere to behavioral management agreement, if implemented  Description: INTERVENTIONS:  1. Assess patient/family's coping skills and  non-compliant behavior (including use of illegal substances)  2. Notify security of behavior or suspected illegal substances which indicate the need for search of the family and/or belongings  3. Encourage verbalization of thoughts and concerns in a socially appropriate manner  4. Utilize positive, consistent limit setting strategies supporting safety of patient, staff and others  5. Encourage participation in the decision making process about the behavioral management agreement  6. If a visitor's behavior poses a threat to safety call refer to organization policy.  7. Initiate consult with , Psychosocial CNS, Spiritual Care as appropriate  7/5/2025 1350 by Mallika Turner, RN  Outcome: Not Progressing  7/5/2025 1345 by Mallika Turner, RN  Outcome: Progressing     Problem: Jessica  Goal: Will exhibit normal sleep and speech and no impulsivity  Description: INTERVENTIONS:  1. Administer medication as ordered  2. Set limits on impulsive behavior  3. Make attempts to decrease external stimuli as possible  7/5/2025 1350 by Mallika Turner, RN  Outcome: Not Progressing  7/5/2025 1345 by Mallika Turner, RN  Outcome: Progressing     Problem: Psychosis  Goal: Will report no 
Patient out in the day room, social with other peers. Has poor insight into admission circumstances. Remains preoccupied with \"missing son and no one cares\" Displaying manic behaviors, impulsive with labile mood. Speech is rapid, pressured and tangential. Disorganized with Flight of ideas and non-sensical in conversation. Denies any depression. Denies SI/HI/AVH.     Problem: Behavior  Goal: Pt/Family maintain appropriate behavior and adhere to behavioral management agreement, if implemented  Description: INTERVENTIONS:  1. Assess patient/family's coping skills and  non-compliant behavior (including use of illegal substances)  2. Notify security of behavior or suspected illegal substances which indicate the need for search of the family and/or belongings  3. Encourage verbalization of thoughts and concerns in a socially appropriate manner  4. Utilize positive, consistent limit setting strategies supporting safety of patient, staff and others  5. Encourage participation in the decision making process about the behavioral management agreement  6. If a visitor's behavior poses a threat to safety call refer to organization policy.  7. Initiate consult with , Psychosocial CNS, Spiritual Care as appropriate  7/5/2025 1955 by Mallika Turner, RN  Outcome: Progressing  Flowsheets (Taken 7/5/2025 1941)  Patient/family maintains appropriate behavior and adheres to behavioral management agreement, if implemented: Utilize positive, consistent limit setting strategies supporting safety of patient, staff and others  7/5/2025 1350 by Mallika Turner, RN  Outcome: Not Progressing  7/5/2025 1345 by Mallika Turner, RN  Outcome: Progressing     Problem: Jessica  Goal: Will exhibit normal sleep and speech and no impulsivity  Description: INTERVENTIONS:  1. Administer medication as ordered  2. Set limits on impulsive behavior  3. Make attempts to decrease external stimuli as possible  7/5/2025 1955 by Johnny 
agreement, if implemented  Description: INTERVENTIONS:  1. Assess patient/family's coping skills and  non-compliant behavior (including use of illegal substances)  2. Notify security of behavior or suspected illegal substances which indicate the need for search of the family and/or belongings  3. Encourage verbalization of thoughts and concerns in a socially appropriate manner  4. Utilize positive, consistent limit setting strategies supporting safety of patient, staff and others  5. Encourage participation in the decision making process about the behavioral management agreement  6. If a visitor's behavior poses a threat to safety call refer to organization policy.  7. Initiate consult with , Psychosocial CNS, Spiritual Care as appropriate  7/9/2025 2350 by Nara Packer RN  Outcome: Progressing  7/9/2025 1229 by Mallika Turner RN  Outcome: Progressing  Flowsheets (Taken 7/9/2025 1224)  Patient/family maintains appropriate behavior and adheres to behavioral management agreement, if implemented: Utilize positive, consistent limit setting strategies supporting safety of patient, staff and others     Problem: Involuntary Admit  Goal: Will cooperate with staff recommendations and doctor's orders and will demonstrate appropriate behavior  Description: INTERVENTIONS:  1. Treat underlying conditions and offer medication as ordered  2. Educate regarding involuntary admission procedures and rules  3. Contain excessive/inappropriate behavior per unit and hospital policies  7/9/2025 2350 by Nara Packer RN  Outcome: Progressing  7/9/2025 1229 by Mallika Turner RN  Outcome: Progressing  Flowsheets (Taken 7/9/2025 1224)  Will cooperate with staff recommendations and doctor's orders and will demonstrate appropriate behavior: Contain excessive/inappropriate behavior per unit and hospital policies     Problem: Jessica  Goal: Will exhibit normal sleep and speech and no impulsivity  Description: 
participation in the decision making process about the behavioral management agreement  6. If a visitor's behavior poses a threat to safety call refer to organization policy.  7. Initiate consult with , Psychosocial CNS, Spiritual Care as appropriate  7/8/2025 0738 by Ryan Bowman RN  Outcome: Not Progressing  7/7/2025 1903 by Elizabet Bullard RN  Outcome: Progressing     Problem: Involuntary Admit  Goal: Will cooperate with staff recommendations and doctor's orders and will demonstrate appropriate behavior  Description: INTERVENTIONS:  1. Treat underlying conditions and offer medication as ordered  2. Educate regarding involuntary admission procedures and rules  3. Contain excessive/inappropriate behavior per unit and hospital policies  7/8/2025 0738 by Ryan Bowman RN  Outcome: Not Progressing  7/7/2025 1903 by Elizabet Bullard RN  Outcome: Progressing     Problem: Jessica  Goal: Will exhibit normal sleep and speech and no impulsivity  Description: INTERVENTIONS:  1. Administer medication as ordered  2. Set limits on impulsive behavior  3. Make attempts to decrease external stimuli as possible  7/8/2025 0738 by Ryan Bowman RN  Outcome: Not Progressing  7/7/2025 1903 by Elizabet Bullard RN  Outcome: Progressing     Problem: Psychosis  Goal: Will report no hallucinations or delusions  Description: INTERVENTIONS:  1. Administer medication as  ordered  2. Assist with reality testing to support increasing orientation  3. Assess if patient's hallucinations or delusions are encouraging self harm or harm to others and intervene as appropriate  7/8/2025 0738 by Ryan Bowman RN  Outcome: Not Progressing  7/7/2025 1903 by Elizabet Bullard RN  Outcome: Progressing     
hospital policies   Treat underlying conditions and offer medication as ordered     Problem: Jessica  Goal: Will exhibit normal sleep and speech and no impulsivity  Description: INTERVENTIONS:  1. Administer medication as ordered  2. Set limits on impulsive behavior  3. Make attempts to decrease external stimuli as possible  7/7/2025 1903 by Elizabet Bullard RN  Outcome: Progressing  7/7/2025 1419 by Ryan Bowman RN  Outcome: Not Progressing     Problem: Psychosis  Goal: Will report no hallucinations or delusions  Description: INTERVENTIONS:  1. Administer medication as  ordered  2. Assist with reality testing to support increasing orientation  3. Assess if patient's hallucinations or delusions are encouraging self harm or harm to others and intervene as appropriate  7/7/2025 1903 by Elizabet Bullard RN  Outcome: Progressing  7/7/2025 1419 by Ryan Bowman RN  Outcome: Not Progressing

## 2025-07-11 NOTE — DISCHARGE SUMMARY
tablet 100 mg, 100 mg, Oral, BID, Tu Driver MD, 100 mg at 07/11/25 0903    hydrOXYzine pamoate (VISTARIL) capsule 50 mg, 50 mg, Oral, Q6H PRN, 50 mg at 07/08/25 0350 **OR** hydrOXYzine (VISTARIL) injection 50 mg, 50 mg, IntraMUSCular, Q6H PRN, Rush Morris MD    acetaminophen (TYLENOL) tablet 650 mg, 650 mg, Oral, Q4H PRN, Rush Morris MD    magnesium hydroxide (MILK OF MAGNESIA) 400 MG/5ML suspension 30 mL, 30 mL, Oral, Daily PRN, Rush Morris MD    aluminum & magnesium hydroxide-simethicone (MAALOX PLUS) 200-200-20 MG/5ML suspension 30 mL, 30 mL, Oral, PRN, Rush Morris MD    haloperidol (HALDOL) tablet 5 mg, 5 mg, Oral, Q6H PRN **OR** haloperidol lactate (HALDOL) injection 5 mg, 5 mg, IntraMUSCular, Q6H PRN, Rush Morris MD    benztropine mesylate (COGENTIN) injection 2 mg, 2 mg, IntraMUSCular, BID PRN, Rush Morris MD    clonazePAM (KLONOPIN) tablet 0.5 mg, 0.5 mg, Oral, BID PRN, Rush Morris MD, 0.5 mg at 07/11/25 0142    Examination:  /65   Pulse 100   Temp 98.6 °F (37 °C)   Resp 16   Ht 1.651 m (5' 5\")   SpO2 100%   BMI 29.45 kg/m²   Gait - steady    HOSPITAL COURSE::  Following admission to the hospital, patient had a complete physical exam and blood work up  Patient was monitored closely with suicide precaution  Patient was started on meds as listed below  Was encouraged to participate in group and other milieu activity  Patient started to feel better with this combination of treatment.  Significant progress in the symptoms since admission.    Mood better, with the score of 2/10 - bad  No AVH or paranoid thoughts  No Hopeless or worthless feeling  No active SI/HI  Appetite:  [x] Normal  [] Increased  [] Decreased    Sleep:       [x] Normal  [] Fair       [] Poor            Energy:    [x] Normal  [] Increased  [] Decreased     SI [] Present  [x] Absent  HI  []Present  [x] Absent   Aggression:  [] yes  [] no  Patient is [x] able  [] unable to CONTRACT FOR

## 2025-07-11 NOTE — FLOWSHEET NOTE
Reviewed discharge instructions with patient. Pt. Verbalized understanding. Denies SI/HI/AVH. Ambulatory off unit to cab.

## 2025-07-11 NOTE — GROUP NOTE
Patient joined for the last few minutes of group and sang along to familiar music selected. Patient verbalized excitement for anticipated discharge.    Date: 7/11/2025    Group Start Time: 1015  Group End Time: 1050  Group Topic: Music Therapy    [facility] Louise Solomon    Live Music Group  Live Music Listening, Re-creative Singing    Patients will be given a songbook and offered the opportunity to select (a) song(s) of their choice for live music listening on [employer]. Patients will be encouraged to sing along, move along, and listen to the music.    Focus: Building Positive Experiences, Relaxation  Goals: Increase Level of Socialization, Improve Mood, Improve Self-Esteem, Improve Coping Skills, Promote Reality Orientation     Attended: Less than 1/4 attendance    Signature: Louise Dunaway Licensed Professional Music Therapist (LPMT)

## 2025-07-11 NOTE — TRANSITION OF CARE
Behavioral Health Transition Record    Patient Name: Kanchan Cam  YOB: 1980   Medical Record Number: [id number]  Date of Admission: 7/4/2025  8:23 PM   Date of Discharge: 07/11/2025    Attending Provider: Tu Driver MD   Discharging Provider: Dr. Driver  To contact this individual call [phone] and ask the  to page.  If unavailable, ask to be transferred to Behavioral Health Provider on call.  A Behavioral Health Provider will be available on call 24/7 and during holidays.    Primary Care Provider: Nia Martin APRN - CNP    Allergies   Allergen Reactions    Latuda [Lurasidone Hcl] Hives    Lurasidone Hives     Uses paper tape    Adipex-P [Phentermine] Other (See Comments)     Causes kenna     Metformin And Related Other (See Comments)     Causes abdominal pain and diarrhea    Adhesive Tape Rash       Reason for Admission:   CIRCUMSTANCES OF ADMISSION:      Ms. Kanchan Cam is a 45 y.o. female with a history of Bipolar Disorder, who was brought to the ER by Police for acutely psychotic behavior. Per ER notes, \"Patient was outside where people were calling regarding for a local [employer] display when she reportedly started running in and out of the roadway.\". She reportedly was making nonsensical statements, and had to be sedated. While in the ER, she was tangential, talking about her adult children missing, the war in [place], the gas station \"rumbling\". She needed to be sedated in the ambulance, and again in the ER. After medical clearance, she was admitted to .     HISTORY OF PRESENT ILLNESS:       Ms. Kanchan Cam is a 45 y.o. female with a history of Bipolar Disorder, who was brought to the ER for manic and psychotic symptoms, as above.  When admitted to , she reportedly was argumentative.  When interviewed today, she said, when asked about the circumstances of her admission, that she \"has been overwhelmed\", and then talked about \"not understanding the Internet too

## 2025-07-11 NOTE — GROUP NOTE
Date: 7/11/2025    Group Start Time: 0910  Group End Time: 0952  Group Topic: Psychoeducation    [facility] 3[facility] Louise Solomon    Anxiety Exploration  Celia Analysis/Discussion, Psycho-education, Receptive Music Listening    Patients will listen to \"Anxiety\" by Carrol Cheng & Earnestine Soni and discuss lyrics/themes/interpretations derived from the song. Patients will identify potential triggers, physical symptoms, and thoughts related to anxiety and stress management. Patients will learn about and review coping skills as a group, and reflect on other ways they have managed anxiety in the past.    Focus: Anxiety and Stress Management  Goals: Improve Attention to Task, Improve Cognitive Skills, Improve Self-Awareness, Improve Coping Skills     Patient engaged in discussions about anxiety management and coping skills. Patient monopolized at times and provided loosely related responses to discussion prompts. Patient expressed that she \"prioritizes my responsibilities\" and seeks out new social supports to help with her mental health. Patient frequently talked about discharge planning, and left early.    Attended: 1/4-1/2 attendance  Participation Level: Monopolizing  Participation Quality: Sharing  Affect/Mood: Congruent/Elevated and Brightens  Speech: spontaneous, rapid, and hyperverbal   Thought Content/Processes: Tangential, Loose or idiosyncratic associations, Disorganized, and Perseverative  Level of consciousness: Inattentive  Response: Able to verbalize current knowledge/experience and Able to change behavior  Outcomes: Actively participated in the group experience and Anticipated discharge today    Signature: Louise Dunaway, Licensed Professional Music Therapist (LPMT)

## 2025-07-11 NOTE — PROGRESS NOTES
[facility]  BEHAVIORAL HEALTH FOLLOW-UP NOTE       7/9/2025     Patient was seen and examined in person, Chart reviewed   Patient's case discussed with staff/team    Chief Complaint: Jessica    Interim History:     Pt slept better last night  Still presenting with racing disorganized thinking  Pt is social with peers  Mood less manic  Paranoid ideation   Poor insight    Appetite:   [] Normal/Unchanged  [] Increased  [x] Decreased      Sleep:       [] Normal/Unchanged  [x] Fair       [] Poor              Energy:    [] Normal/Unchanged  [] Increased  [x] Decreased        SI [] Present  [] Absent    HI  []Present  [] Absent     Aggression:  [] yes  [] no    Patient is [] able  [x] unable to CONTRACT FOR SAFETY     PAST MEDICAL/PSYCHIATRIC HISTORY:   Past Medical History:   Diagnosis Date    ADD (attention deficit disorder) 2021    Bipolar disorder ([facility])     Depression     DM (diabetes mellitus) ([facility])     History of drug abuse ([facility])     positive drug screens 9/22/14 and 5/18/14    Hyperlipidemia     Morbid obesity with body mass index (BMI) of 40.0 to 44.9 in adult ([facility]) 10/12/2018    QI (obstructive sleep apnea) 09/28/2018    Osteoarthritis        FAMILY/SOCIAL HISTORY:  Family History   Problem Relation Age of Onset    Cirrhosis Brother     Breast Cancer Maternal Aunt     Breast Cancer Other      Social History     Socioeconomic History    Marital status:      Spouse name: Not on file    Number of children: Not on file    Years of education: Not on file    Highest education level: Not on file   Occupational History    Not on file   Tobacco Use    Smoking status: Every Day     Current packs/day: 1.00     Average packs/day: 1 pack/day for 0.5 years (0.5 ttl pk-yrs)     Types: Cigarettes     Start date: 2025     Last attempt to quit: 12/7/2023     Passive exposure: Current    Smokeless tobacco: Former   Vaping Use    Vaping status: Former   Substance and Sexual Activity    Alcohol use: Not 
               [facility]  BEHAVIORAL HEALTH FOLLOW-UP NOTE       7/7/2025     Patient was seen and examined in person, Chart reviewed   Patient's case discussed with staff/team    Chief Complaint: Jessica    Interim History:     Pt remain manic with rapid pressured speech and FOI  Poor insight  Impulsive tendencies  Has been compliant with meds  Agreeable to take higher dose of topamax    Appetite:   [] Normal/Unchanged  [] Increased  [x] Decreased      Sleep:       [] Normal/Unchanged  [x] Fair       [] Poor              Energy:    [] Normal/Unchanged  [] Increased  [x] Decreased        SI [] Present  [] Absent    HI  []Present  [] Absent     Aggression:  [] yes  [] no    Patient is [] able  [x] unable to CONTRACT FOR SAFETY     PAST MEDICAL/PSYCHIATRIC HISTORY:   Past Medical History:   Diagnosis Date    ADD (attention deficit disorder) 2021    Bipolar disorder ([facility])     Depression     DM (diabetes mellitus) ([facility])     History of drug abuse ([facility])     positive drug screens 9/22/14 and 5/18/14    Hyperlipidemia     Morbid obesity with body mass index (BMI) of 40.0 to 44.9 in adult ([facility]) 10/12/2018    QI (obstructive sleep apnea) 09/28/2018    Osteoarthritis        FAMILY/SOCIAL HISTORY:  Family History   Problem Relation Age of Onset    Cirrhosis Brother     Breast Cancer Maternal Aunt     Breast Cancer Other      Social History     Socioeconomic History    Marital status:      Spouse name: Not on file    Number of children: Not on file    Years of education: Not on file    Highest education level: Not on file   Occupational History    Not on file   Tobacco Use    Smoking status: Every Day     Current packs/day: 1.00     Average packs/day: 1 pack/day for 0.5 years (0.5 ttl pk-yrs)     Types: Cigarettes     Start date: 2025     Last attempt to quit: 12/7/2023     Passive exposure: Current    Smokeless tobacco: Former   Vaping Use    Vaping status: Former   Substance and Sexual Activity    Alcohol 
CLINICAL PHARMACY NOTE: MEDS TO BEDS    Total # of Prescriptions Filled: 5   The following medications were delivered to the patient:  Topiramate 100 mg Tab  Cetirizine 10 mg Tab  Risperidone 1 mg Tab  Zolpidem 5 mg Tab  Hydrocortisone 1% Cream    Additional Documentation:      
Patient at the desk \"you rich. How rich are you?. People drink ice water in hell. Can't have pants. No the food sucks here. You that rich\". Pt refusing PRNs. Disorganized. Nonsensical.   
Patient back up to the desk \"I'll take that vistaril now\". Pt requested/ received PRN vistaril. Pt then stated \"you said the food sucks here I'm going to throw this up in 30 minutes\".   
Patient out . Social on unit,   Denies SI,HI,AH,VH,anxiety and depression  Sleep ok, appetite increased  Goal- go to group  Main support - Buddhist, daughter  Has 4 kids and 5 grandkids  Does not work, but would if she needed to  Went through a divorce lately and has her own apartment  Has a dog the neighbor is taking care of  Was getting help through [facility], but did not have a case  manager  Fun - swim, dance , outside, camp  Then topic changed and she started talking about going to the gas station to by a pack of smokes , the gigi was really nice, so she can't understand why they cost so much money, she had the lighter and all and had the money for it  Then started talking about her kids and how she needs a pair of jeans, but they should be good because they are done with their periods. They should be done bleeding.        
Patient speaking to another staff member \"Rosie is an asshole. She told me to eat out of the garbage. I never want to see her again\".   
Patient stated \"you got my risperdal so I can get bigger boobies \"  
Patient to 3W via wheelchair. Skin checked completed with Ludy LARA. Skin intact. Patient refused orientation to unit. Paranoid, argumentative and uncooperative upon arrival to the unit. Belongings secured in locker/safe.  
Progress Note    Date:7/7/2025       Room:[place] [address]-01  Patient Name:Kanchan Cam     YOB: 1980     Age:45 y.o.    Assessment        Hospital Problems           Last Modified POA    * (Principal) Bipolar 1 disorder ([facility]) 7/5/2025 Yes       Plan:      *Bipolar 1 disorder  -Psychiatry managing    *QI  - SpO2 99% on room air; denies shortness of breath  Current smoker; advised to quit smoking  - On Commit lozenges every 2 hours as needed    *Hypotension; BP mildly low but within acceptable range  - Encouraged to increase fluids  - Labs reviewed with no history of anemia Hg stable    Additional work up or/and treatment plan may be added today or then after based on clinical progression by other providers or specialists.  Patient will need to follow-up with PCP for chronic disease management.     I have spent greater than 70% of time  spent focused exclusively on this patient ,reviewing  chart, labs/diagnostics, reconciling medications, &  answering questions with patient and discussing plan.    Subjective   Interval History Status: Patient reevaluated for QI and hypotension.  /70.  Labs reviewed.  Hgb stable.  13.6.  No signs of anemia.  Patient encouraged to increase fluids.  Patient is a current smoker.  Advised to quit smoking.  Denies any shortness of breath.  SpO2 99% on room air.  No CPAP use.    Patient denies chest pain, palpitations, headache, dizziness, shortness of breath, cough, fever, chills, N/V/D, and changes in appetite.      Review of Systems   12 point review of systems reviewed with patient; negative other than as mentioned    Medications   Scheduled Meds:    topiramate  100 mg Oral BID     Continuous Infusions:   PRN Meds: hydrOXYzine pamoate **OR** hydrOXYzine, acetaminophen, magnesium hydroxide, aluminum & magnesium hydroxide-simethicone, haloperidol **OR** haloperidol lactate, benztropine mesylate, traZODone, clonazePAM, nicotine polacrilex    Past History    Past Medical 
Progress Note    Date:7/8/2025       Room:[place] [address]-01  Patient Name:Kanchan Cam     YOB: 1980     Age:45 y.o.    Assessment        Hospital Problems           Last Modified POA    * (Principal) Bipolar 1 disorder ([facility]) 7/5/2025 Yes       Plan:      *Bipolar 1 disorder  - Psychiatry managing       *QI  - SpO2 99% on room air; denies shortness of breath    Tobacco use  - advised to quit smoking;  - Commit lozenges every 2 hours as needed     *Hypotension; BP mildly low but within acceptable range  - Encouraged to increase fluids  - Labs reviewed with no history of anemia Hg stable    *Rash  - Hydrocortisone cream twice daily to affected areas    *Seasonal allergies  - Zyrtec 10 mg daily    Additional work up or/and treatment plan may be added today or then after based on clinical progression by other providers or specialists.  Patient will need to follow-up with PCP for chronic disease management.     I have spent greater than 70% of time  spent focused exclusively on this patient ,reviewing  chart, labs/diagnostics, reconciling medications, &  answering questions with patient and discussing plan.    Subjective   Interval History Status: Patient request to be seen for pinpoint rash/hives.  Reports itching to upper extremities.  Reports something is wrong with the water reports rash is due to water in facility.  Mild pinpoint rash noted to bilateral upper extremities.  Patient also requesting Claritin for seasonal allergies.  Patient with multiple complaints daily, patient with flight of ideas.  Hard to redirect at times.        Review of Systems   12 point review of systems reviewed with patient; negative other than as mentioned    Medications   Scheduled Meds:    zolpidem  5 mg Oral Nightly    risperiDONE  0.5 mg Oral BID    cetirizine  10 mg Oral Daily    hydrocortisone   Topical BID    topiramate  100 mg Oral BID     Continuous Infusions:   PRN Meds: diphenhydrAMINE, hydrOXYzine pamoate **OR** 
Pt is noted in her room, agreeable to Vs, wanted to sit on her  bed aware her feet should be on the ground , pt is hyper verbal, unable to sit still  and has racing thoughts during vs and explanation of meds, hard to keep pt on track, gave dani coelho as requested.   
Pt is noted in the dinning room, appears irritable today offered and gave klonopin 0.5 , no number for anxiety was given.  
Pt out on unit, social with peers, elevated incongruent affect. Pt pressured speech, inability to focus,  labile, impulsive, distractible. Pt poor insight regarding mental health. Pt minimizing admission to [facility], education provided.Pt reports showering today.Pt reports good appetite.Pt reports good sleep.Pt rates anxiety 7/10.Pt rates depression 3/10. On a scale from 1 through 10, 10 being the highest. Pt reports attending groups.Pt denies SI, HI and A/V hallucinations. Will continue to monitor.    
Pt out on unit, social with peers, elevated, exaggerated affect. Pt intrusive, labile, impulsive, distractible, paranoid delusions, use of nonsensical statements, inability to focus. Pt verbally aggressive at times, poor insight regarding mental health. Pt requires, frequent de-escalation techniques, reassurance, reality orientation.Pt reports showering today.  Pt reports good appetite.Pt reports good sleep.  Pt rates anxiety 3/10.Pt rates depression 5/10. On a scale from 1 through 10, 10 being the highest. Pt reports attending groups.Pt denies SI, HI and A/V hallucinations. Will continue to monitor.    
Pt out on unit, social with peers, exaggerated unstable affect. Pt labile, impulsive, suspicious, paranoid, distractible, delusional, pressure speech, unrealistic, poor judgment, insight. Pt poor insight regarding mental health, use of nonsensical statements, rambling. Pt requires reality orientation, redirection, effective at times. Pt minimizing admission to [facility]. Pt reports showering today.Pt reports good appetite.Pt reports good sleep. Pt denies anxiety, depression.  Pt reports attending groups.Pt denies SI, HI and A/V hallucinations. Will continue to monitor.    
Pt reports depression is #5/10, aniety level is #5/10. Pt denies SI/HI/AVH. Pt presents with pressured speech, disorganized thinking,Pt reports showering today, pt reports good appetite, pt is observed returning to desk multiple times for xtra snacks.Pt is visibbe on unit in day room and tv area.  
Pt requesting PRN trazodone and nicotine lozenge. Administered at this time without difficulty.   
To NS at and requested klonopin 0.5mg po at 0142 for c/o anxiety  
Not Currently     Comment: working on sobriety    Drug use: Not Currently    Sexual activity: Yes     Partners: Male   Other Topics Concern    Not on file   Social History Narrative    Not on file     Social Drivers of Health     Financial Resource Strain: Low Risk  (8/31/2023)    Overall Financial Resource Strain (CARDIA)     Difficulty of Paying Living Expenses: Not hard at all   Food Insecurity: Food Insecurity Present (7/6/2025)    Hunger Vital Sign     Worried About Running Out of Food in the Last Year: Often true     Ran Out of Food in the Last Year: Often true   Transportation Needs: Unmet Transportation Needs (7/6/2025)    PRAPARE - Transportation     Lack of Transportation (Medical): Yes     Lack of Transportation (Non-Medical): No   Physical Activity: Not on file   Stress: Not on file   Social Connections: Not on file   Intimate Partner Violence: Not on file   Housing Stability: High Risk (7/6/2025)    Housing Stability Vital Sign     Unable to Pay for Housing in the Last Year: Yes     Number of Times Moved in the Last Year: 0     Homeless in the Last Year: Yes           ROS:  [x] All negative/unchanged except if checked. Explain positive(checked items) below:  [] Constitutional  [] Eyes  [] Ear/Nose/Mouth/Throat  [] Respiratory  [] CV  [] GI  []   [] Musculoskeletal  [] Skin/Breast  [] Neurological  [] Endocrine  [] Heme/Lymph  [] Allergic/Immunologic    Explanation:     MEDICATIONS:    Current Facility-Administered Medications:     nicotine (NICODERM CQ) 21 MG/24HR 1 patch, 1 patch, TransDERmal, Daily, Tu Driver MD    risperiDONE (RISPERDAL) tablet 1 mg, 1 mg, Oral, BID, Tu Driver MD, 1 mg at 07/10/25 0910    zolpidem (AMBIEN) tablet 5 mg, 5 mg, Oral, Nightly, Tu Driver MD, 5 mg at 07/09/25 2041    cetirizine (ZYRTEC) tablet 10 mg, 10 mg, Oral, Daily, Leslie Santos APRN - CNP, 10 mg at 07/10/25 0911    hydrocortisone 1 % cream, , Topical, BID, Leslie Santos APRN - CNP, Given at 
exposure: Current    Smokeless tobacco: Former   Vaping Use    Vaping status: Former   Substance and Sexual Activity    Alcohol use: Not Currently     Comment: working on sobriety    Drug use: Not Currently    Sexual activity: Yes     Partners: Male   Other Topics Concern    Not on file   Social History Narrative    Not on file     Social Drivers of Health     Financial Resource Strain: Low Risk  (8/31/2023)    Overall Financial Resource Strain (CARDIA)     Difficulty of Paying Living Expenses: Not hard at all   Food Insecurity: Food Insecurity Present (7/6/2025)    Hunger Vital Sign     Worried About Running Out of Food in the Last Year: Often true     Ran Out of Food in the Last Year: Often true   Transportation Needs: Unmet Transportation Needs (7/6/2025)    PRAPARE - Transportation     Lack of Transportation (Medical): Yes     Lack of Transportation (Non-Medical): No   Physical Activity: Not on file   Stress: Not on file   Social Connections: Not on file   Intimate Partner Violence: Not on file   Housing Stability: High Risk (7/6/2025)    Housing Stability Vital Sign     Unable to Pay for Housing in the Last Year: Yes     Number of Times Moved in the Last Year: 0     Homeless in the Last Year: Yes           ROS:  [x] All negative/unchanged except if checked. Explain positive(checked items) below:  [] Constitutional  [] Eyes  [] Ear/Nose/Mouth/Throat  [] Respiratory  [] CV  [] GI  []   [] Musculoskeletal  [] Skin/Breast  [] Neurological  [] Endocrine  [] Heme/Lymph  [] Allergic/Immunologic    Explanation:     MEDICATIONS:    Current Facility-Administered Medications:     zolpidem (AMBIEN) tablet 5 mg, 5 mg, Oral, Nightly, Tu Driver MD    topiramate (TOPAMAX) tablet 100 mg, 100 mg, Oral, BID, Tu Driver MD, 100 mg at 07/08/25 0813    hydrOXYzine pamoate (VISTARIL) capsule 50 mg, 50 mg, Oral, Q6H PRN, 50 mg at 07/08/25 0350 **OR** hydrOXYzine (VISTARIL) injection 50 mg, 50 mg, IntraMUSCular, Q6H

## 2025-07-11 NOTE — DISCHARGE INSTR - DIET

## 2025-07-11 NOTE — FLOWSHEET NOTE
Pt reports attending groups, Sleep improved Pt rates anxiety and  depression.0/10 Pt socializes with select peers. Pt had high energy throughout my shift. With flight of ideas. Pt focused on discharge. Pt denies SI,HI,AVH. Will continue to monitor.

## 2025-08-02 ENCOUNTER — HOSPITAL ENCOUNTER (INPATIENT)
Age: 45
LOS: 4 days | Discharge: HOME | DRG: 753 | End: 2025-08-06
Attending: INTERNAL MEDICINE | Admitting: PSYCHIATRY & NEUROLOGY
Payer: MEDICAID

## 2025-08-02 DIAGNOSIS — F23 ACUTE PSYCHOSIS (HCC): ICD-10-CM

## 2025-08-02 DIAGNOSIS — F31.9 BIPOLAR DISORDER WITH PSYCHOTIC FEATURES (HCC): Primary | ICD-10-CM

## 2025-08-02 LAB
ALBUMIN SERPL-MCNC: 4 G/DL (ref 3.5–4.6)
ALP SERPL-CCNC: 72 U/L (ref 40–130)
ALT SERPL-CCNC: 82 U/L (ref 0–33)
AMPHET UR QL SCN: ABNORMAL
ANION GAP SERPL CALCULATED.3IONS-SCNC: 10 MEQ/L (ref 9–15)
APAP SERPL-MCNC: 49 UG/ML (ref 10–30)
APAP SERPL-MCNC: 60 UG/ML (ref 10–30)
AST SERPL-CCNC: 75 U/L (ref 0–35)
BARBITURATES UR QL SCN: ABNORMAL
BASOPHILS # BLD: 0.1 K/UL (ref 0–0.2)
BASOPHILS NFR BLD: 0.7 %
BENZODIAZ UR QL SCN: POSITIVE
BILIRUB SERPL-MCNC: 0.5 MG/DL (ref 0.2–0.7)
BILIRUB UR QL STRIP: NEGATIVE
BUN SERPL-MCNC: 20 MG/DL (ref 6–20)
CALCIUM SERPL-MCNC: 9 MG/DL (ref 8.5–9.9)
CANNABINOIDS UR QL SCN: POSITIVE
CHLORIDE SERPL-SCNC: 104 MEQ/L (ref 95–107)
CHOLEST SERPL-MCNC: 88 MG/DL (ref 0–199)
CK SERPL-CCNC: 248 U/L (ref 0–170)
CLARITY UR: CLEAR
CO2 SERPL-SCNC: 22 MEQ/L (ref 20–31)
COCAINE UR QL SCN: ABNORMAL
COLOR UR: YELLOW
CREAT SERPL-MCNC: 0.98 MG/DL (ref 0.5–0.9)
DRUG SCREEN COMMENT UR-IMP: ABNORMAL
EOSINOPHIL # BLD: 0.1 K/UL (ref 0–0.7)
EOSINOPHIL NFR BLD: 1.2 %
ERYTHROCYTE [DISTWIDTH] IN BLOOD BY AUTOMATED COUNT: 13.3 % (ref 11.5–14.5)
ETHANOL PERCENT: NORMAL G/DL
ETHANOLAMINE SERPL-MCNC: <10 MG/DL (ref 0–0.08)
FENTANYL SCREEN, URINE: ABNORMAL
GLOBULIN SER CALC-MCNC: 1.8 G/DL (ref 2.3–3.5)
GLUCOSE SERPL-MCNC: 147 MG/DL (ref 70–99)
GLUCOSE UR STRIP-MCNC: NEGATIVE MG/DL
HCT VFR BLD AUTO: 40.6 % (ref 37–47)
HDLC SERPL-MCNC: 49 MG/DL (ref 40–59)
HGB BLD-MCNC: 13.7 G/DL (ref 12–16)
HGB UR QL STRIP: NEGATIVE
KETONES UR STRIP-MCNC: 15 MG/DL
LDLC SERPL CALC-MCNC: 31 MG/DL (ref 0–129)
LEUKOCYTE ESTERASE UR QL STRIP: NEGATIVE
LYMPHOCYTES # BLD: 1.8 K/UL (ref 1–4.8)
LYMPHOCYTES NFR BLD: 14.9 %
MCH RBC QN AUTO: 29.4 PG (ref 27–31.3)
MCHC RBC AUTO-ENTMCNC: 33.7 % (ref 33–37)
MCV RBC AUTO: 87.1 FL (ref 79.4–94.8)
METHADONE UR QL SCN: ABNORMAL
MONOCYTES # BLD: 0.2 K/UL (ref 0.2–0.8)
MONOCYTES NFR BLD: 1.4 %
NEUTROPHILS # BLD: 9.8 K/UL (ref 1.4–6.5)
NEUTS SEG NFR BLD: 81.6 %
NITRITE UR QL STRIP: NEGATIVE
OPIATES UR QL SCN: ABNORMAL
OXYCODONE UR QL SCN: ABNORMAL
PCP UR QL SCN: ABNORMAL
PH UR STRIP: 5 [PH] (ref 5–9)
PLATELET # BLD AUTO: 204 K/UL (ref 130–400)
POTASSIUM SERPL-SCNC: 3.3 MEQ/L (ref 3.4–4.9)
PROPOXYPH UR QL SCN: ABNORMAL
PROT SERPL-MCNC: 5.8 G/DL (ref 6.3–8)
PROT UR STRIP-MCNC: ABNORMAL MG/DL
RBC # BLD AUTO: 4.66 M/UL (ref 4.2–5.4)
SALICYLATES SERPL-MCNC: <0.3 MG/DL (ref 15–30)
SODIUM SERPL-SCNC: 136 MEQ/L (ref 135–144)
SP GR UR STRIP: 1.06 (ref 1–1.03)
TRIGL SERPL-MCNC: 39 MG/DL (ref 0–150)
TSH SERPL-MCNC: 0.8 UIU/ML (ref 0.44–3.86)
URINE REFLEX TO CULTURE: ABNORMAL
UROBILINOGEN UR STRIP-ACNC: 1 E.U./DL
WBC # BLD AUTO: 12 K/UL (ref 4.8–10.8)

## 2025-08-02 PROCEDURE — 82077 ASSAY SPEC XCP UR&BREATH IA: CPT

## 2025-08-02 PROCEDURE — 85025 COMPLETE CBC W/AUTO DIFF WBC: CPT

## 2025-08-02 PROCEDURE — 99285 EMERGENCY DEPT VISIT HI MDM: CPT

## 2025-08-02 PROCEDURE — 80053 COMPREHEN METABOLIC PANEL: CPT

## 2025-08-02 PROCEDURE — 36415 COLL VENOUS BLD VENIPUNCTURE: CPT

## 2025-08-02 PROCEDURE — 80061 LIPID PANEL: CPT

## 2025-08-02 PROCEDURE — 80143 DRUG ASSAY ACETAMINOPHEN: CPT

## 2025-08-02 PROCEDURE — 93005 ELECTROCARDIOGRAM TRACING: CPT | Performed by: INTERNAL MEDICINE

## 2025-08-02 PROCEDURE — 84443 ASSAY THYROID STIM HORMONE: CPT

## 2025-08-02 PROCEDURE — 1240000000 HC EMOTIONAL WELLNESS R&B

## 2025-08-02 PROCEDURE — 82550 ASSAY OF CK (CPK): CPT

## 2025-08-02 PROCEDURE — 96372 THER/PROPH/DIAG INJ SC/IM: CPT

## 2025-08-02 PROCEDURE — 6370000000 HC RX 637 (ALT 250 FOR IP): Performed by: PSYCHIATRY & NEUROLOGY

## 2025-08-02 PROCEDURE — 81003 URINALYSIS AUTO W/O SCOPE: CPT

## 2025-08-02 PROCEDURE — 6360000002 HC RX W HCPCS: Performed by: INTERNAL MEDICINE

## 2025-08-02 PROCEDURE — 80307 DRUG TEST PRSMV CHEM ANLYZR: CPT

## 2025-08-02 PROCEDURE — 80179 DRUG ASSAY SALICYLATE: CPT

## 2025-08-02 RX ORDER — MAGNESIUM HYDROXIDE/ALUMINUM HYDROXICE/SIMETHICONE 120; 1200; 1200 MG/30ML; MG/30ML; MG/30ML
30 SUSPENSION ORAL PRN
Status: DISCONTINUED | OUTPATIENT
Start: 2025-08-02 | End: 2025-08-06 | Stop reason: HOSPADM

## 2025-08-02 RX ORDER — HYDROXYZINE HYDROCHLORIDE 50 MG/ML
50 INJECTION, SOLUTION INTRAMUSCULAR EVERY 6 HOURS PRN
Status: DISCONTINUED | OUTPATIENT
Start: 2025-08-02 | End: 2025-08-06 | Stop reason: HOSPADM

## 2025-08-02 RX ORDER — BENZTROPINE MESYLATE 1 MG/ML
2 INJECTION, SOLUTION INTRAMUSCULAR; INTRAVENOUS 2 TIMES DAILY PRN
Status: DISCONTINUED | OUTPATIENT
Start: 2025-08-02 | End: 2025-08-06 | Stop reason: HOSPADM

## 2025-08-02 RX ORDER — DIVALPROEX SODIUM 250 MG/1
250 TABLET, DELAYED RELEASE ORAL 2 TIMES DAILY
Status: DISCONTINUED | OUTPATIENT
Start: 2025-08-02 | End: 2025-08-06 | Stop reason: HOSPADM

## 2025-08-02 RX ORDER — ARIPIPRAZOLE 5 MG/1
5 TABLET ORAL DAILY
Status: DISCONTINUED | OUTPATIENT
Start: 2025-08-02 | End: 2025-08-04

## 2025-08-02 RX ORDER — HALOPERIDOL 5 MG/ML
5 INJECTION INTRAMUSCULAR ONCE
Status: COMPLETED | OUTPATIENT
Start: 2025-08-02 | End: 2025-08-02

## 2025-08-02 RX ORDER — ACETAMINOPHEN 325 MG/1
650 TABLET ORAL EVERY 4 HOURS PRN
Status: DISCONTINUED | OUTPATIENT
Start: 2025-08-02 | End: 2025-08-06 | Stop reason: HOSPADM

## 2025-08-02 RX ORDER — LORAZEPAM 2 MG/ML
2 INJECTION INTRAMUSCULAR ONCE
Status: COMPLETED | OUTPATIENT
Start: 2025-08-02 | End: 2025-08-02

## 2025-08-02 RX ORDER — CLONAZEPAM 0.5 MG/1
0.5 TABLET ORAL 2 TIMES DAILY PRN
Status: DISCONTINUED | OUTPATIENT
Start: 2025-08-02 | End: 2025-08-06 | Stop reason: HOSPADM

## 2025-08-02 RX ORDER — HALOPERIDOL 5 MG/ML
5 INJECTION INTRAMUSCULAR EVERY 6 HOURS PRN
Status: DISCONTINUED | OUTPATIENT
Start: 2025-08-02 | End: 2025-08-06 | Stop reason: HOSPADM

## 2025-08-02 RX ORDER — HYDROXYZINE PAMOATE 50 MG/1
50 CAPSULE ORAL EVERY 6 HOURS PRN
Status: DISCONTINUED | OUTPATIENT
Start: 2025-08-02 | End: 2025-08-06 | Stop reason: HOSPADM

## 2025-08-02 RX ORDER — HALOPERIDOL 5 MG/1
5 TABLET ORAL EVERY 6 HOURS PRN
Status: DISCONTINUED | OUTPATIENT
Start: 2025-08-02 | End: 2025-08-06 | Stop reason: HOSPADM

## 2025-08-02 RX ORDER — TRAZODONE HYDROCHLORIDE 50 MG/1
50 TABLET ORAL NIGHTLY PRN
Status: DISCONTINUED | OUTPATIENT
Start: 2025-08-02 | End: 2025-08-06 | Stop reason: HOSPADM

## 2025-08-02 RX ADMIN — Medication 2 MG: at 11:52

## 2025-08-02 RX ADMIN — HALOPERIDOL LACTATE 5 MG: 5 INJECTION, SOLUTION INTRAMUSCULAR at 11:52

## 2025-08-03 PROCEDURE — 1240000000 HC EMOTIONAL WELLNESS R&B

## 2025-08-03 PROCEDURE — 6370000000 HC RX 637 (ALT 250 FOR IP): Performed by: PSYCHIATRY & NEUROLOGY

## 2025-08-03 PROCEDURE — 6360000002 HC RX W HCPCS: Performed by: PSYCHIATRY & NEUROLOGY

## 2025-08-03 RX ORDER — POTASSIUM CHLORIDE 1500 MG/1
40 TABLET, EXTENDED RELEASE ORAL ONCE
Status: DISCONTINUED | OUTPATIENT
Start: 2025-08-03 | End: 2025-08-06 | Stop reason: HOSPADM

## 2025-08-03 RX ADMIN — NICOTINE POLACRILEX 4 MG: 4 LOZENGE ORAL at 13:15

## 2025-08-03 RX ADMIN — NICOTINE POLACRILEX 4 MG: 4 LOZENGE ORAL at 18:05

## 2025-08-03 RX ADMIN — CLONAZEPAM 0.5 MG: 0.5 TABLET ORAL at 07:28

## 2025-08-03 RX ADMIN — HYDROXYZINE HYDROCHLORIDE 50 MG: 50 INJECTION, SOLUTION INTRAMUSCULAR at 07:48

## 2025-08-03 RX ADMIN — HALOPERIDOL LACTATE 5 MG: 5 INJECTION, SOLUTION INTRAMUSCULAR at 18:08

## 2025-08-03 RX ADMIN — NICOTINE POLACRILEX 4 MG: 4 LOZENGE ORAL at 08:02

## 2025-08-03 RX ADMIN — HYDROXYZINE HYDROCHLORIDE 50 MG: 50 INJECTION, SOLUTION INTRAMUSCULAR at 18:04

## 2025-08-03 RX ADMIN — HALOPERIDOL LACTATE 5 MG: 5 INJECTION, SOLUTION INTRAMUSCULAR at 07:54

## 2025-08-03 ASSESSMENT — SLEEP AND FATIGUE QUESTIONNAIRES
DO YOU HAVE DIFFICULTY SLEEPING: YES
AVERAGE NUMBER OF SLEEP HOURS: 4
SLEEP PATTERN: DIFFICULTY FALLING ASLEEP;DISTURBED/INTERRUPTED SLEEP
DO YOU USE A SLEEP AID: NO

## 2025-08-04 LAB
EKG ATRIAL RATE: 78 BPM
EKG DIAGNOSIS: NORMAL
EKG P AXIS: 67 DEGREES
EKG P-R INTERVAL: 158 MS
EKG Q-T INTERVAL: 414 MS
EKG QRS DURATION: 94 MS
EKG QTC CALCULATION (BAZETT): 471 MS
EKG R AXIS: 17 DEGREES
EKG T AXIS: 32 DEGREES
EKG VENTRICULAR RATE: 78 BPM

## 2025-08-04 PROCEDURE — 6370000000 HC RX 637 (ALT 250 FOR IP): Performed by: PSYCHIATRY & NEUROLOGY

## 2025-08-04 PROCEDURE — 1240000000 HC EMOTIONAL WELLNESS R&B

## 2025-08-04 PROCEDURE — 93010 ELECTROCARDIOGRAM REPORT: CPT | Performed by: INTERNAL MEDICINE

## 2025-08-04 RX ORDER — FLUPHENAZINE HYDROCHLORIDE 5 MG/1
5 TABLET ORAL 2 TIMES DAILY
Status: DISCONTINUED | OUTPATIENT
Start: 2025-08-04 | End: 2025-08-06 | Stop reason: HOSPADM

## 2025-08-04 RX ORDER — NICOTINE 21 MG/24HR
1 PATCH, TRANSDERMAL 24 HOURS TRANSDERMAL DAILY
Status: DISCONTINUED | OUTPATIENT
Start: 2025-08-04 | End: 2025-08-05

## 2025-08-04 RX ADMIN — TRAZODONE HYDROCHLORIDE 50 MG: 50 TABLET ORAL at 20:31

## 2025-08-04 RX ADMIN — FLUPHENAZINE HYDROCHLORIDE 5 MG: 5 TABLET, FILM COATED ORAL at 09:01

## 2025-08-04 RX ADMIN — FLUPHENAZINE HYDROCHLORIDE 5 MG: 5 TABLET, FILM COATED ORAL at 20:31

## 2025-08-04 RX ADMIN — DIVALPROEX SODIUM 250 MG: 250 TABLET, DELAYED RELEASE ORAL at 20:31

## 2025-08-05 PROCEDURE — 1240000000 HC EMOTIONAL WELLNESS R&B

## 2025-08-05 PROCEDURE — 6370000000 HC RX 637 (ALT 250 FOR IP): Performed by: PSYCHIATRY & NEUROLOGY

## 2025-08-05 RX ORDER — POLYETHYLENE GLYCOL 3350 17 G
2 POWDER IN PACKET (EA) ORAL
Status: DISCONTINUED | OUTPATIENT
Start: 2025-08-05 | End: 2025-08-06 | Stop reason: HOSPADM

## 2025-08-05 RX ADMIN — DIVALPROEX SODIUM 250 MG: 250 TABLET, DELAYED RELEASE ORAL at 08:55

## 2025-08-05 RX ADMIN — NICOTINE POLACRILEX 2 MG: 2 LOZENGE ORAL at 13:17

## 2025-08-05 RX ADMIN — NICOTINE POLACRILEX 2 MG: 2 LOZENGE ORAL at 08:55

## 2025-08-05 RX ADMIN — NICOTINE POLACRILEX 2 MG: 2 LOZENGE ORAL at 17:01

## 2025-08-05 RX ADMIN — DIVALPROEX SODIUM 250 MG: 250 TABLET, DELAYED RELEASE ORAL at 21:28

## 2025-08-05 RX ADMIN — FLUPHENAZINE HYDROCHLORIDE 5 MG: 5 TABLET, FILM COATED ORAL at 21:28

## 2025-08-05 RX ADMIN — CLONAZEPAM 0.5 MG: 0.5 TABLET ORAL at 18:20

## 2025-08-05 RX ADMIN — CLONAZEPAM 0.5 MG: 0.5 TABLET ORAL at 21:37

## 2025-08-05 RX ADMIN — FLUPHENAZINE HYDROCHLORIDE 5 MG: 5 TABLET, FILM COATED ORAL at 08:55

## 2025-08-06 VITALS
OXYGEN SATURATION: 100 % | RESPIRATION RATE: 18 BRPM | HEART RATE: 78 BPM | SYSTOLIC BLOOD PRESSURE: 106 MMHG | DIASTOLIC BLOOD PRESSURE: 68 MMHG | TEMPERATURE: 97.7 F

## 2025-08-06 LAB
ANION GAP SERPL CALCULATED.3IONS-SCNC: 10 MEQ/L (ref 9–15)
BUN SERPL-MCNC: 11 MG/DL (ref 6–20)
CALCIUM SERPL-MCNC: 9.3 MG/DL (ref 8.5–9.9)
CHLORIDE SERPL-SCNC: 108 MEQ/L (ref 95–107)
CO2 SERPL-SCNC: 22 MEQ/L (ref 20–31)
CREAT SERPL-MCNC: 0.65 MG/DL (ref 0.5–0.9)
GLUCOSE SERPL-MCNC: 88 MG/DL (ref 70–99)
POTASSIUM SERPL-SCNC: 4.1 MEQ/L (ref 3.4–4.9)
SODIUM SERPL-SCNC: 140 MEQ/L (ref 135–144)

## 2025-08-06 PROCEDURE — 6360000002 HC RX W HCPCS: Performed by: PSYCHIATRY & NEUROLOGY

## 2025-08-06 PROCEDURE — 80048 BASIC METABOLIC PNL TOTAL CA: CPT

## 2025-08-06 PROCEDURE — 6370000000 HC RX 637 (ALT 250 FOR IP): Performed by: PSYCHIATRY & NEUROLOGY

## 2025-08-06 PROCEDURE — 36415 COLL VENOUS BLD VENIPUNCTURE: CPT

## 2025-08-06 RX ORDER — DIVALPROEX SODIUM 250 MG/1
250 TABLET, DELAYED RELEASE ORAL 2 TIMES DAILY
Qty: 90 TABLET | Refills: 3 | Status: SHIPPED | OUTPATIENT
Start: 2025-08-06

## 2025-08-06 RX ORDER — FLUPHENAZINE DECANOATE 25 MG/ML
25 INJECTION, SOLUTION INTRAMUSCULAR; SUBCUTANEOUS ONCE
Qty: 1 ML | Refills: 0 | Status: SHIPPED | OUTPATIENT
Start: 2025-08-06 | End: 2025-08-06

## 2025-08-06 RX ORDER — FLUPHENAZINE DECANOATE 25 MG/ML
25 INJECTION, SOLUTION INTRAMUSCULAR; SUBCUTANEOUS ONCE
Status: COMPLETED | OUTPATIENT
Start: 2025-08-06 | End: 2025-08-06

## 2025-08-06 RX ORDER — FLUPHENAZINE HYDROCHLORIDE 5 MG/1
5 TABLET ORAL 2 TIMES DAILY
Qty: 30 TABLET | Refills: 1 | Status: SHIPPED | OUTPATIENT
Start: 2025-08-06

## 2025-08-06 RX ADMIN — FLUPHENAZINE DECANOATE 25 MG: 25 INJECTION, SOLUTION INTRAMUSCULAR; SUBCUTANEOUS at 11:38

## 2025-08-06 RX ADMIN — NICOTINE POLACRILEX 2 MG: 2 LOZENGE ORAL at 09:37

## 2025-08-06 RX ADMIN — NICOTINE POLACRILEX 2 MG: 2 LOZENGE ORAL at 12:09

## 2025-08-06 RX ADMIN — FLUPHENAZINE HYDROCHLORIDE 5 MG: 5 TABLET, FILM COATED ORAL at 09:37

## 2025-08-06 RX ADMIN — NICOTINE POLACRILEX 2 MG: 2 LOZENGE ORAL at 06:21

## 2025-08-06 RX ADMIN — DIVALPROEX SODIUM 250 MG: 250 TABLET, DELAYED RELEASE ORAL at 09:37

## 2025-08-13 ENCOUNTER — HOSPITAL ENCOUNTER (INPATIENT)
Age: 45
LOS: 5 days | Discharge: HOME | DRG: 753 | End: 2025-08-18
Attending: PSYCHIATRY & NEUROLOGY | Admitting: PSYCHIATRY & NEUROLOGY
Payer: MEDICAID

## 2025-08-13 DIAGNOSIS — R45.851 SUICIDAL IDEATION: Primary | ICD-10-CM

## 2025-08-13 DIAGNOSIS — Z91.148 NONCOMPLIANCE WITH MEDICATION REGIMEN: ICD-10-CM

## 2025-08-13 LAB
ALBUMIN SERPL-MCNC: 3.5 G/DL (ref 3.5–4.6)
ALP SERPL-CCNC: 58 U/L (ref 40–130)
ALT SERPL-CCNC: 56 U/L (ref 0–33)
AMPHET UR QL SCN: ABNORMAL
ANION GAP SERPL CALCULATED.3IONS-SCNC: 9 MEQ/L (ref 9–15)
APAP SERPL-MCNC: <5 UG/ML (ref 10–30)
AST SERPL-CCNC: 12 U/L (ref 0–35)
BARBITURATES UR QL SCN: ABNORMAL
BASOPHILS # BLD: 0.1 K/UL (ref 0–0.2)
BASOPHILS NFR BLD: 0.5 %
BENZODIAZ UR QL SCN: POSITIVE
BILIRUB SERPL-MCNC: 0.3 MG/DL (ref 0.2–0.7)
BILIRUB UR QL STRIP: NEGATIVE
BUN SERPL-MCNC: 10 MG/DL (ref 6–20)
CALCIUM SERPL-MCNC: 8.3 MG/DL (ref 8.5–9.9)
CANNABINOIDS UR QL SCN: POSITIVE
CHLORIDE SERPL-SCNC: 107 MEQ/L (ref 95–107)
CHOLEST SERPL-MCNC: 119 MG/DL (ref 0–199)
CK SERPL-CCNC: 26 U/L (ref 0–170)
CLARITY UR: ABNORMAL
CO2 SERPL-SCNC: 23 MEQ/L (ref 20–31)
COCAINE UR QL SCN: ABNORMAL
COLOR UR: YELLOW
CREAT SERPL-MCNC: 0.66 MG/DL (ref 0.5–0.9)
DRUG SCREEN COMMENT UR-IMP: ABNORMAL
EOSINOPHIL # BLD: 0.2 K/UL (ref 0–0.7)
EOSINOPHIL NFR BLD: 2.1 %
ERYTHROCYTE [DISTWIDTH] IN BLOOD BY AUTOMATED COUNT: 13.6 % (ref 11.5–14.5)
ETHANOL PERCENT: NORMAL G/DL
ETHANOLAMINE SERPL-MCNC: <10 MG/DL (ref 0–0.08)
FENTANYL SCREEN, URINE: ABNORMAL
GLOBULIN SER CALC-MCNC: 1.8 G/DL (ref 2.3–3.5)
GLUCOSE SERPL-MCNC: 99 MG/DL (ref 70–99)
GLUCOSE UR STRIP-MCNC: NEGATIVE MG/DL
HCT VFR BLD AUTO: 45.1 % (ref 37–47)
HDLC SERPL-MCNC: 38 MG/DL (ref 40–59)
HGB BLD-MCNC: 14.6 G/DL (ref 12–16)
HGB UR QL STRIP: NEGATIVE
KETONES UR STRIP-MCNC: ABNORMAL MG/DL
LDLC SERPL CALC-MCNC: 64 MG/DL (ref 0–129)
LEUKOCYTE ESTERASE UR QL STRIP: NEGATIVE
LYMPHOCYTES # BLD: 2.6 K/UL (ref 1–4.8)
LYMPHOCYTES NFR BLD: 22.7 %
MCH RBC QN AUTO: 28.6 PG (ref 27–31.3)
MCHC RBC AUTO-ENTMCNC: 32.4 % (ref 33–37)
MCV RBC AUTO: 88.3 FL (ref 79.4–94.8)
METHADONE UR QL SCN: ABNORMAL
MONOCYTES # BLD: 0.8 K/UL (ref 0.2–0.8)
MONOCYTES NFR BLD: 6.9 %
NEUTROPHILS # BLD: 7.8 K/UL (ref 1.4–6.5)
NEUTS SEG NFR BLD: 67.5 %
NITRITE UR QL STRIP: NEGATIVE
OPIATES UR QL SCN: ABNORMAL
OXYCODONE UR QL SCN: ABNORMAL
PCP UR QL SCN: ABNORMAL
PH UR STRIP: 5 [PH] (ref 5–9)
PLATELET # BLD AUTO: 242 K/UL (ref 130–400)
POTASSIUM SERPL-SCNC: 3.6 MEQ/L (ref 3.4–4.9)
PROPOXYPH UR QL SCN: ABNORMAL
PROT SERPL-MCNC: 5.3 G/DL (ref 6.3–8)
PROT UR STRIP-MCNC: NEGATIVE MG/DL
RBC # BLD AUTO: 5.11 M/UL (ref 4.2–5.4)
SALICYLATES SERPL-MCNC: <0.3 MG/DL (ref 15–30)
SODIUM SERPL-SCNC: 139 MEQ/L (ref 135–144)
SP GR UR STRIP: 1.02 (ref 1–1.03)
TRIGL SERPL-MCNC: 83 MG/DL (ref 0–150)
TSH SERPL-MCNC: 1.28 UIU/ML (ref 0.44–3.86)
URINE REFLEX TO CULTURE: ABNORMAL
UROBILINOGEN UR STRIP-ACNC: 1 E.U./DL
VALPROATE SERPL-MCNC: <2.8 UG/ML (ref 50–100)
WBC # BLD AUTO: 11.6 K/UL (ref 4.8–10.8)

## 2025-08-13 PROCEDURE — 36415 COLL VENOUS BLD VENIPUNCTURE: CPT

## 2025-08-13 PROCEDURE — 82077 ASSAY SPEC XCP UR&BREATH IA: CPT

## 2025-08-13 PROCEDURE — 80164 ASSAY DIPROPYLACETIC ACD TOT: CPT

## 2025-08-13 PROCEDURE — 80307 DRUG TEST PRSMV CHEM ANLYZR: CPT

## 2025-08-13 PROCEDURE — 82550 ASSAY OF CK (CPK): CPT

## 2025-08-13 PROCEDURE — 84443 ASSAY THYROID STIM HORMONE: CPT

## 2025-08-13 PROCEDURE — 80143 DRUG ASSAY ACETAMINOPHEN: CPT

## 2025-08-13 PROCEDURE — 99285 EMERGENCY DEPT VISIT HI MDM: CPT

## 2025-08-13 PROCEDURE — 80061 LIPID PANEL: CPT

## 2025-08-13 PROCEDURE — 80179 DRUG ASSAY SALICYLATE: CPT

## 2025-08-13 PROCEDURE — 1240000000 HC EMOTIONAL WELLNESS R&B

## 2025-08-13 PROCEDURE — 90791 PSYCH DIAGNOSTIC EVALUATION: CPT | Performed by: SOCIAL WORKER

## 2025-08-13 PROCEDURE — 81003 URINALYSIS AUTO W/O SCOPE: CPT

## 2025-08-13 PROCEDURE — 85025 COMPLETE CBC W/AUTO DIFF WBC: CPT

## 2025-08-13 PROCEDURE — 80053 COMPREHEN METABOLIC PANEL: CPT

## 2025-08-13 RX ORDER — HYDROXYZINE HYDROCHLORIDE 50 MG/ML
50 INJECTION, SOLUTION INTRAMUSCULAR EVERY 6 HOURS PRN
Status: DISCONTINUED | OUTPATIENT
Start: 2025-08-13 | End: 2025-08-18 | Stop reason: HOSPADM

## 2025-08-13 RX ORDER — TRAZODONE HYDROCHLORIDE 50 MG/1
50 TABLET ORAL NIGHTLY PRN
Status: DISCONTINUED | OUTPATIENT
Start: 2025-08-13 | End: 2025-08-18 | Stop reason: HOSPADM

## 2025-08-13 RX ORDER — BENZTROPINE MESYLATE 1 MG/ML
2 INJECTION, SOLUTION INTRAMUSCULAR; INTRAVENOUS 2 TIMES DAILY PRN
Status: DISCONTINUED | OUTPATIENT
Start: 2025-08-13 | End: 2025-08-18 | Stop reason: HOSPADM

## 2025-08-13 RX ORDER — HALOPERIDOL 5 MG/1
5 TABLET ORAL EVERY 4 HOURS PRN
Status: DISCONTINUED | OUTPATIENT
Start: 2025-08-13 | End: 2025-08-13

## 2025-08-13 RX ORDER — HYDROXYZINE PAMOATE 50 MG/1
50 CAPSULE ORAL EVERY 6 HOURS PRN
Status: DISCONTINUED | OUTPATIENT
Start: 2025-08-13 | End: 2025-08-18 | Stop reason: HOSPADM

## 2025-08-13 RX ORDER — MAGNESIUM HYDROXIDE/ALUMINUM HYDROXICE/SIMETHICONE 120; 1200; 1200 MG/30ML; MG/30ML; MG/30ML
30 SUSPENSION ORAL PRN
Status: DISCONTINUED | OUTPATIENT
Start: 2025-08-13 | End: 2025-08-18 | Stop reason: HOSPADM

## 2025-08-13 RX ORDER — HALOPERIDOL 5 MG/ML
5 INJECTION INTRAMUSCULAR EVERY 4 HOURS PRN
Status: DISCONTINUED | OUTPATIENT
Start: 2025-08-13 | End: 2025-08-13

## 2025-08-13 RX ORDER — HALOPERIDOL 5 MG/ML
5 INJECTION INTRAMUSCULAR EVERY 6 HOURS PRN
Status: DISCONTINUED | OUTPATIENT
Start: 2025-08-13 | End: 2025-08-18 | Stop reason: HOSPADM

## 2025-08-13 RX ORDER — ACETAMINOPHEN 325 MG/1
650 TABLET ORAL EVERY 4 HOURS PRN
Status: DISCONTINUED | OUTPATIENT
Start: 2025-08-13 | End: 2025-08-18 | Stop reason: HOSPADM

## 2025-08-13 RX ORDER — HALOPERIDOL 5 MG/1
5 TABLET ORAL EVERY 6 HOURS PRN
Status: DISCONTINUED | OUTPATIENT
Start: 2025-08-13 | End: 2025-08-18 | Stop reason: HOSPADM

## 2025-08-13 ASSESSMENT — PAIN - FUNCTIONAL ASSESSMENT: PAIN_FUNCTIONAL_ASSESSMENT: 0-10

## 2025-08-13 ASSESSMENT — PAIN SCALES - GENERAL: PAINLEVEL_OUTOF10: 0

## 2025-08-14 LAB
GLUCOSE BLD-MCNC: 121 MG/DL (ref 70–99)
GLUCOSE BLD-MCNC: 99 MG/DL (ref 70–99)
PERFORMED ON: ABNORMAL
PERFORMED ON: NORMAL

## 2025-08-14 PROCEDURE — 99223 1ST HOSP IP/OBS HIGH 75: CPT | Performed by: PSYCHIATRY & NEUROLOGY

## 2025-08-14 PROCEDURE — 1240000000 HC EMOTIONAL WELLNESS R&B

## 2025-08-14 PROCEDURE — 6370000000 HC RX 637 (ALT 250 FOR IP): Performed by: PSYCHIATRY & NEUROLOGY

## 2025-08-14 RX ORDER — POLYETHYLENE GLYCOL 3350 17 G
2 POWDER IN PACKET (EA) ORAL
Status: DISCONTINUED | OUTPATIENT
Start: 2025-08-14 | End: 2025-08-14

## 2025-08-14 RX ORDER — LITHIUM CARBONATE 300 MG/1
300 CAPSULE ORAL 2 TIMES DAILY WITH MEALS
Status: DISCONTINUED | OUTPATIENT
Start: 2025-08-14 | End: 2025-08-18 | Stop reason: HOSPADM

## 2025-08-14 RX ORDER — MECOBALAMIN 5000 MCG
5 TABLET,DISINTEGRATING ORAL NIGHTLY
Status: DISCONTINUED | OUTPATIENT
Start: 2025-08-14 | End: 2025-08-18 | Stop reason: HOSPADM

## 2025-08-14 RX ORDER — NICOTINE 21 MG/24HR
1 PATCH, TRANSDERMAL 24 HOURS TRANSDERMAL DAILY
Status: DISCONTINUED | OUTPATIENT
Start: 2025-08-14 | End: 2025-08-18 | Stop reason: HOSPADM

## 2025-08-14 RX ORDER — CLONAZEPAM 0.5 MG/1
0.5 TABLET ORAL EVERY 12 HOURS PRN
Status: DISCONTINUED | OUTPATIENT
Start: 2025-08-14 | End: 2025-08-16

## 2025-08-14 RX ORDER — OXCARBAZEPINE 150 MG/1
150 TABLET, FILM COATED ORAL 2 TIMES DAILY
Status: DISCONTINUED | OUTPATIENT
Start: 2025-08-14 | End: 2025-08-18 | Stop reason: HOSPADM

## 2025-08-14 RX ADMIN — LITHIUM CARBONATE 300 MG: 300 CAPSULE ORAL at 16:18

## 2025-08-14 RX ADMIN — CLONAZEPAM 0.5 MG: 0.5 TABLET ORAL at 22:15

## 2025-08-14 RX ADMIN — Medication 5 MG: at 20:55

## 2025-08-14 RX ADMIN — NICOTINE POLACRILEX 2 MG: 2 LOZENGE ORAL at 06:55

## 2025-08-14 RX ADMIN — LITHIUM CARBONATE 300 MG: 300 CAPSULE ORAL at 10:10

## 2025-08-14 RX ADMIN — OXCARBAZEPINE 150 MG: 150 TABLET, FILM COATED ORAL at 20:55

## 2025-08-14 RX ADMIN — NICOTINE POLACRILEX 2 MG: 2 LOZENGE ORAL at 10:11

## 2025-08-14 RX ADMIN — OXCARBAZEPINE 150 MG: 150 TABLET, FILM COATED ORAL at 10:10

## 2025-08-14 RX ADMIN — CLONAZEPAM 0.5 MG: 0.5 TABLET ORAL at 10:10

## 2025-08-14 RX ADMIN — NICOTINE POLACRILEX 2 MG: 2 LOZENGE ORAL at 15:29

## 2025-08-14 ASSESSMENT — LIFESTYLE VARIABLES
HOW MANY STANDARD DRINKS CONTAINING ALCOHOL DO YOU HAVE ON A TYPICAL DAY: PATIENT DOES NOT DRINK
HOW OFTEN DO YOU HAVE A DRINK CONTAINING ALCOHOL: NEVER
HOW MANY STANDARD DRINKS CONTAINING ALCOHOL DO YOU HAVE ON A TYPICAL DAY: 1 OR 2
HOW OFTEN DO YOU HAVE A DRINK CONTAINING ALCOHOL: MONTHLY OR LESS

## 2025-08-14 ASSESSMENT — PATIENT HEALTH QUESTIONNAIRE - PHQ9
5. POOR APPETITE OR OVEREATING: MORE THAN HALF THE DAYS
10. IF YOU CHECKED OFF ANY PROBLEMS, HOW DIFFICULT HAVE THESE PROBLEMS MADE IT FOR YOU TO DO YOUR WORK, TAKE CARE OF THINGS AT HOME, OR GET ALONG WITH OTHER PEOPLE: VERY DIFFICULT
9. THOUGHTS THAT YOU WOULD BE BETTER OFF DEAD, OR OF HURTING YOURSELF: SEVERAL DAYS
7. TROUBLE CONCENTRATING ON THINGS, SUCH AS READING THE NEWSPAPER OR WATCHING TELEVISION: MORE THAN HALF THE DAYS
SUM OF ALL RESPONSES TO PHQ QUESTIONS 1-9: 13
SUM OF ALL RESPONSES TO PHQ QUESTIONS 1-9: 12
3. TROUBLE FALLING OR STAYING ASLEEP: SEVERAL DAYS
2. FEELING DOWN, DEPRESSED OR HOPELESS: SEVERAL DAYS
SUM OF ALL RESPONSES TO PHQ QUESTIONS 1-9: 13
6. FEELING BAD ABOUT YOURSELF - OR THAT YOU ARE A FAILURE OR HAVE LET YOURSELF OR YOUR FAMILY DOWN: SEVERAL DAYS
4. FEELING TIRED OR HAVING LITTLE ENERGY: SEVERAL DAYS
8. MOVING OR SPEAKING SO SLOWLY THAT OTHER PEOPLE COULD HAVE NOTICED. OR THE OPPOSITE, BEING SO FIGETY OR RESTLESS THAT YOU HAVE BEEN MOVING AROUND A LOT MORE THAN USUAL: MORE THAN HALF THE DAYS
SUM OF ALL RESPONSES TO PHQ QUESTIONS 1-9: 13
1. LITTLE INTEREST OR PLEASURE IN DOING THINGS: MORE THAN HALF THE DAYS

## 2025-08-14 ASSESSMENT — SLEEP AND FATIGUE QUESTIONNAIRES
DO YOU HAVE DIFFICULTY SLEEPING: YES
AVERAGE NUMBER OF SLEEP HOURS: 4
DO YOU USE A SLEEP AID: NO
SLEEP PATTERN: DIFFICULTY FALLING ASLEEP

## 2025-08-14 ASSESSMENT — PAIN SCALES - GENERAL: PAINLEVEL_OUTOF10: 7

## 2025-08-15 LAB
GLUCOSE BLD-MCNC: 87 MG/DL (ref 70–99)
PERFORMED ON: NORMAL

## 2025-08-15 PROCEDURE — 6370000000 HC RX 637 (ALT 250 FOR IP): Performed by: PSYCHIATRY & NEUROLOGY

## 2025-08-15 PROCEDURE — 1240000000 HC EMOTIONAL WELLNESS R&B

## 2025-08-15 PROCEDURE — 99232 SBSQ HOSP IP/OBS MODERATE 35: CPT | Performed by: PSYCHIATRY & NEUROLOGY

## 2025-08-15 RX ADMIN — CLONAZEPAM 0.5 MG: 0.5 TABLET ORAL at 23:44

## 2025-08-15 RX ADMIN — OXCARBAZEPINE 150 MG: 150 TABLET, FILM COATED ORAL at 20:49

## 2025-08-15 RX ADMIN — HYDROXYZINE PAMOATE 50 MG: 50 CAPSULE ORAL at 23:34

## 2025-08-15 RX ADMIN — Medication 5 MG: at 21:14

## 2025-08-15 RX ADMIN — LITHIUM CARBONATE 300 MG: 300 CAPSULE ORAL at 17:18

## 2025-08-15 RX ADMIN — CLONAZEPAM 0.5 MG: 0.5 TABLET ORAL at 10:04

## 2025-08-15 RX ADMIN — TRAZODONE HYDROCHLORIDE 50 MG: 50 TABLET ORAL at 23:22

## 2025-08-15 RX ADMIN — MAGNESIUM HYDROXIDE 30 ML: 2400 SUSPENSION ORAL at 14:11

## 2025-08-15 RX ADMIN — OXCARBAZEPINE 150 MG: 150 TABLET, FILM COATED ORAL at 09:22

## 2025-08-15 RX ADMIN — LITHIUM CARBONATE 300 MG: 300 CAPSULE ORAL at 09:22

## 2025-08-15 ASSESSMENT — PAIN SCALES - GENERAL
PAINLEVEL_OUTOF10: 0
PAINLEVEL_OUTOF10: 0

## 2025-08-16 LAB
GLUCOSE BLD-MCNC: 78 MG/DL (ref 70–99)
PERFORMED ON: NORMAL

## 2025-08-16 PROCEDURE — 6370000000 HC RX 637 (ALT 250 FOR IP): Performed by: PSYCHIATRY & NEUROLOGY

## 2025-08-16 PROCEDURE — 1240000000 HC EMOTIONAL WELLNESS R&B

## 2025-08-16 RX ORDER — CLONAZEPAM 0.5 MG/1
0.5 TABLET ORAL 2 TIMES DAILY PRN
Status: DISCONTINUED | OUTPATIENT
Start: 2025-08-16 | End: 2025-08-18 | Stop reason: HOSPADM

## 2025-08-16 RX ADMIN — LITHIUM CARBONATE 300 MG: 300 CAPSULE ORAL at 08:52

## 2025-08-16 RX ADMIN — OXCARBAZEPINE 150 MG: 150 TABLET, FILM COATED ORAL at 08:52

## 2025-08-16 RX ADMIN — ACETAMINOPHEN 650 MG: 325 TABLET ORAL at 13:31

## 2025-08-16 RX ADMIN — OXCARBAZEPINE 150 MG: 150 TABLET, FILM COATED ORAL at 21:08

## 2025-08-16 RX ADMIN — CLONAZEPAM 0.5 MG: 0.5 TABLET ORAL at 13:28

## 2025-08-16 RX ADMIN — ACETAMINOPHEN 650 MG: 325 TABLET ORAL at 18:53

## 2025-08-16 RX ADMIN — LITHIUM CARBONATE 300 MG: 300 CAPSULE ORAL at 16:21

## 2025-08-16 RX ADMIN — Medication 5 MG: at 21:08

## 2025-08-16 ASSESSMENT — PAIN - FUNCTIONAL ASSESSMENT: PAIN_FUNCTIONAL_ASSESSMENT: 0-10

## 2025-08-16 ASSESSMENT — PAIN DESCRIPTION - LOCATION: LOCATION: HIP

## 2025-08-16 ASSESSMENT — PAIN DESCRIPTION - DESCRIPTORS: DESCRIPTORS: SHARP;SORE

## 2025-08-17 LAB
GLUCOSE BLD-MCNC: 86 MG/DL (ref 70–99)
PERFORMED ON: NORMAL

## 2025-08-17 PROCEDURE — 6370000000 HC RX 637 (ALT 250 FOR IP): Performed by: PSYCHIATRY & NEUROLOGY

## 2025-08-17 PROCEDURE — 1240000000 HC EMOTIONAL WELLNESS R&B

## 2025-08-17 RX ADMIN — CLONAZEPAM 0.5 MG: 0.5 TABLET ORAL at 07:57

## 2025-08-17 RX ADMIN — Medication 5 MG: at 20:48

## 2025-08-17 RX ADMIN — OXCARBAZEPINE 150 MG: 150 TABLET, FILM COATED ORAL at 20:48

## 2025-08-17 RX ADMIN — LITHIUM CARBONATE 300 MG: 300 CAPSULE ORAL at 16:12

## 2025-08-17 RX ADMIN — LITHIUM CARBONATE 300 MG: 300 CAPSULE ORAL at 07:56

## 2025-08-17 RX ADMIN — OXCARBAZEPINE 150 MG: 150 TABLET, FILM COATED ORAL at 07:57

## 2025-08-17 RX ADMIN — HYDROXYZINE PAMOATE 50 MG: 50 CAPSULE ORAL at 03:39

## 2025-08-17 RX ADMIN — CLONAZEPAM 0.5 MG: 0.5 TABLET ORAL at 17:11

## 2025-08-18 VITALS
HEIGHT: 63 IN | SYSTOLIC BLOOD PRESSURE: 98 MMHG | TEMPERATURE: 97.7 F | DIASTOLIC BLOOD PRESSURE: 63 MMHG | WEIGHT: 171 LBS | BODY MASS INDEX: 30.3 KG/M2 | OXYGEN SATURATION: 99 % | RESPIRATION RATE: 18 BRPM | HEART RATE: 74 BPM

## 2025-08-18 LAB
GLUCOSE BLD-MCNC: 78 MG/DL (ref 70–99)
PERFORMED ON: NORMAL

## 2025-08-18 PROCEDURE — 6370000000 HC RX 637 (ALT 250 FOR IP): Performed by: PSYCHIATRY & NEUROLOGY

## 2025-08-18 PROCEDURE — 99239 HOSP IP/OBS DSCHRG MGMT >30: CPT | Performed by: PSYCHIATRY & NEUROLOGY

## 2025-08-18 RX ORDER — LITHIUM CARBONATE 300 MG/1
300 CAPSULE ORAL 2 TIMES DAILY WITH MEALS
Qty: 30 CAPSULE | Refills: 3 | Status: SHIPPED | OUTPATIENT
Start: 2025-08-18

## 2025-08-18 RX ORDER — OXCARBAZEPINE 150 MG/1
150 TABLET, FILM COATED ORAL 2 TIMES DAILY
Qty: 30 TABLET | Refills: 3 | Status: SHIPPED | OUTPATIENT
Start: 2025-08-18

## 2025-08-18 RX ORDER — MECOBALAMIN 5000 MCG
5 TABLET,DISINTEGRATING ORAL NIGHTLY
Qty: 15 TABLET | Refills: 3 | Status: SHIPPED | OUTPATIENT
Start: 2025-08-18

## 2025-08-18 RX ADMIN — LITHIUM CARBONATE 300 MG: 300 CAPSULE ORAL at 08:13

## 2025-08-18 RX ADMIN — CLONAZEPAM 0.5 MG: 0.5 TABLET ORAL at 09:15

## 2025-08-18 RX ADMIN — OXCARBAZEPINE 150 MG: 150 TABLET, FILM COATED ORAL at 08:13

## 2025-08-28 ENCOUNTER — HOSPITAL ENCOUNTER (INPATIENT)
Age: 45
LOS: 5 days | Discharge: HOME | DRG: 753 | End: 2025-09-02
Attending: EMERGENCY MEDICINE | Admitting: PSYCHIATRY & NEUROLOGY
Payer: MEDICAID

## 2025-08-28 DIAGNOSIS — F32.A DEPRESSION WITH SUICIDAL IDEATION: Primary | ICD-10-CM

## 2025-08-28 DIAGNOSIS — R45.851 DEPRESSION WITH SUICIDAL IDEATION: Primary | ICD-10-CM

## 2025-08-28 LAB
ALBUMIN SERPL-MCNC: 3.8 G/DL (ref 3.5–4.6)
ALP SERPL-CCNC: 57 U/L (ref 40–130)
ALT SERPL-CCNC: 10 U/L (ref 0–33)
AMPHET UR QL SCN: ABNORMAL
ANION GAP SERPL CALCULATED.3IONS-SCNC: 7 MEQ/L (ref 9–15)
APAP SERPL-MCNC: <5 UG/ML (ref 10–30)
AST SERPL-CCNC: 11 U/L (ref 0–35)
BARBITURATES UR QL SCN: ABNORMAL
BASOPHILS # BLD: 0.1 K/UL (ref 0–0.2)
BASOPHILS NFR BLD: 0.6 %
BENZODIAZ UR QL SCN: ABNORMAL
BILIRUB SERPL-MCNC: 0.4 MG/DL (ref 0.2–0.7)
BILIRUB UR QL STRIP: NEGATIVE
BUN SERPL-MCNC: 13 MG/DL (ref 6–20)
CALCIUM SERPL-MCNC: 8.8 MG/DL (ref 8.5–9.9)
CANNABINOIDS UR QL SCN: POSITIVE
CHLORIDE SERPL-SCNC: 105 MEQ/L (ref 95–107)
CHOLEST SERPL-MCNC: 171 MG/DL (ref 0–199)
CK SERPL-CCNC: 56 U/L (ref 0–170)
CLARITY UR: CLEAR
CO2 SERPL-SCNC: 25 MEQ/L (ref 20–31)
COCAINE UR QL SCN: ABNORMAL
COLOR UR: YELLOW
CREAT SERPL-MCNC: 0.65 MG/DL (ref 0.5–0.9)
DRUG SCREEN COMMENT UR-IMP: ABNORMAL
EOSINOPHIL # BLD: 0.1 K/UL (ref 0–0.7)
EOSINOPHIL NFR BLD: 1 %
ERYTHROCYTE [DISTWIDTH] IN BLOOD BY AUTOMATED COUNT: 14.3 % (ref 11.5–14.5)
ESTIMATED AVERAGE GLUCOSE: 105 MG/DL
ETHANOL PERCENT: NORMAL G/DL
ETHANOLAMINE SERPL-MCNC: <10 MG/DL (ref 0–0.08)
FENTANYL SCREEN, URINE: ABNORMAL
GLOBULIN SER CALC-MCNC: 1.9 G/DL (ref 2.3–3.5)
GLUCOSE SERPL-MCNC: 90 MG/DL (ref 70–99)
GLUCOSE UR STRIP-MCNC: NEGATIVE MG/DL
HBA1C MFR BLD: 5.3 % (ref 4–6)
HCG UR QL: NEGATIVE
HCT VFR BLD AUTO: 41.4 % (ref 37–47)
HDLC SERPL-MCNC: 69 MG/DL (ref 40–59)
HGB BLD-MCNC: 13.6 G/DL (ref 12–16)
HGB UR QL STRIP: NEGATIVE
KETONES UR STRIP-MCNC: NEGATIVE MG/DL
LDLC SERPL CALC-MCNC: 81 MG/DL (ref 0–129)
LEUKOCYTE ESTERASE UR QL STRIP: NEGATIVE
LYMPHOCYTES # BLD: 2.3 K/UL (ref 1–4.8)
LYMPHOCYTES NFR BLD: 18.6 %
MCH RBC QN AUTO: 29.1 PG (ref 27–31.3)
MCHC RBC AUTO-ENTMCNC: 32.9 % (ref 33–37)
MCV RBC AUTO: 88.7 FL (ref 79.4–94.8)
METHADONE UR QL SCN: ABNORMAL
MONOCYTES # BLD: 0.5 K/UL (ref 0.2–0.8)
MONOCYTES NFR BLD: 4.2 %
NEUTROPHILS # BLD: 9.3 K/UL (ref 1.4–6.5)
NEUTS SEG NFR BLD: 75.1 %
NITRITE UR QL STRIP: NEGATIVE
OPIATES UR QL SCN: ABNORMAL
OXYCODONE UR QL SCN: ABNORMAL
PCP UR QL SCN: ABNORMAL
PH UR STRIP: 5 [PH] (ref 5–9)
PLATELET # BLD AUTO: 217 K/UL (ref 130–400)
POTASSIUM SERPL-SCNC: 4.1 MEQ/L (ref 3.4–4.9)
PROPOXYPH UR QL SCN: ABNORMAL
PROT SERPL-MCNC: 5.7 G/DL (ref 6.3–8)
PROT UR STRIP-MCNC: NEGATIVE MG/DL
RBC # BLD AUTO: 4.67 M/UL (ref 4.2–5.4)
SALICYLATES SERPL-MCNC: <0.3 MG/DL (ref 15–30)
SODIUM SERPL-SCNC: 137 MEQ/L (ref 135–144)
SP GR UR STRIP: 1.02 (ref 1–1.03)
TRIGL SERPL-MCNC: 104 MG/DL (ref 0–150)
TSH SERPL-MCNC: 1.65 UIU/ML (ref 0.44–3.86)
URINE REFLEX TO CULTURE: NORMAL
UROBILINOGEN UR STRIP-ACNC: 0.2 E.U./DL
WBC # BLD AUTO: 12.4 K/UL (ref 4.8–10.8)

## 2025-08-28 PROCEDURE — 90791 PSYCH DIAGNOSTIC EVALUATION: CPT

## 2025-08-28 PROCEDURE — 84443 ASSAY THYROID STIM HORMONE: CPT

## 2025-08-28 PROCEDURE — 36415 COLL VENOUS BLD VENIPUNCTURE: CPT

## 2025-08-28 PROCEDURE — 82077 ASSAY SPEC XCP UR&BREATH IA: CPT

## 2025-08-28 PROCEDURE — 80053 COMPREHEN METABOLIC PANEL: CPT

## 2025-08-28 PROCEDURE — 82550 ASSAY OF CK (CPK): CPT

## 2025-08-28 PROCEDURE — 85025 COMPLETE CBC W/AUTO DIFF WBC: CPT

## 2025-08-28 PROCEDURE — 1240000000 HC EMOTIONAL WELLNESS R&B

## 2025-08-28 PROCEDURE — 6370000000 HC RX 637 (ALT 250 FOR IP): Performed by: EMERGENCY MEDICINE

## 2025-08-28 PROCEDURE — 80179 DRUG ASSAY SALICYLATE: CPT

## 2025-08-28 PROCEDURE — 6370000000 HC RX 637 (ALT 250 FOR IP): Performed by: PSYCHIATRY & NEUROLOGY

## 2025-08-28 PROCEDURE — 80307 DRUG TEST PRSMV CHEM ANLYZR: CPT

## 2025-08-28 PROCEDURE — 99285 EMERGENCY DEPT VISIT HI MDM: CPT

## 2025-08-28 PROCEDURE — 84703 CHORIONIC GONADOTROPIN ASSAY: CPT

## 2025-08-28 PROCEDURE — 80061 LIPID PANEL: CPT

## 2025-08-28 PROCEDURE — 80143 DRUG ASSAY ACETAMINOPHEN: CPT

## 2025-08-28 PROCEDURE — 81003 URINALYSIS AUTO W/O SCOPE: CPT

## 2025-08-28 PROCEDURE — 83036 HEMOGLOBIN GLYCOSYLATED A1C: CPT

## 2025-08-28 RX ORDER — TRAZODONE HYDROCHLORIDE 50 MG/1
50 TABLET ORAL NIGHTLY PRN
Status: DISCONTINUED | OUTPATIENT
Start: 2025-08-28 | End: 2025-09-02 | Stop reason: HOSPADM

## 2025-08-28 RX ORDER — POLYETHYLENE GLYCOL 3350 17 G
2 POWDER IN PACKET (EA) ORAL ONCE
Status: COMPLETED | OUTPATIENT
Start: 2025-08-28 | End: 2025-08-28

## 2025-08-28 RX ORDER — HALOPERIDOL 5 MG/1
5 TABLET ORAL EVERY 4 HOURS PRN
Status: DISCONTINUED | OUTPATIENT
Start: 2025-08-28 | End: 2025-09-02 | Stop reason: HOSPADM

## 2025-08-28 RX ORDER — POLYETHYLENE GLYCOL 3350 17 G/17G
17 POWDER, FOR SOLUTION ORAL DAILY PRN
Status: DISCONTINUED | OUTPATIENT
Start: 2025-08-28 | End: 2025-09-02 | Stop reason: HOSPADM

## 2025-08-28 RX ORDER — LORAZEPAM 2 MG/1
2 TABLET ORAL ONCE
Status: COMPLETED | OUTPATIENT
Start: 2025-08-28 | End: 2025-08-28

## 2025-08-28 RX ORDER — MAGNESIUM HYDROXIDE/ALUMINUM HYDROXICE/SIMETHICONE 120; 1200; 1200 MG/30ML; MG/30ML; MG/30ML
30 SUSPENSION ORAL EVERY 6 HOURS PRN
Status: DISCONTINUED | OUTPATIENT
Start: 2025-08-28 | End: 2025-09-02 | Stop reason: HOSPADM

## 2025-08-28 RX ORDER — POLYETHYLENE GLYCOL 3350 17 G
2 POWDER IN PACKET (EA) ORAL
Status: DISCONTINUED | OUTPATIENT
Start: 2025-08-28 | End: 2025-08-30

## 2025-08-28 RX ORDER — ACETAMINOPHEN 325 MG/1
650 TABLET ORAL EVERY 4 HOURS PRN
Status: DISCONTINUED | OUTPATIENT
Start: 2025-08-28 | End: 2025-09-02 | Stop reason: HOSPADM

## 2025-08-28 RX ORDER — ENOXAPARIN SODIUM 100 MG/ML
40 INJECTION SUBCUTANEOUS DAILY
Status: DISCONTINUED | OUTPATIENT
Start: 2025-08-28 | End: 2025-08-29

## 2025-08-28 RX ORDER — HYDROXYZINE HYDROCHLORIDE 25 MG/1
50 TABLET, FILM COATED ORAL 3 TIMES DAILY PRN
Status: DISCONTINUED | OUTPATIENT
Start: 2025-08-28 | End: 2025-09-02 | Stop reason: HOSPADM

## 2025-08-28 RX ORDER — HALOPERIDOL 5 MG/ML
5 INJECTION INTRAMUSCULAR EVERY 4 HOURS PRN
Status: DISCONTINUED | OUTPATIENT
Start: 2025-08-28 | End: 2025-09-02 | Stop reason: HOSPADM

## 2025-08-28 RX ADMIN — LORAZEPAM 2 MG: 2 TABLET ORAL at 15:25

## 2025-08-28 RX ADMIN — TRAZODONE HYDROCHLORIDE 50 MG: 50 TABLET ORAL at 21:05

## 2025-08-28 RX ADMIN — NICOTINE POLACRILEX 2 MG: 2 LOZENGE ORAL at 15:27

## 2025-08-28 RX ADMIN — HYDROXYZINE HYDROCHLORIDE 50 MG: 25 TABLET ORAL at 21:05

## 2025-08-28 RX ADMIN — NICOTINE POLACRILEX 2 MG: 2 LOZENGE ORAL at 21:05

## 2025-08-28 ASSESSMENT — SLEEP AND FATIGUE QUESTIONNAIRES
DO YOU HAVE DIFFICULTY SLEEPING: YES
SLEEP PATTERN: DIFFICULTY FALLING ASLEEP;DISTURBED/INTERRUPTED SLEEP
AVERAGE NUMBER OF SLEEP HOURS: 3
DO YOU USE A SLEEP AID: YES

## 2025-08-28 ASSESSMENT — PATIENT HEALTH QUESTIONNAIRE - PHQ9
SUM OF ALL RESPONSES TO PHQ QUESTIONS 1-9: 20
7. TROUBLE CONCENTRATING ON THINGS, SUCH AS READING THE NEWSPAPER OR WATCHING TELEVISION: MORE THAN HALF THE DAYS
1. LITTLE INTEREST OR PLEASURE IN DOING THINGS: NEARLY EVERY DAY
10. IF YOU CHECKED OFF ANY PROBLEMS, HOW DIFFICULT HAVE THESE PROBLEMS MADE IT FOR YOU TO DO YOUR WORK, TAKE CARE OF THINGS AT HOME, OR GET ALONG WITH OTHER PEOPLE: VERY DIFFICULT
2. FEELING DOWN, DEPRESSED OR HOPELESS: MORE THAN HALF THE DAYS
3. TROUBLE FALLING OR STAYING ASLEEP: NEARLY EVERY DAY
8. MOVING OR SPEAKING SO SLOWLY THAT OTHER PEOPLE COULD HAVE NOTICED. OR THE OPPOSITE, BEING SO FIGETY OR RESTLESS THAT YOU HAVE BEEN MOVING AROUND A LOT MORE THAN USUAL: MORE THAN HALF THE DAYS
SUM OF ALL RESPONSES TO PHQ QUESTIONS 1-9: 22
4. FEELING TIRED OR HAVING LITTLE ENERGY: NEARLY EVERY DAY
5. POOR APPETITE OR OVEREATING: MORE THAN HALF THE DAYS
SUM OF ALL RESPONSES TO PHQ QUESTIONS 1-9: 22
6. FEELING BAD ABOUT YOURSELF - OR THAT YOU ARE A FAILURE OR HAVE LET YOURSELF OR YOUR FAMILY DOWN: NEARLY EVERY DAY
SUM OF ALL RESPONSES TO PHQ QUESTIONS 1-9: 22
9. THOUGHTS THAT YOU WOULD BE BETTER OFF DEAD, OR OF HURTING YOURSELF: MORE THAN HALF THE DAYS

## 2025-08-28 ASSESSMENT — PAIN - FUNCTIONAL ASSESSMENT: PAIN_FUNCTIONAL_ASSESSMENT: 0-10

## 2025-08-29 PROCEDURE — 1240000000 HC EMOTIONAL WELLNESS R&B

## 2025-08-29 PROCEDURE — 6370000000 HC RX 637 (ALT 250 FOR IP): Performed by: PSYCHIATRY & NEUROLOGY

## 2025-08-29 PROCEDURE — 99222 1ST HOSP IP/OBS MODERATE 55: CPT | Performed by: PSYCHIATRY & NEUROLOGY

## 2025-08-29 RX ORDER — GLUCAGON 1 MG/ML
1 KIT INJECTION PRN
Status: DISCONTINUED | OUTPATIENT
Start: 2025-08-29 | End: 2025-09-02 | Stop reason: HOSPADM

## 2025-08-29 RX ORDER — LITHIUM CARBONATE 300 MG/1
300 CAPSULE ORAL 2 TIMES DAILY WITH MEALS
Status: DISCONTINUED | OUTPATIENT
Start: 2025-08-29 | End: 2025-09-02 | Stop reason: HOSPADM

## 2025-08-29 RX ORDER — OXCARBAZEPINE 300 MG/1
300 TABLET, FILM COATED ORAL 2 TIMES DAILY
Status: DISCONTINUED | OUTPATIENT
Start: 2025-08-29 | End: 2025-09-02 | Stop reason: HOSPADM

## 2025-08-29 RX ORDER — CLONAZEPAM 0.5 MG/1
0.5 TABLET ORAL EVERY 12 HOURS PRN
Status: DISCONTINUED | OUTPATIENT
Start: 2025-08-29 | End: 2025-09-02 | Stop reason: HOSPADM

## 2025-08-29 RX ORDER — OXCARBAZEPINE 150 MG/1
150 TABLET, FILM COATED ORAL 2 TIMES DAILY
Status: DISCONTINUED | OUTPATIENT
Start: 2025-08-29 | End: 2025-08-29

## 2025-08-29 RX ORDER — DEXTROSE MONOHYDRATE 100 MG/ML
INJECTION, SOLUTION INTRAVENOUS CONTINUOUS PRN
Status: DISCONTINUED | OUTPATIENT
Start: 2025-08-29 | End: 2025-09-02 | Stop reason: HOSPADM

## 2025-08-29 RX ORDER — MECOBALAMIN 5000 MCG
5 TABLET,DISINTEGRATING ORAL NIGHTLY
Status: DISCONTINUED | OUTPATIENT
Start: 2025-08-29 | End: 2025-09-02 | Stop reason: HOSPADM

## 2025-08-29 RX ADMIN — OXCARBAZEPINE 300 MG: 300 TABLET, FILM COATED ORAL at 21:16

## 2025-08-29 RX ADMIN — LITHIUM CARBONATE 300 MG: 300 CAPSULE ORAL at 15:57

## 2025-08-29 RX ADMIN — CLONAZEPAM 0.5 MG: 0.5 TABLET ORAL at 21:16

## 2025-08-29 RX ADMIN — NICOTINE POLACRILEX 2 MG: 2 LOZENGE ORAL at 14:50

## 2025-08-29 RX ADMIN — NICOTINE POLACRILEX 2 MG: 2 LOZENGE ORAL at 11:38

## 2025-08-29 RX ADMIN — NICOTINE POLACRILEX 2 MG: 2 LOZENGE ORAL at 21:16

## 2025-08-29 RX ADMIN — HYDROXYZINE HYDROCHLORIDE 50 MG: 25 TABLET ORAL at 08:47

## 2025-08-29 RX ADMIN — HYDROXYZINE HYDROCHLORIDE 50 MG: 25 TABLET ORAL at 15:18

## 2025-08-29 RX ADMIN — LITHIUM CARBONATE 300 MG: 300 CAPSULE ORAL at 09:33

## 2025-08-29 RX ADMIN — HALOPERIDOL 5 MG: 5 TABLET ORAL at 16:26

## 2025-08-29 RX ADMIN — NICOTINE POLACRILEX 2 MG: 2 LOZENGE ORAL at 08:06

## 2025-08-29 RX ADMIN — OXCARBAZEPINE 300 MG: 300 TABLET, FILM COATED ORAL at 09:33

## 2025-08-29 RX ADMIN — Medication 5 MG: at 21:16

## 2025-08-29 ASSESSMENT — PATIENT HEALTH QUESTIONNAIRE - PHQ9
SUM OF ALL RESPONSES TO PHQ QUESTIONS 1-9: 26
2. FEELING DOWN, DEPRESSED OR HOPELESS: NEARLY EVERY DAY
3. TROUBLE FALLING OR STAYING ASLEEP: NEARLY EVERY DAY
4. FEELING TIRED OR HAVING LITTLE ENERGY: NEARLY EVERY DAY
SUM OF ALL RESPONSES TO PHQ QUESTIONS 1-9: 23
SUM OF ALL RESPONSES TO PHQ QUESTIONS 1-9: 26
7. TROUBLE CONCENTRATING ON THINGS, SUCH AS READING THE NEWSPAPER OR WATCHING TELEVISION: NEARLY EVERY DAY
SUM OF ALL RESPONSES TO PHQ QUESTIONS 1-9: 26
8. MOVING OR SPEAKING SO SLOWLY THAT OTHER PEOPLE COULD HAVE NOTICED. OR THE OPPOSITE, BEING SO FIGETY OR RESTLESS THAT YOU HAVE BEEN MOVING AROUND A LOT MORE THAN USUAL: MORE THAN HALF THE DAYS
10. IF YOU CHECKED OFF ANY PROBLEMS, HOW DIFFICULT HAVE THESE PROBLEMS MADE IT FOR YOU TO DO YOUR WORK, TAKE CARE OF THINGS AT HOME, OR GET ALONG WITH OTHER PEOPLE: VERY DIFFICULT
9. THOUGHTS THAT YOU WOULD BE BETTER OFF DEAD, OR OF HURTING YOURSELF: NEARLY EVERY DAY
6. FEELING BAD ABOUT YOURSELF - OR THAT YOU ARE A FAILURE OR HAVE LET YOURSELF OR YOUR FAMILY DOWN: NEARLY EVERY DAY
1. LITTLE INTEREST OR PLEASURE IN DOING THINGS: NEARLY EVERY DAY
5. POOR APPETITE OR OVEREATING: NEARLY EVERY DAY

## 2025-08-29 ASSESSMENT — LIFESTYLE VARIABLES
HOW OFTEN DO YOU HAVE A DRINK CONTAINING ALCOHOL: 2-4 TIMES A MONTH
HOW MANY STANDARD DRINKS CONTAINING ALCOHOL DO YOU HAVE ON A TYPICAL DAY: 1 OR 2

## 2025-08-29 ASSESSMENT — PAIN SCALES - GENERAL: PAINLEVEL_OUTOF10: 0

## 2025-08-30 LAB
GLUCOSE BLD-MCNC: 110 MG/DL (ref 70–99)
PERFORMED ON: ABNORMAL

## 2025-08-30 PROCEDURE — 6370000000 HC RX 637 (ALT 250 FOR IP): Performed by: PSYCHIATRY & NEUROLOGY

## 2025-08-30 PROCEDURE — 99232 SBSQ HOSP IP/OBS MODERATE 35: CPT | Performed by: PSYCHIATRY & NEUROLOGY

## 2025-08-30 PROCEDURE — 1240000000 HC EMOTIONAL WELLNESS R&B

## 2025-08-30 RX ORDER — NICOTINE 21 MG/24HR
1 PATCH, TRANSDERMAL 24 HOURS TRANSDERMAL DAILY
Status: DISCONTINUED | OUTPATIENT
Start: 2025-08-30 | End: 2025-09-01

## 2025-08-30 RX ORDER — QUETIAPINE FUMARATE 50 MG/1
50 TABLET, FILM COATED ORAL NIGHTLY
Status: DISCONTINUED | OUTPATIENT
Start: 2025-08-30 | End: 2025-09-02 | Stop reason: HOSPADM

## 2025-08-30 RX ORDER — QUETIAPINE FUMARATE 25 MG/1
25 TABLET, FILM COATED ORAL 2 TIMES DAILY
Status: DISCONTINUED | OUTPATIENT
Start: 2025-08-30 | End: 2025-09-02 | Stop reason: HOSPADM

## 2025-08-30 RX ADMIN — Medication 5 MG: at 20:57

## 2025-08-30 RX ADMIN — LITHIUM CARBONATE 300 MG: 300 CAPSULE ORAL at 16:58

## 2025-08-30 RX ADMIN — CLONAZEPAM 0.5 MG: 0.5 TABLET ORAL at 07:57

## 2025-08-30 RX ADMIN — CLONAZEPAM 0.5 MG: 0.5 TABLET ORAL at 21:05

## 2025-08-30 RX ADMIN — OXCARBAZEPINE 300 MG: 300 TABLET, FILM COATED ORAL at 20:57

## 2025-08-30 RX ADMIN — OXCARBAZEPINE 300 MG: 300 TABLET, FILM COATED ORAL at 07:57

## 2025-08-30 RX ADMIN — HYDROXYZINE HYDROCHLORIDE 50 MG: 25 TABLET ORAL at 07:13

## 2025-08-30 RX ADMIN — LITHIUM CARBONATE 300 MG: 300 CAPSULE ORAL at 07:57

## 2025-08-30 RX ADMIN — QUETIAPINE FUMARATE 25 MG: 25 TABLET ORAL at 13:15

## 2025-08-30 RX ADMIN — QUETIAPINE FUMARATE 50 MG: 50 TABLET ORAL at 20:58

## 2025-08-30 RX ADMIN — ACETAMINOPHEN 650 MG: 325 TABLET ORAL at 07:56

## 2025-08-30 RX ADMIN — NICOTINE POLACRILEX 2 MG: 2 LOZENGE ORAL at 07:13

## 2025-08-30 RX ADMIN — TRAZODONE HYDROCHLORIDE 50 MG: 50 TABLET ORAL at 20:57

## 2025-08-30 ASSESSMENT — PAIN DESCRIPTION - LOCATION: LOCATION: BACK

## 2025-08-30 ASSESSMENT — PAIN DESCRIPTION - DESCRIPTORS: DESCRIPTORS: ACHING;SPASM

## 2025-08-30 ASSESSMENT — PAIN SCALES - GENERAL
PAINLEVEL_OUTOF10: 6
PAINLEVEL_OUTOF10: 2

## 2025-08-30 ASSESSMENT — PAIN - FUNCTIONAL ASSESSMENT: PAIN_FUNCTIONAL_ASSESSMENT: 0-10

## 2025-08-31 LAB
GLUCOSE BLD-MCNC: 102 MG/DL (ref 70–99)
PERFORMED ON: ABNORMAL

## 2025-08-31 PROCEDURE — 99232 SBSQ HOSP IP/OBS MODERATE 35: CPT | Performed by: PSYCHIATRY & NEUROLOGY

## 2025-08-31 PROCEDURE — 6370000000 HC RX 637 (ALT 250 FOR IP): Performed by: PSYCHIATRY & NEUROLOGY

## 2025-08-31 PROCEDURE — 6360000002 HC RX W HCPCS: Performed by: PSYCHIATRY & NEUROLOGY

## 2025-08-31 PROCEDURE — 1240000000 HC EMOTIONAL WELLNESS R&B

## 2025-08-31 RX ADMIN — POLYETHYLENE GLYCOL 3350 17 G: 17 POWDER, FOR SOLUTION ORAL at 18:29

## 2025-08-31 RX ADMIN — CLONAZEPAM 0.5 MG: 0.5 TABLET ORAL at 21:19

## 2025-08-31 RX ADMIN — OXCARBAZEPINE 300 MG: 300 TABLET, FILM COATED ORAL at 07:37

## 2025-08-31 RX ADMIN — LITHIUM CARBONATE 300 MG: 300 CAPSULE ORAL at 16:28

## 2025-08-31 RX ADMIN — Medication 5 MG: at 21:19

## 2025-08-31 RX ADMIN — QUETIAPINE FUMARATE 50 MG: 50 TABLET ORAL at 21:19

## 2025-08-31 RX ADMIN — LITHIUM CARBONATE 300 MG: 300 CAPSULE ORAL at 07:37

## 2025-08-31 RX ADMIN — HYDROXYZINE HYDROCHLORIDE 50 MG: 25 TABLET ORAL at 16:36

## 2025-08-31 RX ADMIN — QUETIAPINE FUMARATE 25 MG: 25 TABLET ORAL at 13:23

## 2025-08-31 RX ADMIN — QUETIAPINE FUMARATE 25 MG: 25 TABLET ORAL at 07:37

## 2025-08-31 RX ADMIN — HALOPERIDOL LACTATE 5 MG: 5 INJECTION, SOLUTION INTRAMUSCULAR at 07:40

## 2025-08-31 RX ADMIN — CLONAZEPAM 0.5 MG: 0.5 TABLET ORAL at 07:37

## 2025-08-31 RX ADMIN — OXCARBAZEPINE 300 MG: 300 TABLET, FILM COATED ORAL at 21:19

## 2025-09-01 LAB
GLUCOSE BLD-MCNC: 103 MG/DL (ref 70–99)
PERFORMED ON: ABNORMAL

## 2025-09-01 PROCEDURE — 6370000000 HC RX 637 (ALT 250 FOR IP): Performed by: PSYCHIATRY & NEUROLOGY

## 2025-09-01 PROCEDURE — 6370000000 HC RX 637 (ALT 250 FOR IP): Performed by: NURSE PRACTITIONER

## 2025-09-01 PROCEDURE — 1240000000 HC EMOTIONAL WELLNESS R&B

## 2025-09-01 RX ADMIN — OXCARBAZEPINE 300 MG: 300 TABLET, FILM COATED ORAL at 21:13

## 2025-09-01 RX ADMIN — NICOTINE POLACRILEX 4 MG: 4 LOZENGE ORAL at 11:33

## 2025-09-01 RX ADMIN — CLONAZEPAM 0.5 MG: 0.5 TABLET ORAL at 21:13

## 2025-09-01 RX ADMIN — Medication 5 MG: at 21:13

## 2025-09-01 RX ADMIN — LITHIUM CARBONATE 300 MG: 300 CAPSULE ORAL at 16:16

## 2025-09-01 RX ADMIN — QUETIAPINE FUMARATE 25 MG: 25 TABLET ORAL at 08:50

## 2025-09-01 RX ADMIN — OXCARBAZEPINE 300 MG: 300 TABLET, FILM COATED ORAL at 08:50

## 2025-09-01 RX ADMIN — LITHIUM CARBONATE 300 MG: 300 CAPSULE ORAL at 08:50

## 2025-09-01 RX ADMIN — CLONAZEPAM 0.5 MG: 0.5 TABLET ORAL at 08:50

## 2025-09-01 RX ADMIN — QUETIAPINE FUMARATE 50 MG: 50 TABLET ORAL at 21:13

## 2025-09-01 RX ADMIN — HYDROXYZINE HYDROCHLORIDE 50 MG: 25 TABLET ORAL at 17:15

## 2025-09-01 RX ADMIN — NICOTINE POLACRILEX 4 MG: 4 LOZENGE ORAL at 18:11

## 2025-09-01 RX ADMIN — NICOTINE POLACRILEX 4 MG: 4 LOZENGE ORAL at 16:16

## 2025-09-01 RX ADMIN — QUETIAPINE FUMARATE 25 MG: 25 TABLET ORAL at 14:16

## 2025-09-01 RX ADMIN — NICOTINE POLACRILEX 4 MG: 4 LOZENGE ORAL at 20:30

## 2025-09-01 RX ADMIN — NICOTINE POLACRILEX 4 MG: 4 LOZENGE ORAL at 14:16

## 2025-09-01 RX ADMIN — NICOTINE POLACRILEX 4 MG: 4 LOZENGE ORAL at 08:50

## 2025-09-02 VITALS
WEIGHT: 182.2 LBS | TEMPERATURE: 97.7 F | HEART RATE: 76 BPM | OXYGEN SATURATION: 100 % | HEIGHT: 63 IN | SYSTOLIC BLOOD PRESSURE: 110 MMHG | BODY MASS INDEX: 32.28 KG/M2 | DIASTOLIC BLOOD PRESSURE: 75 MMHG | RESPIRATION RATE: 18 BRPM

## 2025-09-02 PROCEDURE — 6370000000 HC RX 637 (ALT 250 FOR IP): Performed by: NURSE PRACTITIONER

## 2025-09-02 PROCEDURE — 6370000000 HC RX 637 (ALT 250 FOR IP): Performed by: PSYCHIATRY & NEUROLOGY

## 2025-09-02 PROCEDURE — 99239 HOSP IP/OBS DSCHRG MGMT >30: CPT | Performed by: PSYCHIATRY & NEUROLOGY

## 2025-09-02 RX ORDER — OXCARBAZEPINE 300 MG/1
300 TABLET, FILM COATED ORAL 2 TIMES DAILY
Qty: 30 TABLET | Refills: 3 | Status: SHIPPED | OUTPATIENT
Start: 2025-09-02

## 2025-09-02 RX ORDER — LITHIUM CARBONATE 300 MG/1
300 CAPSULE ORAL 2 TIMES DAILY WITH MEALS
Qty: 30 CAPSULE | Refills: 3 | Status: SHIPPED | OUTPATIENT
Start: 2025-09-02

## 2025-09-02 RX ORDER — QUETIAPINE FUMARATE 25 MG/1
25 TABLET, FILM COATED ORAL 2 TIMES DAILY
Qty: 30 TABLET | Refills: 3 | Status: SHIPPED | OUTPATIENT
Start: 2025-09-02

## 2025-09-02 RX ORDER — QUETIAPINE FUMARATE 50 MG/1
50 TABLET, FILM COATED ORAL NIGHTLY
Qty: 15 TABLET | Refills: 3 | Status: SHIPPED | OUTPATIENT
Start: 2025-09-02

## 2025-09-02 RX ORDER — HYDROXYZINE HYDROCHLORIDE 50 MG/1
50 TABLET, FILM COATED ORAL 2 TIMES DAILY PRN
Qty: 20 TABLET | Refills: 1 | Status: SHIPPED | OUTPATIENT
Start: 2025-09-02 | End: 2025-09-22

## 2025-09-02 RX ADMIN — LITHIUM CARBONATE 300 MG: 300 CAPSULE ORAL at 08:14

## 2025-09-02 RX ADMIN — CLONAZEPAM 0.5 MG: 0.5 TABLET ORAL at 09:37

## 2025-09-02 RX ADMIN — NICOTINE POLACRILEX 4 MG: 4 LOZENGE ORAL at 08:14

## 2025-09-02 RX ADMIN — OXCARBAZEPINE 300 MG: 300 TABLET, FILM COATED ORAL at 08:14

## 2025-09-02 RX ADMIN — QUETIAPINE FUMARATE 25 MG: 25 TABLET ORAL at 08:14

## 2025-09-03 ENCOUNTER — TELEPHONE (OUTPATIENT)
Age: 45
End: 2025-09-03

## (undated) DEVICE — SLEEVE CMPR SM STD CALF SCD ANEMB LF

## (undated) DEVICE — Device: Brand: ENDO SMARTCAP

## (undated) DEVICE — CONMED SCOPE SAVER BITE BLOCK, 20X27 MM: Brand: SCOPE SAVER

## (undated) DEVICE — ENDO CARRY-ON PROCEDURE KIT: Brand: ENDO CARRY-ON PROCEDURE KIT

## (undated) DEVICE — TUBING, SUCTION, 1/4" X 10', STRAIGHT: Brand: MEDLINE

## (undated) DEVICE — BINDER ABD 4 PANEL UNIV 12 IN 72-84 IN LINING COTTON DISP

## (undated) DEVICE — JACKSON-PRATT 100CC BULB RESERVOIR: Brand: CARDINAL HEALTH

## (undated) DEVICE — PAD,ABDOMINAL,8"X10",ST,LF: Brand: MEDLINE

## (undated) DEVICE — DRAPE SURG C-ARM MOBILE XRAY LF

## (undated) DEVICE — ELECTRODE PT RET AD L9FT HI MOIST COND ADH HYDRGEL CORDED

## (undated) DEVICE — FORCEPS BX L240CM JAW DIA2.8MM L CAP W/ NDL MIC MESH TOOTH

## (undated) DEVICE — TROCAR: Brand: KII FIOS FIRST ENTRY

## (undated) DEVICE — APPLIER CLP L SHFT DIA12MM 20 ROT MULT LIGACLP

## (undated) DEVICE — PLUMEPORT LAPAROSCOPIC SMOKE FILTRATION DEVICE: Brand: PLUMEPORT ACTIV

## (undated) DEVICE — INTENDED FOR TISSUE SEPARATION, AND OTHER PROCEDURES THAT REQUIRE A SHARP SURGICAL BLADE TO PUNCTURE OR CUT.: Brand: BARD-PARKER ® CARBON RIB-BACK BLADES

## (undated) DEVICE — STERILE LATEX POWDER-FREE SURGICAL GLOVESWITH NITRILE COATING: Brand: PROTEXIS

## (undated) DEVICE — DRAPE,LAP,CHOLE,W/TROUGHS,STERILE: Brand: MEDLINE

## (undated) DEVICE — 1842 FOAM BLOCK NEEDLE COUNTER: Brand: DEVON

## (undated) DEVICE — SYRINGE MED 10ML TRNSLUC BRL PLUNG BLK MRK POLYPR CTRL

## (undated) DEVICE — ADAPTER FLSH PMP FLD MGMT GI IRRIG OFP 2 DISPOSABLE

## (undated) DEVICE — ENDOSCOPIC TRAY TRNSPRT 20.5X16.5X4.1 IN RECYCL SUGAR PULP

## (undated) DEVICE — 2000CC GUARDIAN II: Brand: GUARDIAN

## (undated) DEVICE — SINGLE PORT MANIFOLD: Brand: NEPTUNE 2

## (undated) DEVICE — BW-412T DISP COMBO CLEANING BRUSH: Brand: SINGLE USE COMBINATION CLEANING BRUSH

## (undated) DEVICE — SYRINGE IRRIG 60ML SFT PLIABLE BLB EZ TO GRP 1 HND USE W/

## (undated) DEVICE — GLOVE SURG SZ 85 STD WHT LTX SYN POLYMER BEAD REINF ANTI RL

## (undated) DEVICE — LABEL MED MINI W/ MARKER

## (undated) DEVICE — TOWEL,OR,DSP,ST,BLUE,STD,4/PK,20PK/CS: Brand: MEDLINE

## (undated) DEVICE — SUTURE MCRYL SZ 4-0 L27IN ABSRB UD L19MM PS-2 1/2 CIR PRIM Y426H

## (undated) DEVICE — FLEXIBLE ADHESIVE BANDAGE,X-LARGE: Brand: CURITY

## (undated) DEVICE — YANKAUER,BULB TIP,W/O VENT,RIGID,STERILE: Brand: MEDLINE

## (undated) DEVICE — TUBE SET 96 MM 64 MM H2O PERISTALTIC STD AUX CHANNEL

## (undated) DEVICE — TROCAR: Brand: KII® SLEEVE

## (undated) DEVICE — GLOVE ORANGE PI 7 1/2   MSG9075

## (undated) DEVICE — PACK,SET UP,DRAPE: Brand: MEDLINE

## (undated) DEVICE — GOWN,AURORA,NONREINFORCED,LARGE: Brand: MEDLINE

## (undated) DEVICE — GOWN,PREVENTION PLUS,XLN/XL,ST,24/CS: Brand: MEDLINE

## (undated) DEVICE — COUNTER NDL 40 COUNT HLD 70 FOAM BLK ADH W/ MAG

## (undated) DEVICE — COVER,MAYO STAND,STERILE: Brand: MEDLINE

## (undated) DEVICE — BRUSH ENDO CLN L90.5IN SHTH DIA1.7MM BRIST DIA5-7MM 2-6MM

## (undated) DEVICE — 1200CC COLLECTION UNIT 6MM X 6' PATIENT: Brand: MEDI-VAC

## (undated) DEVICE — GOWN,SIRUS,POLYRNF,BRTHSLV,XLN/XL,20/CS: Brand: MEDLINE

## (undated) DEVICE — 35 ML SYRINGE LUER-LOCK TIP: Brand: MONOJECT

## (undated) DEVICE — SUTURE VCRL + SZ 3-0 L27IN ABSRB UD L26MM SH 1/2 CIR VCP416H

## (undated) DEVICE — SUTURE PROL SZ 3-0 L30IN NONABSORBABLE BLU L30MM FS-1 3/8 8675H

## (undated) DEVICE — SONY PRINTER PAPER

## (undated) DEVICE — KIT CHOLGM POLYUR W/ KARLAN BLLN CATH 4FR L60CM 5MM INTRO

## (undated) DEVICE — DEFOGGER!" ANTI FOG KIT: Brand: DEROYAL

## (undated) DEVICE — NEEDLE INSUF L120MM ULT VERES ENDOPATH

## (undated) DEVICE — CONVERTED USE 291618 SPONGE LAPAROTOMY POCKET POUCH RING 18

## (undated) DEVICE — GAUZE,SPONGE,FLUFF,6"X6.75",STRL,10/TRAY: Brand: MEDLINE

## (undated) DEVICE — DRAIN JACKSON PRATT ROUND 15FR: Brand: CARDINAL HEALTH

## (undated) DEVICE — TROCAR: Brand: KII SHIELDED BLADED ACCESS SYSTEM

## (undated) DEVICE — SPONGE,LAP,18"X18",DLX,XR,ST,5/PK,40/PK: Brand: MEDLINE

## (undated) DEVICE — NEPTUNE E-SEP SMOKE EVACUATION PENCIL, COATED, 70MM BLADE, PUSH BUTTON SWITCH: Brand: NEPTUNE E-SEP

## (undated) DEVICE — ENDO CARRY-ON PROCEDURE KIT INCLUDES LUBRICANT, DEFENDO OLYMPUS AIR, WATER, SUCTION, BIOPSY VALVE KIT, ENZYMATIC SPONGE, AND BASIN.: Brand: ENDO CARRY-ON PROCEDURE KIT

## (undated) DEVICE — CHLORAPREP 26ML ORANGE

## (undated) DEVICE — WARMER LAPSCP BST 2 STG STRL DISP HEAT BLU

## (undated) DEVICE — INSUFFLATION TUBING SET, WITH FILTER: Brand: CONMED

## (undated) DEVICE — TRAY PREP DRY W/ PREM GLV 2 APPL 6 SPNG 2 UNDPD 1 OVERWRAP

## (undated) DEVICE — Device